# Patient Record
Sex: FEMALE | Race: WHITE | NOT HISPANIC OR LATINO | Employment: OTHER | URBAN - METROPOLITAN AREA
[De-identification: names, ages, dates, MRNs, and addresses within clinical notes are randomized per-mention and may not be internally consistent; named-entity substitution may affect disease eponyms.]

---

## 2017-02-27 ENCOUNTER — TELEPHONE (OUTPATIENT)
Dept: INTERNAL MEDICINE | Facility: CLINIC | Age: 77
End: 2017-02-27

## 2017-02-27 DIAGNOSIS — E78.5 HYPERLIPIDEMIA, UNSPECIFIED HYPERLIPIDEMIA TYPE: ICD-10-CM

## 2017-02-27 DIAGNOSIS — I10 ESSENTIAL HYPERTENSION: Primary | ICD-10-CM

## 2017-02-27 NOTE — TELEPHONE ENCOUNTER
----- Message from Leyla Wilcox sent at 2/27/2017  3:43 PM CST -----  Contact: self   Doctor appointment and lab have been scheduled.  Please link lab orders to the lab appointment.  Date of doctor appointment:  06/29  Physical or EP:  EPP  Date of lab appointment: 06/22

## 2017-04-03 ENCOUNTER — HOSPITAL ENCOUNTER (OUTPATIENT)
Dept: PULMONOLOGY | Facility: CLINIC | Age: 77
Discharge: HOME | End: 2017-04-03
Payer: MEDICARE

## 2017-04-03 ENCOUNTER — OFFICE VISIT (OUTPATIENT)
Dept: TRANSPLANT | Facility: CLINIC | Age: 77
End: 2017-04-03
Payer: MEDICARE

## 2017-04-03 VITALS
HEIGHT: 63 IN | BODY MASS INDEX: 28.13 KG/M2 | SYSTOLIC BLOOD PRESSURE: 156 MMHG | DIASTOLIC BLOOD PRESSURE: 84 MMHG | HEART RATE: 64 BPM | WEIGHT: 158.75 LBS

## 2017-04-03 VITALS — HEIGHT: 63 IN | WEIGHT: 158.31 LBS | BODY MASS INDEX: 28.05 KG/M2

## 2017-04-03 DIAGNOSIS — I10 ESSENTIAL HYPERTENSION: ICD-10-CM

## 2017-04-03 DIAGNOSIS — G47.33 OSA (OBSTRUCTIVE SLEEP APNEA): ICD-10-CM

## 2017-04-03 DIAGNOSIS — I25.10 CORONARY ARTERY DISEASE INVOLVING NATIVE CORONARY ARTERY OF NATIVE HEART WITHOUT ANGINA PECTORIS: ICD-10-CM

## 2017-04-03 DIAGNOSIS — E78.5 HYPERLIPIDEMIA, UNSPECIFIED HYPERLIPIDEMIA TYPE: ICD-10-CM

## 2017-04-03 DIAGNOSIS — I27.20 PULMONARY HYPERTENSION: Primary | ICD-10-CM

## 2017-04-03 PROCEDURE — 94620 PR PULMONARY STRESS TESTING,SIMPLE: CPT | Mod: S$GLB,,, | Performed by: INTERNAL MEDICINE

## 2017-04-03 PROCEDURE — 3079F DIAST BP 80-89 MM HG: CPT | Mod: S$GLB,,, | Performed by: INTERNAL MEDICINE

## 2017-04-03 PROCEDURE — 3077F SYST BP >= 140 MM HG: CPT | Mod: S$GLB,,, | Performed by: INTERNAL MEDICINE

## 2017-04-03 PROCEDURE — 1159F MED LIST DOCD IN RCRD: CPT | Mod: S$GLB,,, | Performed by: INTERNAL MEDICINE

## 2017-04-03 PROCEDURE — 99999 PR PBB SHADOW E&M-EST. PATIENT-LVL III: CPT | Mod: PBBFAC,,, | Performed by: INTERNAL MEDICINE

## 2017-04-03 PROCEDURE — 1157F ADVNC CARE PLAN IN RCRD: CPT | Mod: S$GLB,,, | Performed by: INTERNAL MEDICINE

## 2017-04-03 PROCEDURE — 99499 UNLISTED E&M SERVICE: CPT | Mod: S$GLB,,, | Performed by: INTERNAL MEDICINE

## 2017-04-03 PROCEDURE — 99214 OFFICE O/P EST MOD 30 MIN: CPT | Mod: S$GLB,,, | Performed by: INTERNAL MEDICINE

## 2017-04-03 PROCEDURE — 1160F RVW MEDS BY RX/DR IN RCRD: CPT | Mod: S$GLB,,, | Performed by: INTERNAL MEDICINE

## 2017-04-03 NOTE — PROCEDURES
Maryann Sorenson is a 76 y.o.  female patient, who presents for a 6 minute walk test ordered by MD. Sharath.  The diagnosis is Pulmonary Hypertension.  The patient's BMI is 28.1 kg/m2.  Predicted distance (lower limit of normal) is 271.24 meters.      Test Results:    The test was completed without stopping.   The total time walked was 360 seconds.  During walking, the patient reported:  Dyspnea. The patient used no assistive devices  during testing.     04/03/2017---------Distance: 462.69 meters (1518 feet)     O2 Sat % Supplemental Oxygen Heart Rate Blood Pressure Laurie Scale   Pre-exercise  (Resting) 99 % Room Air 68 bpm 153/76 mmHg 1   During Exercise 98 % Room Air 101 bpm 166/86 mmHg 4   Post-exercise  (Recovery) 99 % Room Air  75 bpm   mmHg       Recovery Time: 49 seconds    Performing nurse/tech: JAYASHREE Espino      PREVIOUS STUDY:   The patient had a previous study.  09/26/2016---------Distance: 518.16 meters (1700 feet)       O2 Sat % Supplemental Oxygen Heart Rate Blood Pressure Laurie Scale   Pre-exercise  (Resting) 98 % Room Air 62 bpm 183/81 mmHg 0.5   During Exercise 99 % Room Air 80 bpm 173/81 mmHg 4   Post-exercise  (Recovery) 99 % Room Air  70 bpm   mmHg           CLINICAL INTERPRETATION:  Six minute walk distance is 462.69 meters (1518 feet) with somewhat heavy dyspnea.  During exercise, there was no significant desaturation while breathing room air.  Blood pressure remained stable and Heart rate increased significantly with walking.  Hypertension was present prior to exercise.  This may represent a tachycardic response to exercise.  The patient did not report non-pulmonary symptoms during exercise.  Since the previous study in September 2016, exercise capacity is unchanged.  Based upon age and body mass index, exercise capacity is normal.

## 2017-04-03 NOTE — PROGRESS NOTES
Subjective:    Patient ID:  Maryann Sorenson is a 76 y.o. female who presents for evaluation of Pulmonary Hypertension.    Congestive Heart Failure   Pertinent negatives include no abdominal pain, change in bowel habit, chest pain, chills, coughing or fever.      76 to woman with HTN, high ca score, NOE referred by Dr Rich to be evaluated for PH after echo 1 yr ago showed PAP 59 and now here for f/u    Since last visit, pt says off and on since the fall she has been getting CP in the middle of her chest, twinges, not strong, dont last long, and has not occurred in the last few weeks. Has had a lot of belching and GI issues- was scoped at , found to have a large hiatal hernia, and she had to have her esophagus dilated, found to have one tiny polyp in her colon. Has bad reflux, gets reflux with nexium. bp log mostly well controlled in 112-130's/70-80's, has had a few in 150/80. Not exercising since she fell, used to do it faithfully, finding it hard to get motivated and feels tired. Went back to the sleep apnea MD in oct but never went back for f/u as her son was having heart surgery at [place]          Six Minute Walk Test  463m ( 518m 9/16, 488  m in June   )                                              O2 sat  99 ->98  %                                                           HR 68 -> 101                                                                BP  153 / 76  ->166 /86                                                         Laurie  1  -> 4    Echo        Results for orders placed or performed in visit on 04/27/16   2D echo with color flow doppler   Result Value Ref Range    EF 60 55 - 65    Diastolic Dysfunction No     Aortic Valve Regurgitation MILD     Est. PA Systolic Pressure 35.04     Mitral Valve Mobility NORMAL     Tricuspid Valve Regurgitation MILD TO MODERATE    The right ventricle is normal in size measuring 2.4 cm at the base in the apical right ventricle-focused view. Global right ventricular  "systolic function appears normal. Tricuspid annular plane systolic excursion (TAPSE) is 2.9 cm.   Tissue Doppler-derived tricuspid annular peak systolic velocity (S prime) is 12.0 cm/s. The estimated PA systolic pressure is 35 mmHg.     6 - Mild aortic regurgitation.     7 - Mild to moderate tricuspid regurgitation.     8 - Intermediate central venous pressure.    JENNIFER 2015    1 - Enlarged ascending aorta.     2 - Normal left ventricular systolic function (EF 60-65%).     3 - Normal left ventricular diastolic function.     4 - Pulmonary hypertension.     5 - Normal right ventricular systolic function .     6 - Mild tricuspid regurgitation.     No evidence of stress induced myocardial ischemia.     EKG  4 /  15  Normal sinus rhythm  Normal ECG    CXR  3 /16   Heart is not enlarged.  The lungs are clear.  Double density seen through the heart with air-fluid level is compatible with a non-reducing hiatal hernia.  Prominent kyphosis with DJD seen in the spine.  No compression fractures in the visualized spine          Review of Systems   Constitution: Negative for chills, fever, malaise/fatigue and weight gain.   HENT: Negative.    Eyes: Negative.    Cardiovascular: Negative for chest pain, dyspnea on exertion, leg swelling, near-syncope, orthopnea, palpitations, paroxysmal nocturnal dyspnea and syncope.   Respiratory: Negative for cough and shortness of breath.    Endocrine: Negative.    Skin: Negative.    Musculoskeletal: Negative.    Gastrointestinal: Negative for bloating, abdominal pain and change in bowel habit.   Neurological: Negative for dizziness and light-headedness.   Psychiatric/Behavioral: Negative for depression.        Objective:    BP (!) 156/84  Pulse 64  Ht 5' 3" (1.6 m)  Wt 72 kg (158 lb 11.7 oz)  BMI 28.12 kg/m2      Physical Exam   Constitutional: She is oriented to person, place, and time. She appears well-developed and well-nourished.   HENT:   Head: Normocephalic and atraumatic.   Eyes: Right " eye exhibits no discharge. Left eye exhibits no discharge.   Neck: Neck supple. No JVD present. No thyromegaly present.   Cardiovascular: Normal rate and regular rhythm.  Exam reveals no gallop and no friction rub.    No murmur heard.       Pulmonary/Chest: Effort normal and breath sounds normal. No respiratory distress. She has no wheezes. She has no rales.   Abdominal: Soft. Bowel sounds are normal. She exhibits no distension. There is no tenderness.   Musculoskeletal: Normal range of motion. She exhibits no edema or tenderness.   Neurological: She is alert and oriented to person, place, and time. No cranial nerve deficit. Coordination normal.   Skin: Skin is warm and dry. No rash noted.   Psychiatric: She has a normal mood and affect. Judgment and thought content normal.           Chemistry        Component Value Date/Time     04/03/2017 0921    K 4.5 04/03/2017 0921     04/03/2017 0921    CO2 29 04/03/2017 0921    BUN 11 04/03/2017 0921    CREATININE 0.7 04/03/2017 0921    GLU 87 04/03/2017 0921        Component Value Date/Time    CALCIUM 9.0 04/03/2017 0921    ALKPHOS 78 04/03/2017 0921    AST 27 04/03/2017 0921    ALT 15 04/03/2017 0921    BILITOT 0.3 04/03/2017 0921            Magnesium   Date Value Ref Range Status   06/06/2016 2.3 1.6 - 2.6 mg/dL Final       Lab Results   Component Value Date    WBC 2.93 (L) 04/03/2017    HGB 11.7 (L) 04/03/2017    HCT 33.9 (L) 04/03/2017    MCV 84 04/03/2017     04/03/2017       No results found for: INR, PROTIME    BNP   Date Value Ref Range Status   04/03/2017 86 0 - 99 pg/mL Final     Comment:     Values of less than 100 pg/ml are consistent with non-CHF populations.   09/26/2016 43 0 - 99 pg/mL Final     Comment:     Values of less than 100 pg/ml are consistent with non-CHF populations.   06/06/2016 57 0 - 99 pg/mL Final     Comment:     Values of less than 100 pg/ml are consistent with non-CHF populations.       No results found for:  LDH          Assessment:       1. Pulmonary hypertension on previous echo, now normal PAP, normal RV size and fxn- euvolemic on exam, low BNP- BP mostly well controlled, occasionally goes up into the 150's   2. Shortness of breath    3. Essential hypertension    4. Coronary artery disease involving native coronary artery of native heart without angina pectoris    5. Hyperlipidemia    6. NOE- untreated       Plan:      No changes today- needs a new general cardiologist as she said Dr Rich is no longer seeing pts in Clinic- will refer to Dr Nichols in [place]    Keep salt intake to under 2000 mg sodium, fluids to under 2 L (64 oz)    Continue tracking blood pressure at home and bring log to next visit. Trying to balance her low BP at rest with severe HTN on exercise.    Still need to schedule CPAP titration    Will defer [facility] at this time as my suspicion for PH in light of her most recent echo is low- again we talked about need for routine exercise    F/u 6 mo with labs 6mw and echo (would like to see PAP assessed once she's on CPAP) if echo suggests PH than will follow with RHC

## 2017-04-03 NOTE — PATIENT INSTRUCTIONS
CPAP titration    Refer to Dr Gavin to establish care    No changes to your heart medicines    Echo next time once you are wearing a CPAP machine for a few months (or oxygen at night if they think that's all you need)

## 2017-04-03 NOTE — MR AVS SNAPSHOT
[facility]  [address]  [facility] [address]  Phone: [phone]                  Maryann Sorenson   4/3/2017 10:30 AM   Office Visit    Description:  Female : 1940   Provider:  Iris Early MD   Department:  [facility]           Reason for Visit     Congestive Heart Failure           Diagnoses this Visit        Comments    Pulmonary hypertension    -  Primary     NOE (obstructive sleep apnea)         Essential hypertension         Coronary artery disease involving native coronary artery of native heart without angina pectoris         Hyperlipidemia, unspecified hyperlipidemia type                To Do List           Future Appointments        Provider Department Dept Phone    2017 8:30 AM Katja Recinos MD [facility] [phone]    2017 9:00 AM Heydi Gavin MD [place] - Cardiology [phone]    2017 8:30 [facility] [place] [facility] [phone]    2017 8:45 [facility] [place] [facility] [phone]    2017 9:00 AM Chucky Carlson MD [place] - Internal Medicine [phone]      Goals (5 Years of Data)     None      Follow-Up and Disposition     Return in about 6 months (around 10/3/2017).      [facility]     [facility] Care [facility] -  Assistance  Unless otherwise directed by your provider, please contact [facility], our nurse care line that is available for  assistance.     Registered nurses in the [facility] provide: appointment scheduling, clinical advisement, health education, and other advisory services.  Call: [phone] (toll free)               Medications           Message regarding Medications     Verify the changes and/or additions to your medication regime listed below are the same as discussed with your clinician today.  If any of these changes or additions are incorrect, please notify your healthcare provider.            "  Verify that the below list of medications is an accurate representation of the medications you are currently taking.  If none reported, the list may be blank. If incorrect, please contact your healthcare provider. Carry this list with you in case of emergency.           Current Medications     aspirin 81 mg Tab Take 1 tablet by mouth Daily. 1 Tablet Oral Every day    azithromycin (Z-RUSTAM) 250 MG tablet Take 2 tablets by mouth on day 1; Take 1 tablet by mouth on days 2-5    CA CITRATE/MGOX/VIT D3/B6/MIN (CITRACAL PLUS ORAL) Take by mouth.    coenzyme Q10 (CO Q-10) 200 mg capsule Take 1 capsule by mouth Daily. 1 Capsule Oral Every day    CRESTOR 40 mg Tab TAKE 1 TABLET EVERY DAY    dextromethorphan-guaifenesin  mg (MUCINEX DM)  mg per 12 hr tablet Take 1 tablet by mouth every 12 (twelve) hours.    diazepam (VALIUM) 2 MG tablet Take 1 tablet by mouth as needed. 1 Tablet Oral .  For dizziness    esomeprazole (NEXIUM) 40 MG capsule 1 Capsule, Delayed Release(E.C.) Oral Every day    fexofenadine (ALLEGRA) 180 MG tablet Take 1 tablet by mouth Daily. 1 Tablet Oral Every day    fluticasone (FLONASE) 50 mcg/actuation nasal spray 2 sprays by Each Nare route once daily.    GLUCOSAMINE SULFATE ORAL Take 1 capsule by mouth Daily. 1 Capsule Oral Every day    LACTOBACILLUS RHAMNOSUS GG (PROBIOTIC ORAL) Take 1 capsule by mouth Daily. 1 Capsule Oral Every day    lisinopril (PRINIVIL,ZESTRIL) 20 MG tablet Take 1 tablet (20 mg total) by mouth 2 (two) times daily.    magnesium 250 mg Tab Take 250 mg by mouth once daily.    MULTIVITAMIN ORAL Take 2 tablets by mouth Daily. 2 Tablet Oral Every day    omega-3 fatty acids-vitamin E (FISH OIL) 1,000 mg Cap Take 2 capsules by mouth once daily.             Clinical Reference Information           Your Vitals Were     BP Pulse Height Weight BMI    156/84 64 5' 3" (1.6 m) 72 kg (158 lb 11.7 oz) 28.12 kg/m2      Blood Pressure          Most Recent Value    BP  (!)  156/84    "   Allergies as of 4/3/2017     Augmentin [Amoxicillin-pot Clavulanate]    Codeine    Doxycycline    Lyrica [Pregabalin]    Relafen [Nabumetone]    Sulfa Dyne      Immunizations Administered on Date of Encounter - 4/3/2017     None      Orders Placed During Today's Visit      Normal Orders This Visit    Ambulatory consult to Cardiology     Future Labs/Procedures Expected by Expires    2D echo with color flow doppler  As directed 4/3/2018    CPAP titration (Must have diagnosis of NOE from previous sleep study.)  As directed 4/3/2018      [facility] Sign-Up     Activating your [facility] account is as easy as 1-2-3!     1) Visit [email or web address], select Sign Up Now, enter this activation code and your date of birth, then select Next.  [email or web address]  Expires: 5/18/2017 11:07 AM      2) Create a username and password to use when you visit [facility] in the future and select a security question in case you lose your password and select Next.    3) Enter your e-mail address and click Sign Up!    Additional Information  If you have questions, please e-mail [email or web address] or call [phone] to talk to our [facility] staff. Remember, [facility] is NOT to be used for urgent needs. For medical emergencies, dial 911.         Instructions    CPAP titration    Refer to Dr Gavin to establish care    No changes to your heart medicines    Echo next time once you are wearing a CPAP machine for a few months (or oxygen at night if they think that's all you need)       Language Assistance Services     ATTENTION: Language assistance services are available, free of charge. Please call [phone].      ATENCIÓN: Si habla español, tiene a hampton disposición servicios gratuitos de asistencia lingüística. Llame al [phone].     CHÚ Ý: N?u b?n nói Ti?ng Vi?t, có các d?ch v? h? tr? ngôn ng? mi?n phí dành cho b?n. G?i s? [phone].         [facility] complies with applicable Federal civil rights laws and does not  discriminate on the basis of race, color, national origin, age, disability, or sex.

## 2017-04-05 RX ORDER — ROSUVASTATIN CALCIUM 40 MG/1
40 TABLET, COATED ORAL DAILY
Qty: 90 TABLET | Refills: 3 | Status: SHIPPED | OUTPATIENT
Start: 2017-04-05 | End: 2018-03-28 | Stop reason: SDUPTHER

## 2017-04-24 ENCOUNTER — TELEPHONE (OUTPATIENT)
Dept: SLEEP MEDICINE | Facility: CLINIC | Age: 77
End: 2017-04-24

## 2017-05-02 RX ORDER — ESOMEPRAZOLE MAGNESIUM 40 MG/1
CAPSULE, DELAYED RELEASE ORAL
Qty: 90 CAPSULE | Refills: 3 | Status: SHIPPED | OUTPATIENT
Start: 2017-05-02 | End: 2018-04-23 | Stop reason: SDUPTHER

## 2017-05-02 NOTE — TELEPHONE ENCOUNTER
----- Message from Anna Rios sent at 5/1/2017  2:22 PM CDT -----  Contact: self   RX request - refill or new RX.  Is this a refill or new RX:  New   RX name and strength: esomeprazole (NEXIUM) 40 MG capsule  Directions: 1 Capsule, Delayed Release(E.C.) Oral Every day  Is this a 30 day or 90 day RX:  90  Pharmacy name and phone #: [facility]  [phone] (Phone)  [phone] (Fax)  Comments:        .

## 2017-05-02 NOTE — TELEPHONE ENCOUNTER
refill request sent to dr fonseca and rx will be sent today 5/2/17    Please approve.  Thanks jordon

## 2017-05-24 ENCOUNTER — OFFICE VISIT (OUTPATIENT)
Dept: SLEEP MEDICINE | Facility: CLINIC | Age: 77
End: 2017-05-24
Payer: MEDICARE

## 2017-05-24 VITALS
SYSTOLIC BLOOD PRESSURE: 135 MMHG | DIASTOLIC BLOOD PRESSURE: 84 MMHG | HEART RATE: 60 BPM | BODY MASS INDEX: 28.29 KG/M2 | HEIGHT: 63 IN | WEIGHT: 159.63 LBS

## 2017-05-24 DIAGNOSIS — G47.33 OSA (OBSTRUCTIVE SLEEP APNEA): ICD-10-CM

## 2017-05-24 PROCEDURE — 1160F RVW MEDS BY RX/DR IN RCRD: CPT | Mod: S$GLB,,, | Performed by: PSYCHIATRY & NEUROLOGY

## 2017-05-24 PROCEDURE — 99214 OFFICE O/P EST MOD 30 MIN: CPT | Mod: S$GLB,,, | Performed by: PSYCHIATRY & NEUROLOGY

## 2017-05-24 PROCEDURE — 1159F MED LIST DOCD IN RCRD: CPT | Mod: S$GLB,,, | Performed by: PSYCHIATRY & NEUROLOGY

## 2017-05-24 PROCEDURE — 99999 PR PBB SHADOW E&M-EST. PATIENT-LVL III: CPT | Mod: PBBFAC,,, | Performed by: PSYCHIATRY & NEUROLOGY

## 2017-05-24 PROCEDURE — 3079F DIAST BP 80-89 MM HG: CPT | Mod: S$GLB,,, | Performed by: PSYCHIATRY & NEUROLOGY

## 2017-05-24 PROCEDURE — 3075F SYST BP GE 130 - 139MM HG: CPT | Mod: S$GLB,,, | Performed by: PSYCHIATRY & NEUROLOGY

## 2017-05-24 PROCEDURE — 99499 UNLISTED E&M SERVICE: CPT | Mod: S$GLB,,, | Performed by: PSYCHIATRY & NEUROLOGY

## 2017-05-24 PROCEDURE — 1157F ADVNC CARE PLAN IN RCRD: CPT | Mod: S$GLB,,, | Performed by: PSYCHIATRY & NEUROLOGY

## 2017-05-24 NOTE — LETTER
May 24, 2017      Iris Early MD  [address]  [facility] [address]           [facility]  [address] [facility] [address]  Phone: [phone]  Fax: [phone]          Patient: Maryann Sorenson   MR Number: [id number]   YOB: 1940   Date of Visit: 5/24/2017       Dear Dr. Iris Early:    Thank you for referring Maryann Sorenson to me for evaluation. Attached you will find relevant portions of my assessment and plan of care.    If you have questions, please do not hesitate to call me. I look forward to following Maryann Sorenson along with you.    Sincerely,    Katja Recinos MD    Enclosure  CC:  No Recipients    If you would like to receive this communication electronically, please contact [email or web address] or [phone] to request more information on [employer] access.    For providers and/or their staff who would like to refer a patient to [facility], please contact us through our one-stop-shop provider referral line, [facility], at [phone].    If you feel you have received this communication in error or would no longer like to receive these types of communications, please e-mail [email or web address]

## 2017-05-24 NOTE — PATIENT INSTRUCTIONS
[employer] 677-231 Heating Pad plus Massage    by [employer]     More options available:   $115.38 Other Eli   3.8 out of 5 stars [phone]   Product Description  ... The [employer] Massaging Heating Pad provides the ultimate sense of ...      DME [facility]:  [phone] - [facility]:  or [phone] (ext [phone])- [facility]

## 2017-05-24 NOTE — PROGRESS NOTES
Maryann Sorenson  was seen at the request of  Iris Early MD for sleep evaluation.    08/11/2016 INITIAL HISTORY OF PRESENT ILLNESS:  Maryann Sorenson is a 76 y.o. female is here to be evaluated for a sleep disorder.       CHIEF COMPLAINT:      The patient's complaints include excessive daytime fatigue, snoring,  witnessed breathing pauses,  gasping for air in sleep and interrupted sleep since  Over 10 years ago.    Being evaluated by Dr. Early for pulmonary HTN.     Reports  dry mouth and sore throat  Denies nasal congestion   Denies  morning headaches  Reports occasional  interrupted sleep  Reports frequent leg movements - only when watching TV. Urge to move her legs. Got it from her father.   Denies symptoms concerning for parasomnia    The ESS ([place] Sleepiness Score) taken on initial visit is 6 /24    Bedroom is dark and quiet.    FH: father and 2 sisters - RLS.    INTERVAL HISTORY:    10/12/2016  The patient has not presented any new complaints since the previous visit.   Comes to discuss PSG. Very poor sleep efficiency could have caused underestimation of test results (positive to mild to moderate sleep apnea). Iron test was normal.   Nature's wellness oil at the base of the spine - using for RLS - helps at night - never tried it in the evening.     05/24/2017: Maryann Sorenson comes back to talk about treatment options. Prefers CPAP with nasal pillows mask. Pros and cons were discussed.       SLEEP ROUTINE AND LIFESTYLE 05/24/2017 :    Occupation: retired    Bed partner:  - sleeps in another room.     Time to bed: 11  Sleep onset latency: 5-10 min  Disruptions or awakenings: 1-2  Time to fall back into sleep: 5 min   Wakeup time: 7 AM   Perceived sleep quality: 3/5  Perceived total sleep time:  7.5  hours.  Daytime naps: sometimes not intentional  Weekend sleep routine: till 7:10   Exercise routine: no - lost routine since her trip to [place].  Caffeine: usually decaf     PREVIOUS SLEEP STUDIES:  "    PSG 8/25/16: Significant Obstructive sleep apnea (NOE) with AHI (apnea hypopnea Index) of 8.9 and SaO2 of 87% (weight  161 lbs).RDI 15.    DME:       PAST MEDICAL HISTORY:    Active Ambulatory Problems     Diagnosis Date Noted    Meniere's disease, unspecified 03/07/2010    Hyperlipidemia     Hypertension     CAD (coronary artery disease) 11/01/2012    Fullness in head 10/14/2013    Agatston coronary artery calcium score greater than 400 - 577 10/23/2013    Headache, occipital 10/24/2013    Cast discomfort 06/21/2014    Pre-operative examination for internal medicine 06/24/2014    GERD (gastroesophageal reflux disease) 10/07/2014    Dysphagia 10/07/2014    Sore throat 10/07/2014    Bronchitis 03/19/2015    Sinusitis 03/19/2015    Shortness of breath 04/15/2015    Generalized muscle ache 06/29/2015    Back pain of thoracolumbar region 03/08/2016    Essential hypertension 04/21/2016    Pulmonary hypertension 05/03/2016    NOE (obstructive sleep apnea)      Resolved Ambulatory Problems     Diagnosis Date Noted    Annual physical exam 06/20/2013     Past Medical History:   Diagnosis Date    CAD (coronary artery disease) 11/1/2012    GERD (gastroesophageal reflux disease)     Hyperlipidemia     Hypertension                 PAST SURGICAL HISTORY:    Past Surgical History:   Procedure Laterality Date    broken wrist      "put plate in it"    DILATION AND CURETTAGE OF UTERUS      tonsillectomy      TONSILLECTOMY           FAMILY HISTORY:                Family History   Problem Relation Age of Onset    Cancer Mother     Colon cancer Mother        SOCIAL HISTORY:          Tobacco:   History   Smoking Status    Never Smoker   Smokeless Tobacco    Never Used       alcohol use:    History   Alcohol Use    0.0 oz/week     Comment: occasionally/ not weekly                   ALLERGIES:    Allergies   Allergen Reactions    Augmentin [Amoxicillin-Pot Clavulanate] Diarrhea     Given march 2016 "    Codeine      Other reaction(s): Unknown, tolerates hydrocodone june 2014    Doxycycline      Other reaction(s): Unknown    Lyrica [Pregabalin] Other (See Comments)     Eye irritation    Relafen [Nabumetone] Diarrhea    Sulfa Dyne      Other reaction(s): Unknown       CURRENT MEDICATIONS:    Current Outpatient Prescriptions   Medication Sig Dispense Refill    aspirin 81 mg Tab Take 1 tablet by mouth Daily. 1 Tablet Oral Every day      azithromycin (Z-RUSTAM) 250 MG tablet Take 2 tablets by mouth on day 1; Take 1 tablet by mouth on days 2-5 6 tablet 0    CA CITRATE/MGOX/VIT D3/B6/MIN (CITRACAL PLUS ORAL) Take by mouth.      coenzyme Q10 (CO Q-10) 200 mg capsule Take 1 capsule by mouth Daily. 1 Capsule Oral Every day      dextromethorphan-guaifenesin  mg (MUCINEX DM)  mg per 12 hr tablet Take 1 tablet by mouth every 12 (twelve) hours.      diazepam (VALIUM) 2 MG tablet Take 1 tablet by mouth as needed. 1 Tablet Oral .  For dizziness      esomeprazole (NEXIUM) 40 MG capsule 1 Capsule, Delayed Release(E.C.) Oral Every day 90 capsule 3    fexofenadine (ALLEGRA) 180 MG tablet Take 1 tablet by mouth Daily. 1 Tablet Oral Every day      GLUCOSAMINE SULFATE ORAL Take 1 capsule by mouth Daily. 1 Capsule Oral Every day      LACTOBACILLUS RHAMNOSUS GG (PROBIOTIC ORAL) Take 1 capsule by mouth Daily. 1 Capsule Oral Every day      lisinopril (PRINIVIL,ZESTRIL) 20 MG tablet Take 1 tablet (20 mg total) by mouth 2 (two) times daily. 180 tablet 3    magnesium 250 mg Tab Take 250 mg by mouth once daily.      MULTIVITAMIN ORAL Take 2 tablets by mouth Daily. 2 Tablet Oral Every day      omega-3 fatty acids-vitamin E (FISH OIL) 1,000 mg Cap Take 2 capsules by mouth once daily.        rosuvastatin (CRESTOR) 40 MG Tab Take 1 tablet (40 mg total) by mouth once daily. 90 tablet 3    fluticasone (FLONASE) 50 mcg/actuation nasal spray 2 sprays by Each Nare route once daily.  6     No current facility-administered  "medications for this visit.                       REVIEW OF SYSTEMS:   Sleep related symptoms as per HPI    denies weight gain  Reports dyspnea  Denies palpitations  Reports acid reflux   Reports polyuria 3-4-> now better on low salt diet.  Denies  mood diturbance  Denies  anemia  Reports occasional  muscle pain  Denies  Gait imbalance    Otherwise, a balance of 10 systems reviewed is negative.    PHYSICAL EXAM:  /84 (BP Location: Left arm, Patient Position: Sitting, BP Method: Automatic)   Pulse 60   Ht 5' 3" (1.6 m)   Wt 72.4 kg (159 lb 9.8 oz)   BMI 28.27 kg/m²   GENERAL: Overweight body habitus, well groomed.  HEENT:   HEENT:  Conjunctivae are non-erythematous; Pupils equal, round, and reactive to light; Nose is symmetrical; Nasal mucosa is pink and moist; Septum is midline; Inferior turbinates are hypertrophied; Nasal airflow is normal; Posterior pharynx is pink; Modified Mallampati:II-III; Posterior palate is low; Tonsils not visualized; Uvula is normal and pink;Tongue is enlarged; Dentition is fair; No TMJ tenderness; Jaw opening and protrusion without click and without discomfort.  NECK: Supple. Neck circumference is 16 inches. No thyromegaly. No palpable nodes.     SKIN: On face and neck: No abrasions, no rashes, no lesions.  No subcutaneous nodules are palpable.  RESPIRATORY: Chest is clear to auscultation.  Normal chest expansion and non-labored breathing at rest.  CARDIOVASCULAR: Normal S1, S2.  No murmurs, gallops or rubs. No carotid bruits bilaterally.  No edema. No clubbing. No cyanosis.    NEURO: Oriented to time, place and person. Normal attention span and concentration. Gait normal.    PSYCH: Affect is full. Mood is normal  MUSCULOSKELETAL: Moves 4 extremities. Gait normal.       Using [facility]:       ASSESSMENT:    1. NOE - moderate by RDI. Poor sleep efficiency on the nioght of the study could have led to underestimation of NOE severity.  The patient symptomatically has  excessive " "daytime sleepiness, snoring,  witnessed breathing pauses, excessive daytime fatigue, gasping for air in sleep and interrupted sleep  with exam findings of "a crowded oral airway and elevated body mass index. The patient has medical co-morbidities of CAD and hyperlipidemia,  which can be worsened by NOE. This warrants treatment.     2. RLS - mild symptoms.Mostly while watching TV in the evening.  Iron/Ferritin parameters were normal. No significant PLMS were reported.  Now using a Nature Wellness Oil with some improvement. She would like to withhold medical treatment of RLS for now. Possibility of medical treatment was discussed with the patient.       PLAN:        Treatment: prescription  for CPAP 6 - 18 cm with the mask of a patient's choice was given to the patient.    Education: During our discussion today, we talked about the etiology of obstructive sleep apnea as well as the potential ramifications of untreated sleep apnea, which could include daytime sleepiness, hypertension, heart disease and/or stroke.      We discussed potential treatment options, which could include weight loss, body positioning, continuous positive airway pressure (CPAP), or referral for surgical consideration. The patient preferred CPAP option.     Discussed purpose of PAP therapy, health benefits of CPAP, as well as the potential ramifications of untreated sleep apnea, which could include daytime sleepiness, hypertension, heart disease and/or stroke. An AHI of 15 is associated with increased risk CVD.     The patient should avoid ETOH and sedatives at night, as it tends to aggravate NOE. Regular replacement of CPAP mask, tubing and filter was recommended.    Precautions: The patient was advised to abstain from driving should he feel sleepy or drowsy.    Follow up: MD/NP after 1 month of PAP use.    Thank you for allowing me the opportunity to participate in the care of your patient.    "

## 2017-05-30 ENCOUNTER — OFFICE VISIT (OUTPATIENT)
Dept: CARDIOLOGY | Facility: CLINIC | Age: 77
End: 2017-05-30
Payer: MEDICARE

## 2017-05-30 VITALS
DIASTOLIC BLOOD PRESSURE: 86 MMHG | BODY MASS INDEX: 27.97 KG/M2 | SYSTOLIC BLOOD PRESSURE: 150 MMHG | WEIGHT: 157.88 LBS | HEIGHT: 63 IN | HEART RATE: 74 BPM

## 2017-05-30 DIAGNOSIS — E78.5 HYPERLIPIDEMIA, UNSPECIFIED HYPERLIPIDEMIA TYPE: ICD-10-CM

## 2017-05-30 DIAGNOSIS — I27.20 PULMONARY HYPERTENSION: ICD-10-CM

## 2017-05-30 DIAGNOSIS — R93.1 AGATSTON CORONARY ARTERY CALCIUM SCORE GREATER THAN 400: ICD-10-CM

## 2017-05-30 DIAGNOSIS — I25.10 ATHEROSCLEROSIS OF NATIVE CORONARY ARTERY OF NATIVE HEART WITHOUT ANGINA PECTORIS: Primary | ICD-10-CM

## 2017-05-30 DIAGNOSIS — G47.33 OSA (OBSTRUCTIVE SLEEP APNEA): ICD-10-CM

## 2017-05-30 DIAGNOSIS — I10 ESSENTIAL HYPERTENSION: ICD-10-CM

## 2017-05-30 PROCEDURE — 1159F MED LIST DOCD IN RCRD: CPT | Mod: S$GLB,,, | Performed by: INTERNAL MEDICINE

## 2017-05-30 PROCEDURE — 1126F AMNT PAIN NOTED NONE PRSNT: CPT | Mod: S$GLB,,, | Performed by: INTERNAL MEDICINE

## 2017-05-30 PROCEDURE — 99499 UNLISTED E&M SERVICE: CPT | Mod: S$GLB,,, | Performed by: INTERNAL MEDICINE

## 2017-05-30 PROCEDURE — 99214 OFFICE O/P EST MOD 30 MIN: CPT | Mod: S$GLB,,, | Performed by: INTERNAL MEDICINE

## 2017-05-30 PROCEDURE — 99999 PR PBB SHADOW E&M-EST. PATIENT-LVL III: CPT | Mod: PBBFAC,,, | Performed by: INTERNAL MEDICINE

## 2017-05-30 RX ORDER — GUAIFENESIN 600 MG/1
1200 TABLET, EXTENDED RELEASE ORAL
COMMUNITY

## 2017-05-30 NOTE — LETTER
May 30, 2017      Iris Early MD  [address] [facility] [facility] [address]           [place] [facility]  [address] [facility]  [place] [address]  Phone: [phone]          Patient: Maryann Sorenson   MR Number: [id number]   YOB: 1940   Date of Visit: 5/30/2017       Dear Dr. Iris Early:    Thank you for referring Maryann Sorenson to me for evaluation. Attached you will find relevant portions of my assessment and plan of care.    If you have questions, please do not hesitate to call me. I look forward to following Maryann Sorenson along with you.    Sincerely,    Heydi Gavin MD    Enclosure  CC:  No Recipients    If you would like to receive this communication electronically, please contact [email or web address] or [phone] to request more information on [facility] access.    For providers and/or their staff who would like to refer a patient to [facility], please contact us through our one-stop-shop provider referral line, [facility], at [phone].    If you feel you have received this communication in error or would no longer like to receive these types of communications, please e-mail [email or web address]

## 2017-05-30 NOTE — PROGRESS NOTES
"Subjective:   Patient ID:  Maryann Sorenson is a 76 y.o. female who presents for evaulation of Coronary Artery Disease      HPI: This is a former patient of Gordo Rodriguez and Hilario, wo was followed for high CAC (>500) with DLP and hypertensive heart disease.  Stress echo in 2015 was negative for ischemia, but showed elevated PA pressure (58mmHg), Subsequent CFD showed mildly elevated PA pressure (38mm Hg). Patient was referred to Dr Early for an evaluation of PH.  She was also diagnosed with NOE, but has not been fitted for C-PAP yet. She used to walk daily, but after suffering fall and fracture of the left and she stopped walking.    Patient c/o shortness of breath on exertion and atypical chest discomort.  6 min was normal except for elevated BP.    Blood work indicates mild degree of anemia.  There is a history of hiatal hernia, GI reflux, esophageal dilatation, colonic polyp.    Past Medical History:   Diagnosis Date    CAD (coronary artery disease) 11/1/2012    GERD (gastroesophageal reflux disease)     Hyperlipidemia     Hypertension        Past Surgical History:   Procedure Laterality Date    broken wrist      "put plate in it"    DILATION AND CURETTAGE OF UTERUS      tonsillectomy      TONSILLECTOMY         Social History     Social History    Marital status:      Spouse name: N/A    Number of children: N/A    Years of education: N/A     Social History Main Topics    Smoking status: Never Smoker    Smokeless tobacco: Never Used    Alcohol use 0.0 oz/week      Comment: occasionally/ not weekly    Drug use: No    Sexual activity: Not Currently     Other Topics Concern    None     Social History Narrative    None       Review of patient's allergies indicates:   Allergen Reactions    Augmentin [amoxicillin-pot clavulanate] Diarrhea     Given march 2016    Codeine      Other reaction(s): Unknown, tolerates hydrocodone june 2014    Doxycycline      Other reaction(s): Unknown    Lyrica " [pregabalin] Other (See Comments)     Eye irritation    Relafen [nabumetone] Diarrhea    Sulfa dyne      Other reaction(s): Unknown       Lab Results   Component Value Date     04/03/2017    K 4.5 04/03/2017     04/03/2017    CO2 29 04/03/2017    BUN 11 04/03/2017    CREATININE 0.7 04/03/2017    GLU 87 04/03/2017    MG 2.3 06/06/2016    AST 27 04/03/2017    ALT 15 04/03/2017    ALBUMIN 3.7 04/03/2017    PROT 6.6 04/03/2017    BILITOT 0.3 04/03/2017    WBC 2.93 (L) 04/03/2017    HGB 11.7 (L) 04/03/2017    HCT 33.9 (L) 04/03/2017    MCV 84 04/03/2017     04/03/2017    TSH 2.134 06/14/2016    CHOL 156 06/14/2016    HDL 69 06/14/2016    LDLCALC 74.6 06/14/2016    TRIG 62 06/14/2016       Review of Systems   Constitution: Positive for malaise/fatigue.   HENT: Positive for headaches.    Eyes: Negative.    Cardiovascular: Positive for chest pain and dyspnea on exertion. Negative for claudication, irregular heartbeat, leg swelling, near-syncope, palpitations and syncope.   Respiratory: Negative.  Negative for cough, shortness of breath, snoring and wheezing.    Endocrine: Negative.  Negative for cold intolerance, heat intolerance, polydipsia, polyphagia and polyuria.   Skin: Negative.    Musculoskeletal: Positive for arthritis, back pain and falls.   Gastrointestinal: Positive for heartburn.   Genitourinary: Negative.    Neurological: Positive for dizziness, loss of balance, numbness and paresthesias.   Psychiatric/Behavioral: Positive for depression.       Patient's Medications   New Prescriptions    No medications on file   Previous Medications    ASPIRIN 81 MG TAB    Take 1 tablet by mouth Daily. 1 Tablet Oral Every day    CA CITRATE/MGOX/VIT D3/B6/MIN (CITRACAL PLUS ORAL)    Take by mouth.    COENZYME Q10 (CO Q-10) 200 MG CAPSULE    Take 1 capsule by mouth Daily. 1 Capsule Oral Every day    DIAZEPAM (VALIUM) 2 MG TABLET    Take 1 tablet by mouth as needed. 1 Tablet Oral .  For dizziness     "ESOMEPRAZOLE (NEXIUM) 40 MG CAPSULE    1 Capsule, Delayed Release(E.C.) Oral Every day    FEXOFENADINE (ALLEGRA) 180 MG TABLET    Take 1 tablet by mouth Daily. 1 Tablet Oral Every day    GLUCOSAMINE SULFATE ORAL    Take 1,000 mg by mouth Daily. 1 Capsule Oral Every day    GUAIFENESIN (MUCINEX) 600 MG 12 HR TABLET    Take 1,200 mg by mouth 2 (two) times daily.    LACTOBACILLUS RHAMNOSUS GG (PROBIOTIC ORAL)    Take 1 capsule by mouth Daily. 1 Capsule Oral Every day    LISINOPRIL (PRINIVIL,ZESTRIL) 20 MG TABLET    Take 1 tablet (20 mg total) by mouth 2 (two) times daily.    MAGNESIUM 250 MG TAB    Take 250 mg by mouth once daily.    MULTIVIT-IRON-FA-CALCIUM-MINS (WOMEN'S ONE DAILY) 18 MG IRON-400 MCG-500 MG CA TAB    Take by mouth once.    OMEGA-3 FATTY ACIDS-VITAMIN E (FISH OIL) 1,000 MG CAP    Take 2 capsules by mouth once daily.      ROSUVASTATIN (CRESTOR) 40 MG TAB    Take 1 tablet (40 mg total) by mouth once daily.   Modified Medications    No medications on file   Discontinued Medications    AZITHROMYCIN (Z-RUSTAM) 250 MG TABLET    Take 2 tablets by mouth on day 1; Take 1 tablet by mouth on days 2-5    DEXTROMETHORPHAN-GUAIFENESIN  MG (MUCINEX DM)  MG PER 12 HR TABLET    Take 1 tablet by mouth every 12 (twelve) hours.    FLUTICASONE (FLONASE) 50 MCG/ACTUATION NASAL SPRAY    2 sprays by Each Nare route once daily.    MULTIVITAMIN ORAL    Take 2 tablets by mouth Daily. 2 Tablet Oral Every day       Objective:   Physical Exam   Constitutional: She is oriented to person, place, and time. She appears well-developed and well-nourished.   BP (!) 150/86   Pulse 74   Ht 5' 3" (1.6 m)   Wt 71.6 kg (157 lb 13.6 oz)   BMI 27.96 kg/m²      HENT:   Head: Normocephalic.   Eyes: Pupils are equal, round, and reactive to light.   Neck: Normal range of motion. Neck supple. Carotid bruit is not present. No thyromegaly present.   Cardiovascular: Normal rate, regular rhythm, normal heart sounds and intact distal pulses.  " Exam reveals no gallop and no friction rub.    No murmur heard.  Pulses:       Carotid pulses are 2+ on the right side, and 2+ on the left side.       Radial pulses are 2+ on the right side, and 2+ on the left side.        Femoral pulses are 2+ on the right side, and 2+ on the left side.       Popliteal pulses are 2+ on the right side, and 2+ on the left side.        Dorsalis pedis pulses are 2+ on the right side, and 2+ on the left side.        Posterior tibial pulses are 2+ on the right side, and 2+ on the left side.   Pulmonary/Chest: Effort normal and breath sounds normal. No respiratory distress. She has no wheezes. She has no rales. She exhibits no tenderness.   Abdominal: Soft. Bowel sounds are normal.   Musculoskeletal: Normal range of motion. She exhibits no edema.   Neurological: She is alert and oriented to person, place, and time.   Skin: Skin is warm and dry.   Psychiatric: She has a normal mood and affect.   Nursing note and vitals reviewed.      Assessment and Plan:     Problem List Items Addressed This Visit        Cardiology Problems    Hyperlipidemia    Essential hypertension    Pulmonary hypertension    Atherosclerosis of native coronary artery of native heart without angina pectoris - Primary       Other    Agatston coronary artery calcium score greater than 400 - 577    NOE (obstructive sleep apnea)      Other Visit Diagnoses    None.       Suspect that patient's symptoms and elevated pulmonary pressure are related to exertional HFpEF.  Would consider Aldactone, but after she gets treated for NOE and status is reviewed.  In the meantime establishing exercise routine would be beneficial.  Dr. Early ordered repeat CFD. Will review and advise.  May need CPX.  For now continue on the present regimen.  Keep BP log and call if out of range.      No Follow-up on file.

## 2017-06-18 ENCOUNTER — HOSPITAL ENCOUNTER (EMERGENCY)
Facility: HOSPITAL | Age: 77
Discharge: HOME | End: 2017-06-18
Attending: EMERGENCY MEDICINE
Payer: MEDICARE

## 2017-06-18 VITALS
OXYGEN SATURATION: 99 % | BODY MASS INDEX: 28.17 KG/M2 | TEMPERATURE: 97 F | DIASTOLIC BLOOD PRESSURE: 82 MMHG | HEIGHT: 63 IN | RESPIRATION RATE: 16 BRPM | HEART RATE: 63 BPM | WEIGHT: 159 LBS | SYSTOLIC BLOOD PRESSURE: 151 MMHG

## 2017-06-18 DIAGNOSIS — T14.90XA TRAUMA: ICD-10-CM

## 2017-06-18 DIAGNOSIS — R55 SYNCOPE: ICD-10-CM

## 2017-06-18 DIAGNOSIS — S06.0X9A CONCUSSION, WITH LOC OF UNSPECIFIED DURATION, INITIAL ENCOUNTER: ICD-10-CM

## 2017-06-18 DIAGNOSIS — S93.409A SPRAIN OF ANKLE, UNSPECIFIED LATERALITY, UNSPECIFIED LIGAMENT, INITIAL ENCOUNTER: ICD-10-CM

## 2017-06-18 DIAGNOSIS — R55 SYNCOPE, UNSPECIFIED SYNCOPE TYPE: Primary | ICD-10-CM

## 2017-06-18 LAB
ALBUMIN SERPL BCP-MCNC: 3.9 G/DL
ALP SERPL-CCNC: 111 U/L
ALT SERPL W/O P-5'-P-CCNC: 24 U/L
ANION GAP SERPL CALC-SCNC: 9 MMOL/L
AST SERPL-CCNC: 39 U/L
BASOPHILS # BLD AUTO: 0.03 K/UL
BASOPHILS NFR BLD: 0.9 %
BILIRUB SERPL-MCNC: 0.2 MG/DL
BUN SERPL-MCNC: 15 MG/DL
CALCIUM SERPL-MCNC: 8.5 MG/DL
CHLORIDE SERPL-SCNC: 102 MMOL/L
CO2 SERPL-SCNC: 21 MMOL/L
CREAT SERPL-MCNC: 0.8 MG/DL
DIFFERENTIAL METHOD: ABNORMAL
EOSINOPHIL # BLD AUTO: 0.1 K/UL
EOSINOPHIL NFR BLD: 3.8 %
ERYTHROCYTE [DISTWIDTH] IN BLOOD BY AUTOMATED COUNT: 14.1 %
EST. GFR  (AFRICAN AMERICAN): >60 ML/MIN/1.73 M^2
EST. GFR  (NON AFRICAN AMERICAN): >60 ML/MIN/1.73 M^2
GLUCOSE SERPL-MCNC: 104 MG/DL
HCT VFR BLD AUTO: 33.4 %
HGB BLD-MCNC: 11.1 G/DL
LYMPHOCYTES # BLD AUTO: 1 K/UL
LYMPHOCYTES NFR BLD: 28.7 %
MAGNESIUM SERPL-MCNC: 2.5 MG/DL
MCH RBC QN AUTO: 28.1 PG
MCHC RBC AUTO-ENTMCNC: 33.2 %
MCV RBC AUTO: 85 FL
MONOCYTES # BLD AUTO: 0.3 K/UL
MONOCYTES NFR BLD: 9.4 %
NEUTROPHILS # BLD AUTO: 2 K/UL
NEUTROPHILS NFR BLD: 57.2 %
PLATELET # BLD AUTO: 203 K/UL
PMV BLD AUTO: 10.7 FL
POTASSIUM SERPL-SCNC: 4 MMOL/L
PROT SERPL-MCNC: 6.6 G/DL
RBC # BLD AUTO: 3.95 M/UL
SODIUM SERPL-SCNC: 132 MMOL/L
TROPONIN I SERPL DL<=0.01 NG/ML-MCNC: <0.006 NG/ML
WBC # BLD AUTO: 3.42 K/UL

## 2017-06-18 PROCEDURE — 25000003 PHARM REV CODE 250: Performed by: EMERGENCY MEDICINE

## 2017-06-18 PROCEDURE — 93005 ELECTROCARDIOGRAM TRACING: CPT

## 2017-06-18 PROCEDURE — 96360 HYDRATION IV INFUSION INIT: CPT

## 2017-06-18 PROCEDURE — 85025 COMPLETE CBC W/AUTO DIFF WBC: CPT

## 2017-06-18 PROCEDURE — 84484 ASSAY OF TROPONIN QUANT: CPT

## 2017-06-18 PROCEDURE — 80053 COMPREHEN METABOLIC PANEL: CPT

## 2017-06-18 PROCEDURE — 83735 ASSAY OF MAGNESIUM: CPT

## 2017-06-18 PROCEDURE — 99284 EMERGENCY DEPT VISIT MOD MDM: CPT

## 2017-06-18 RX ORDER — SODIUM CHLORIDE 9 MG/ML
1000 INJECTION, SOLUTION INTRAVENOUS
Status: COMPLETED | OUTPATIENT
Start: 2017-06-18 | End: 2017-06-18

## 2017-06-18 RX ORDER — SODIUM CHLORIDE 9 MG/ML
1000 INJECTION, SOLUTION INTRAVENOUS
Status: DISCONTINUED | OUTPATIENT
Start: 2017-06-18 | End: 2017-06-18

## 2017-06-18 RX ADMIN — SODIUM CHLORIDE 1000 ML: 0.9 INJECTION, SOLUTION INTRAVENOUS at 12:06

## 2017-06-18 NOTE — ED NOTES
"Pt reports that she was about to change her clothes when she started having a severe abdominal pain and shortly afterwards passed out. States that she woke up on the floor of her bedroom with bilateral ankle pain and a "knot" on the back of her head. Denies headache, dizziness, CP, and SOB. Reports that she had a normal BM after syncopal episode. Denies abdominal pain at this time. Reports continued bilateral ankle pain. + swelling noted to lateral aspect of R ankle. VSS. AAO.    APPEARANCE: Alert, oriented and in no acute distress.  CARDIAC: Normal rate and rhythm.  PERIPHERAL VASCULAR: peripheral pulses present. Normal cap refill. Warm to touch.    RESPIRATORY:Normal rate and effort. Respirations are equal and unlabored no obvious signs of distress.  GASTRO: soft, no tenderness, no abdominal distention.  MUSC: Full ROM. +bilateral ankle pain. +swelling to R lateral ankle.  SKIN: Skin is warm and dry, normal skin turgor.  NEURO: 5/5 strength major flexors/extensors bilaterally. Rosalee coma scale: eyes open spontaneously-4, oriented & converses-5, obeys commands-6. No neurological abnormalities.   MENTAL STATUS: awake, alert and aware of environment.        "

## 2017-06-23 ENCOUNTER — LAB VISIT (OUTPATIENT)
Dept: LAB | Facility: HOSPITAL | Age: 77
End: 2017-06-23
Attending: INTERNAL MEDICINE
Payer: MEDICARE

## 2017-06-23 DIAGNOSIS — I10 ESSENTIAL HYPERTENSION: ICD-10-CM

## 2017-06-23 DIAGNOSIS — E78.5 HYPERLIPIDEMIA, UNSPECIFIED HYPERLIPIDEMIA TYPE: ICD-10-CM

## 2017-06-23 LAB
ALBUMIN SERPL BCP-MCNC: 3.9 G/DL
ALP SERPL-CCNC: 89 U/L
ALT SERPL W/O P-5'-P-CCNC: 17 U/L
ANION GAP SERPL CALC-SCNC: 9 MMOL/L
AST SERPL-CCNC: 29 U/L
BASOPHILS # BLD AUTO: 0.03 K/UL
BASOPHILS NFR BLD: 0.8 %
BILIRUB SERPL-MCNC: 0.5 MG/DL
BUN SERPL-MCNC: 16 MG/DL
CALCIUM SERPL-MCNC: 9.7 MG/DL
CHLORIDE SERPL-SCNC: 102 MMOL/L
CHOLEST/HDLC SERPL: 2.5 {RATIO}
CO2 SERPL-SCNC: 27 MMOL/L
CREAT SERPL-MCNC: 0.8 MG/DL
DIFFERENTIAL METHOD: ABNORMAL
EOSINOPHIL # BLD AUTO: 0.2 K/UL
EOSINOPHIL NFR BLD: 4.4 %
ERYTHROCYTE [DISTWIDTH] IN BLOOD BY AUTOMATED COUNT: 14.3 %
EST. GFR  (AFRICAN AMERICAN): >60 ML/MIN/1.73 M^2
EST. GFR  (NON AFRICAN AMERICAN): >60 ML/MIN/1.73 M^2
GLUCOSE SERPL-MCNC: 99 MG/DL
HCT VFR BLD AUTO: 35.9 %
HDL/CHOLESTEROL RATIO: 39.6 %
HDLC SERPL-MCNC: 149 MG/DL
HDLC SERPL-MCNC: 59 MG/DL
HGB BLD-MCNC: 11.7 G/DL
LDLC SERPL CALC-MCNC: 78.6 MG/DL
LYMPHOCYTES # BLD AUTO: 1 K/UL
LYMPHOCYTES NFR BLD: 28.9 %
MCH RBC QN AUTO: 27.9 PG
MCHC RBC AUTO-ENTMCNC: 32.6 %
MCV RBC AUTO: 86 FL
MONOCYTES # BLD AUTO: 0.3 K/UL
MONOCYTES NFR BLD: 9.4 %
NEUTROPHILS # BLD AUTO: 2 K/UL
NEUTROPHILS NFR BLD: 56.5 %
NONHDLC SERPL-MCNC: 90 MG/DL
PLATELET # BLD AUTO: 242 K/UL
PMV BLD AUTO: 11.5 FL
POTASSIUM SERPL-SCNC: 4.8 MMOL/L
PROT SERPL-MCNC: 7.1 G/DL
RBC # BLD AUTO: 4.19 M/UL
SODIUM SERPL-SCNC: 138 MMOL/L
T4 FREE SERPL-MCNC: 0.83 NG/DL
TRIGL SERPL-MCNC: 57 MG/DL
TSH SERPL DL<=0.005 MIU/L-ACNC: 2.05 UIU/ML
WBC # BLD AUTO: 3.6 K/UL

## 2017-06-23 PROCEDURE — 84439 ASSAY OF FREE THYROXINE: CPT

## 2017-06-23 PROCEDURE — 80061 LIPID PANEL: CPT

## 2017-06-23 PROCEDURE — 84443 ASSAY THYROID STIM HORMONE: CPT

## 2017-06-23 PROCEDURE — 85025 COMPLETE CBC W/AUTO DIFF WBC: CPT

## 2017-06-23 PROCEDURE — 36415 COLL VENOUS BLD VENIPUNCTURE: CPT | Mod: PO

## 2017-06-23 PROCEDURE — 80053 COMPREHEN METABOLIC PANEL: CPT

## 2017-06-29 ENCOUNTER — OFFICE VISIT (OUTPATIENT)
Dept: INTERNAL MEDICINE | Facility: CLINIC | Age: 77
End: 2017-06-29
Payer: MEDICARE

## 2017-06-29 VITALS
SYSTOLIC BLOOD PRESSURE: 150 MMHG | BODY MASS INDEX: 28.64 KG/M2 | TEMPERATURE: 98 F | HEIGHT: 63 IN | WEIGHT: 161.63 LBS | HEART RATE: 72 BPM | DIASTOLIC BLOOD PRESSURE: 82 MMHG | RESPIRATION RATE: 16 BRPM

## 2017-06-29 DIAGNOSIS — E78.2 MIXED HYPERLIPIDEMIA: ICD-10-CM

## 2017-06-29 DIAGNOSIS — I10 ESSENTIAL HYPERTENSION: ICD-10-CM

## 2017-06-29 DIAGNOSIS — I27.20 PULMONARY HYPERTENSION: ICD-10-CM

## 2017-06-29 DIAGNOSIS — Z00.00 ANNUAL PHYSICAL EXAM: ICD-10-CM

## 2017-06-29 DIAGNOSIS — G47.33 OSA (OBSTRUCTIVE SLEEP APNEA): ICD-10-CM

## 2017-06-29 DIAGNOSIS — G44.309 POST-TRAUMATIC HEADACHE, NOT INTRACTABLE, UNSPECIFIED CHRONICITY PATTERN: Primary | ICD-10-CM

## 2017-06-29 DIAGNOSIS — M62.838 NECK MUSCLE SPASM: ICD-10-CM

## 2017-06-29 DIAGNOSIS — I25.10 ATHEROSCLEROSIS OF NATIVE CORONARY ARTERY OF NATIVE HEART WITHOUT ANGINA PECTORIS: ICD-10-CM

## 2017-06-29 DIAGNOSIS — R93.1 AGATSTON CORONARY ARTERY CALCIUM SCORE GREATER THAN 400: ICD-10-CM

## 2017-06-29 DIAGNOSIS — I65.23 CAROTID STENOSIS, BILATERAL: ICD-10-CM

## 2017-06-29 DIAGNOSIS — G25.81 RESTLESS LEGS: ICD-10-CM

## 2017-06-29 DIAGNOSIS — R55 SYNCOPE, UNSPECIFIED SYNCOPE TYPE: ICD-10-CM

## 2017-06-29 PROCEDURE — 99499 UNLISTED E&M SERVICE: CPT | Mod: S$GLB,,, | Performed by: INTERNAL MEDICINE

## 2017-06-29 PROCEDURE — 99999 PR PBB SHADOW E&M-EST. PATIENT-LVL III: CPT | Mod: PBBFAC,,, | Performed by: INTERNAL MEDICINE

## 2017-06-29 PROCEDURE — 99397 PER PM REEVAL EST PAT 65+ YR: CPT | Mod: S$GLB,,, | Performed by: INTERNAL MEDICINE

## 2017-06-29 RX ORDER — HYDROCODONE/ACETAMINOPHEN 5 MG-500MG
6 TABLET ORAL DAILY
COMMUNITY
End: 2019-11-25 | Stop reason: DRUGHIGH

## 2017-06-29 NOTE — PROGRESS NOTES
Subjective:       Patient ID: Maryann Sorenson is a 76 y.o. female.    Chief Complaint: Annual Exam  HISTORY OF PRESENT ILLNESS:  A 76-year-old white female patient coming in for an   annual review, but she has a number of complaints.  The patient said that 10   days prior to this she developed cramps while she was having [facility]   after breakfast and had a syncopal episode.  She is not certain how long she   passed out for.  The patient was taken to [facility].  It appears   that she hit her head and caught her feet underneath the tool injuring both of   her ankles.  Since that visit to the ER, she has seen orthopedist, Dr. Bran   and continues to have problems though she had negative x-rays on her ankle.  He   told her he thought she was just dealing with a strain and did not need to do   anything more about it.  The patient in the Emergency Room was awake, aware and   with other than her complaints of ankle injury and possible bump on her head,   she had negative CAT scan, lab and EKG.  The patient also had ankle x-rays   there, which saw no fracture, but she had arthritic changes, particularly in her   right ankle.  She had an ultrasound of her retroperitoneal area, found no   aneurysm of her aorta.  Following the syncopal spell, she went to the bathroom,   had her bowel movement, cramps disappeared and she has had no problems other   than the pain as described above.    The patient also is now on CPAP for sleep apnea and restless legs.  She has   occasional neck spasms.  She has seen multiple doctors including Dr. Gavin,   Dr. Bran, Dr. Recinos for sleep, Dr. Pop for her GI tract and she had   upper and lower endoscopies done in March 2017, which were normal.  She also   sees Pulmonary, Dr. Early, for pulmonary hypertension and gynecologist, Dr. Wiedemann.  The annual lab was done prior to this showing normal urinalysis,   thyroid function, chemistries including her  transaminase was normal, lipids, CBC   shows she has a very mild anemia in the high 30s.    PHYSICAL EXAMINATION:  VITAL SIGNS:  Normal.  Blood pressure slightly elevated, 150/82, so I asked her   to monitor that.  CHEST:  Clear though breath sounds seem somewhat reduced.  HEART:  There is no murmur or gallop.  ABDOMEN:  At this time is nontender without any organomegaly.  EXTREMITIES:  Show normal muscles, joints, pulses except for right ankle   swelling.  There is no deformity.  She has no bruising either.  NEUROLOGIC:  Cranial nerves, higher integrated function, motor, sensory all   appear normal.    I have told her to see Dr. Gavin because of the episode, though it sounded   like it was a vasovagal spell.  She was given a copy of her lab and AVS.      JEAN CLAUDE/GALEN  dd: 07/18/2017 00:11:49 (CDT)  td: 07/18/2017 05:42:48 (CDT)  Doc ID   #[id number]  Job ID #[id number]    CC:     [facility] [id number]  HPI  Review of Systems   Constitutional: Negative.    HENT: Negative for congestion, hearing loss, sinus pressure, sneezing, sore throat and voice change.    Eyes: Negative for visual disturbance.   Respiratory: Negative for cough, chest tightness and shortness of breath.    Cardiovascular: Negative.  Negative for chest pain, palpitations and leg swelling.   Gastrointestinal: Positive for abdominal pain.   Genitourinary: Negative for difficulty urinating, dysuria, flank pain, frequency, hematuria, menstrual problem, pelvic pain, urgency, vaginal bleeding and vaginal discharge.   Musculoskeletal: Positive for arthralgias, joint swelling, neck pain and neck stiffness.   Skin: Positive for wound.   Neurological: Positive for syncope. Negative for dizziness, seizures, light-headedness, numbness and headaches.   Psychiatric/Behavioral: Positive for sleep disturbance. Negative for agitation, behavioral problems and confusion. The patient is not nervous/anxious.        Objective:      Physical Exam   Constitutional: She is oriented to person,  place, and time. She appears well-developed and well-nourished.   Eyes: EOM are normal. Pupils are equal, round, and reactive to light.   Neck: Normal range of motion. Neck supple. No JVD present. No thyromegaly present.   Cardiovascular: Normal rate, regular rhythm, normal heart sounds and intact distal pulses.  Exam reveals no gallop.    No murmur heard.  Pulmonary/Chest: Breath sounds normal. She has no wheezes. She has no rales.   Abdominal: Soft. Bowel sounds are normal. She exhibits no mass. There is no tenderness.   Musculoskeletal: Normal range of motion. She exhibits tenderness.   Lymphadenopathy:     She has no cervical adenopathy.   Neurological: She is alert and oriented to person, place, and time. She has normal reflexes. No cranial nerve deficit.   Skin: No rash noted. No erythema.   Psychiatric: She has a normal mood and affect. Judgment normal.       Assessment:       1. Post-traumatic headache, not intractable, unspecified chronicity pattern    2. Annual physical exam    3. Neck muscle spasm    4. Agatston coronary artery calcium score greater than 400 - 577    5. Pulmonary hypertension    6. Essential hypertension    7. Syncope, unspecified syncope type    8. NOE (obstructive sleep apnea)    9. Restless legs    10. Carotid stenosis, bilateral    11. Atherosclerosis of native coronary artery of native heart without angina pectoris    12. Mixed hyperlipidemia        Plan:

## 2017-07-06 ENCOUNTER — CLINICAL SUPPORT (OUTPATIENT)
Dept: CARDIOLOGY | Facility: CLINIC | Age: 77
End: 2017-07-06
Payer: MEDICARE

## 2017-07-06 ENCOUNTER — OFFICE VISIT (OUTPATIENT)
Dept: CARDIOLOGY | Facility: CLINIC | Age: 77
End: 2017-07-06
Payer: MEDICARE

## 2017-07-06 VITALS
WEIGHT: 161.06 LBS | BODY MASS INDEX: 28.54 KG/M2 | DIASTOLIC BLOOD PRESSURE: 84 MMHG | HEART RATE: 68 BPM | HEIGHT: 63 IN | SYSTOLIC BLOOD PRESSURE: 136 MMHG

## 2017-07-06 DIAGNOSIS — G47.33 OSA (OBSTRUCTIVE SLEEP APNEA): ICD-10-CM

## 2017-07-06 DIAGNOSIS — I25.10 ATHEROSCLEROSIS OF NATIVE CORONARY ARTERY OF NATIVE HEART WITHOUT ANGINA PECTORIS: ICD-10-CM

## 2017-07-06 DIAGNOSIS — I27.20 PULMONARY HYPERTENSION: ICD-10-CM

## 2017-07-06 DIAGNOSIS — R55 VASOVAGAL SYNCOPE: ICD-10-CM

## 2017-07-06 DIAGNOSIS — R06.00 DYSPNEA, UNSPECIFIED TYPE: ICD-10-CM

## 2017-07-06 DIAGNOSIS — E78.2 MIXED HYPERLIPIDEMIA: ICD-10-CM

## 2017-07-06 DIAGNOSIS — I65.23 CAROTID STENOSIS, BILATERAL: ICD-10-CM

## 2017-07-06 DIAGNOSIS — R93.1 AGATSTON CORONARY ARTERY CALCIUM SCORE GREATER THAN 400: ICD-10-CM

## 2017-07-06 DIAGNOSIS — I10 ESSENTIAL HYPERTENSION: ICD-10-CM

## 2017-07-06 DIAGNOSIS — G44.309 POST-TRAUMATIC HEADACHE, NOT INTRACTABLE, UNSPECIFIED CHRONICITY PATTERN: ICD-10-CM

## 2017-07-06 DIAGNOSIS — R55 VASOVAGAL SYNCOPE: Primary | ICD-10-CM

## 2017-07-06 PROCEDURE — 1159F MED LIST DOCD IN RCRD: CPT | Mod: S$GLB,,, | Performed by: INTERNAL MEDICINE

## 2017-07-06 PROCEDURE — 93970 EXTREMITY STUDY: CPT | Mod: S$GLB,,, | Performed by: INTERNAL MEDICINE

## 2017-07-06 PROCEDURE — 99999 PR PBB SHADOW E&M-EST. PATIENT-LVL III: CPT | Mod: PBBFAC,,, | Performed by: INTERNAL MEDICINE

## 2017-07-06 PROCEDURE — 1125F AMNT PAIN NOTED PAIN PRSNT: CPT | Mod: S$GLB,,, | Performed by: INTERNAL MEDICINE

## 2017-07-06 PROCEDURE — 93224 XTRNL ECG REC UP TO 48 HRS: CPT | Mod: S$GLB,,, | Performed by: INTERNAL MEDICINE

## 2017-07-06 PROCEDURE — 99214 OFFICE O/P EST MOD 30 MIN: CPT | Mod: S$GLB,,, | Performed by: INTERNAL MEDICINE

## 2017-07-06 PROCEDURE — 99499 UNLISTED E&M SERVICE: CPT | Mod: S$GLB,,, | Performed by: INTERNAL MEDICINE

## 2017-07-06 RX ORDER — IBUPROFEN 200 MG
400 TABLET ORAL EVERY 8 HOURS PRN
COMMUNITY
End: 2022-04-27

## 2017-07-06 NOTE — PROGRESS NOTES
Subjective:   Patient ID:  Maryann Sorenson is a 76 y.o. female who presents for follow-up of Hospital Follow Up      HPI: Patient was recently seen in Er with syncopal episode related to severe abdominal pain. After she woke up she had BP and felt better. In ER patient was found to have concussion and sprained ankles.  She was diagnosed with vasovagal syncope and discharge to an out-patient follow up . PCP requested follow up with cardiology.    Patient is doing well except for post concussion headache.  She denies any recurrent abdominal pain ( abdominal ultrasound was fine) or any other gi, or gyn problems.  She stopped taking iron and iodine containing vitamines and feels that bowels move regularly now.  In the past she has been experiencing occasional pelvic and abdominal pains but has not pursued any work up.  She denies any rpodromal symptoms and any other symptoms after the episode. She denies leg pain or prolonged immobilization.      CFD is still pending. Patient just started using C-PAP and has difficulties with insomnia and RLS.    She has mild chronic anemia    Lab Results   Component Value Date     06/23/2017    K 4.8 06/23/2017     06/23/2017    CO2 27 06/23/2017    BUN 16 06/23/2017    CREATININE 0.8 06/23/2017    GLU 99 06/23/2017    MG 2.5 06/18/2017    AST 29 06/23/2017    ALT 17 06/23/2017    ALBUMIN 3.9 06/23/2017    PROT 7.1 06/23/2017    BILITOT 0.5 06/23/2017    WBC 3.60 (L) 06/23/2017    HGB 11.7 (L) 06/23/2017    HCT 35.9 (L) 06/23/2017    MCV 86 06/23/2017     06/23/2017    TSH 2.052 06/23/2017    CHOL 149 06/23/2017    HDL 59 06/23/2017    LDLCALC 78.6 06/23/2017    TRIG 57 06/23/2017       Review of Systems   Constitution: Positive for weakness and malaise/fatigue.   HENT: Positive for headaches.    Eyes: Negative.    Cardiovascular: Positive for syncope. Negative for chest pain, claudication, dyspnea on exertion, irregular heartbeat, leg swelling, near-syncope and  "palpitations.   Respiratory: Negative.  Negative for cough, shortness of breath, snoring and wheezing.    Endocrine: Negative.  Negative for cold intolerance, heat intolerance, polydipsia, polyphagia and polyuria.   Skin: Negative.    Musculoskeletal: Positive for arthritis, back pain and falls.   Gastrointestinal: Positive for abdominal pain and heartburn.   Genitourinary: Positive for pelvic pain.   Neurological: Positive for light-headedness, loss of balance and paresthesias.   Psychiatric/Behavioral: The patient is nervous/anxious.        Objective:   Physical Exam   Constitutional: She is oriented to person, place, and time. She appears well-developed and well-nourished.   /84   Pulse 68   Ht 5' 3" (1.6 m)   Wt 73.1 kg (161 lb 0.7 oz)   BMI 28.53 kg/m²      HENT:   Head: Normocephalic.   Eyes: Pupils are equal, round, and reactive to light.   Neck: Normal range of motion. Neck supple. Carotid bruit is not present. No thyromegaly present.   Cardiovascular: Normal rate, regular rhythm, normal heart sounds and intact distal pulses.  Exam reveals no gallop and no friction rub.    No murmur heard.  Pulses:       Carotid pulses are 2+ on the right side, and 2+ on the left side.       Radial pulses are 2+ on the right side, and 2+ on the left side.        Femoral pulses are 2+ on the right side, and 2+ on the left side.       Popliteal pulses are 2+ on the right side, and 2+ on the left side.        Dorsalis pedis pulses are 2+ on the right side, and 2+ on the left side.        Posterior tibial pulses are 2+ on the right side, and 2+ on the left side.   Pulmonary/Chest: Effort normal and breath sounds normal. No respiratory distress. She has no wheezes. She has no rales. She exhibits no tenderness.   Abdominal: Soft. Bowel sounds are normal.   Musculoskeletal: Normal range of motion. She exhibits no edema.   Immobilized left ankle   Neurological: She is alert and oriented to person, place, and time.   Skin: " Skin is warm and dry.   Psychiatric: She has a normal mood and affect.   Nursing note and vitals reviewed.        Assessment and Plan:     Problem List Items Addressed This Visit        Cardiology Problems    Mixed hyperlipidemia    Essential hypertension    Pulmonary hypertension    Relevant Orders    CAR Ultrasound doppler venous legs bilat    Holter monitor - 24 hour    Atherosclerosis of native coronary artery of native heart without angina pectoris    Carotid stenosis, bilateral    Syncope - Primary    Relevant Orders    CAR Ultrasound doppler venous legs bilat    Holter monitor - 24 hour       Other    Agatston coronary artery calcium score greater than 400 - 577    NOE (obstructive sleep apnea)    Post-traumatic headache, not intractable      Other Visit Diagnoses     Dyspnea, unspecified type         Relevant Orders    CAR Ultrasound doppler venous legs bilat          Patient's Medications   New Prescriptions    No medications on file   Previous Medications    ASPIRIN 81 MG TAB    Take 1 tablet by mouth Daily. 1 Tablet Oral Every day    CA CITRATE/MGOX/VIT D3/B6/MIN (CITRACAL PLUS ORAL)    Take by mouth.    COENZYME Q10 (CO Q-10) 200 MG CAPSULE    Take 1 capsule by mouth Daily. 1 Capsule Oral Every day    DIAZEPAM (VALIUM) 2 MG TABLET    Take 1 tablet by mouth as needed. 1 Tablet Oral .  For dizziness    ESOMEPRAZOLE (NEXIUM) 40 MG CAPSULE    1 Capsule, Delayed Release(E.C.) Oral Every day    FEXOFENADINE (ALLEGRA) 180 MG TABLET    Take 1 tablet by mouth Daily. 1 Tablet Oral Every day    GLUCOSAMINE SULFATE ORAL    Take 1,000 mg by mouth Daily. 1 Capsule Oral Every day    GUAIFENESIN (MUCINEX) 600 MG 12 HR TABLET    Take 1,200 mg by mouth as needed.     IBUPROFEN (ADVIL,MOTRIN) 200 MG TABLET    Take 400 mg by mouth every 8 (eight) hours as needed for Pain.    LACTOBACILLUS RHAMNOSUS GG (PROBIOTIC ORAL)    Take 1 capsule by mouth Daily. 1 Capsule Oral Every day    LISINOPRIL (PRINIVIL,ZESTRIL) 20 MG TABLET     Take 1 tablet (20 mg total) by mouth 2 (two) times daily.    LUTEIN 6 MG CAP    Take 12 mg by mouth once daily at 6am.    MAGNESIUM 250 MG TAB    Take 250 mg by mouth once daily.    MULTIVIT-IRON-FA-CALCIUM-MINS (WOMEN'S ONE DAILY) 18 MG IRON-400 MCG-500 MG CA TAB    Take by mouth once.    OMEGA-3 FATTY ACIDS-VITAMIN E (FISH OIL) 1,000 MG CAP    Take 2 capsules by mouth once daily.      ROSUVASTATIN (CRESTOR) 40 MG TAB    Take 1 tablet (40 mg total) by mouth once daily.   Modified Medications    No medications on file   Discontinued Medications    No medications on file     Symptoms are suggestive of vasovagal episode.  Reassurance given.  We discussed vasovagal syncope.  Proper hydration advised.  I doubt any other etiologies, but will schedule Holter to rule out arrhythmia and venous Doppler to rule out DVT/ PE and review.  Review CFD and advise.  Work up of recurrent abdominal and pelvic pains as per PCP.  Follow up with PCP and pulmonary hypertension clinic as scheduled.

## 2017-07-10 ENCOUNTER — TELEPHONE (OUTPATIENT)
Dept: CARDIOLOGY | Facility: CLINIC | Age: 77
End: 2017-07-10

## 2017-07-10 NOTE — TELEPHONE ENCOUNTER
----- Message from Heydi Gavin MD sent at 7/8/2017 10:35 AM CDT -----  Mrs. Sorenson,  Please review result of your Holter monitor.  It was unremarkable with few extra beats and no significant fast or slow heart rhythm. Good results.

## 2017-07-12 ENCOUNTER — TELEPHONE (OUTPATIENT)
Dept: CARDIOLOGY | Facility: CLINIC | Age: 77
End: 2017-07-12

## 2017-07-12 NOTE — TELEPHONE ENCOUNTER
----- Message from Heydi Gavin MD sent at 7/11/2017  4:55 PM CDT -----  Mrs Sorenson,  Please review results of your Venous Doppler. IT was normal. Great news. We were not able to find any structural reasons for your symptoms. As we have discussed it was probably a vasovagal episode.  I would not recommend any other cardiac work up at this time.

## 2017-07-20 ENCOUNTER — OFFICE VISIT (OUTPATIENT)
Dept: SLEEP MEDICINE | Facility: CLINIC | Age: 77
End: 2017-07-20
Payer: MEDICARE

## 2017-07-20 VITALS
SYSTOLIC BLOOD PRESSURE: 138 MMHG | HEART RATE: 59 BPM | WEIGHT: 161.19 LBS | HEIGHT: 63 IN | DIASTOLIC BLOOD PRESSURE: 78 MMHG | BODY MASS INDEX: 28.56 KG/M2

## 2017-07-20 DIAGNOSIS — G25.81 RLS (RESTLESS LEGS SYNDROME): Primary | ICD-10-CM

## 2017-07-20 PROCEDURE — 99499 UNLISTED E&M SERVICE: CPT | Mod: S$GLB,,, | Performed by: PSYCHIATRY & NEUROLOGY

## 2017-07-20 PROCEDURE — 99214 OFFICE O/P EST MOD 30 MIN: CPT | Mod: S$GLB,,, | Performed by: PSYCHIATRY & NEUROLOGY

## 2017-07-20 PROCEDURE — 1125F AMNT PAIN NOTED PAIN PRSNT: CPT | Mod: S$GLB,,, | Performed by: PSYCHIATRY & NEUROLOGY

## 2017-07-20 PROCEDURE — 99999 PR PBB SHADOW E&M-EST. PATIENT-LVL III: CPT | Mod: PBBFAC,,, | Performed by: PSYCHIATRY & NEUROLOGY

## 2017-07-20 PROCEDURE — 1159F MED LIST DOCD IN RCRD: CPT | Mod: S$GLB,,, | Performed by: PSYCHIATRY & NEUROLOGY

## 2017-07-20 RX ORDER — PRAMIPEXOLE DIHYDROCHLORIDE 0.12 MG/1
TABLET ORAL
Qty: 60 TABLET | Refills: 1 | Status: SHIPPED | OUTPATIENT
Start: 2017-07-20 | End: 2017-08-31 | Stop reason: SDUPTHER

## 2017-07-20 NOTE — PROGRESS NOTES
Maryann Sorenson  was seen at the request of  No ref. provider found for sleep evaluation.    08/11/2016 INITIAL HISTORY OF PRESENT ILLNESS:  Maryann Sorenson is a 76 y.o. female is here to be evaluated for a sleep disorder.       CHIEF COMPLAINT:      The patient's complaints include excessive daytime fatigue, snoring,  witnessed breathing pauses,  gasping for air in sleep and interrupted sleep since  Over 10 years ago.    Being evaluated by Dr. Early for pulmonary HTN.     Reports  dry mouth and sore throat  Denies nasal congestion   Denies  morning headaches  Reports occasional  interrupted sleep  Reports frequent leg movements - only when watching TV. Urge to move her legs. Got it from her father.   Denies symptoms concerning for parasomnia    The ESS ([place] Sleepiness Score) taken on initial visit is 6 /24    Bedroom is dark and quiet.    FH: father and 2 sisters - RLS.    INTERVAL HISTORY:    10/12/2016  The patient has not presented any new complaints since the previous visit.   Comes to discuss PSG. Very poor sleep efficiency could have caused underestimation of test results (positive to mild to moderate sleep apnea). Iron test was normal.   Nature's wellness oil at the base of the spine - using for RLS - helps at night - never tried it in the evening.     05/24/2017: Maryann Sorenson comes back to talk about treatment options. Prefers CPAP with nasal pillows mask. Pros and cons were discussed.     07/20/2017: Started using CPAP in June. Passed out in her bedroom-> resulted in ankle sprain.  Has to get up in the middle of the night to the bathroom - now has to turn the light on for the sake of CPAP - now that she is wide awake -> RLS acts up.     Therapy Event Summary Date Range: 6/20/2017 - 7/19/2017     Doing well with Dreamwear mask.    ESS 9/24.    Hide      Compliance Summary  Apnea Indices  Ventilator Statistics    Days with Device Usage:  30 days  Average AHI:  1.1  Average Breath Rate:  16.5 bpm     Percentage of Days >=4 Hours:  90.0%  Average OA Index:  0.1  Average % Patient Triggered Breaths:  N/A    Average Usage (Days Used):  4 hrs. 34 mins. 42 secs.  Average CA Index:  0.1  Average Tidal Volume:  417.1 ml    Average Usage (All Days):  4 hrs. 34 mins. 42 secs.    Average Minute Vent:  N/A        Large Leak  Periodic Breathing     Average Time in Large Leak:  0 secs.  Average % of Night in PB:  0.3%     Average % of Night in Large Leak:  0.0%                    SLEEP ROUTINE AND LIFESTYLE 07/20/2017 :    Occupation: retired    Bed partner:  - sleeps in another room.     Time to bed: 11  Sleep onset latency: 5-10 min  Disruptions or awakenings: 1-2  Time to fall back into sleep: 5 min   Wakeup time: 7 AM   Perceived sleep quality: 3/5  Perceived total sleep time:  7.5  hours.  Daytime naps: sometimes not intentional  Weekend sleep routine: till 7:10   Exercise routine: no - lost routine since her trip to [place].  Caffeine: usually decaf     PREVIOUS SLEEP STUDIES:     PSG 8/25/16: Significant Obstructive sleep apnea (NOE) with AHI (apnea hypopnea Index) of 8.9 and SaO2 of 87% (weight  161 lbs).RDI 15.    DME:       PAST MEDICAL HISTORY:    Active Ambulatory Problems     Diagnosis Date Noted    Meniere's disease, unspecified 03/07/2010    Mixed hyperlipidemia     Annual physical exam 06/20/2013    Agatston coronary artery calcium score greater than 400 - 577 10/23/2013    GERD (gastroesophageal reflux disease) 10/07/2014    Shortness of breath 04/15/2015    Back pain of thoracolumbar region 03/08/2016    Essential hypertension 04/21/2016    Pulmonary hypertension 05/03/2016    NOE (obstructive sleep apnea)     Atherosclerosis of native coronary artery of native heart without angina pectoris 05/30/2017    Carotid stenosis, bilateral 06/29/2017    Restless legs 06/29/2017    Syncope 06/29/2017    Neck muscle spasm 06/29/2017    Post-traumatic headache, not intractable 06/29/2017  "    Resolved Ambulatory Problems     Diagnosis Date Noted    Fullness in head 10/14/2013    Headache, occipital 10/24/2013    Cast discomfort 06/21/2014    Pre-operative examination for internal medicine 06/24/2014    Dysphagia 10/07/2014    Sore throat 10/07/2014    Bronchitis 03/19/2015    Sinusitis 03/19/2015    Generalized muscle ache 06/29/2015     Past Medical History:   Diagnosis Date    CAD (coronary artery disease) 11/1/2012    GERD (gastroesophageal reflux disease)     Hyperlipidemia     Hypertension                 PAST SURGICAL HISTORY:    Past Surgical History:   Procedure Laterality Date    broken wrist      "put plate in it"    DILATION AND CURETTAGE OF UTERUS      tonsillectomy      TONSILLECTOMY           FAMILY HISTORY:                Family History   Problem Relation Age of Onset    Cancer Mother     Colon cancer Mother     Hyperlipidemia Mother     Hypertension Mother     Heart disease Father     Heart attack Neg Hx     Heart failure Neg Hx     Stroke Neg Hx        SOCIAL HISTORY:          Tobacco:   History   Smoking Status    Never Smoker   Smokeless Tobacco    Never Used       alcohol use:    History   Alcohol Use    0.0 oz/week     Comment: occasionally/ not weekly                   ALLERGIES:    Allergies   Allergen Reactions    Augmentin [Amoxicillin-Pot Clavulanate] Diarrhea     Given march 2016    Codeine      Other reaction(s): Unknown, tolerates hydrocodone june 2014    Doxycycline      Other reaction(s): Unknown    Lyrica [Pregabalin] Other (See Comments)     Eye irritation    Relafen [Nabumetone] Diarrhea    Sulfa Dyne      Other reaction(s): Unknown       CURRENT MEDICATIONS:    Current Outpatient Prescriptions   Medication Sig Dispense Refill    aspirin 81 mg Tab Take 1 tablet by mouth Daily. 1 Tablet Oral Every day      CA CITRATE/MGOX/VIT D3/B6/MIN (CITRACAL PLUS ORAL) Take by mouth.      coenzyme Q10 (CO Q-10) 200 mg capsule Take 1 capsule by " "mouth Daily. 1 Capsule Oral Every day      diazepam (VALIUM) 2 MG tablet Take 1 tablet by mouth as needed. 1 Tablet Oral .  For dizziness      esomeprazole (NEXIUM) 40 MG capsule 1 Capsule, Delayed Release(E.C.) Oral Every day 90 capsule 3    fexofenadine (ALLEGRA) 180 MG tablet Take 1 tablet by mouth Daily. 1 Tablet Oral Every day      GLUCOSAMINE SULFATE ORAL Take 1,000 mg by mouth Daily. 1 Capsule Oral Every day      guaifenesin (MUCINEX) 600 mg 12 hr tablet Take 1,200 mg by mouth as needed.       ibuprofen (ADVIL,MOTRIN) 200 MG tablet Take 400 mg by mouth every 8 (eight) hours as needed for Pain.      LACTOBACILLUS RHAMNOSUS GG (PROBIOTIC ORAL) Take 1 capsule by mouth Daily. 1 Capsule Oral Every day      lisinopril (PRINIVIL,ZESTRIL) 20 MG tablet Take 1 tablet (20 mg total) by mouth 2 (two) times daily. 180 tablet 3    lutein 6 mg Cap Take 12 mg by mouth once daily at 6am.      magnesium 250 mg Tab Take 250 mg by mouth once daily.      multivit-iron-FA-calcium-mins (WOMEN'S ONE DAILY) 18 mg iron-400 mcg-500 mg Ca Tab Take by mouth once.      omega-3 fatty acids-vitamin E (FISH OIL) 1,000 mg Cap Take 2 capsules by mouth once daily.        rosuvastatin (CRESTOR) 40 MG Tab Take 1 tablet (40 mg total) by mouth once daily. 90 tablet 3     No current facility-administered medications for this visit.                       REVIEW OF SYSTEMS:   Sleep related symptoms as per HPI    denies weight gain  Reports dyspnea  Denies palpitations  Reports acid reflux   Reports polyuria 3-4-> now better on low salt diet.  Denies  mood diturbance  Denies  anemia  Reports occasional  muscle pain  Denies  Gait imbalance    Otherwise, a balance of 10 systems reviewed is negative.    PHYSICAL EXAM:  /78 (BP Location: Left arm, Patient Position: Sitting, BP Method: Automatic)   Pulse (!) 59   Ht 5' 3" (1.6 m)   Wt 73.1 kg (161 lb 2.5 oz)   BMI 28.55 kg/m²   GENERAL: Overweight body habitus, well groomed.  HEENT: " "  HEENT:  Conjunctivae are non-erythematous; Pupils equal, round, and reactive to light; Nose is symmetrical; Nasal mucosa is pink and moist; Septum is midline; Inferior turbinates are hypertrophied; Nasal airflow is normal; Posterior pharynx is pink; Modified Mallampati:II-III; Posterior palate is low; Tonsils not visualized; Uvula is normal and pink;Tongue is enlarged; Dentition is fair; No TMJ tenderness; Jaw opening and protrusion without click and without discomfort.  NECK: Supple. Neck circumference is 16 inches. No thyromegaly. No palpable nodes.     SKIN: On face and neck: No abrasions, no rashes, no lesions.  No subcutaneous nodules are palpable.  RESPIRATORY: Chest is clear to auscultation.  Normal chest expansion and non-labored breathing at rest.  CARDIOVASCULAR: Normal S1, S2.  No murmurs, gallops or rubs. No carotid bruits bilaterally.  No edema. No clubbing. No cyanosis.    NEURO: Oriented to time, place and person. Normal attention span and concentration. Gait normal.    PSYCH: Affect is full. Mood is normal  MUSCULOSKELETAL: Moves 4 extremities. Gait normal.       Using [facility]:       ASSESSMENT:    1. NOE - moderate by RDI. Poor sleep efficiency on the nioght of the study could have led to underestimation of NOE severity.  The patient symptomatically has  excessive daytime sleepiness, snoring,  witnessed breathing pauses, excessive daytime fatigue, gasping for air in sleep and interrupted sleep  with exam findings of "a crowded oral airway and elevated body mass index. The patient has medical co-morbidities of CAD and hyperlipidemia,  which can be worsened by NOE. This warrants treatment.     2. RLS - mild symptoms.Mostly while watching TV in the evening.  Iron/Ferritin parameters were normal. No significant PLMS were reported.  Now using a Nature Wellness Oil with some improvement. She would like to withhold medical treatment of RLS for now. Possibility of medical treatment was discussed with " the patient.       PLAN:      Increase autoPAP 6 - 18 cm to 7-18  with the mask of a patient's choice was given to the patient.  Distilled water requirements were discussed with the patient.    Will start Mirapex 0.125 mg    Education: During our discussion today, we talked about the etiology of obstructive sleep apnea as well as the potential ramifications of untreated sleep apnea, which could include daytime sleepiness, hypertension, heart disease and/or stroke.      We discussed potential treatment options, which could include weight loss, body positioning, continuous positive airway pressure (CPAP), or referral for surgical consideration. The patient preferred CPAP option.     Discussed purpose of PAP therapy, health benefits of CPAP, as well as the potential ramifications of untreated sleep apnea, which could include daytime sleepiness, hypertension, heart disease and/or stroke. An AHI of 15 is associated with increased risk CVD.     The patient should avoid ETOH and sedatives at night, as it tends to aggravate NOE. Regular replacement of CPAP mask, tubing and filter was recommended.    Precautions: The patient was advised to abstain from driving should he feel sleepy or drowsy.    Follow up: MD/NP after 1 month of PAP use.    Thank you for allowing me the opportunity to participate in the care of your patient.

## 2017-08-31 DIAGNOSIS — G25.81 RLS (RESTLESS LEGS SYNDROME): ICD-10-CM

## 2017-08-31 RX ORDER — PRAMIPEXOLE DIHYDROCHLORIDE 0.12 MG/1
TABLET ORAL
Qty: 60 TABLET | Refills: 1 | Status: SHIPPED | OUTPATIENT
Start: 2017-08-31 | End: 2017-09-06 | Stop reason: SDUPTHER

## 2017-09-05 RX ORDER — LISINOPRIL 20 MG/1
20 TABLET ORAL 2 TIMES DAILY
Qty: 180 TABLET | Refills: 3 | Status: SHIPPED | OUTPATIENT
Start: 2017-09-05 | End: 2018-09-05 | Stop reason: SDUPTHER

## 2017-09-06 ENCOUNTER — TELEPHONE (OUTPATIENT)
Dept: SLEEP MEDICINE | Facility: CLINIC | Age: 77
End: 2017-09-06

## 2017-09-06 DIAGNOSIS — G25.81 RLS (RESTLESS LEGS SYNDROME): ICD-10-CM

## 2017-09-06 RX ORDER — PRAMIPEXOLE DIHYDROCHLORIDE 0.12 MG/1
TABLET ORAL
Qty: 90 TABLET | Refills: 1 | Status: SHIPPED | OUTPATIENT
Start: 2017-09-06 | End: 2017-09-07 | Stop reason: SDUPTHER

## 2017-09-06 NOTE — TELEPHONE ENCOUNTER
----- Message from Sandy Hernandez MA sent at 9/6/2017 10:20 AM CDT -----  Contact: Self      ----- Message -----  From: Rita Callahan  Sent: 9/6/2017  10:14 AM  To: [facility] Staff    X   1st Request  _  2nd Request  _  3rd Request        Who: DARWIN CULVER [[id number]]    Why: Pt states she needs the pramipexole (MIRAPEX) 0.125 MG tablet resubmitted to [facility] for a 90 day supply and directions to say she takes one a day not two. Pt would like to speak to the clinical team before medication is submitted. Please call,thanks!    What Number to Call Back: [phone]    When to Expect a call back: (With in 24 hours)

## 2017-09-07 DIAGNOSIS — G25.81 RLS (RESTLESS LEGS SYNDROME): ICD-10-CM

## 2017-09-07 RX ORDER — PRAMIPEXOLE DIHYDROCHLORIDE 0.12 MG/1
TABLET ORAL
Qty: 90 TABLET | Refills: 1 | Status: SHIPPED | OUTPATIENT
Start: 2017-09-07 | End: 2017-10-20 | Stop reason: ALTCHOICE

## 2017-09-11 ENCOUNTER — TELEPHONE (OUTPATIENT)
Dept: SLEEP MEDICINE | Facility: CLINIC | Age: 77
End: 2017-09-11

## 2017-09-21 ENCOUNTER — TELEPHONE (OUTPATIENT)
Dept: OBSTETRICS AND GYNECOLOGY | Facility: CLINIC | Age: 77
End: 2017-09-21

## 2017-09-21 DIAGNOSIS — Z12.31 VISIT FOR SCREENING MAMMOGRAM: Primary | ICD-10-CM

## 2017-09-21 NOTE — TELEPHONE ENCOUNTER
----- Message from Jaimie Kraus sent at 9/21/2017  1:43 PM CDT -----  Contact: self/[phone]  Patient would like orders put in the system for her Mammo.  Please call and advise when completed.

## 2017-09-22 ENCOUNTER — HOSPITAL ENCOUNTER (OUTPATIENT)
Dept: RADIOLOGY | Facility: HOSPITAL | Age: 77
Discharge: HOME | End: 2017-09-22
Attending: OBSTETRICS & GYNECOLOGY
Payer: MEDICARE

## 2017-09-22 DIAGNOSIS — Z12.31 VISIT FOR SCREENING MAMMOGRAM: ICD-10-CM

## 2017-09-22 PROCEDURE — 77067 SCR MAMMO BI INCL CAD: CPT | Mod: TC

## 2017-09-22 PROCEDURE — 77067 SCR MAMMO BI INCL CAD: CPT | Mod: 26,,, | Performed by: RADIOLOGY

## 2017-09-22 PROCEDURE — 77063 BREAST TOMOSYNTHESIS BI: CPT | Mod: 26,,, | Performed by: RADIOLOGY

## 2017-09-29 ENCOUNTER — OFFICE VISIT (OUTPATIENT)
Dept: TRANSPLANT | Facility: CLINIC | Age: 77
End: 2017-09-29
Payer: MEDICARE

## 2017-09-29 ENCOUNTER — HOSPITAL ENCOUNTER (OUTPATIENT)
Dept: CARDIOLOGY | Facility: CLINIC | Age: 77
Discharge: HOME | End: 2017-09-29
Payer: MEDICARE

## 2017-09-29 ENCOUNTER — HOSPITAL ENCOUNTER (OUTPATIENT)
Dept: PULMONOLOGY | Facility: CLINIC | Age: 77
Discharge: HOME | End: 2017-09-29
Payer: MEDICARE

## 2017-09-29 VITALS
BODY MASS INDEX: 29.14 KG/M2 | WEIGHT: 161 LBS | HEIGHT: 63 IN | SYSTOLIC BLOOD PRESSURE: 143 MMHG | DIASTOLIC BLOOD PRESSURE: 83 MMHG | WEIGHT: 164.44 LBS | HEART RATE: 68 BPM | BODY MASS INDEX: 28.53 KG/M2 | OXYGEN SATURATION: 99 % | HEIGHT: 63 IN

## 2017-09-29 DIAGNOSIS — I10 ESSENTIAL HYPERTENSION: ICD-10-CM

## 2017-09-29 DIAGNOSIS — G47.33 OSA (OBSTRUCTIVE SLEEP APNEA): ICD-10-CM

## 2017-09-29 DIAGNOSIS — E78.2 MIXED HYPERLIPIDEMIA: ICD-10-CM

## 2017-09-29 DIAGNOSIS — R06.02 SHORTNESS OF BREATH: ICD-10-CM

## 2017-09-29 DIAGNOSIS — I25.10 ATHEROSCLEROSIS OF NATIVE CORONARY ARTERY OF NATIVE HEART WITHOUT ANGINA PECTORIS: ICD-10-CM

## 2017-09-29 DIAGNOSIS — I27.20 PULMONARY HYPERTENSION: ICD-10-CM

## 2017-09-29 DIAGNOSIS — I27.20 PULMONARY HYPERTENSION: Primary | ICD-10-CM

## 2017-09-29 PROBLEM — G44.309 POST-TRAUMATIC HEADACHE, NOT INTRACTABLE: Status: RESOLVED | Noted: 2017-06-29 | Resolved: 2017-09-29

## 2017-09-29 PROBLEM — R55 SYNCOPE: Status: RESOLVED | Noted: 2017-06-29 | Resolved: 2017-09-29

## 2017-09-29 LAB
AORTIC VALVE REGURGITATION: NORMAL
ESTIMATED PA SYSTOLIC PRESSURE: 35.04
RETIRED EF AND QEF - SEE NOTES: 65 (ref 55–65)
TRICUSPID VALVE REGURGITATION: NORMAL

## 2017-09-29 PROCEDURE — 3077F SYST BP >= 140 MM HG: CPT | Mod: S$GLB,,, | Performed by: INTERNAL MEDICINE

## 2017-09-29 PROCEDURE — 99999 PR PBB SHADOW E&M-EST. PATIENT-LVL III: CPT | Mod: PBBFAC,,, | Performed by: INTERNAL MEDICINE

## 2017-09-29 PROCEDURE — 99499 UNLISTED E&M SERVICE: CPT | Mod: S$GLB,,, | Performed by: INTERNAL MEDICINE

## 2017-09-29 PROCEDURE — 3008F BODY MASS INDEX DOCD: CPT | Mod: S$GLB,,, | Performed by: INTERNAL MEDICINE

## 2017-09-29 PROCEDURE — 94620 PR PULMONARY STRESS TESTING,SIMPLE: CPT | Mod: S$GLB,,, | Performed by: INTERNAL MEDICINE

## 2017-09-29 PROCEDURE — 3079F DIAST BP 80-89 MM HG: CPT | Mod: S$GLB,,, | Performed by: INTERNAL MEDICINE

## 2017-09-29 PROCEDURE — 99214 OFFICE O/P EST MOD 30 MIN: CPT | Mod: S$GLB,,, | Performed by: INTERNAL MEDICINE

## 2017-09-29 PROCEDURE — 93306 TTE W/DOPPLER COMPLETE: CPT | Mod: S$GLB,,, | Performed by: INTERNAL MEDICINE

## 2017-09-29 PROCEDURE — 1159F MED LIST DOCD IN RCRD: CPT | Mod: S$GLB,,, | Performed by: INTERNAL MEDICINE

## 2017-09-29 NOTE — PATIENT INSTRUCTIONS
1. Continue current therapy.  2.  Keep salt intake to under 2000 mg sodium, fluids to under 2 L (64 oz)  3  Check your weights every morning after getting out of bed and urinating. If your weight goes up 3# overnight or 5# in one week take call us  4. Call us if you find yourself getting more short of breath, have more swelling or unexpected weight changes, fatigue or chest pain    Flu shot

## 2017-09-29 NOTE — PROGRESS NOTES
Subjective:    Patient ID:  Maryann Sorenson is a 76 y.o. female who presents for follow-up of Pulmonary Hypertension.    Congestive Heart Failure   Pertinent negatives include no abdominal pain, change in bowel habit, chest pain, chills, coughing or fever.      76 to woman with HTN, high ca score, NOE referred by Dr Rich to be evaluated for PH after echo 1 yr ago showed PAP 59 and now here for f/u    Since last visit, pt had an episode of syncope in June- says it was vasovagal-  Feel on back, sprained her ankles, had a concussion- had severe stomach cramps and on her way to the [place] when she passed out- was laid up with her ankles for a a while- still having issues with the left ankle. Dr Nichols ordered a holter and LE doppler which was (-)  Says she still gets some SOB doing things, but it hasnt changed- changing sheets on the bed, wiping down the shower (using her arms)  Has a cleaning lady to do her housework.   Has had some swelling in her ankles since the injury, not exercising like she was    Started CPAP in June and has been faithful to it. Thinks maybe she has a tad more energy    BP log /64-85    TTE today    1 - Normal left ventricular systolic function (EF 60-65%).     2 - Normal right ventricular systolic function .     3 - Indeterminate LV diastolic function.     4 - Mild left atrial enlargement.     5 - Mildly enlarged ascending aorta.     6 - Mild tricuspid regurgitation.     7 - The estimated PA systolic pressure is 35 mmHg.   right ventricle is normal in size measuring 3.7 cm at the base in the apical right ventricle-focused view. Global right ventricular systolic function appears normal. Tricuspid annular plane systolic excursion (TAPSE) is 2.9 cm.   Tissue Doppler-derived tricuspid annular peak systolic velocity (S prime) is 13.7 cm/s. The estimated PA systolic pressure is 35 mmHg.       Six Minute Walk Test  457m (463m,  518m 9/16, 488  m in June   )                                               O2 sat  98 ->98  %                                                           HR 63 -> 107                                                                BP  138 / 78  ->164 /78                                                         Laurie  1  -> 4    Echo        Results for orders placed or performed during the hospital encounter of 09/29/17   2D echo with color flow doppler   Result Value Ref Range    EF 65 55 - 65    Aortic Valve Regurgitation TRIVIAL     Est. PA Systolic Pressure 35.04     Tricuspid Valve Regurgitation MILD    The right ventricle is normal in size measuring 2.4 cm at the base in the apical right ventricle-focused view. Global right ventricular systolic function appears normal. Tricuspid annular plane systolic excursion (TAPSE) is 2.9 cm.   Tissue Doppler-derived tricuspid annular peak systolic velocity (S prime) is 12.0 cm/s. The estimated PA systolic pressure is 35 mmHg.     6 - Mild aortic regurgitation.     7 - Mild to moderate tricuspid regurgitation.     8 - Intermediate central venous pressure.    JENNIFER 2015    1 - Enlarged ascending aorta.     2 - Normal left ventricular systolic function (EF 60-65%).     3 - Normal left ventricular diastolic function.     4 - Pulmonary hypertension.     5 - Normal right ventricular systolic function .     6 - Mild tricuspid regurgitation.     No evidence of stress induced myocardial ischemia.     EKG  4 /  15  Normal sinus rhythm  Normal ECG    CXR  3 /16   Heart is not enlarged.  The lungs are clear.  Double density seen through the heart with air-fluid level is compatible with a non-reducing hiatal hernia.  Prominent kyphosis with DJD seen in the spine.  No compression fractures in the visualized spine          Review of Systems   Constitution: Negative for chills, fever, malaise/fatigue and weight gain.   HENT: Negative.    Eyes: Negative.    Cardiovascular: Negative for chest pain, dyspnea on exertion, leg swelling, near-syncope, orthopnea,  "palpitations, paroxysmal nocturnal dyspnea and syncope.   Respiratory: Negative for cough and shortness of breath.    Endocrine: Negative.    Skin: Negative.    Musculoskeletal: Negative.    Gastrointestinal: Negative for bloating, abdominal pain and change in bowel habit.   Neurological: Negative for dizziness and light-headedness.   Psychiatric/Behavioral: Negative for depression.        Objective:    BP (!) 143/83   Pulse 68   Ht 5' 3" (1.6 m)   Wt 74.6 kg (164 lb 7.4 oz)   LMP  (LMP Unknown)   SpO2 99%   BMI 29.13 kg/m²       Physical Exam   Constitutional: She is oriented to person, place, and time. She appears well-developed and well-nourished.   HENT:   Head: Normocephalic and atraumatic.   Eyes: Right eye exhibits no discharge. Left eye exhibits no discharge.   Neck: Neck supple. No JVD present. No thyromegaly present.   Cardiovascular: Normal rate and regular rhythm.  Exam reveals no gallop and no friction rub.    No murmur heard.       Pulmonary/Chest: Effort normal and breath sounds normal. No respiratory distress. She has no wheezes. She has no rales.   Abdominal: Soft. Bowel sounds are normal. She exhibits no distension. There is no tenderness.   Musculoskeletal: Normal range of motion. She exhibits no edema or tenderness.   Neurological: She is alert and oriented to person, place, and time. No cranial nerve deficit. Coordination normal.   Skin: Skin is warm and dry. No rash noted.   Psychiatric: She has a normal mood and affect. Judgment and thought content normal.           Chemistry        Component Value Date/Time     (L) 09/29/2017 1217    K 4.8 09/29/2017 1217     09/29/2017 1217    CO2 24 09/29/2017 1217    BUN 17 09/29/2017 1217    CREATININE 0.9 09/29/2017 1217     09/29/2017 1217        Component Value Date/Time    CALCIUM 9.2 09/29/2017 1217    ALKPHOS 98 09/29/2017 1217    AST 34 09/29/2017 1217    ALT 19 09/29/2017 1217    BILITOT 0.3 09/29/2017 1217    "         Magnesium   Date Value Ref Range Status   06/18/2017 2.5 1.6 - 2.6 mg/dL Final       Lab Results   Component Value Date    WBC 3.89 (L) 09/29/2017    HGB 11.5 (L) 09/29/2017    HCT 35.1 (L) 09/29/2017    MCV 85 09/29/2017     09/29/2017       No results found for: INR, PROTIME    BNP   Date Value Ref Range Status   09/29/2017 40 0 - 99 pg/mL Final     Comment:     Values of less than 100 pg/ml are consistent with non-CHF populations.   04/03/2017 86 0 - 99 pg/mL Final     Comment:     Values of less than 100 pg/ml are consistent with non-CHF populations.   09/26/2016 43 0 - 99 pg/mL Final     Comment:     Values of less than 100 pg/ml are consistent with non-CHF populations.       No results found for: LDH          Assessment:       1. Pulmonary hypertension on previous echo, now normal PAP, normal RV size and fxn today- euvolemic on exam, low BNP- BP  well controlled,    2. Shortness of breath    3. Essential hypertension    4. Coronary artery disease involving native coronary artery of native heart without angina pectoris    5. Hyperlipidemia    6. NOE- untreated       Plan:      No changes today-     Keep salt intake to under 2000 mg sodium, fluids to under 2 L (64 oz)    Continue tracking blood pressure at home and bring log to next visit. Trying to balance her low BP at rest with severe HTN on exercise.    Will defer RHC at this time as my suspicion for PH in light of her most recent echo is low- again we talked about need for routine exercise    Flu shot    F/u 6 mo with labs 6mw

## 2017-09-30 NOTE — PROCEDURES
Maryann Sorenson is a 76 y.o.  female patient, who presents for a 6 minute walk test ordered by Iris Early MD.  The diagnosis is Pulmonary Hypertension; Dyspnea with Exertion.  The patient's BMI is 28.6 kg/m2.  Predicted distance (lower limit of normal) is 268.12 meters.      Test Results:    The test was completed without stopping.  The total time walked was 360 seconds.  During walking, the patient reported:  Dyspnea, Leg pain.  The patient used no assistive devices during testing.     09/29/2017---------Distance: 457.2 meters (1500 feet)     O2 Sat % Supplemental Oxygen Heart Rate Blood Pressure Laurie Scale   Pre-exercise  (Resting) 98 % Room Air 63 bpm 138/78 mmHg 1   During Exercise 98 % Room Air 107 bpm 164/78 mmHg 4   Post-exercise  (Recovery) 98 % Room Air  81 bpm       Recovery Time:  80 seconds    Performing nurse/tech:  JAYASHREE Villareal.      PREVIOUS STUDY:   04/03/2017---------Distance: 462.69 meters (1518 feet)       O2 Sat % Supplemental Oxygen Heart Rate Blood Pressure Laurie Scale   Pre-exercise  (Resting) 99 % Room Air 68 bpm 153/76 mmHg 1   During Exercise 98 % Room Air 101 bpm 166/86 mmHg 4   Post-exercise  (Recovery) 99 % Room Air  75 bpm   mmHg         CLINICAL INTERPRETATION:  Six minute walk distance is 457.2 meters (1500 feet) with somewhat heavy dyspnea.  During exercise, there was no desaturation while breathing room air.  Both blood pressure and heart rate increased significantly with walking.  The patient reported non-pulmonary symptoms during exercise.  Since the previous study in April 2017, exercise capacity is unchanged.  Based upon age and body mass index, exercise capacity is normal.

## 2017-10-20 ENCOUNTER — OFFICE VISIT (OUTPATIENT)
Dept: SLEEP MEDICINE | Facility: CLINIC | Age: 77
End: 2017-10-20
Payer: MEDICARE

## 2017-10-20 VITALS
BODY MASS INDEX: 29.57 KG/M2 | WEIGHT: 166.88 LBS | HEIGHT: 63 IN | HEART RATE: 59 BPM | DIASTOLIC BLOOD PRESSURE: 76 MMHG | SYSTOLIC BLOOD PRESSURE: 130 MMHG

## 2017-10-20 DIAGNOSIS — G25.81 RLS (RESTLESS LEGS SYNDROME): ICD-10-CM

## 2017-10-20 PROCEDURE — 99999 PR PBB SHADOW E&M-EST. PATIENT-LVL IV: CPT | Mod: PBBFAC,,, | Performed by: PSYCHIATRY & NEUROLOGY

## 2017-10-20 PROCEDURE — 99499 UNLISTED E&M SERVICE: CPT | Mod: S$GLB,,, | Performed by: PSYCHIATRY & NEUROLOGY

## 2017-10-20 PROCEDURE — 99212 OFFICE O/P EST SF 10 MIN: CPT | Mod: S$GLB,,, | Performed by: PSYCHIATRY & NEUROLOGY

## 2017-10-20 RX ORDER — PRAMIPEXOLE DIHYDROCHLORIDE 0.12 MG/1
TABLET ORAL
Qty: 90 TABLET | Refills: 3 | Status: SHIPPED | OUTPATIENT
Start: 2017-10-20 | End: 2018-09-26 | Stop reason: SDUPTHER

## 2017-10-20 NOTE — PROGRESS NOTES
Maryann Sorenson  was seen at the request of  No ref. provider found for sleep evaluation.    08/11/2016 INITIAL HISTORY OF PRESENT ILLNESS:  Maryann Sorenson is a 76 y.o. female is here to be evaluated for a sleep disorder.       CHIEF COMPLAINT:      The patient's complaints include excessive daytime fatigue, snoring,  witnessed breathing pauses,  gasping for air in sleep and interrupted sleep since  Over 10 years ago.    Being evaluated by Dr. Early for pulmonary HTN.     Reports  dry mouth and sore throat  Denies nasal congestion   Denies  morning headaches  Reports occasional  interrupted sleep  Reports frequent leg movements - only when watching TV. Urge to move her legs. Got it from her father.   Denies symptoms concerning for parasomnia    The ESS ([place] Sleepiness Score) taken on initial visit is 6 /24    Bedroom is dark and quiet.    FH: father and 2 sisters - RLS.    INTERVAL HISTORY:    10/12/2016  The patient has not presented any new complaints since the previous visit.   Comes to discuss PSG. Very poor sleep efficiency could have caused underestimation of test results (positive to mild to moderate sleep apnea). Iron test was normal.   Nature's wellness oil at the base of the spine - using for RLS - helps at night - never tried it in the evening.     05/24/2017: Maryann Sorenson comes back to talk about treatment options. Prefers CPAP with nasal pillows mask. Pros and cons were discussed.     07/20/2017: Started using CPAP in June. Passed out in her bedroom-> resulted in ankle sprain.  Has to get up in the middle of the night to the bathroom - now has to turn the light on for the sake of CPAP - now that she is wide awake -> RLS acts up.     Therapy Event Summary Date Range: 6/20/2017 - 7/19/2017     Doing well with Dreamwear mask.     ESS 9/24.    Hide      Compliance Summary  Apnea Indices  Ventilator Statistics    Days with Device Usage:  30 days  Average AHI:  1.1  Average Breath Rate:  16.5 bpm     Percentage of Days >=4 Hours:  90.0%  Average OA Index:  0.1  Average % Patient Triggered Breaths:  N/A    Average Usage (Days Used):  4 hrs. 34 mins. 42 secs.  Average CA Index:  0.1  Average Tidal Volume:  417.1 ml    Average Usage (All Days):  4 hrs. 34 mins. 42 secs.    Average Minute Vent:  N/A        Large Leak  Periodic Breathing     Average Time in Large Leak:  0 secs.  Average % of Night in PB:  0.3%     Average % of Night in Large Leak:  0.0%            \  10/20/2017:  Therapy Event Summary Date Range: 9/20/2017 - 10/19/2017   Doing better since pressure increase. Resports benefit from CPAP use, but still significantly interrupted sleep, and residual fatigue/sleepiness.    APAP90% pressure 8  Using ramp at 5.    Mirapex  0.125 mg- still controlling RLS well.      Hide      Compliance Summary  Apnea Indices  Ventilator Statistics    Days with Device Usage:  30 days  Average AHI:  4.9  Average Breath Rate:  13.7 bpm    Percentage of Days >=4 Hours:  100.0%  Average OA Index:  0.6  Average % Patient Triggered Breaths:  N/A    Average Usage (Days Used):  7 hrs. 34 mins. 49 secs.  Average CA Index:  1.1  Average Tidal Volume:  316.8 ml    Average Usage (All Days):  7 hrs. 34 mins. 49 secs.    Average Minute Vent:  N/A        Large Leak  Periodic Breathing     Average Time in Large Leak:  0 secs.  Average % of Night in PB:  1.0%     Average % of Night in Large Leak:  0.0%                    SLEEP ROUTINE AND LIFESTYLE 10/20/2017 :    Occupation: retired    Bed partner:  - sleeps in another room.     Time to bed: 11  Sleep onset latency: 5-10 min  Disruptions or awakenings: 1-2  Time to fall back into sleep: 5 min   Wakeup time: 7 AM   Perceived sleep quality: 3/5  Perceived total sleep time:  7.5  hours.  Daytime naps: sometimes not intentional  Weekend sleep routine: till 7:10   Exercise routine: no - lost routine since her trip to [place].  Caffeine: usually decaf     PREVIOUS SLEEP STUDIES:     PSG  "8/25/16: Significant Obstructive sleep apnea (NOE) with AHI (apnea hypopnea Index) of 8.9 and SaO2 of 87% (weight  161 lbs).RDI 15.    DME:       PAST MEDICAL HISTORY:    Active Ambulatory Problems     Diagnosis Date Noted    Meniere's disease, unspecified 03/07/2010    Mixed hyperlipidemia     Agatston coronary artery calcium score greater than 400 - 577 10/23/2013    GERD (gastroesophageal reflux disease) 10/07/2014    Shortness of breath 04/15/2015    Back pain of thoracolumbar region 03/08/2016    Essential hypertension 04/21/2016    Pulmonary hypertension 05/03/2016    NOE (obstructive sleep apnea)     Atherosclerosis of native coronary artery of native heart without angina pectoris 05/30/2017    Carotid stenosis, bilateral 06/29/2017    Restless legs 06/29/2017    Neck muscle spasm 06/29/2017     Resolved Ambulatory Problems     Diagnosis Date Noted    Annual physical exam 06/20/2013    Fullness in head 10/14/2013    Headache, occipital 10/24/2013    Cast discomfort 06/21/2014    Pre-operative examination for internal medicine 06/24/2014    Dysphagia 10/07/2014    Sore throat 10/07/2014    Bronchitis 03/19/2015    Sinusitis 03/19/2015    Generalized muscle ache 06/29/2015    Syncope 06/29/2017    Post-traumatic headache, not intractable 06/29/2017     Past Medical History:   Diagnosis Date    CAD (coronary artery disease) 11/1/2012    GERD (gastroesophageal reflux disease)     Hyperlipidemia     Hypertension     Syncope                 PAST SURGICAL HISTORY:    Past Surgical History:   Procedure Laterality Date    broken wrist      "put plate in it"    DILATION AND CURETTAGE OF UTERUS      tonsillectomy      TONSILLECTOMY           FAMILY HISTORY:                Family History   Problem Relation Age of Onset    Cancer Mother     Colon cancer Mother     Hyperlipidemia Mother     Hypertension Mother     Heart disease Father     Heart attack Neg Hx     Heart failure Neg Hx "     Stroke Neg Hx        SOCIAL HISTORY:          Tobacco:   History   Smoking Status    Never Smoker   Smokeless Tobacco    Never Used       alcohol use:    History   Alcohol Use    0.0 oz/week     Comment: occasionally/ not weekly                   ALLERGIES:    Allergies   Allergen Reactions    Augmentin [Amoxicillin-Pot Clavulanate] Diarrhea     Given march 2016    Codeine      Other reaction(s): Unknown, tolerates hydrocodone june 2014    Doxycycline      Other reaction(s): Unknown    Lyrica [Pregabalin] Other (See Comments)     Eye irritation    Relafen [Nabumetone] Diarrhea    Sulfa Dyne      Other reaction(s): Unknown       CURRENT MEDICATIONS:    Current Outpatient Prescriptions   Medication Sig Dispense Refill    aspirin 81 mg Tab Take 1 tablet by mouth Daily. 1 Tablet Oral Every day      CA CITRATE/MGOX/VIT D3/B6/MIN (CITRACAL PLUS ORAL) Take by mouth.      coenzyme Q10 (CO Q-10) 200 mg capsule Take 1 capsule by mouth Daily. 1 Capsule Oral Every day      diazepam (VALIUM) 2 MG tablet Take 1 tablet by mouth as needed. 1 Tablet Oral .  For dizziness      esomeprazole (NEXIUM) 40 MG capsule 1 Capsule, Delayed Release(E.C.) Oral Every day 90 capsule 3    fexofenadine (ALLEGRA) 180 MG tablet Take 1 tablet by mouth Daily. 1 Tablet Oral Every day      GLUCOSAMINE SULFATE ORAL Take 1,000 mg by mouth Daily. 1 Capsule Oral Every day      guaifenesin (MUCINEX) 600 mg 12 hr tablet Take 1,200 mg by mouth as needed.       ibuprofen (ADVIL,MOTRIN) 200 MG tablet Take 400 mg by mouth every 8 (eight) hours as needed for Pain.      LACTOBACILLUS RHAMNOSUS GG (PROBIOTIC ORAL) Take 1 capsule by mouth Daily. 1 Capsule Oral Every day      lisinopril (PRINIVIL,ZESTRIL) 20 MG tablet Take 1 tablet (20 mg total) by mouth 2 (two) times daily. 180 tablet 3    lutein 6 mg Cap Take 12 mg by mouth once daily at 6am.      magnesium 250 mg Tab Take 500 mg by mouth once daily.       omega-3 fatty acids-vitamin E  "(FISH OIL) 1,000 mg Cap Take 2 capsules by mouth once daily.        pramipexole (MIRAPEX) 0.125 MG tablet 1 pill PO in the morning PRN sleepiness. Please provide 90 days supply. (Patient taking differently: 1 pill PO in the nightime PRN for RLS. Please provide 90 days supply.) 90 tablet 1    rosuvastatin (CRESTOR) 40 MG Tab Take 1 tablet (40 mg total) by mouth once daily. 90 tablet 3     No current facility-administered medications for this visit.                       REVIEW OF SYSTEMS:   Sleep related symptoms as per HPI    denies weight gain  Reports dyspnea  Denies palpitations  Reports acid reflux   Reports polyuria 3-4-> now better on low salt diet.  Denies  mood diturbance  Denies  anemia  Reports occasional  muscle pain  Denies  Gait imbalance    Otherwise, a balance of 10 systems reviewed is negative.    PHYSICAL EXAM:  /76 (BP Location: Left arm, Patient Position: Sitting, BP Method: Large (Automatic))   Pulse (!) 59   Ht 5' 3" (1.6 m)   Wt 75.7 kg (166 lb 14.4 oz)   LMP  (LMP Unknown)   BMI 29.57 kg/m²   GENERAL: Overweight body habitus, well groomed.  HEENT:   HEENT:  Conjunctivae are non-erythematous; Pupils equal, round, and reactive to light; Nose is symmetrical; Nasal mucosa is pink and moist; Septum is midline; Inferior turbinates are hypertrophied; Nasal airflow is normal; Posterior pharynx is pink; Modified Mallampati:II-III; Posterior palate is low; Tonsils not visualized; Uvula is normal and pink;Tongue is enlarged; Dentition is fair; No TMJ tenderness; Jaw opening and protrusion without click and without discomfort.  NECK: Supple. Neck circumference is 16 inches. No thyromegaly. No palpable nodes.     SKIN: On face and neck: No abrasions, no rashes, no lesions.  No subcutaneous nodules are palpable.  RESPIRATORY: Chest is clear to auscultation.  Normal chest expansion and non-labored breathing at rest.  CARDIOVASCULAR: Normal S1, S2.  No murmurs, gallops or rubs. No carotid bruits " "bilaterally.  No edema. No clubbing. No cyanosis.    NEURO: Oriented to time, place and person. Normal attention span and concentration. Gait normal.    PSYCH: Affect is full. Mood is normal  MUSCULOSKELETAL: Moves 4 extremities. Gait normal.       Using [facility]:       ASSESSMENT:    1. NOE - moderate by RDI. Poor sleep efficiency on the nioght of the study could have led to underestimation of NOE severity.  The patient symptomatically has  excessive daytime sleepiness, snoring,  witnessed breathing pauses, excessive daytime fatigue, gasping for air in sleep and interrupted sleep  with exam findings of "a crowded oral airway and elevated body mass index. The patient has medical co-morbidities of CAD and hyperlipidemia,  which can be worsened by NOE. This warrants treatment. Benefiting from CPAP use in terms of sleep continuity and daytime sleepiness. Remains compliant . Reports still interrupted sleep and residual sleepiness.    2. RLS - mild symptoms. Well controlled on low dose Mirapex 0.125 mg QHS.      PLAN:      Following recommendations were given in the AVS:   ncrease CPAP to 8-18 H2O  Increase ramp to 6 cm H2O    Please let me know if you are having any problems with that.    Try Melatonin over the counter 5-10 mg    Consider CPAP comfort cover.    Use Meditations from [employer]  (I have personally installed it on her phone)    Please please try to walk more        Will continue Mirapex 0.125 mg    Education: During our discussion today, we talked about the etiology of obstructive sleep apnea as well as the potential ramifications of untreated sleep apnea, which could include daytime sleepiness, hypertension, heart disease and/or stroke.      We discussed potential treatment options, which could include weight loss, body positioning, continuous positive airway pressure (CPAP), or referral for surgical consideration. The patient preferred CPAP option.     Discussed purpose of PAP therapy, health benefits " of CPAP, as well as the potential ramifications of untreated sleep apnea, which could include daytime sleepiness, hypertension, heart disease and/or stroke. An AHI of 15 is associated with increased risk CVD.     The patient should avoid ETOH and sedatives at night, as it tends to aggravate NOE. Regular replacement of CPAP mask, tubing and filter was recommended.    Precautions: The patient was advised to abstain from driving should he feel sleepy or drowsy.    Follow up: MD/NP after 1 month of PAP use.    Thank you for allowing me the opportunity to participate in the care of your patient.

## 2017-11-28 ENCOUNTER — OFFICE VISIT (OUTPATIENT)
Dept: OBSTETRICS AND GYNECOLOGY | Facility: CLINIC | Age: 77
End: 2017-11-28
Payer: MEDICARE

## 2017-11-28 VITALS
WEIGHT: 165.56 LBS | DIASTOLIC BLOOD PRESSURE: 66 MMHG | HEIGHT: 63 IN | SYSTOLIC BLOOD PRESSURE: 124 MMHG | BODY MASS INDEX: 29.34 KG/M2

## 2017-11-28 DIAGNOSIS — Z01.419 WELL WOMAN EXAM WITH ROUTINE GYNECOLOGICAL EXAM: ICD-10-CM

## 2017-11-28 DIAGNOSIS — Z12.4 ROUTINE PAPANICOLAOU SMEAR: Primary | ICD-10-CM

## 2017-11-28 PROCEDURE — 88175 CYTOPATH C/V AUTO FLUID REDO: CPT

## 2017-11-28 PROCEDURE — 99999 PR PBB SHADOW E&M-EST. PATIENT-LVL III: CPT | Mod: PBBFAC,,, | Performed by: OBSTETRICS & GYNECOLOGY

## 2017-11-28 PROCEDURE — G0101 CA SCREEN;PELVIC/BREAST EXAM: HCPCS | Mod: S$GLB,,, | Performed by: OBSTETRICS & GYNECOLOGY

## 2017-12-09 NOTE — PATIENT INSTRUCTIONS
Increase CPAP to 8-18 H2O  Increase ramp to 6 cm H2O    Please let me know if you are having any problems with that.    Try Melatonin over the counter 5-10 mg    Consider CPAP comfort cover.    Please please try to walk more   Patient Discharge Instructions    Seble Copeland / [id number] : 1925    Admitted 2017 Discharged: 2017     Take Home Medications            · It is important that you take the medication exactly as they are prescribed. · Keep your medication in the bottles provided by the pharmacist and keep a list of the medication names, dosages, and times to be taken in your wallet. · Do not take other medications without consulting your doctor. What to do at Home    Recommended diet: Cardiac Diet,     Recommended activity: Activity as tolerated, PT/OT    If you experience any of the following symptoms syncope, please follow up with Dr. Sophia Nicholson. Follow-up Appointments   Procedures    FOLLOW UP VISIT Appointment in: One Month As needed     As needed     Standing Status:   Standing     Number of Occurrences:   1     Order Specific Question:   Appointment in     Answer:   One Month            Information obtained by :  I understand that if any problems occur once I am at home I am to contact my physician. I understand and acknowledge receipt of the instructions indicated above.                                                                                                                                            Physician's or R.N.'s Signature                                                                  Date/Time                                                                                                                                              Patient or Representative Signature                                                          Date/Time

## 2017-12-23 ENCOUNTER — HOSPITAL ENCOUNTER (OUTPATIENT)
Dept: RADIOLOGY | Facility: HOSPITAL | Age: 77
Discharge: HOME | End: 2017-12-23
Attending: INTERNAL MEDICINE
Payer: MEDICARE

## 2017-12-23 ENCOUNTER — OFFICE VISIT (OUTPATIENT)
Dept: INTERNAL MEDICINE | Facility: CLINIC | Age: 77
End: 2017-12-23
Payer: MEDICARE

## 2017-12-23 VITALS
TEMPERATURE: 98 F | WEIGHT: 166 LBS | HEART RATE: 88 BPM | RESPIRATION RATE: 12 BRPM | BODY MASS INDEX: 29.41 KG/M2 | HEIGHT: 63 IN | DIASTOLIC BLOOD PRESSURE: 84 MMHG | SYSTOLIC BLOOD PRESSURE: 144 MMHG

## 2017-12-23 DIAGNOSIS — M79.10 MYALGIA: ICD-10-CM

## 2017-12-23 DIAGNOSIS — B34.9 VIRAL ILLNESS: Primary | ICD-10-CM

## 2017-12-23 DIAGNOSIS — J34.89 SINUS PRESSURE: ICD-10-CM

## 2017-12-23 DIAGNOSIS — R53.83 FATIGUE, UNSPECIFIED TYPE: ICD-10-CM

## 2017-12-23 PROCEDURE — 70220 X-RAY EXAM OF SINUSES: CPT | Mod: 26,,, | Performed by: RADIOLOGY

## 2017-12-23 PROCEDURE — 99214 OFFICE O/P EST MOD 30 MIN: CPT | Mod: S$GLB,,, | Performed by: INTERNAL MEDICINE

## 2017-12-23 PROCEDURE — 99999 PR PBB SHADOW E&M-EST. PATIENT-LVL III: CPT | Mod: PBBFAC,,, | Performed by: INTERNAL MEDICINE

## 2017-12-23 PROCEDURE — 70220 X-RAY EXAM OF SINUSES: CPT | Mod: TC,PO

## 2017-12-23 NOTE — PROGRESS NOTES
CC: Fatigue    76 yo female patient w/ HLD, CAD, GERD, and HTN presents w/ URI symptoms  Sx started: Monday, intermittent, ++ myalgias   No Fever, chills, or night sweats:  Sudden onset: 11/29 sx w/ fever and fatigue, 12/1 UC  - flu swabs negative,   and reported as CXR neg  Steroid injection given and antibiotics , Zpak 12/4 when sx did not improve, 12/8   Intermittent w/ exacerbation Monday  Ear pain or pressure: n/a  Sinus pain or pressure: right congestion @ night  Sore throat: n/a  PND or nasal congestion: ++  Cough or wheezing: occ cough w/o wheezing- has chronic SOB  Mucus production: diminished over the past few days  Discolored: yellow 12/1  GI sx: diarrhea after the Zpak- improved, intermittent  Myalgias arthralgias: +++  HA neuro symptoms: n/a  TX: C-Pap, Allegra, Mucinex and steroid injection and Zpak  No unusual rashes or joint swelling  Pt thinks she has endocarditis, as her son has had 2/2 to his congenital heart problems.  Reviewed 9/2017 echo w/ pt that was unremarkable  Noted she has had no fever w/ her illness    PE:  VSS  GEN: WDWN, A&O, conversant and co-operative,in NAD  EYES: Conjunctiva/lids unremarkable  Sclera anicteric PERRL, EOMI  ENT: Hearing intact; canals w/o significant cerumen;   TM's unremarkable   Nasal mucosa/turbinates  injected w/o exudate  O/p  injected w/o exudate  Sinus mild pressure maxillary  Stridor none  Hoarseness none   NECK: Supple w/o lymphadenopathy or thyromegaly; trachea midline  RESP: Efforts unlabored  LUNGS: CTAP  CV: RRR w/o mgr, no carotid bruits noted, 1+ pedal pulses, no LE edema  GI: BS+, soft,, NT/ND, no HSM noted  MSK: Gait normal, no CCE  NEURO: STEVENS,, no tremor noted  SKIN: Warm and dry, no rashes noted    IMPRESSION:  Viral illness  Fatigue  Myalgias  Concern for endocarditis, recent dental cleaning and filling    Reviewed w/ pt no indication on lab, echo, hx or PE of endocarditis    Will not order BC or echo @ this time, request pt call if fever  develops or other    Acute change that require further investigation    PLAN:  Lab-  Sinus xray  Vigorous hydration - water best  Mucinex  NSAIDS   Acetaminophen  Call prn  RTC  ED acute change in status  30' o/v > 50% spent in review, rec , and MDM

## 2017-12-26 ENCOUNTER — TELEPHONE (OUTPATIENT)
Dept: INTERNAL MEDICINE | Facility: CLINIC | Age: 77
End: 2017-12-26

## 2017-12-26 NOTE — TELEPHONE ENCOUNTER
I gave her all of 's comments on test results and she will keep appt tomorrow with dr fonseca for 6 mos ck.  She expressed understanding all.

## 2017-12-26 NOTE — TELEPHONE ENCOUNTER
----- Message from Ira Smith MD sent at 12/26/2017 10:42 AM CST -----  Please advise pt that test results revealed, her markers of inflammation ( and possible blood infection) were all normal  And included,ESR And CRP  SHe reported muscle aches and pains, good news her muscle test, CPK, was normal, indication that the muscle pain was not to the extent that it was damaging the muscles  Her chemistries revealed normal kidney and liver tests, her sodium was one point improved @ 135 from her sodium when lab was checked 9/2017  Her WBC's were in the normal range  But borderline anemia was slightly more pronounced, and may, in part, explain some of her fatigue--- consuming iron rich foods like spinach and kale, will help build her count back up  ( it would be reasonable to check her iron studies if she is doing blood work in anticipation of her visit w/ Dr. Carlson this week, if you would yael up, I'll sign)  I called her Saturday w/ her normal sinus xray  Perhaps she has another process contributing to her fatigue- sleep disturbance or fibromyalgia, a log of her sx to review w/ Dr. Carlson might be helpful if she is not feeling any better

## 2017-12-27 ENCOUNTER — OFFICE VISIT (OUTPATIENT)
Dept: INTERNAL MEDICINE | Facility: CLINIC | Age: 77
End: 2017-12-27
Payer: MEDICARE

## 2017-12-27 ENCOUNTER — TELEPHONE (OUTPATIENT)
Dept: INTERNAL MEDICINE | Facility: CLINIC | Age: 77
End: 2017-12-27

## 2017-12-27 VITALS
HEART RATE: 73 BPM | DIASTOLIC BLOOD PRESSURE: 81 MMHG | RESPIRATION RATE: 16 BRPM | WEIGHT: 166.88 LBS | BODY MASS INDEX: 29.57 KG/M2 | TEMPERATURE: 98 F | SYSTOLIC BLOOD PRESSURE: 127 MMHG | HEIGHT: 63 IN

## 2017-12-27 DIAGNOSIS — I25.10 ATHEROSCLEROSIS OF NATIVE CORONARY ARTERY OF NATIVE HEART WITHOUT ANGINA PECTORIS: ICD-10-CM

## 2017-12-27 DIAGNOSIS — M79.10 MYALGIA: ICD-10-CM

## 2017-12-27 DIAGNOSIS — G25.81 RESTLESS LEGS: ICD-10-CM

## 2017-12-27 DIAGNOSIS — I77.1 STRICTURE OF ARTERY: ICD-10-CM

## 2017-12-27 DIAGNOSIS — R53.83 FATIGUE, UNSPECIFIED TYPE: Primary | ICD-10-CM

## 2017-12-27 DIAGNOSIS — R59.0 CERVICAL ADENOPATHY: ICD-10-CM

## 2017-12-27 DIAGNOSIS — G47.33 OSA (OBSTRUCTIVE SLEEP APNEA): ICD-10-CM

## 2017-12-27 DIAGNOSIS — K21.9 GASTROESOPHAGEAL REFLUX DISEASE WITHOUT ESOPHAGITIS: ICD-10-CM

## 2017-12-27 DIAGNOSIS — I10 ESSENTIAL HYPERTENSION: ICD-10-CM

## 2017-12-27 DIAGNOSIS — I65.23 CAROTID STENOSIS, BILATERAL: ICD-10-CM

## 2017-12-27 DIAGNOSIS — R93.1 AGATSTON CORONARY ARTERY CALCIUM SCORE GREATER THAN 400: ICD-10-CM

## 2017-12-27 PROCEDURE — 99999 PR PBB SHADOW E&M-EST. PATIENT-LVL III: CPT | Mod: PBBFAC,,, | Performed by: INTERNAL MEDICINE

## 2017-12-27 PROCEDURE — 99214 OFFICE O/P EST MOD 30 MIN: CPT | Mod: S$GLB,,, | Performed by: INTERNAL MEDICINE

## 2017-12-27 NOTE — TELEPHONE ENCOUNTER
----- Message from Ira Smith MD sent at 12/23/2017  6:22 PM CST -----  Patient/caregiver was advised that the patient xray  Revealed no acute infection

## 2017-12-28 ENCOUNTER — HOSPITAL ENCOUNTER (OUTPATIENT)
Dept: RADIOLOGY | Facility: HOSPITAL | Age: 77
Discharge: HOME | End: 2017-12-28
Attending: INTERNAL MEDICINE
Payer: MEDICARE

## 2017-12-28 DIAGNOSIS — R53.83 FATIGUE, UNSPECIFIED TYPE: ICD-10-CM

## 2017-12-28 DIAGNOSIS — M79.10 MYALGIA: ICD-10-CM

## 2017-12-28 DIAGNOSIS — I77.1 STRICTURE OF ARTERY: ICD-10-CM

## 2017-12-28 DIAGNOSIS — R59.0 CERVICAL ADENOPATHY: ICD-10-CM

## 2017-12-28 PROCEDURE — 93880 EXTRACRANIAL BILAT STUDY: CPT | Mod: TC,PO

## 2017-12-28 PROCEDURE — 76536 US EXAM OF HEAD AND NECK: CPT | Mod: TC,PO

## 2018-01-08 ENCOUNTER — OFFICE VISIT (OUTPATIENT)
Dept: SLEEP MEDICINE | Facility: CLINIC | Age: 78
End: 2018-01-08
Payer: MEDICARE

## 2018-01-08 VITALS
WEIGHT: 169.19 LBS | HEART RATE: 66 BPM | BODY MASS INDEX: 29.98 KG/M2 | DIASTOLIC BLOOD PRESSURE: 84 MMHG | HEIGHT: 63 IN | SYSTOLIC BLOOD PRESSURE: 141 MMHG

## 2018-01-08 DIAGNOSIS — G25.81 RLS (RESTLESS LEGS SYNDROME): ICD-10-CM

## 2018-01-08 DIAGNOSIS — G47.33 OSA (OBSTRUCTIVE SLEEP APNEA): Primary | ICD-10-CM

## 2018-01-08 DIAGNOSIS — E61.1 IRON DEFICIENCY: ICD-10-CM

## 2018-01-08 DIAGNOSIS — R53.83 FATIGUE, UNSPECIFIED TYPE: ICD-10-CM

## 2018-01-08 PROCEDURE — 99204 OFFICE O/P NEW MOD 45 MIN: CPT | Mod: S$GLB,,, | Performed by: PSYCHIATRY & NEUROLOGY

## 2018-01-08 PROCEDURE — 99999 PR PBB SHADOW E&M-EST. PATIENT-LVL IV: CPT | Mod: PBBFAC,,, | Performed by: PSYCHIATRY & NEUROLOGY

## 2018-01-08 PROCEDURE — 99499 UNLISTED E&M SERVICE: CPT | Mod: S$GLB,,, | Performed by: PSYCHIATRY & NEUROLOGY

## 2018-01-08 NOTE — PROGRESS NOTES
Maryann Sorenson  was seen at the request of  No ref. provider found for sleep evaluation.    08/11/2016 INITIAL HISTORY OF PRESENT ILLNESS:  Maryann Sorenson is a 77 y.o. female is here to be evaluated for a sleep disorder.       CHIEF COMPLAINT:      The patient's complaints include excessive daytime fatigue, snoring,  witnessed breathing pauses,  gasping for air in sleep and interrupted sleep since  Over 10 years ago.    Being evaluated by Dr. Early for pulmonary HTN.     Reports  dry mouth and sore throat  Denies nasal congestion   Denies  morning headaches  Reports occasional  interrupted sleep  Reports frequent leg movements - only when watching TV. Urge to move her legs. Got it from her father.   Denies symptoms concerning for parasomnia    The ESS ([place] Sleepiness Score) taken on initial visit is 6 /24    Bedroom is dark and quiet.    FH: father and 2 sisters - RLS.    INTERVAL HISTORY:    10/12/2016  The patient has not presented any new complaints since the previous visit.   Comes to discuss PSG. Very poor sleep efficiency could have caused underestimation of test results (positive to mild to moderate sleep apnea). Iron test was normal.   Nature's wellness oil at the base of the spine - using for RLS - helps at night - never tried it in the evening.     05/24/2017: Maryann Sorenson comes back to talk about treatment options. Prefers CPAP with nasal pillows mask. Pros and cons were discussed.     07/20/2017: Started using CPAP in June. Passed out in her bedroom-> resulted in ankle sprain.  Has to get up in the middle of the night to the bathroom - now has to turn the light on for the sake of CPAP - now that she is wide awake -> RLS acts up.     Therapy Event Summary Date Range: 6/20/2017 - 7/19/2017     Doing well with Dreamwear mask.     ESS 9/24.    Hide      Compliance Summary  Apnea Indices  Ventilator Statistics    Days with Device Usage:  30 days  Average AHI:  1.1  Average Breath Rate:  16.5 bpm   "  Percentage of Days >=4 Hours:  90.0%  Average OA Index:  0.1  Average % Patient Triggered Breaths:  N/A    Average Usage (Days Used):  4 hrs. 34 mins. 42 secs.  Average CA Index:  0.1  Average Tidal Volume:  417.1 ml    Average Usage (All Days):  4 hrs. 34 mins. 42 secs.    Average Minute Vent:  N/A        Large Leak  Periodic Breathing     Average Time in Large Leak:  0 secs.  Average % of Night in PB:  0.3%     Average % of Night in Large Leak:  0.0%            \  10/20/2017:  Therapy Event Summary Date Range: 9/20/2017 - 10/19/2017   Doing better since pressure increase. Resports benefit from CPAP use, but still significantly interrupted sleep, and residual fatigue/sleepiness.    APAP90% pressure 8  Using ramp at 5.    Mirapex  0.125 mg- still controlling RLS well.      Hide      Compliance Summary  Apnea Indices  Ventilator Statistics    Days with Device Usage:  30 days  Average AHI:  4.9  Average Breath Rate:  13.7 bpm    Percentage of Days >=4 Hours:  100.0%  Average OA Index:  0.6  Average % Patient Triggered Breaths:  N/A    Average Usage (Days Used):  7 hrs. 34 mins. 49 secs.  Average CA Index:  1.1  Average Tidal Volume:  316.8 ml    Average Usage (All Days):  7 hrs. 34 mins. 49 secs.    Average Minute Vent:  N/A        Large Leak  Periodic Breathing     Average Time in Large Leak:  0 secs.  Average % of Night in PB:  1.0%     Average % of Night in Large Leak:  0.0%              01/08/2018    CPAP 8-18; not using ramp lately. 90% 9 cm  Now can adjust CPAP in the dark. Most nights were good nights.   Still fatigued; anemia not controlled 0- not tolerating iron supplements.  Bedroom is very dark.  Nocturia and joint pain plays a role.    RLS is well controlled on Mirapex.    T is 64%. It is dark in the bedroom. Godd sleep hygiene.     Sh is 'stuck" in one position with CPAP.    Believes she "slept better before CPAP. Needs to readjust the mask.     She has failed REM Fresh Melatonin in terms of sleep " "continuity and not feeling more refreshed in AM    She has significant anemia (Hb 10), yet won't take iron due to its constipating effects.     Taking magnesium lunch time. Taking Fish oil and Glucosamine for joint pain, yet it seems that her arthritic pain is not well controlled and may contribute to frequent arousals (noted on airflow channel from her CPAP download).  She is trying to avoid regular NSAID or Tylenol intake.   She has not really tried CBTI ruma that I have installed on her phone last time yet, since she wants to "keep it simple:    Therapy Event Summary Date Range: 12/8/2017 - 1/6/2018     Hide      Compliance Summary  Apnea Indices  Ventilator Statistics    Days with Device Usage:  30 days  Average AHI:  3.7  Average Breath Rate:  13.7 bpm    Percentage of Days >=4 Hours:  100.0%  Average OA Index:  0.6  Average % Patient Triggered Breaths:  N/A    Average Usage (Days Used):  7 hrs. 39 mins. 1 secs.  Average CA Index:  0.9  Average Tidal Volume:  324.3 ml    Average Usage (All Days):  7 hrs. 39 mins. 1 secs.    Average Minute Vent:  N/A        Large Leak  Periodic Breathing     Average Time in Large Leak:  0 secs.  Average % of Night in PB:  1.0%     Average % of Night in Large Leak:  0.0%                    SLEEP ROUTINE AND LIFESTYLE 01/08/2018 :    Occupation: retired    Bed partner:  - sleeps in another room.     Time to bed: 11  Sleep onset latency: 5-10 min  Disruptions or awakenings: 1-2  Time to fall back into sleep: 5 min   Wakeup time: 7 AM   Perceived sleep quality: 3/5  Perceived total sleep time:  7.5  hours.  Daytime naps: sometimes not intentional  Weekend sleep routine: till 7:10   Exercise routine: no - lost routine since her trip to [place].  Caffeine: usually decaf     PREVIOUS SLEEP STUDIES:     PSG 8/25/16: Significant Obstructive sleep apnea (NOE) with AHI (apnea hypopnea Index) of 8.9 and SaO2 of 87% (weight  161 lbs).RDI 15.    DME:       PAST MEDICAL HISTORY:    Active " "Ambulatory Problems     Diagnosis Date Noted    Meniere's disease, unspecified 03/07/2010    Mixed hyperlipidemia     Agatston coronary artery calcium score greater than 400 - 577 10/23/2013    Gastroesophageal reflux disease without esophagitis 10/07/2014    Shortness of breath 04/15/2015    Myalgia 06/29/2015    Back pain of thoracolumbar region 03/08/2016    Essential hypertension 04/21/2016    Pulmonary hypertension 05/03/2016    NOE (obstructive sleep apnea)     Atherosclerosis of native coronary artery of native heart without angina pectoris 05/30/2017    Carotid stenosis, bilateral 06/29/2017    Restless legs 06/29/2017    Neck muscle spasm 06/29/2017    Fatigue 12/27/2017     Resolved Ambulatory Problems     Diagnosis Date Noted    Annual physical exam 06/20/2013    Fullness in head 10/14/2013    Headache, occipital 10/24/2013    Cast discomfort 06/21/2014    Pre-operative examination for internal medicine 06/24/2014    Dysphagia 10/07/2014    Sore throat 10/07/2014    Bronchitis 03/19/2015    Sinusitis 03/19/2015    Syncope 06/29/2017    Post-traumatic headache, not intractable 06/29/2017     Past Medical History:   Diagnosis Date    CAD (coronary artery disease) 11/1/2012    GERD (gastroesophageal reflux disease)     Hyperlipidemia     Hypertension     Syncope                 PAST SURGICAL HISTORY:    Past Surgical History:   Procedure Laterality Date    broken wrist      "put plate in it"    DILATION AND CURETTAGE OF UTERUS      tonsillectomy      TONSILLECTOMY           FAMILY HISTORY:                Family History   Problem Relation Age of Onset    Cancer Mother     Colon cancer Mother     Hyperlipidemia Mother     Hypertension Mother     Heart disease Father     Heart attack Neg Hx     Heart failure Neg Hx     Stroke Neg Hx        SOCIAL HISTORY:          Tobacco:   History   Smoking Status    Never Smoker   Smokeless Tobacco    Never Used       alcohol use: "    History   Alcohol Use    0.0 oz/week     Comment: occasionally/ not weekly                   ALLERGIES:    Allergies   Allergen Reactions    Augmentin [Amoxicillin-Pot Clavulanate] Diarrhea     Given march 2016    Codeine      Other reaction(s): Unknown, tolerates hydrocodone june 2014    Doxycycline      Other reaction(s): Unknown    Lyrica [Pregabalin] Other (See Comments)     Eye irritation    Relafen [Nabumetone] Diarrhea    Sulfa Dyne      Other reaction(s): Unknown       CURRENT MEDICATIONS:    Current Outpatient Prescriptions   Medication Sig Dispense Refill    aspirin 81 mg Tab Take 1 tablet by mouth Daily. 1 Tablet Oral Every day      CA CITRATE/MGOX/VIT D3/B6/MIN (CITRACAL PLUS ORAL) Take by mouth.      coenzyme Q10 (CO Q-10) 200 mg capsule Take 1 capsule by mouth Daily. 1 Capsule Oral Every day      diazepam (VALIUM) 2 MG tablet Take 1 tablet by mouth as needed. 1 Tablet Oral .  For dizziness      esomeprazole (NEXIUM) 40 MG capsule 1 Capsule, Delayed Release(E.C.) Oral Every day 90 capsule 3    fexofenadine (ALLEGRA) 180 MG tablet Take 1 tablet by mouth Daily. 1 Tablet Oral Every day      GLUCOSAMINE SULFATE ORAL Take 1,000 mg by mouth Daily. 1 Capsule Oral Every day      guaifenesin (MUCINEX) 600 mg 12 hr tablet Take 1,200 mg by mouth as needed.       ibuprofen (ADVIL,MOTRIN) 200 MG tablet Take 400 mg by mouth every 8 (eight) hours as needed for Pain.      LACTOBACILLUS RHAMNOSUS GG (PROBIOTIC ORAL) Take 1 capsule by mouth Daily. 1 Capsule Oral Every day      lisinopril (PRINIVIL,ZESTRIL) 20 MG tablet Take 1 tablet (20 mg total) by mouth 2 (two) times daily. 180 tablet 3    lutein 6 mg Cap Take 12 mg by mouth once daily at 6am.      magnesium 250 mg Tab Take 500 mg by mouth once daily.       omega-3 fatty acids-vitamin E (FISH OIL) 1,000 mg Cap Take 2 capsules by mouth once daily.        pramipexole (MIRAPEX) 0.125 MG tablet Take 1 pill PO QHS for RLS - 90 days supply 90 tablet  "3    rosuvastatin (CRESTOR) 40 MG Tab Take 1 tablet (40 mg total) by mouth once daily. 90 tablet 3     No current facility-administered medications for this visit.                       REVIEW OF SYSTEMS:   Sleep related symptoms as per HPI    denies weight gain  Reports dyspnea  Denies palpitations  Reports acid reflux   Reports polyuria 3-4-> now better on low salt diet.  Denies  mood diturbance  Denies  anemia  Reports occasional  muscle pain  Denies  Gait imbalance    Otherwise, a balance of 10 systems reviewed is negative.    PHYSICAL EXAM:  BP (!) 141/84   Pulse 66   Ht 5' 3" (1.6 m)   Wt 76.7 kg (169 lb 3.2 oz)   LMP  (LMP Unknown)   BMI 29.97 kg/m²   GENERAL: Overweight body habitus, well groomed.  HEENT:   HEENT:  Conjunctivae are non-erythematous; Pupils equal, round, and reactive to light; Nose is symmetrical; Nasal mucosa is pink and moist; Septum is midline; Inferior turbinates are hypertrophied; Nasal airflow is normal; Posterior pharynx is pink; Modified Mallampati:II-III; Posterior palate is low; Tonsils not visualized; Uvula is normal and pink;Tongue is enlarged; Dentition is fair; No TMJ tenderness; Jaw opening and protrusion without click and without discomfort.  NECK: Supple. Neck circumference is 16 inches. No thyromegaly. No palpable nodes.     SKIN: On face and neck: No abrasions, no rashes, no lesions.  No subcutaneous nodules are palpable.  RESPIRATORY: Chest is clear to auscultation.  Normal chest expansion and non-labored breathing at rest.  CARDIOVASCULAR: Normal S1, S2.  No murmurs, gallops or rubs. No carotid bruits bilaterally.  No edema. No clubbing. No cyanosis.    NEURO: Oriented to time, place and person. Normal attention span and concentration. Gait normal.    PSYCH: Affect is full. Mood is normal  MUSCULOSKELETAL: Moves 4 extremities. Gait normal.       Using [facility]:       ASSESSMENT:    1. NOE - moderate by RDI. Poor sleep efficiency on the nioght of the study could " "have led to underestimation of NOE severity.  The patient symptomatically has  excessive daytime sleepiness, snoring,  witnessed breathing pauses, excessive daytime fatigue, gasping for air in sleep and interrupted sleep  with exam findings of "a crowded oral airway and elevated body mass index. The patient has medical co-morbidities of CAD and hyperlipidemia,  which can be worsened by NOE. This warrants treatment. Benefiting from CPAP use in terms of sleep continuity and daytime sleepiness. Remains compliant .  NOE is well controlled with minimal mask leak as per CPAP download and she is doing sufficient hours.      2. Interrupted, nonrefreshing  sleep and residual fatigue despite compliant and effective CPAP use.         Reports still interrupted sleep and residual sleepiness - despite good AHI control. May be due to pain interrupting her sleep and feeling limited in 1 sleep position, since mask leaks when she turns.          It seems that her arthritic pain is not well controlled and may contribute to frequent arousals (noted on airflow channel from her CPAP download).  She has not really tried CBTI ruma that I have installed on her phone last time yet, since she wants to "keep it simple". She has very significant anemia, yet won't take iron supplement due to GI side effects.  She has a good overall sleep hygiene.    2. RLS - mild symptoms. Well controlled on low dose Mirapex 0.125 mg QHS.      PLAN:      Following recommendations were given in the AVS:   Keep CPAP to 8-18 H2O  Keep amp to 6 cm H2O    Will continue Mirapex 0.125 mg    Following recommendations were given in the AVS:       Try Dreamwear mask with pillows - RT provided the nasal piece (Medium) today      For sleep can try:    1. Black cherry juice helps with Melatonin.  2. Take all your Magnesium at bedtime    CBTI -  was strongly encouraged. She was told that without      For pain:    1. Zlammend  2. White [place] bark - natural " aspirin      -------------------------------    Kale, spinach,  pomegranate, may juice to get more iron out of it  Red meat    Ferrous bisglycinate may be better tolerated than ferrous sulfate    ----------------------------------------------------            Education: During our discussion today, we talked about the etiology of obstructive sleep apnea as well as the potential ramifications of untreated sleep apnea, which could include daytime sleepiness, hypertension, heart disease and/or stroke.      We discussed potential treatment options, which could include weight loss, body positioning, continuous positive airway pressure (CPAP), or referral for surgical consideration. The patient preferred CPAP option.     Discussed purpose of PAP therapy, health benefits of CPAP, as well as the potential ramifications of untreated sleep apnea, which could include daytime sleepiness, hypertension, heart disease and/or stroke. An AHI of 15 is associated with increased risk CVD.     The patient should avoid ETOH and sedatives at night, as it tends to aggravate NOE. Regular replacement of CPAP mask, tubing and filter was recommended.    Precautions: The patient was advised to abstain from driving should he feel sleepy or drowsy.    Follow up: MD/NP after 1 month of PAP use.    Thank you for allowing me the opportunity to participate in the care of your patient.

## 2018-01-08 NOTE — PATIENT INSTRUCTIONS
Try Dreamwear mask with pillows      For sleep can try:    1. Black cherry juice helps with Melatonin.  2. Take all your Magnesium at bedtime      For pain:    1. Noe  2. [place] bark - natural aspirin      -------------------------------    Kale, spinach,  pomegranate, may juice    Ferrous bisglycinate    ----------------------------------------------------    KURTI -

## 2018-01-09 ENCOUNTER — TELEPHONE (OUTPATIENT)
Dept: INTERNAL MEDICINE | Facility: CLINIC | Age: 78
End: 2018-01-09

## 2018-01-09 NOTE — TELEPHONE ENCOUNTER
----- Message from Chucky Carlson MD sent at 1/8/2018  7:40 AM CST -----  She had small thyroid nodules, no change. And her carotids looked open with minimal disease.  No change in treatment

## 2018-01-09 NOTE — TELEPHONE ENCOUNTER
----- Message from Won Jiang sent at 1/9/2018  2:15 PM CST -----  Contact: Pt at [phone]  Patient is returning a phone call.  Who left a message for the patient:  Geraldine

## 2018-01-15 NOTE — PROGRESS NOTES
Subjective:       Patient ID: Maryann Sorenson is a 77 y.o. female.    Chief Complaint: Follow-up (6 month)  HISTORY OF PRESENT ILLNESS:  A 77-year-old white female coming in for periodic   evaluation, over the last month, she has been having problems with upper   respiratory infection.  She was seen a couple of days after onset on 12/01/2017   at an Urgent Care, had a negative chest x-ray, flu prep, was placed on Kenalog   and Z-RUSTAM.  She got somewhat better and then was seen by Dr. Vila on   12/23/2017, had lab work done and sinus x-rays that were negative.  She was   treated symptomatically with Mucinex nasal spray.  She continues to have sinus   congestion, postnasal drainage, cough, but no other symptoms.    The patient has restless legs for which she is now on Mirapex and that seems to   be working.  She has no other complaints.  She recently saw her gynecologist,   Dr. Wiedemann and is followed by the Sleep Medicine people.    PHYSICAL EXAMINATION:  VITAL SIGNS:  Normal.  SKIN:  Fair and healthy.  HEENT:  Clear.  She does have a rather large lymph node in her left neck.  That   is about 3 cm in size in the lower lateral anterior cervical area, it is not   tender.  She is unaware, but it does move.  She has no thyroid enlargement.  RESPIRATORY:  Clear.  HEART:  There is no murmur or gallop.  ABDOMEN:  Nontender without any organomegaly.  EXTREMITIES:  Show normal muscles, joints, pulses.  NEUROLOGIC:  Appears normal.    IMPRESSION:  1.  There appears to be a large lymph node, left neck for which I sent her for   an ultrasound, which also included carotids showing mild carotid disease.  No   narrowing.  The ultrasound of her soft tissues showed changes is similar to an   ultrasound a year and a half ago and her thyroid with bilateral nodules, but no   mention of a lymph node in her neck.  2.  Persistent sinus symptoms.  3.  Restless legs.  4.  Mild carotid disease.  5.  Mild thyroid disease, not functionally  important.  6.  Hypertension.  7.  Abnormal Agatston coronary score.  8.  Fatigue secondary to the above.    PLAN:  My plan is to get her back if she continues to have upper respiratory   complaints because they theoretically could be from lisinopril and she should   then be switched to an ARB if that is alright with Dr. Rich.  I also go and   to want her to see ENT about the node in her neck, which has not showed up on 2   ultrasounds at this point.      JDC/HN  dd: 01/14/2018 21:18:56 (CST)  td: 01/14/2018 22:06:06 (CST)  Doc ID   #[id number]  Job ID #[id number]    CC:     Dict [id number]  HPI  Review of Systems   Constitutional: Negative.    HENT: Positive for congestion, postnasal drip and sinus pressure. Negative for hearing loss, sneezing, sore throat and voice change.    Eyes: Negative for visual disturbance.   Respiratory: Positive for cough. Negative for chest tightness and shortness of breath.    Cardiovascular: Negative.  Negative for chest pain, palpitations and leg swelling.   Gastrointestinal: Negative.    Genitourinary: Negative for difficulty urinating, dysuria, flank pain, frequency, hematuria, menstrual problem, pelvic pain, urgency, vaginal bleeding and vaginal discharge.   Musculoskeletal: Positive for myalgias. Negative for neck pain and neck stiffness.   Skin: Negative.    Neurological: Negative.  Negative for dizziness, seizures, light-headedness, numbness and headaches.   Psychiatric/Behavioral: Negative for agitation, behavioral problems, confusion and sleep disturbance. The patient is not nervous/anxious.        Objective:      Physical Exam   Constitutional: She is oriented to person, place, and time. She appears well-developed and well-nourished.   Eyes: EOM are normal. Pupils are equal, round, and reactive to light.   Neck: Normal range of motion. Neck supple. No JVD present. No thyromegaly present.   Cardiovascular: Normal rate, regular rhythm, normal heart sounds and intact distal pulses.   Exam reveals no gallop.    No murmur heard.  Pulmonary/Chest: Breath sounds normal. She has no wheezes. She has no rales.   Abdominal: Soft. Bowel sounds are normal. She exhibits no mass. There is no tenderness.   Musculoskeletal: Normal range of motion.   Lymphadenopathy:     She has cervical adenopathy.   Neurological: She is alert and oriented to person, place, and time. She has normal reflexes. No cranial nerve deficit.   Skin: No rash noted. No erythema.   Psychiatric: She has a normal mood and affect. Judgment normal.       Assessment:       1. Fatigue, unspecified type    2. Myalgia    3. Agatston coronary artery calcium score greater than 400 - 577    4. Carotid stenosis, bilateral    5. Restless legs    6. Atherosclerosis of native coronary artery of native heart without angina pectoris    7. NOE (obstructive sleep apnea)    8. Essential hypertension    9. Gastroesophageal reflux disease without esophagitis    10. Cervical adenopathy    11. Stricture of artery         Plan:

## 2018-01-23 ENCOUNTER — TELEPHONE (OUTPATIENT)
Dept: INTERNAL MEDICINE | Facility: CLINIC | Age: 78
End: 2018-01-23

## 2018-01-23 DIAGNOSIS — R59.0 CERVICAL ADENOPATHY: Primary | ICD-10-CM

## 2018-01-23 NOTE — TELEPHONE ENCOUNTER
"Your note says  "  My plan is to get her back if she continues to have upper respiratory   complaints because they theoretically could be from lisinopril and she should   then be switched to an ARB if that is alright with Dr. Rich.  I also go and   to want her to see ENT about the node in her neck, which has not showed up on 2   ultrasounds at this point. "    need ent referral please.    Thanks jordon"

## 2018-01-25 NOTE — TELEPHONE ENCOUNTER
I reached her and told her dr's recommendation.  She is feeling better with respiratory problem.    She see's dr guerrero at [facility] and will set a appt to see him for node in neck.

## 2018-01-26 ENCOUNTER — PES CALL (OUTPATIENT)
Dept: ADMINISTRATIVE | Facility: CLINIC | Age: 78
End: 2018-01-26

## 2018-02-09 ENCOUNTER — TELEPHONE (OUTPATIENT)
Dept: INTERNAL MEDICINE | Facility: CLINIC | Age: 78
End: 2018-02-09

## 2018-02-09 NOTE — TELEPHONE ENCOUNTER
Thyroid labs and scan done and all was ok.  Nodule shown and wants repeat scan done in 6 mos.    I told her I will keep my eye out for fax coming.    Records put in your inbox at your desk to review.  She has recall to return in June for annual.

## 2018-02-09 NOTE — TELEPHONE ENCOUNTER
----- Message from Paty Wood sent at 2/9/2018  1:42 PM CST -----  Contact: patient [phone]  Dr. Fitch from [facility] is supposed to be faxing you a copy of thyroid labs and scan today. She would like to speak with you/

## 2018-02-27 ENCOUNTER — TELEPHONE (OUTPATIENT)
Dept: CARDIOLOGY | Facility: CLINIC | Age: 78
End: 2018-02-27

## 2018-02-27 NOTE — TELEPHONE ENCOUNTER
Pt called c/o sob and fatigue x 2 weeks. Pt stated that she thinks that she may be involuntarily holding her breath and when she breathes again she is taking heavy breaths. Pt has appointment w/ on 3/28/18 and would like to know if she should come in to see Dr. Gavin for her symptoms before seeing Dr. Early on 3/28/18. Please advise.

## 2018-02-28 DIAGNOSIS — R06.02 SHORTNESS OF BREATH: Primary | ICD-10-CM

## 2018-03-07 ENCOUNTER — HOSPITAL ENCOUNTER (OUTPATIENT)
Dept: CARDIOLOGY | Facility: CLINIC | Age: 78
Discharge: HOME | End: 2018-03-07
Attending: INTERNAL MEDICINE
Payer: MEDICARE

## 2018-03-07 DIAGNOSIS — R06.02 SHORTNESS OF BREATH: ICD-10-CM

## 2018-03-07 LAB — DIASTOLIC DYSFUNCTION: NO

## 2018-03-07 PROCEDURE — 94621 CARDIOPULM EXERCISE TESTING: CPT | Mod: S$GLB,,, | Performed by: INTERNAL MEDICINE

## 2018-03-08 ENCOUNTER — TELEPHONE (OUTPATIENT)
Dept: CARDIOLOGY | Facility: CLINIC | Age: 78
End: 2018-03-08

## 2018-03-08 NOTE — TELEPHONE ENCOUNTER
----- Message from Heydi Gavin MD sent at 3/8/2018  2:17 PM CST -----  Ms. Sorenson,  Please review results of  Your cardiopulmonary stress test. It shows that reasons for your shortness of breath do not come from either lungs or a heart, rather physical deconditioning plays a role in your symptoms. In a way this is a good result as this could significantly improve if you estbalish an exercise routine that is acceptable to you.

## 2018-03-28 ENCOUNTER — OFFICE VISIT (OUTPATIENT)
Dept: TRANSPLANT | Facility: CLINIC | Age: 78
End: 2018-03-28
Payer: MEDICARE

## 2018-03-28 ENCOUNTER — HOSPITAL ENCOUNTER (OUTPATIENT)
Dept: PULMONOLOGY | Facility: CLINIC | Age: 78
Discharge: HOME | End: 2018-03-28
Payer: MEDICARE

## 2018-03-28 VITALS
WEIGHT: 166.25 LBS | SYSTOLIC BLOOD PRESSURE: 156 MMHG | HEIGHT: 63 IN | HEIGHT: 63 IN | WEIGHT: 163 LBS | DIASTOLIC BLOOD PRESSURE: 82 MMHG | HEART RATE: 68 BPM | TEMPERATURE: 100 F | BODY MASS INDEX: 29.46 KG/M2 | BODY MASS INDEX: 28.88 KG/M2

## 2018-03-28 DIAGNOSIS — R93.1 AGATSTON CORONARY ARTERY CALCIUM SCORE GREATER THAN 400: ICD-10-CM

## 2018-03-28 DIAGNOSIS — I10 ESSENTIAL HYPERTENSION: ICD-10-CM

## 2018-03-28 DIAGNOSIS — I25.10 ATHEROSCLEROSIS OF NATIVE CORONARY ARTERY OF NATIVE HEART WITHOUT ANGINA PECTORIS: ICD-10-CM

## 2018-03-28 DIAGNOSIS — G47.33 OSA (OBSTRUCTIVE SLEEP APNEA): ICD-10-CM

## 2018-03-28 DIAGNOSIS — I65.23 CAROTID STENOSIS, BILATERAL: ICD-10-CM

## 2018-03-28 DIAGNOSIS — R06.02 SHORTNESS OF BREATH: ICD-10-CM

## 2018-03-28 DIAGNOSIS — I27.20 PULMONARY HYPERTENSION: Primary | ICD-10-CM

## 2018-03-28 DIAGNOSIS — E78.2 MIXED HYPERLIPIDEMIA: ICD-10-CM

## 2018-03-28 PROCEDURE — 99499 UNLISTED E&M SERVICE: CPT | Mod: S$GLB,,, | Performed by: INTERNAL MEDICINE

## 2018-03-28 PROCEDURE — 94618 PULMONARY STRESS TESTING: CPT | Mod: S$GLB,,, | Performed by: INTERNAL MEDICINE

## 2018-03-28 PROCEDURE — 99999 PR PBB SHADOW E&M-EST. PATIENT-LVL III: CPT | Mod: PBBFAC,,, | Performed by: INTERNAL MEDICINE

## 2018-03-28 PROCEDURE — 3079F DIAST BP 80-89 MM HG: CPT | Mod: CPTII,S$GLB,, | Performed by: INTERNAL MEDICINE

## 2018-03-28 PROCEDURE — 3077F SYST BP >= 140 MM HG: CPT | Mod: CPTII,S$GLB,, | Performed by: INTERNAL MEDICINE

## 2018-03-28 PROCEDURE — 99214 OFFICE O/P EST MOD 30 MIN: CPT | Mod: S$GLB,,, | Performed by: INTERNAL MEDICINE

## 2018-03-28 RX ORDER — GABAPENTIN 100 MG/1
1 CAPSULE ORAL 2 TIMES DAILY
COMMUNITY
Start: 2018-03-26 | End: 2019-02-14 | Stop reason: ALTCHOICE

## 2018-03-28 RX ORDER — ROSUVASTATIN CALCIUM 40 MG/1
40 TABLET, COATED ORAL DAILY
Qty: 90 TABLET | Refills: 3 | Status: SHIPPED | OUTPATIENT
Start: 2018-03-28 | End: 2019-02-14 | Stop reason: SDUPTHER

## 2018-03-28 NOTE — PROGRESS NOTES
Subjective:    Patient ID:  Maryann Sorenson is a 77 y.o. female who presents for follow-up of Pulmonary Hypertension.    Congestive Heart Failure   Pertinent negatives include no abdominal pain, change in bowel habit, chest pain, chills, coughing or fever.      76 yo woman with HTN, high ca score, NOE referred by Dr Rich to be evaluated for PH after echo 1 yr ago showed PAP 59 and now here for f/u    Since last visit, pt has been suffering with back pain going down her leg- has an MRI scheduled- has a trip planned to [place] in 2 mo  Called Dr Gavin who ordered a CPX- felt that she found herself holding her breath and then being SOB- Dr Recinos also noticed she was holding her breath during the night- has not been swelling except a little in her fingers in the am, by the end of the day has no swelling- no CP, orthopnea, PND- doing well with the CPAP- wears it all night long though she feels like it isnt doing anything- would like to have more energy and not feel so tired, but she knows she has anemia which contributes- says Dr Recinos did adjust her settings. Thyroid was checked in Dec-     Has not been exercising, first because of the ankle issue she had for a long time and then because of her back/leg issues- has been taking ibuprofen prn    bp log 110's-131/70-80's      CPX 3/18  Mild functional impairment associated with a normal breathing reserve, normal oxygen saturation, an adequate effort, and a normal AT. These findings are indicative of functional impairment secondary to deconditioning.     TTE 9/17    1 - Normal left ventricular systolic function (EF 60-65%).     2 - Normal right ventricular systolic function .     3 - Indeterminate LV diastolic function.     4 - Mild left atrial enlargement.     5 - Mildly enlarged ascending aorta.     6 - Mild tricuspid regurgitation.     7 - The estimated PA systolic pressure is 35 mmHg.   right ventricle is normal in size measuring 3.7 cm at the base in  the apical right ventricle-focused view. Global right ventricular systolic function appears normal. Tricuspid annular plane systolic excursion (TAPSE) is 2.9 cm.   Tissue Doppler-derived tricuspid annular peak systolic velocity (S prime) is 13.7 cm/s. The estimated PA systolic pressure is 35 mmHg.       Six Minute Walk Test  446m, (457m, 463m,  518m 9/16, 488  m in June   )                                              O2 sat  98 ->98  %                                                           HR 62 -> 97                                                                BP  148 / 79 ->170 /80                                                         Laurie 2 -> 4    Echo        Results for orders placed or performed during the hospital encounter of 09/29/17   2D echo with color flow doppler   Result Value Ref Range    EF 65 55 - 65    Aortic Valve Regurgitation TRIVIAL     Est. PA Systolic Pressure 35.04     Tricuspid Valve Regurgitation MILD    The right ventricle is normal in size measuring 2.4 cm at the base in the apical right ventricle-focused view. Global right ventricular systolic function appears normal. Tricuspid annular plane systolic excursion (TAPSE) is 2.9 cm.   Tissue Doppler-derived tricuspid annular peak systolic velocity (S prime) is 12.0 cm/s. The estimated PA systolic pressure is 35 mmHg.     6 - Mild aortic regurgitation.     7 - Mild to moderate tricuspid regurgitation.     8 - Intermediate central venous pressure.    JENNIFER 2015    1 - Enlarged ascending aorta.     2 - Normal left ventricular systolic function (EF 60-65%).     3 - Normal left ventricular diastolic function.     4 - Pulmonary hypertension.     5 - Normal right ventricular systolic function .     6 - Mild tricuspid regurgitation.     No evidence of stress induced myocardial ischemia.     EKG  4 /  15  Normal sinus rhythm  Normal ECG    CXR  3 /16   Heart is not enlarged.  The lungs are clear.  Double density seen through the heart with  "air-fluid level is compatible with a non-reducing hiatal hernia.  Prominent kyphosis with DJD seen in the spine.  No compression fractures in the visualized spine          Review of Systems   Constitution: Negative for chills, fever, malaise/fatigue and weight gain.   HENT: Negative.    Eyes: Negative.    Cardiovascular: Negative for chest pain, dyspnea on exertion, leg swelling, near-syncope, orthopnea, palpitations, paroxysmal nocturnal dyspnea and syncope.   Respiratory: Negative for cough and shortness of breath.    Endocrine: Negative.    Skin: Negative.    Musculoskeletal: Positive for back pain.   Gastrointestinal: Negative for bloating, abdominal pain and change in bowel habit.   Neurological: Negative for dizziness and light-headedness.   Psychiatric/Behavioral: Negative for depression.        Objective:    BP (!) 156/82   Pulse 68   Temp 100 °F (37.8 °C)   Ht 5' 3" (1.6 m)   Wt 75.4 kg (166 lb 3.6 oz)   LMP  (LMP Unknown)   BMI 29.45 kg/m²       Physical Exam   Constitutional: She is oriented to person, place, and time. She appears well-developed and well-nourished.   HENT:   Head: Normocephalic and atraumatic.   Eyes: Right eye exhibits no discharge. Left eye exhibits no discharge.   Neck: Neck supple. No JVD present. No thyromegaly present.   Cardiovascular: Normal rate and regular rhythm.  Exam reveals no gallop and no friction rub.    No murmur heard.       Pulmonary/Chest: Effort normal and breath sounds normal. No respiratory distress. She has no wheezes. She has no rales.   Abdominal: Soft. Bowel sounds are normal. She exhibits no distension. There is no tenderness.   Musculoskeletal: Normal range of motion. She exhibits no edema or tenderness.   Neurological: She is alert and oriented to person, place, and time. No cranial nerve deficit. Coordination normal.   Skin: Skin is warm and dry. No rash noted.   Psychiatric: She has a normal mood and affect. Judgment and thought content normal.       "     Chemistry        Component Value Date/Time     (L) 03/28/2018 0938    K 4.4 03/28/2018 0938     03/28/2018 0938    CO2 24 03/28/2018 0938    BUN 15 03/28/2018 0938    CREATININE 0.9 03/28/2018 0938    GLU 92 03/28/2018 0938        Component Value Date/Time    CALCIUM 9.3 03/28/2018 0938    ALKPHOS 87 03/28/2018 0938    AST 31 03/28/2018 0938    ALT 17 03/28/2018 0938    BILITOT 0.4 03/28/2018 0938            Magnesium   Date Value Ref Range Status   06/18/2017 2.5 1.6 - 2.6 mg/dL Final       Lab Results   Component Value Date    WBC 3.18 (L) 03/28/2018    HGB 10.9 (L) 03/28/2018    HCT 33.7 (L) 03/28/2018    MCV 87 03/28/2018     03/28/2018       No results found for: INR, PROTIME    BNP   Date Value Ref Range Status   09/29/2017 40 0 - 99 pg/mL Final     Comment:     Values of less than 100 pg/ml are consistent with non-CHF populations.   04/03/2017 86 0 - 99 pg/mL Final     Comment:     Values of less than 100 pg/ml are consistent with non-CHF populations.   09/26/2016 43 0 - 99 pg/mL Final     Comment:     Values of less than 100 pg/ml are consistent with non-CHF populations.       No results found for: LDH          Assessment:       1. Pulmonary hypertension on previous echo, now normal PAP, normal RV size and fxn one cho 9/17- euvolemic on exam,  BNP pending- BP  well controlled,    2. Shortness of breath    3. Essential hypertension    4. Coronary artery disease involving native coronary artery of native heart without angina pectoris    5. Hyperlipidemia    6. NOE- now treated     Plan:      No changes today- asked her to minimize NSAID use, neurontin is ok    Keep salt intake to under 2000 mg sodium, fluids to under 2 L (64 oz)    Continue tracking blood pressure at home and bring log to next visit. Trying to balance her low BP at rest with severe HTN on exercise.    Will defer RHC at this time as my suspicion for PH in light of her most recent echo is low- again we talked about need  for routine exercise given CPX with deconditioning    F/u 6 mo with labs 6mw and echo

## 2018-03-28 NOTE — PATIENT INSTRUCTIONS
"1. Continue current therapy.  2.  Keep salt intake to under 2000 mg sodium, fluids to under 2 L (64 oz)  3  Check your weights every morning after getting out of bed and urinating. If your weight goes up 3# overnight or 5# in one week call us   4. Call us if you find yourself getting more short of breath, have more swelling or unexpected weight changes, fatigue or chest pain    For Upper respiratory tract infections: Cough syrup (robitussin with or without DM)  Is fine  Benadryl, zyrtec, claritin, allegra, also ok, as long as there isn't a "D"   Mucinex DM is ok, as is Flonase and saline nasal spray    For pain: Tylenol is ok. Avoid nonsteroidal anti-inflammatory drugs (advil, motrin aleve, ibuprofen and naproxen)  Gabapentin is fine too          "

## 2018-03-29 NOTE — PROCEDURES
Maryann Sorenson is a 77 y.o.  female patient, who presents for a 6 minute walk test ordered by Iris Early MD.  The diagnosis is Pulmonary Hypertension.  The patient's BMI is 28.9 kg/m2.  Predicted distance (lower limit of normal) is 260.41 meters.      Test Results:    The test was completed without stopping.  The total time walked was 360 seconds.  During walking, the patient reported:  Dyspnea.  The patient used no assistive devices during testing.     03/28/2018---------Distance: 446.23 meters (1464 feet)     O2 Sat % Supplemental Oxygen Heart Rate Blood Pressure Laurie Scale   Pre-exercise  (Resting) 98 % Room Air 62 bpm 148/79 mmHg 2   During Exercise 98 % Room Air 97 bpm 170/80 mmHg 4   Post-exercise  (Recovery) 99 % Room Air  71 bpm 159/70 mmHg      Recovery Time:  146 seconds    Performing nurse/tech:  SUSANA Daly RRT      PREVIOUS STUDY:   09/29/2017---------Distance: 457.2 meters (1500 feet)       O2 Sat % Supplemental Oxygen Heart Rate Blood Pressure Laurie Scale   Pre-exercise  (Resting) 98 % Room Air 63 bpm 138/78 mmHg 1   During Exercise 98 % Room Air 107 bpm 164/78 mmHg 4   Post-exercise  (Recovery) 98 % Room Air  81 bpm           CLINICAL INTERPRETATION:  Six minute walk distance is 446.23 meters (1464 feet) with somewhat heavy dyspnea.  During exercise, there was no desaturation while breathing room air.  Both blood pressure and heart rate increased significantly with walking.  The patient did not report non-pulmonary symptoms during exercise.  Since the previous study in September 2017, exercise capacity is unchanged.  Based upon age and body mass index, exercise capacity is normal.

## 2018-04-13 ENCOUNTER — TELEPHONE (OUTPATIENT)
Dept: INTERNAL MEDICINE | Facility: CLINIC | Age: 78
End: 2018-04-13

## 2018-04-13 NOTE — TELEPHONE ENCOUNTER
I told patient dr kimberly.  Dr aguilar needs a note faxed to them saying she is cleared to stop aspirin for 7 days for epidural procedure..    Ok I fax them a note in your behalf since you are out of office till 4/23?    (dr angela aguilar, ph [phone]  Fax [phone] attn mekhi)    Please advise.  Thanks jordon

## 2018-04-13 NOTE — TELEPHONE ENCOUNTER
----- Message from Karla Byers sent at 4/13/2018  1:41 PM CDT -----  Contact: pt [phone]  Pt would like a call back about stopping her asprin for 7 says in order to get an epidural. Please advise.

## 2018-04-13 NOTE — TELEPHONE ENCOUNTER
7 days before epidural she needs to stop  81mg aspirin.    Not scheduled yet till they know she can stop aspirin.  Ok to do so?    Has trip in 6 weeks and wants epidural to help her back/Leg pain.   Dr aguilar at [facility] .    Please advise asap.  Thanks jordon

## 2018-04-23 RX ORDER — ESOMEPRAZOLE MAGNESIUM 40 MG/1
CAPSULE, DELAYED RELEASE ORAL
Qty: 90 CAPSULE | Refills: 3 | Status: SHIPPED | OUTPATIENT
Start: 2018-04-23 | End: 2019-04-09 | Stop reason: SDUPTHER

## 2018-04-23 NOTE — TELEPHONE ENCOUNTER
"----- Message from Tiny Ashley sent at 4/23/2018 12:23 PM CDT -----  Contact: Self Call   [phone]  RX request - refill or new RX.  Is this a refill or new RX:  Refill  RX name and strength: Esomeprazole (NEXIUM) 40 MG capsule  Directions:   Is this a 30 day or 90 day RX:  90 day  Pharmacy name and phone # (DON'T enter "on file" or "in chart"):  [facility] [phone] (Phone) [phone] (Fax)    Comments:        "

## 2018-05-11 PROBLEM — M54.16 LUMBAR RADICULITIS: Status: ACTIVE | Noted: 2018-05-11

## 2018-05-18 ENCOUNTER — OFFICE VISIT (OUTPATIENT)
Dept: NEUROLOGY | Facility: CLINIC | Age: 78
End: 2018-05-18
Payer: MEDICARE

## 2018-05-18 VITALS
HEIGHT: 63 IN | DIASTOLIC BLOOD PRESSURE: 102 MMHG | BODY MASS INDEX: 28.35 KG/M2 | SYSTOLIC BLOOD PRESSURE: 168 MMHG | HEART RATE: 68 BPM | WEIGHT: 160 LBS

## 2018-05-18 DIAGNOSIS — R20.0 NUMBNESS ON LEFT SIDE: ICD-10-CM

## 2018-05-18 DIAGNOSIS — M54.2 NECK PAIN: ICD-10-CM

## 2018-05-18 DIAGNOSIS — G54.2 CERVICAL SYNDROME: Primary | ICD-10-CM

## 2018-05-18 PROCEDURE — 3077F SYST BP >= 140 MM HG: CPT | Mod: CPTII,S$GLB,, | Performed by: NEUROMUSCULOSKELETAL MEDICINE & OMM

## 2018-05-18 PROCEDURE — 99999 PR PBB SHADOW E&M-EST. PATIENT-LVL IV: CPT | Mod: PBBFAC,,, | Performed by: NEUROMUSCULOSKELETAL MEDICINE & OMM

## 2018-05-18 PROCEDURE — 99205 OFFICE O/P NEW HI 60 MIN: CPT | Mod: S$GLB,,, | Performed by: NEUROMUSCULOSKELETAL MEDICINE & OMM

## 2018-05-18 PROCEDURE — 3080F DIAST BP >= 90 MM HG: CPT | Mod: CPTII,S$GLB,, | Performed by: NEUROMUSCULOSKELETAL MEDICINE & OMM

## 2018-05-18 NOTE — PROGRESS NOTES
Maryann Sorenson  1940  Review of patient's allergies indicates:   Allergen Reactions    Augmentin [amoxicillin-pot clavulanate] Diarrhea     Given march 2016    Codeine      Other reaction(s): Unknown, tolerates hydrocodone june 2014    Doxycycline      Other reaction(s): Unknown    Lyrica [pregabalin] Other (See Comments)     Eye irritation    Relafen [nabumetone] Diarrhea    Sulfa dyne      Other reaction(s): Unknown     [unfilled]    Past Medical History:   Diagnosis Date    CAD (coronary artery disease) 11/1/2012    GERD (gastroesophageal reflux disease)     Hyperlipidemia     Hypertension     Pulmonary hypertension 2016    Syncope      Social History     Social History    Marital status:      Spouse name: N/A    Number of children: N/A    Years of education: N/A     Occupational History    Not on file.     Social History Main Topics    Smoking status: Never Smoker    Smokeless tobacco: Never Used    Alcohol use 0.0 oz/week      Comment: occasionally/ not weekly    Drug use: No    Sexual activity: Not Currently     Other Topics Concern    Not on file     Social History Narrative    No narrative on file     Family History   Problem Relation Age of Onset    Cancer Mother     Colon cancer Mother     Hyperlipidemia Mother     Hypertension Mother     Heart disease Father     Heart attack Neg Hx     Heart failure Neg Hx     Stroke Neg Hx        Review of systems:  Constitutional-negative  Eyes-negative  ENT, mouth-negative  Cardiovascular-negative  Respiratory-negative  GI-negative  - negative  Musculoskeletal-negative  Skin-negative  Neurologic-negative  Psychiatric-negative  Endocrine-negative  Hematology/lymph nodes-negative  Allergies/immunology-negative  Maryann Sorenson  1940  Review of patient's allergies indicates:   Allergen Reactions    Augmentin [amoxicillin-pot clavulanate] Diarrhea     Given march 2016    Codeine      Other reaction(s): Unknown,  tolerates hydrocodone june 2014    Doxycycline      Other reaction(s): Unknown    Lyrica [pregabalin] Other (See Comments)     Eye irritation    Relafen [nabumetone] Diarrhea    Sulfa dyne      Other reaction(s): Unknown     [unfilled]    Past Medical History:   Diagnosis Date    CAD (coronary artery disease) 11/1/2012    GERD (gastroesophageal reflux disease)     Hyperlipidemia     Hypertension     Pulmonary hypertension 2016    Syncope      Social History     Social History    Marital status:      Spouse name: N/A    Number of children: N/A    Years of education: N/A     Occupational History    Not on file.     Social History Main Topics    Smoking status: Never Smoker    Smokeless tobacco: Never Used    Alcohol use 0.0 oz/week      Comment: occasionally/ not weekly    Drug use: No    Sexual activity: Not Currently     Other Topics Concern    Not on file     Social History Narrative    No narrative on file     Family History   Problem Relation Age of Onset    Cancer Mother     Colon cancer Mother     Hyperlipidemia Mother     Hypertension Mother     Heart disease Father     Heart attack Neg Hx     Heart failure Neg Hx     Stroke Neg Hx        Review of systems:  Constitutional-negative  Eyes-negative  ENT, mouth-negative  Cardiovascular-negative  Respiratory-negative  GI-negative  - negative  Musculoskeletal-negative  Skin-negative  Neurologic-negative  Psychiatric-negative  Endocrine-negative  Hematology/lymph nodes-negative  Allergies/immunology-negative  Gen. Appearance: Well-developed with no obvious deformities  Carotid arteries symmetrical pulses  Peripheral vascular shows symmetrical pulses with no obvious edema or tenderness  Social History : Patient is retired.  She is scheduled to travel to [place] in [place] week from today.  Present history:   This is a 77-year-old white female who presents with several neurologic issues.  Patient had severely low back pain  and left leg pain in March of this year.  She woke up with for-5/10 left leg pain.  She had weakness with difficulty picking up for an climbing the stairs.  She saw orthopedics and was given an epidural steroid injection on 4-24-18.  This made it much better.  She had a repeat injection on 5-11-18.  After that she felt that she had left leg she'll as well as a leg feeling tingling.  In May she developed numbness on the left side including the arm and leg and face.  She notes that she has lost some of her balance mechanism in that she feels wobbly frequently in [place] after standing for periods time.  She continues to some neck pain.  Patient uses a CPAP machine and feels that this may be related to the abnormal positioning of her neck at night and aggravation.  Patient relates in June of last year she developed a vasovagal response with stomach cramps.    Neurological Exam:  Mental status-alert and oriented to person, place, and time; attention span and concentration is good. Fund of knowledge-patient is aware of current events and able to give detailed history of the current problem.recent and remote memory seems intact. Language function is normal with no evidence of aphasia  Cranial nerves:Visual acuity to hand chart -normal; visual fields to confrontation normal;pupils were equal and reactive to light ;no evidence of ptosis ;  funduscopic examination was normal with sharp disc margins. external ocular movements were full with no nystagmus. Facial sensation to pinprick : normal ; corneal reflexes intact; Facial muscles were symmetrical. Hearing is unimpaired symmetrical finger rub; Tongue movements - normal ; palate movements - normal ;Swallowing unimpaired. Shoulder shrug was intact with good strength Speech was normal  Motor examination: Upper : normal                                      Lower extremities - proximal leg weakness; difficulty standing from a chair without using her hands                   Right-handed  Sensory examination:   Upper; normal pinprick and soft touch ;   Lower extremities - normal and symmetrical.   Vibration sense: 10-15 seconds @ toes  Deep tendon reflexes: upper extremities :1-2+ symmetrical ;     lower extremities KJ- 3 +; AJ - 1+ Both plantar responses were flexor  Cerebellar examination upper: Normal finger to nose and rapid alternating movements  Gait: Steady with no ataxia;      heel and toe walk normal  Romberg test: Positive      Tandem gait: Normal    Involuntary movements: Negative  TMJ - no tenderness  Cervical examination: Full range of motion with no pain Cervical tenderness : Left positive  Lumbar examination: Low back tenderness-negative                  Sciatic notchtenderness-negative            Straight leg raising : negative    Impression: Left cervical syndrome; low back syndrome; left arm, face, leg numbness    Recommendations/Plan : MRI cervical spine to rule out spinal stenosis.  Long discussion with the patient in terms of differential diagnosis of her leg weakness, balance problems, numbness, sciatica and neck pain.  I suggested patient obtained a cane or walking stick prior to her trip to [place].

## 2018-05-22 ENCOUNTER — LAB VISIT (OUTPATIENT)
Dept: LAB | Facility: HOSPITAL | Age: 78
End: 2018-05-22
Attending: INTERNAL MEDICINE
Payer: MEDICARE

## 2018-05-22 ENCOUNTER — OFFICE VISIT (OUTPATIENT)
Dept: INTERNAL MEDICINE | Facility: CLINIC | Age: 78
End: 2018-05-22
Payer: MEDICARE

## 2018-05-22 VITALS
HEIGHT: 63 IN | BODY MASS INDEX: 29.84 KG/M2 | WEIGHT: 168.44 LBS | SYSTOLIC BLOOD PRESSURE: 150 MMHG | HEART RATE: 80 BPM | TEMPERATURE: 99 F | DIASTOLIC BLOOD PRESSURE: 82 MMHG

## 2018-05-22 DIAGNOSIS — R20.0 NUMBNESS AND TINGLING: ICD-10-CM

## 2018-05-22 DIAGNOSIS — M79.10 MYALGIA: ICD-10-CM

## 2018-05-22 DIAGNOSIS — R20.2 NUMBNESS AND TINGLING: ICD-10-CM

## 2018-05-22 DIAGNOSIS — R53.83 FATIGUE, UNSPECIFIED TYPE: ICD-10-CM

## 2018-05-22 DIAGNOSIS — R53.83 FATIGUE, UNSPECIFIED TYPE: Primary | ICD-10-CM

## 2018-05-22 LAB
ANION GAP SERPL CALC-SCNC: 9 MMOL/L
BASOPHILS # BLD AUTO: 0.03 K/UL
BASOPHILS NFR BLD: 0.6 %
BUN SERPL-MCNC: 19 MG/DL
CALCIUM SERPL-MCNC: 9.5 MG/DL
CHLORIDE SERPL-SCNC: 102 MMOL/L
CK SERPL-CCNC: 95 U/L
CO2 SERPL-SCNC: 24 MMOL/L
CREAT SERPL-MCNC: 0.9 MG/DL
DIFFERENTIAL METHOD: ABNORMAL
EOSINOPHIL # BLD AUTO: 0.1 K/UL
EOSINOPHIL NFR BLD: 1.1 %
ERYTHROCYTE [DISTWIDTH] IN BLOOD BY AUTOMATED COUNT: 14.7 %
ERYTHROCYTE [SEDIMENTATION RATE] IN BLOOD BY WESTERGREN METHOD: 11 MM/HR
EST. GFR  (AFRICAN AMERICAN): >60 ML/MIN/1.73 M^2
EST. GFR  (NON AFRICAN AMERICAN): >60 ML/MIN/1.73 M^2
GLUCOSE SERPL-MCNC: 102 MG/DL
HCT VFR BLD AUTO: 34.4 %
HGB BLD-MCNC: 11.2 G/DL
IMM GRANULOCYTES # BLD AUTO: 0.01 K/UL
IMM GRANULOCYTES NFR BLD AUTO: 0.2 %
LYMPHOCYTES # BLD AUTO: 1 K/UL
LYMPHOCYTES NFR BLD: 20 %
MCH RBC QN AUTO: 29 PG
MCHC RBC AUTO-ENTMCNC: 32.6 G/DL
MCV RBC AUTO: 89 FL
MONOCYTES # BLD AUTO: 0.4 K/UL
MONOCYTES NFR BLD: 8.4 %
NEUTROPHILS # BLD AUTO: 3.3 K/UL
NEUTROPHILS NFR BLD: 69.7 %
NRBC BLD-RTO: 0 /100 WBC
PLATELET # BLD AUTO: 231 K/UL
PMV BLD AUTO: 12.1 FL
POTASSIUM SERPL-SCNC: 4.9 MMOL/L
RBC # BLD AUTO: 3.86 M/UL
SODIUM SERPL-SCNC: 135 MMOL/L
TSH SERPL DL<=0.005 MIU/L-ACNC: 1.12 UIU/ML
VIT B12 SERPL-MCNC: 501 PG/ML
WBC # BLD AUTO: 4.74 K/UL

## 2018-05-22 PROCEDURE — 80048 BASIC METABOLIC PNL TOTAL CA: CPT

## 2018-05-22 PROCEDURE — 3079F DIAST BP 80-89 MM HG: CPT | Mod: CPTII,S$GLB,, | Performed by: INTERNAL MEDICINE

## 2018-05-22 PROCEDURE — 82550 ASSAY OF CK (CPK): CPT

## 2018-05-22 PROCEDURE — 99999 PR PBB SHADOW E&M-EST. PATIENT-LVL III: CPT | Mod: PBBFAC,,, | Performed by: INTERNAL MEDICINE

## 2018-05-22 PROCEDURE — 36415 COLL VENOUS BLD VENIPUNCTURE: CPT | Mod: PO

## 2018-05-22 PROCEDURE — 99214 OFFICE O/P EST MOD 30 MIN: CPT | Mod: S$GLB,,, | Performed by: INTERNAL MEDICINE

## 2018-05-22 PROCEDURE — 85651 RBC SED RATE NONAUTOMATED: CPT

## 2018-05-22 PROCEDURE — 3077F SYST BP >= 140 MM HG: CPT | Mod: CPTII,S$GLB,, | Performed by: INTERNAL MEDICINE

## 2018-05-22 PROCEDURE — 85025 COMPLETE CBC W/AUTO DIFF WBC: CPT

## 2018-05-22 PROCEDURE — 84443 ASSAY THYROID STIM HORMONE: CPT

## 2018-05-22 PROCEDURE — 82607 VITAMIN B-12: CPT

## 2018-05-22 RX ORDER — ALPRAZOLAM 1 MG/1
1 TABLET ORAL DAILY PRN
Qty: 2 TABLET | Refills: 0 | Status: SHIPPED | OUTPATIENT
Start: 2018-05-22 | End: 2018-11-20

## 2018-05-22 RX ORDER — AZITHROMYCIN 250 MG/1
TABLET, FILM COATED ORAL
Qty: 6 TABLET | Refills: 0 | Status: SHIPPED | OUTPATIENT
Start: 2018-05-22 | End: 2018-07-02 | Stop reason: ALTCHOICE

## 2018-05-22 NOTE — TELEPHONE ENCOUNTER
----- Message from Morteza Arcos sent at 5/22/2018  8:10 AM CDT -----  Needs Medical Advice    Who Called: Pt  Communication Preference (Beatrizt response to Pt. (or) Call Back # and timeframe): [phone]  Additional Information: Pt is asking for Xanax for MRI on tomorrow.    [id number] - [place], [place] - [address] [place] [address] AT CORNER OF [address] [address]  Phone: [phone] Fax: [phone]

## 2018-05-22 NOTE — PROGRESS NOTES
Subjective:       Patient ID: Maryann Sorenson is a 77 y.o. female.    Chief Complaint: hot/ cold flashes (and tingling both legs/arms ; L> R)  HISTORY OF PRESENT ILLNESS:  A 77-year-old white female who is coming in with   several complaints.  She has gotten epidurals on April 24th and May 11th and   after the second epidural, she developed chills in her left lower extremity and   occasionally tingling and numbness following that.  She subsequently has had   tingling on her left face, both arms and her left leg on and off.  Her back is   better.  Her mobility is better.  She has been put on gabapentin by Dr. Champagne   on March 26th.  She also feels like she is having hot flashes.  She said that   she feels somewhat weak when she gets the numbness.  The patient has had   complaints of fatigue in the past and she is wondering if this might be a   complication of her epidural.  I told her that is possible, but that would be a   diagnosis of exclusion and is unlikely to account for the problems that she is   having in her face and both arms.    PHYSICAL EXAMINATION:  VITAL SIGNS:  Normal except her blood pressure is somewhat elevated, 150/82.  GENERAL:  She is in no distress.  SKIN:  Fair, healthy.  HEENT:  Clear.  CHEST:  Clear.  NECK:  She has no pain on neck flexion.  NEUROLOGIC:  She has no current problems with numbness, weakness or other   neurologic abnormalities.    I ordered lab on her, which is back showing normal CPK, B12, sed rate.  Her   hematocrit is 34, but it runs in that range chronically.  Sodium is 135, but   also runs in that range.  TSH is normal.  She was seen prior to my visit by Dr. Smith and diagnosed as having a cervical syndrome, so I have told her if   nothing was found on the lab evaluation to continue her evaluation and follow up   with Neurology.      JEAN CLAUDE/GALEN  dd: 05/30/2018 10:00:15 (CDT)  td: 05/30/2018 22:36:37 (CDT)  Doc ID   #[id number]  Job ID #[id number]    CC:     Dict  [id number]  [facility]  Review of Systems   Constitutional: Positive for chills and fever.   HENT: Negative for congestion, hearing loss, sinus pressure, sneezing, sore throat and voice change.    Eyes: Negative for visual disturbance.   Respiratory: Negative for cough, chest tightness and shortness of breath.    Cardiovascular: Negative.  Negative for chest pain, palpitations and leg swelling.   Gastrointestinal: Negative.    Genitourinary: Negative for difficulty urinating, dysuria, flank pain, frequency, hematuria, menstrual problem, pelvic pain, urgency, vaginal bleeding and vaginal discharge.   Musculoskeletal: Positive for back pain. Negative for neck pain and neck stiffness.   Skin: Negative.    Neurological: Positive for weakness and numbness. Negative for dizziness, seizures, light-headedness and headaches.   Psychiatric/Behavioral: Negative for agitation, behavioral problems, confusion and sleep disturbance. The patient is not nervous/anxious.        Objective:      Physical Exam   Constitutional: She is oriented to person, place, and time. She appears well-developed and well-nourished.   Eyes: EOM are normal. Pupils are equal, round, and reactive to light.   Neck: Normal range of motion. Neck supple. No JVD present. No thyromegaly present.   Cardiovascular: Normal rate, regular rhythm, normal heart sounds and intact distal pulses.  Exam reveals no gallop.    No murmur heard.  Pulmonary/Chest: Breath sounds normal. She has no wheezes. She has no rales.   Abdominal: Soft. Bowel sounds are normal. She exhibits no mass. There is no tenderness.   Musculoskeletal: Normal range of motion.   Lymphadenopathy:     She has no cervical adenopathy.   Neurological: She is alert and oriented to person, place, and time. She has normal reflexes. No cranial nerve deficit.   Skin: No rash noted. No erythema.   Psychiatric: She has a normal mood and affect. Judgment normal.       Assessment:       1. Fatigue, unspecified type    2.  Myalgia    3. Numbness and tingling        Plan:

## 2018-05-23 ENCOUNTER — HOSPITAL ENCOUNTER (OUTPATIENT)
Dept: RADIOLOGY | Facility: HOSPITAL | Age: 78
Discharge: HOME | End: 2018-05-23
Attending: NEUROMUSCULOSKELETAL MEDICINE & OMM
Payer: MEDICARE

## 2018-05-23 DIAGNOSIS — M54.2 NECK PAIN: ICD-10-CM

## 2018-05-23 DIAGNOSIS — G54.2 CERVICAL SYNDROME: ICD-10-CM

## 2018-05-23 PROCEDURE — 72141 MRI NECK SPINE W/O DYE: CPT | Mod: 26,,, | Performed by: RADIOLOGY

## 2018-05-23 PROCEDURE — 72141 MRI NECK SPINE W/O DYE: CPT | Mod: TC

## 2018-05-24 ENCOUNTER — TELEPHONE (OUTPATIENT)
Dept: NEUROLOGY | Facility: CLINIC | Age: 78
End: 2018-05-24

## 2018-05-24 NOTE — TELEPHONE ENCOUNTER
Patient made aware that Dr. Smith is out of the office today and will return tomorrow. She however will be going out of the country for approx. 2 weeks (returning 6/8/18)

## 2018-05-24 NOTE — TELEPHONE ENCOUNTER
----- Message from Morteza Arcos sent at 5/24/2018  3:13 PM CDT -----  Test Results    Who Called: Pt  Name of Test (Lab/Mammo/Etc): MRI C SPINE NON CONTRAST  Date of Test: 05/23/18  Ordering Provider: Luis  Where the test was performed: [facility]  Communication Preference (MyChart response to Pt. (or) Call Back # and timeframe): [phone]  Additional Information:

## 2018-05-25 NOTE — TELEPHONE ENCOUNTER
Left message on Mrs. Sorenson voicemail per Dr. Smith informing her that her MRI results(C-Spine) are not urgent, she was asked to contact our office upon return in regards to scheduling another MRI.

## 2018-05-28 ENCOUNTER — TELEPHONE (OUTPATIENT)
Dept: INTERNAL MEDICINE | Facility: CLINIC | Age: 78
End: 2018-05-28

## 2018-05-28 DIAGNOSIS — R53.81 MALAISE: ICD-10-CM

## 2018-05-28 DIAGNOSIS — I10 HYPERTENSION, UNSPECIFIED TYPE: Primary | ICD-10-CM

## 2018-05-28 DIAGNOSIS — E78.5 HYPERLIPIDEMIA, UNSPECIFIED HYPERLIPIDEMIA TYPE: ICD-10-CM

## 2018-05-28 NOTE — TELEPHONE ENCOUNTER
----- Message from Chucky Carlson MD sent at 5/26/2018 10:00 AM CDT -----  Her lab is normal and how is she doing?

## 2018-06-08 ENCOUNTER — TELEPHONE (OUTPATIENT)
Dept: NEUROLOGY | Facility: CLINIC | Age: 78
End: 2018-06-08

## 2018-06-08 NOTE — TELEPHONE ENCOUNTER
----- Message from Fernando Cooley sent at 6/8/2018  3:29 PM CDT -----  Contact: Pt. [phone]  Needs Advice    Reason for call:    The patient would like to speak to someone regarding scheduling another MRI.    Communication Preference: PHONE

## 2018-06-15 ENCOUNTER — TELEPHONE (OUTPATIENT)
Dept: NEUROLOGY | Facility: CLINIC | Age: 78
End: 2018-06-15

## 2018-06-15 NOTE — TELEPHONE ENCOUNTER
----- Message from Laura Daly sent at 6/14/2018  9:19 AM CDT -----  Contact: pt @ [phone]  Calling to speak with doctor Luis regarding her MRI results and per Paula's note in the patients chart:    Left message on Mrs. Sorenson voicemail per Dr. Smith informing her that her MRI results(C-Spine) are not urgent, she was asked to contact our office upon return in regards to scheduling another MRI.    Patient says she has been calling to speak with someone, left several messages and no one has returned her call. She is very concerned about her test results and why is another MRI needed. Please call patient.

## 2018-06-15 NOTE — TELEPHONE ENCOUNTER
Spoke with patient and informed her that Paula left a message and spoke with her about it not being urgent and Dr. Smith will give us further instruction on another MRI if still needed. Informed her Dr. Smith was out of the office and just returned yesterday as she was told previously. Patients want results of MRI and why is another MRI needed from her.

## 2018-06-15 NOTE — TELEPHONE ENCOUNTER
----- Message from Janusz Peterson sent at 6/15/2018  1:38 PM CDT -----  Contact: DARWIN CULVER [[id number]]    Name of Who is Calling:DARWIN CULVER [[id number]]       What is the request in detail: Patient is upset, stating she has called 4 different times and have not been contacted. Patient states if she is suppose to have another MRI no one has contacted her in a week. Please call ASAP      Can the clinic reply by [facility]: no      What Number to Call Back if not [facility]: [phone]

## 2018-06-18 ENCOUNTER — OFFICE VISIT (OUTPATIENT)
Dept: NEUROLOGY | Facility: CLINIC | Age: 78
End: 2018-06-18
Payer: MEDICARE

## 2018-06-18 VITALS
DIASTOLIC BLOOD PRESSURE: 95 MMHG | HEART RATE: 66 BPM | SYSTOLIC BLOOD PRESSURE: 164 MMHG | BODY MASS INDEX: 29.06 KG/M2 | WEIGHT: 164 LBS | HEIGHT: 63 IN

## 2018-06-18 DIAGNOSIS — M50.90 CERVICAL DISC DISEASE: ICD-10-CM

## 2018-06-18 PROCEDURE — 3077F SYST BP >= 140 MM HG: CPT | Mod: CPTII,S$GLB,, | Performed by: NEUROMUSCULOSKELETAL MEDICINE & OMM

## 2018-06-18 PROCEDURE — 99215 OFFICE O/P EST HI 40 MIN: CPT | Mod: S$GLB,,, | Performed by: NEUROMUSCULOSKELETAL MEDICINE & OMM

## 2018-06-18 PROCEDURE — 3080F DIAST BP >= 90 MM HG: CPT | Mod: CPTII,S$GLB,, | Performed by: NEUROMUSCULOSKELETAL MEDICINE & OMM

## 2018-06-18 PROCEDURE — 99999 PR PBB SHADOW E&M-EST. PATIENT-LVL IV: CPT | Mod: PBBFAC,,, | Performed by: NEUROMUSCULOSKELETAL MEDICINE & OMM

## 2018-06-18 NOTE — PROGRESS NOTES
Present history: Patient presents today for follow-up for her difficulty walking and numbness in the leg.  She describes left thigh numbness in the lateral femoral cutaneous distribution.  She complains of a lot of knee problems at the present time.  He is scheduled to see orthopedics.  MRI cervical spine shows some mild degenerative disc disease with an odontoid cysts which patient relates has been described in the past.  She has some moderate left neural foraminal encroachment at C5-C6.  She has a walking stick which she says really helps her balance as well as her knee problem.  She took her walking stick to [place] ended quite well with walking with the additional stability of his stick.  Previous note: 5-18-18: This is a 77-year-old white female who presents with several neurologic issues.  Patient had severely low back pain and left leg pain in March of this year.  She woke up with for-5/10 left leg pain.  She had weakness with difficulty picking up for an climbing the stairs.  She saw orthopedics and was given an epidural steroid injection on 4-24-18.  This made it much better.  She had a repeat injection on 5-11-18.  After that she felt that she had left leg she'll as well as a leg feeling tingling.  In May she developed numbness on the left side including the arm and leg and face.  She notes that she has lost some of her balance mechanism in that she feels wobbly frequently in [place] after standing for periods time.  She continues to some neck pain.  Patient uses a CPAP machine and feels that this may be related to the abnormal positioning of her neck at night and aggravation.  Patient relates in June of last year she developed a vasovagal response with stomach cramps.     Neurological Exam:  Mental status-alert and oriented to person, place, and time; attention span and concentration is good. Fund of knowledge-patient is aware of current events and able to give detailed history of the current  problem.recent and remote memory seems intact. Language function is normal with no evidence of aphasia  Cranial nerves:Visual acuity to hand chart -normal; visual fields to confrontation normal;pupils were equal and reactive to light ;no evidence of ptosis ;  funduscopic examination was normal with sharp disc margins. external ocular movements were full with no nystagmus. Facial sensation to pinprick : normal ; corneal reflexes intact; Facial muscles were symmetrical. Hearing is unimpaired symmetrical finger rub; Tongue movements - normal ; palate movements - normal ;Swallowing unimpaired. Shoulder shrug was intact with good strength Speech was normal  Motor examination: Upper : normal                                      Lower extremities - proximal leg weakness improved;  today she has no difficulty standing from a chair without using her hands                  Right-handed  Sensory examination:   Upper; normal pinprick and soft touch ;   Lower extremities - some alteration in sensation over the left thigh in the lateral femoral cutaneous distribution.  Vibration sense: 10-15 seconds @ toes  Deep tendon reflexes: upper extremities :1-2+ symmetrical ;     lower extremities KJ- 3 +; AJ - 1+ Both plantar responses were flexor  Cerebellar examination upper: Normal finger to nose and rapid alternating movements  Gait: Steady with no ataxia;      heel and toe walk normal  Romberg test: Positive      Tandem gait: Normal    Involuntary movements: Negative  TMJ - no tenderness  Cervical examination: Full range of motion with no pain Cervical tenderness : Left positive  Lumbar examination: Low back tenderness-negative                  Sciatic notchtenderness-negative            Straight leg raising : negative     Impression:  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  Continue using the walking stick; avoids squatting to irritate  lateral femoral cutaneous nerve.  Weight loss.  Follow-up 4 months

## 2018-06-25 ENCOUNTER — LAB VISIT (OUTPATIENT)
Dept: LAB | Facility: HOSPITAL | Age: 78
End: 2018-06-25
Attending: INTERNAL MEDICINE
Payer: MEDICARE

## 2018-06-25 DIAGNOSIS — I10 HYPERTENSION, UNSPECIFIED TYPE: ICD-10-CM

## 2018-06-25 DIAGNOSIS — R53.81 MALAISE: ICD-10-CM

## 2018-06-25 DIAGNOSIS — E78.5 HYPERLIPIDEMIA, UNSPECIFIED HYPERLIPIDEMIA TYPE: ICD-10-CM

## 2018-06-25 LAB
ALBUMIN SERPL BCP-MCNC: 4.4 G/DL
ALP SERPL-CCNC: 81 U/L
ALT SERPL W/O P-5'-P-CCNC: 18 U/L
ANION GAP SERPL CALC-SCNC: 8 MMOL/L
AST SERPL-CCNC: 28 U/L
BASOPHILS # BLD AUTO: 0.03 K/UL
BASOPHILS NFR BLD: 0.8 %
BILIRUB SERPL-MCNC: 0.3 MG/DL
BUN SERPL-MCNC: 16 MG/DL
CALCIUM SERPL-MCNC: 9.4 MG/DL
CHLORIDE SERPL-SCNC: 99 MMOL/L
CHOLEST SERPL-MCNC: 165 MG/DL
CHOLEST/HDLC SERPL: 2.1 {RATIO}
CO2 SERPL-SCNC: 30 MMOL/L
CREAT SERPL-MCNC: 0.68 MG/DL
DIFFERENTIAL METHOD: ABNORMAL
EOSINOPHIL # BLD AUTO: 0.3 K/UL
EOSINOPHIL NFR BLD: 6.6 %
ERYTHROCYTE [DISTWIDTH] IN BLOOD BY AUTOMATED COUNT: 14.6 %
EST. GFR  (AFRICAN AMERICAN): >60 ML/MIN/1.73 M^2
EST. GFR  (NON AFRICAN AMERICAN): >60 ML/MIN/1.73 M^2
GLUCOSE SERPL-MCNC: 98 MG/DL
HCT VFR BLD AUTO: 35.5 %
HDLC SERPL-MCNC: 79 MG/DL
HDLC SERPL: 47.9 %
HGB BLD-MCNC: 11.5 G/DL
LDLC SERPL CALC-MCNC: 75 MG/DL
LYMPHOCYTES # BLD AUTO: 1.3 K/UL
LYMPHOCYTES NFR BLD: 34.6 %
MCH RBC QN AUTO: 28 PG
MCHC RBC AUTO-ENTMCNC: 32.4 G/DL
MCV RBC AUTO: 87 FL
MONOCYTES # BLD AUTO: 0.4 K/UL
MONOCYTES NFR BLD: 10.8 %
NEUTROPHILS # BLD AUTO: 1.8 K/UL
NEUTROPHILS NFR BLD: 46.9 %
NONHDLC SERPL-MCNC: 86 MG/DL
PLATELET # BLD AUTO: 220 K/UL
PMV BLD AUTO: 11.2 FL
POTASSIUM SERPL-SCNC: 4.7 MMOL/L
PROT SERPL-MCNC: 7.2 G/DL
RBC # BLD AUTO: 4.1 M/UL
SODIUM SERPL-SCNC: 137 MMOL/L
TRIGL SERPL-MCNC: 55 MG/DL
WBC # BLD AUTO: 3.81 K/UL

## 2018-06-25 PROCEDURE — 80053 COMPREHEN METABOLIC PANEL: CPT | Mod: PO

## 2018-06-25 PROCEDURE — 36415 COLL VENOUS BLD VENIPUNCTURE: CPT | Mod: PO

## 2018-06-25 PROCEDURE — 85025 COMPLETE CBC W/AUTO DIFF WBC: CPT | Mod: PO

## 2018-06-25 PROCEDURE — 80061 LIPID PANEL: CPT

## 2018-07-02 ENCOUNTER — OFFICE VISIT (OUTPATIENT)
Dept: INTERNAL MEDICINE | Facility: CLINIC | Age: 78
End: 2018-07-02
Payer: MEDICARE

## 2018-07-02 VITALS
RESPIRATION RATE: 18 BRPM | HEART RATE: 71 BPM | WEIGHT: 169.75 LBS | SYSTOLIC BLOOD PRESSURE: 144 MMHG | TEMPERATURE: 97 F | BODY MASS INDEX: 30.08 KG/M2 | HEIGHT: 63 IN | DIASTOLIC BLOOD PRESSURE: 84 MMHG

## 2018-07-02 DIAGNOSIS — K21.9 GASTROESOPHAGEAL REFLUX DISEASE WITHOUT ESOPHAGITIS: ICD-10-CM

## 2018-07-02 DIAGNOSIS — I65.23 CAROTID STENOSIS, BILATERAL: ICD-10-CM

## 2018-07-02 DIAGNOSIS — I27.20 PULMONARY HYPERTENSION: ICD-10-CM

## 2018-07-02 DIAGNOSIS — M50.90 CERVICAL DISC DISEASE: ICD-10-CM

## 2018-07-02 DIAGNOSIS — M54.16 LUMBAR RADICULITIS: ICD-10-CM

## 2018-07-02 DIAGNOSIS — I10 ESSENTIAL HYPERTENSION: ICD-10-CM

## 2018-07-02 DIAGNOSIS — G47.33 OSA (OBSTRUCTIVE SLEEP APNEA): ICD-10-CM

## 2018-07-02 DIAGNOSIS — I25.10 CALCIFIC CORONARY ARTERIOSCLEROSIS: ICD-10-CM

## 2018-07-02 DIAGNOSIS — I25.10 ATHEROSCLEROSIS OF NATIVE CORONARY ARTERY OF NATIVE HEART WITHOUT ANGINA PECTORIS: ICD-10-CM

## 2018-07-02 DIAGNOSIS — G25.81 RESTLESS LEGS: ICD-10-CM

## 2018-07-02 DIAGNOSIS — Z00.00 ANNUAL PHYSICAL EXAM: Primary | ICD-10-CM

## 2018-07-02 PROCEDURE — 3077F SYST BP >= 140 MM HG: CPT | Mod: CPTII,S$GLB,, | Performed by: INTERNAL MEDICINE

## 2018-07-02 PROCEDURE — 99397 PER PM REEVAL EST PAT 65+ YR: CPT | Mod: S$GLB,,, | Performed by: INTERNAL MEDICINE

## 2018-07-02 PROCEDURE — 3079F DIAST BP 80-89 MM HG: CPT | Mod: CPTII,S$GLB,, | Performed by: INTERNAL MEDICINE

## 2018-07-02 PROCEDURE — 99999 PR PBB SHADOW E&M-EST. PATIENT-LVL III: CPT | Mod: PBBFAC,,, | Performed by: INTERNAL MEDICINE

## 2018-07-02 NOTE — PROGRESS NOTES
Subjective:       Patient ID: Maryann Sorenson is a 77 y.o. female.    Chief Complaint: Annual Exam and Knee Pain (right)  HISTORY OF PRESENT ILLNESS:  A 77-year-old white female coming in for annual   review and has lots of complaints.  Most recent is problems with her right knee,   which began after a trip to [place] where she did a lot of walking.  She   walked with a cane.  She says she did not injure her knee.  The pain started two   to three days after they returned from a trip.  She in the meantime was seen by   Dr. Bran who is an orthopedist at [facility].  On June 21st, given a   shot in her knee that did not help her.  In the meantime, she has talked about   getting an MRI and he has ordered that.    The patient also complains of some pain in the muscles and probably nerves in   her left leg connected with what she thinks are cervical spine disease.  She has   seen Dr. Smith for that.  Wanted copy of her report and I gave her that.    She also finds that her hearing is declined.  The patient sees Dr. Gavin in   Cardiology, Dr. Early in pulmonary, Dr. Pop who is in GI at [facility], Dr. Wiedemann in Gynecology and Dr. Fitch in ENT at [facility].    PHYSICAL EXAMINATION:  VITAL SIGNS:  Normal.  SKIN:  Fair and healthy.  HEENT:  Shows bilateral cerumen impaction.  She does not use Q-tips.  She gets   her ears cleaned by Dr. Fitch.  NECK:  Shows no adenopathy, thyroid enlargement or bruit, though she does have   thyroid scans done by both us and her ENT doctor.  ENT asked to get another   scan.  She has had one just in December of last year, which showed no change   over two years.  CHEST:  Clear.  HEART:  There is no murmur or gallop.  ABDOMEN:  Nontender without any organomegaly.  EXTREMITIES:  Show normal muscles, joints and pulses.  She does have some   swelling and possibly a little heat in the right knee.  She has no crepitance,   no instability.  She complains of pain  along the medial inferior aspect of the   knee, does not appear to be from her collateral ligament.  The pulses are good   throughout.  No edema.  NEUROLOGIC:  Otherwise normal.    IMPRESSION:  1.  Hypertension, controlled.  2.  Carotid stenosis, bilateral.  3.  Atherosclerosis, coronaries followed by Cardiology.  4.  Lumbar radiculopathy.  5.  Cervical disk disease followed by Dr. Smith.  6.  Sleep problems with restless leg and NOE, GERD, calcific coronary   atherosclerosis.  7.  Bilateral cerumen impaction.  I told her to see Dr. Fitch for that.  8.  Cystic thyroid disease, probably degenerative.  Her thyroid functions are   normal.  Labs showed slightly elevated lipids and a trace microscopic blood.    So, I told her to check those the next time.  I will see her again in one year.      JEAN CLAUDE/GALEN  dd: 07/02/2018 11:41:13 (CDT)  td: 07/03/2018 02:14:13 (CDT)  Doc ID   #[id number]  Job ID #[id number]    CC:     [facility] [id number]  HPI  Review of Systems   Constitutional: Negative.    HENT: Positive for hearing loss. Negative for congestion, sinus pressure, sneezing, sore throat and voice change.    Eyes: Negative for visual disturbance.   Respiratory: Negative for cough, chest tightness and shortness of breath.    Cardiovascular: Negative.  Negative for chest pain, palpitations and leg swelling.   Gastrointestinal: Negative.    Genitourinary: Negative for difficulty urinating, dysuria, flank pain, frequency, hematuria, menstrual problem, pelvic pain, urgency, vaginal bleeding and vaginal discharge.   Musculoskeletal: Positive for arthralgias, myalgias, neck pain and neck stiffness.   Skin: Negative.    Neurological: Negative.  Negative for dizziness, seizures, light-headedness, numbness and headaches.   Psychiatric/Behavioral: Positive for agitation, dysphoric mood and sleep disturbance. Negative for behavioral problems and confusion. The patient is nervous/anxious.        Objective:      Physical Exam   Constitutional: She is  oriented to person, place, and time. She appears well-developed and well-nourished.   Eyes: EOM are normal. Pupils are equal, round, and reactive to light.   Neck: Normal range of motion. Neck supple. No JVD present. No thyromegaly present.   Cardiovascular: Normal rate, regular rhythm, normal heart sounds and intact distal pulses.  Exam reveals no gallop.    No murmur heard.  Pulmonary/Chest: Breath sounds normal. She has no wheezes. She has no rales.   Abdominal: Soft. Bowel sounds are normal. She exhibits no mass. There is no tenderness.   Musculoskeletal: Normal range of motion.   Lymphadenopathy:     She has no cervical adenopathy.   Neurological: She is alert and oriented to person, place, and time. She has normal reflexes. No cranial nerve deficit.   Skin: No rash noted. No erythema.   Psychiatric: She has a normal mood and affect. Judgment normal.       Assessment:       1. Annual physical exam    2. Essential hypertension    3. Carotid stenosis, bilateral    4. Atherosclerosis of native coronary artery of native heart without angina pectoris    5. Lumbar radiculitis    6. Cervical disc disease    7. Restless legs    8. NOE (obstructive sleep apnea)    9. Gastroesophageal reflux disease without esophagitis    10. Pulmonary hypertension    11. Calcific coronary arteriosclerosis        Plan:

## 2018-07-27 DIAGNOSIS — I27.9 CHRONIC PULMONARY HEART DISEASE: ICD-10-CM

## 2018-07-27 DIAGNOSIS — Z79.899 POLYPHARMACY: Primary | ICD-10-CM

## 2018-07-27 DIAGNOSIS — R06.82 TACHYPNEA: ICD-10-CM

## 2018-09-05 RX ORDER — LISINOPRIL 20 MG/1
20 TABLET ORAL 2 TIMES DAILY
Qty: 180 TABLET | Refills: 3 | Status: SHIPPED | OUTPATIENT
Start: 2018-09-05 | End: 2019-02-14 | Stop reason: ALTCHOICE

## 2018-09-05 NOTE — TELEPHONE ENCOUNTER
----- Message from Joya Hooper MA sent at 9/5/2018 11:40 AM CDT -----  Contact: self  Pt.is calling to get a refill on Lisinopril to [facility].  pt.Last visit 3/28/18. Please call

## 2018-09-10 ENCOUNTER — PES CALL (OUTPATIENT)
Dept: ADMINISTRATIVE | Facility: CLINIC | Age: 78
End: 2018-09-10

## 2018-09-17 ENCOUNTER — TELEPHONE (OUTPATIENT)
Dept: OBSTETRICS AND GYNECOLOGY | Facility: CLINIC | Age: 78
End: 2018-09-17

## 2018-09-17 DIAGNOSIS — Z12.31 SCREENING MAMMOGRAM, ENCOUNTER FOR: Primary | ICD-10-CM

## 2018-09-21 ENCOUNTER — OFFICE VISIT (OUTPATIENT)
Dept: TRANSPLANT | Facility: CLINIC | Age: 78
End: 2018-09-21
Payer: MEDICARE

## 2018-09-21 ENCOUNTER — TELEPHONE (OUTPATIENT)
Dept: OBSTETRICS AND GYNECOLOGY | Facility: CLINIC | Age: 78
End: 2018-09-21

## 2018-09-21 ENCOUNTER — HOSPITAL ENCOUNTER (OUTPATIENT)
Dept: CARDIOLOGY | Facility: CLINIC | Age: 78
Discharge: HOME | End: 2018-09-21
Attending: INTERNAL MEDICINE
Payer: MEDICARE

## 2018-09-21 ENCOUNTER — HOSPITAL ENCOUNTER (OUTPATIENT)
Dept: PULMONOLOGY | Facility: CLINIC | Age: 78
Discharge: HOME | End: 2018-09-21
Payer: MEDICARE

## 2018-09-21 VITALS
HEIGHT: 63 IN | WEIGHT: 168.63 LBS | HEIGHT: 62 IN | BODY MASS INDEX: 29.88 KG/M2 | HEART RATE: 72 BPM | BODY MASS INDEX: 31 KG/M2 | WEIGHT: 168.44 LBS | DIASTOLIC BLOOD PRESSURE: 80 MMHG | SYSTOLIC BLOOD PRESSURE: 144 MMHG

## 2018-09-21 DIAGNOSIS — I27.9 CHRONIC PULMONARY HEART DISEASE: ICD-10-CM

## 2018-09-21 DIAGNOSIS — R93.1 AGATSTON CORONARY ARTERY CALCIUM SCORE GREATER THAN 400: ICD-10-CM

## 2018-09-21 DIAGNOSIS — I27.20 PULMONARY HYPERTENSION: Primary | ICD-10-CM

## 2018-09-21 DIAGNOSIS — I10 ESSENTIAL HYPERTENSION: ICD-10-CM

## 2018-09-21 DIAGNOSIS — G47.33 OSA (OBSTRUCTIVE SLEEP APNEA): ICD-10-CM

## 2018-09-21 DIAGNOSIS — I65.23 CAROTID STENOSIS, BILATERAL: ICD-10-CM

## 2018-09-21 DIAGNOSIS — I25.10 ATHEROSCLEROSIS OF NATIVE CORONARY ARTERY OF NATIVE HEART WITHOUT ANGINA PECTORIS: ICD-10-CM

## 2018-09-21 DIAGNOSIS — R06.02 SHORTNESS OF BREATH: ICD-10-CM

## 2018-09-21 DIAGNOSIS — I27.20 PULMONARY HYPERTENSION: ICD-10-CM

## 2018-09-21 DIAGNOSIS — E78.2 MIXED HYPERLIPIDEMIA: ICD-10-CM

## 2018-09-21 LAB
AORTIC VALVE REGURGITATION: ABNORMAL
DIASTOLIC DYSFUNCTION: NO
ESTIMATED PA SYSTOLIC PRESSURE: 32.6
RETIRED EF AND QEF - SEE NOTES: 65 (ref 55–65)
TRICUSPID VALVE REGURGITATION: ABNORMAL

## 2018-09-21 PROCEDURE — 93306 TTE W/DOPPLER COMPLETE: CPT | Mod: PBBFAC | Performed by: INTERNAL MEDICINE

## 2018-09-21 PROCEDURE — 99214 OFFICE O/P EST MOD 30 MIN: CPT | Mod: S$PBB,,, | Performed by: INTERNAL MEDICINE

## 2018-09-21 PROCEDURE — 94618 PULMONARY STRESS TESTING: CPT | Mod: 26,S$PBB,, | Performed by: INTERNAL MEDICINE

## 2018-09-21 PROCEDURE — 3079F DIAST BP 80-89 MM HG: CPT | Mod: CPTII,,, | Performed by: INTERNAL MEDICINE

## 2018-09-21 PROCEDURE — 1101F PT FALLS ASSESS-DOCD LE1/YR: CPT | Mod: CPTII,,, | Performed by: INTERNAL MEDICINE

## 2018-09-21 PROCEDURE — 3077F SYST BP >= 140 MM HG: CPT | Mod: CPTII,,, | Performed by: INTERNAL MEDICINE

## 2018-09-21 PROCEDURE — 99999 PR PBB SHADOW E&M-EST. PATIENT-LVL III: CPT | Mod: PBBFAC,,, | Performed by: INTERNAL MEDICINE

## 2018-09-21 PROCEDURE — 99213 OFFICE O/P EST LOW 20 MIN: CPT | Mod: PBBFAC | Performed by: INTERNAL MEDICINE

## 2018-09-21 PROCEDURE — 94618 PULMONARY STRESS TESTING: CPT | Mod: PBBFAC | Performed by: INTERNAL MEDICINE

## 2018-09-21 RX ORDER — FLUTICASONE PROPIONATE 50 MCG
2 SPRAY, SUSPENSION (ML) NASAL DAILY
COMMUNITY
Start: 2018-09-07 | End: 2019-10-23 | Stop reason: SDUPTHER

## 2018-09-21 NOTE — TELEPHONE ENCOUNTER
----- Message from Jordyn Meza sent at 9/21/2018 11:14 AM CDT -----  No. [phone]    Patient returned your call.

## 2018-09-21 NOTE — PATIENT INSTRUCTIONS
Start exercsiing again gradually- go three days a week at first, and pace yourself  In a couple of weeks, try four days a week, then shoot for five    Keep salt intake to under 2000 mg sodium, fluids to under 2 L (64 oz)    Call us if you find yourself getting more short of breath, have more swelling or unexpected weight changes    Continue tracking blood pressure at home and bring log to next visit with Dr Gavin.

## 2018-09-21 NOTE — TELEPHONE ENCOUNTER
Left message to return call  Pt is scheduled to see Dr. Wiedemann on 11/30/18. Dr. Wiedemann will not be in the office that day so we need to reschedule. Also, pt has medicare and is not due to have an annual appointment until 11/2019

## 2018-09-21 NOTE — TELEPHONE ENCOUNTER
Pt notified she does not need an annual every year due to her being on medicare. Pt verbalized understanding

## 2018-09-21 NOTE — PROGRESS NOTES
Subjective:    Patient ID:  Maryann Sorenson is a 77 y.o. female who presents for follow-up of Pulmonary Hypertension.    Congestive Heart Failure   Pertinent negatives include no abdominal pain, change in bowel habit, chest pain, chills, coughing or fever.      78 yo woman with HTN, high ca score, NOE referred by Dr Rich to be evaluated for PH after echo 1 yr ago showed PAP 59 and now here for f/u    Since last visit, pt says she is deteriorating- c/o shoulder, knee, back pain- takes gabapentin bid which she says does help, along with ibuprofen once a day, occasionally tylenol at night. Says this is the reason she is not exercising daily like she was.   Some days her breathing is really good, other days she can feel it-- can't tell what makes a good day or bad day- may be weather related but she says she doesn't know for sure. Has not been retaining fluid- ankles are small, ring slips off easily.    no CP, orthopnea, PND- doing well with the CPAP- wears it all night long and sees Dr Recinos next week, thinks the CPAP is helping    +gastro issues- gas, belching from a hiatal hernia        bp log 110's-142/70-93 (mostly 110-120's/70-80's)    TTE today  The right ventricle is normal in size measuring 3.0 cm at the base in the apical right ventricle-focused view. Global right ventricular systolic function appears normal. Tricuspid annular plane systolic excursion (TAPSE) is 2.0 cm. The   estimated PA systolic pressure is 33 mmHg.     1 - Normal left ventricular systolic function (EF 60-65%).     2 - Normal left ventricular diastolic function.     3 - Normal right ventricular systolic function .     4 - The estimated PA systolic pressure is 33 mmHg.     5 - Mild to moderate aortic regurgitation.     6 - Mild tricuspid regurgitation.     7 - Trivial pulmonic regurgitation.     8 - Intermediate central venous pressure.     9 - No wall motion abnormalities.     CPX 3/18  Mild functional impairment associated with a normal  breathing reserve, normal oxygen saturation, an adequate effort, and a normal AT. These findings are indicative of functional impairment secondary to deconditioning.     TTE 9/17    1 - Normal left ventricular systolic function (EF 60-65%).     2 - Normal right ventricular systolic function .     3 - Indeterminate LV diastolic function.     4 - Mild left atrial enlargement.     5 - Mildly enlarged ascending aorta.     6 - Mild tricuspid regurgitation.     7 - The estimated PA systolic pressure is 35 mmHg.   right ventricle is normal in size measuring 3.7 cm at the base in the apical right ventricle-focused view. Global right ventricular systolic function appears normal. Tricuspid annular plane systolic excursion (TAPSE) is 2.9 cm.   Tissue Doppler-derived tricuspid annular peak systolic velocity (S prime) is 13.7 cm/s. The estimated PA systolic pressure is 35 mmHg.       Six Minute Walk Test  427m (446m, (457m, 463m,  518m 9/16, 488  m in June   )     Paces herself-                                          O2 sat  99 ->99  %                                                           HR 69 -> 101                                                                BP  158 / 75 ->174 /84                                                         Laurie 3 -> 5-6    Echo        Results for orders placed or performed during the hospital encounter of 09/21/18   2D echo with color flow doppler   Result Value Ref Range    EF 65 55 - 65    Diastolic Dysfunction No     Aortic Valve Regurgitation MILD TO MODERATE (A)     Est. PA Systolic Pressure 32.6     Tricuspid Valve Regurgitation MILD    The right ventricle is normal in size measuring 2.4 cm at the base in the apical right ventricle-focused view. Global right ventricular systolic function appears normal. Tricuspid annular plane systolic excursion (TAPSE) is 2.9 cm.   Tissue Doppler-derived tricuspid annular peak systolic velocity (S prime) is 12.0 cm/s. The estimated PA systolic  "pressure is 35 mmHg.     6 - Mild aortic regurgitation.     7 - Mild to moderate tricuspid regurgitation.     8 - Intermediate central venous pressure.    JENNIFER 2015    1 - Enlarged ascending aorta.     2 - Normal left ventricular systolic function (EF 60-65%).     3 - Normal left ventricular diastolic function.     4 - Pulmonary hypertension.     5 - Normal right ventricular systolic function .     6 - Mild tricuspid regurgitation.     No evidence of stress induced myocardial ischemia.     EKG  4 /  15  Normal sinus rhythm  Normal ECG    CXR  3 /16   Heart is not enlarged.  The lungs are clear.  Double density seen through the heart with air-fluid level is compatible with a non-reducing hiatal hernia.  Prominent kyphosis with DJD seen in the spine.  No compression fractures in the visualized spine          Review of Systems   Constitution: Negative for chills, fever, malaise/fatigue and weight gain.   HENT: Negative.    Eyes: Negative.    Cardiovascular: Negative for chest pain, dyspnea on exertion, leg swelling, near-syncope, orthopnea, palpitations, paroxysmal nocturnal dyspnea and syncope.   Respiratory: Negative for cough and shortness of breath.    Endocrine: Negative.    Skin: Negative.    Musculoskeletal: Positive for arthritis and back pain.   Gastrointestinal: Negative for bloating, abdominal pain and change in bowel habit.        Belching, gas   Neurological: Positive for loss of balance. Negative for dizziness and light-headedness.   Psychiatric/Behavioral: Negative for depression.        Objective:    BP (!) 144/80   Pulse 72   Ht 5' 3" (1.6 m)   Wt 76.5 kg (168 lb 10.4 oz)   LMP  (LMP Unknown)   BMI 29.88 kg/m²       Physical Exam   Constitutional: She is oriented to person, place, and time. She appears well-developed and well-nourished.   HENT:   Head: Normocephalic and atraumatic.   Eyes: Right eye exhibits no discharge. Left eye exhibits no discharge.   Neck: Neck supple. No JVD present. No " thyromegaly present.   Cardiovascular: Normal rate and regular rhythm. Exam reveals no gallop and no friction rub.   No murmur heard.       Pulmonary/Chest: Effort normal and breath sounds normal. No respiratory distress. She has no wheezes. She has no rales.   Abdominal: Soft. Bowel sounds are normal. She exhibits no distension. There is no tenderness.   Musculoskeletal: Normal range of motion. She exhibits no edema or tenderness.   Neurological: She is alert and oriented to person, place, and time. No cranial nerve deficit. Coordination normal.   Skin: Skin is warm and dry. No rash noted.   Psychiatric: She has a normal mood and affect. Judgment and thought content normal.           Chemistry        Component Value Date/Time     09/21/2018 0857    K 4.3 09/21/2018 0857     09/21/2018 0857    CO2 26 09/21/2018 0857    BUN 15 09/21/2018 0857    CREATININE 0.8 09/21/2018 0857    GLU 93 09/21/2018 0857        Component Value Date/Time    CALCIUM 10.1 09/21/2018 0857    ALKPHOS 88 09/21/2018 0857    AST 28 09/21/2018 0857    ALT 18 09/21/2018 0857    BILITOT 0.4 09/21/2018 0857            Magnesium   Date Value Ref Range Status   09/21/2018 2.4 1.6 - 2.6 mg/dL Final       Lab Results   Component Value Date    WBC 3.07 (L) 09/21/2018    HGB 11.9 (L) 09/21/2018    HCT 36.9 (L) 09/21/2018    MCV 87 09/21/2018     09/21/2018       No results found for: INR, PROTIME    BNP   Date Value Ref Range Status   09/21/2018 26 0 - 99 pg/mL Final     Comment:     Values of less than 100 pg/ml are consistent with non-CHF populations.   03/28/2018 19 0 - 99 pg/mL Final     Comment:     Values of less than 100 pg/ml are consistent with non-CHF populations.   09/29/2017 40 0 - 99 pg/mL Final     Comment:     Values of less than 100 pg/ml are consistent with non-CHF populations.       No results found for: LDH          Assessment:       1. Pulmonary hypertension on previous echo, now normal PAP, normal RV size and fxn  on todays echo- euvolemic on exam,  BNP normal- BP  well controlled for the most part at home, though it's a little high in clinic today    2. Shortness of breath    3. Essential hypertension    4. Coronary artery disease involving native coronary artery of native heart without angina pectoris    5. Hyperlipidemia    6. NOE- now treated     Plan:      No changes today- asked her to restart her exercise program and to cont to track bp at home and bring log to next visit    Already got her flu shot    Keep salt intake to under 2000 mg sodium, fluids to under 2 L (64 oz)    Pt to resume following with general cardiology- happy to see pt back if volume overload becomes an issue

## 2018-09-22 NOTE — PROCEDURES
Maryann Sorenson is a 77 y.o.  female patient, who presents for a 6 minute walk test ordered by Iris Early MD.  The diagnosis is Pulmonary Hypertension.  The patient's BMI is 30.9 kg/m2.  Predicted distance (lower limit of normal) is 247.93 meters.      Test Results:    The test was completed without stopping.  The total time walked was 360 seconds.  During walking, the patient reported:  Dyspnea.  The patient used no assistive devices during testing.     09/21/2018---------Distance: 426.72 meters (1400 feet)     O2 Sat % Supplemental Oxygen Heart Rate Blood Pressure Laurie Scale   Pre-exercise  (Resting) 99 % Room Air 69 bpm 158/75 mmHg 3   During Exercise 99 % Room Air 101 bpm 174/84 mmHg 5-6   Post-exercise  (Recovery) 99 % Room Air  78 bpm 162/74 mmHg      Recovery Time:  134 seconds    Performing nurse/tech:  JAYASHREE Espino      PREVIOUS STUDY:   03/28/2018---------Distance: 446.23 meters (1464 feet)       O2 Sat % Supplemental Oxygen Heart Rate Blood Pressure Laurie Scale   Pre-exercise  (Resting) 98 % Room Air 62 bpm 148/79 mmHg 2   During Exercise 98 % Room Air 97 bpm 170/80 mmHg 4   Post-exercise  (Recovery) 99 % Room Air  71 bpm 159/70 mmHg           CLINICAL INTERPRETATION:  Six minute walk distance is 426.72 meters (1400 feet) with heavy dyspnea.  During exercise, there was no desaturation while breathing room air.  Blood pressure remained stable and Heart rate increased significantly with walking.  Hypertension was present prior to exercise.  The patient did not report non-pulmonary symptoms during exercise.  Since the previous study in March 2018, exercise capacity is unchanged.  Based upon age and body mass index, exercise capacity is normal.

## 2018-09-24 ENCOUNTER — HOSPITAL ENCOUNTER (OUTPATIENT)
Dept: RADIOLOGY | Facility: HOSPITAL | Age: 78
Discharge: HOME | End: 2018-09-24
Attending: OBSTETRICS & GYNECOLOGY
Payer: MEDICARE

## 2018-09-24 DIAGNOSIS — Z12.31 SCREENING MAMMOGRAM, ENCOUNTER FOR: ICD-10-CM

## 2018-09-24 PROCEDURE — 77063 BREAST TOMOSYNTHESIS BI: CPT | Mod: TC,PO

## 2018-09-26 ENCOUNTER — OFFICE VISIT (OUTPATIENT)
Dept: SLEEP MEDICINE | Facility: CLINIC | Age: 78
End: 2018-09-26
Payer: MEDICARE

## 2018-09-26 VITALS
HEIGHT: 63 IN | DIASTOLIC BLOOD PRESSURE: 76 MMHG | WEIGHT: 169 LBS | HEART RATE: 72 BPM | SYSTOLIC BLOOD PRESSURE: 126 MMHG | BODY MASS INDEX: 29.95 KG/M2

## 2018-09-26 DIAGNOSIS — G25.81 RLS (RESTLESS LEGS SYNDROME): ICD-10-CM

## 2018-09-26 DIAGNOSIS — G47.33 OSA (OBSTRUCTIVE SLEEP APNEA): Primary | ICD-10-CM

## 2018-09-26 PROCEDURE — 3074F SYST BP LT 130 MM HG: CPT | Mod: CPTII,,, | Performed by: PSYCHIATRY & NEUROLOGY

## 2018-09-26 PROCEDURE — 99999 PR PBB SHADOW E&M-EST. PATIENT-LVL III: CPT | Mod: PBBFAC,,, | Performed by: PSYCHIATRY & NEUROLOGY

## 2018-09-26 PROCEDURE — 1101F PT FALLS ASSESS-DOCD LE1/YR: CPT | Mod: CPTII,,, | Performed by: PSYCHIATRY & NEUROLOGY

## 2018-09-26 PROCEDURE — 99214 OFFICE O/P EST MOD 30 MIN: CPT | Mod: S$PBB,,, | Performed by: PSYCHIATRY & NEUROLOGY

## 2018-09-26 PROCEDURE — 99213 OFFICE O/P EST LOW 20 MIN: CPT | Mod: PBBFAC,PO | Performed by: PSYCHIATRY & NEUROLOGY

## 2018-09-26 PROCEDURE — 3078F DIAST BP <80 MM HG: CPT | Mod: CPTII,,, | Performed by: PSYCHIATRY & NEUROLOGY

## 2018-09-26 RX ORDER — PRAMIPEXOLE DIHYDROCHLORIDE 0.12 MG/1
TABLET ORAL
Qty: 90 TABLET | Refills: 3 | Status: SHIPPED | OUTPATIENT
Start: 2018-09-26 | End: 2019-10-23 | Stop reason: SDUPTHER

## 2018-09-26 NOTE — PROGRESS NOTES
Maryann Sorenson  was seen at the request of  No ref. provider found for sleep evaluation.    08/11/2016 INITIAL HISTORY OF PRESENT ILLNESS:  Maryann Sorenson is a 77 y.o. female is here to be evaluated for a sleep disorder.       CHIEF COMPLAINT:      The patient's complaints include excessive daytime fatigue, snoring,  witnessed breathing pauses,  gasping for air in sleep and interrupted sleep since  Over 10 years ago.    Being evaluated by Dr. Early for pulmonary HTN.     Reports  dry mouth and sore throat  Denies nasal congestion   Denies  morning headaches  Reports occasional  interrupted sleep  Reports frequent leg movements - only when watching TV. Urge to move her legs. Got it from her father.   Denies symptoms concerning for parasomnia    The ESS ([place] Sleepiness Score) taken on initial visit is 6 /24    Bedroom is dark and quiet.    FH: father and 2 sisters - RLS.    INTERVAL HISTORY:    10/12/2016  The patient has not presented any new complaints since the previous visit.   Comes to discuss PSG. Very poor sleep efficiency could have caused underestimation of test results (positive to mild to moderate sleep apnea). Iron test was normal.   Nature's wellness oil at the base of the spine - using for RLS - helps at night - never tried it in the evening.     05/24/2017: Maryann Sorenson comes back to talk about treatment options. Prefers CPAP with nasal pillows mask. Pros and cons were discussed.     07/20/2017: Started using CPAP in June. Passed out in her bedroom-> resulted in ankle sprain.  Has to get up in the middle of the night to the bathroom - now has to turn the light on for the sake of CPAP - now that she is wide awake -> RLS acts up.     Therapy Event Summary Date Range: 6/20/2017 - 7/19/2017     Doing well with Dreamwear mask.     ESS 9/24.    Hide      Compliance Summary  Apnea Indices  Ventilator Statistics    Days with Device Usage:  30 days  Average AHI:  1.1  Average Breath Rate:  16.5 bpm   "  Percentage of Days >=4 Hours:  90.0%  Average OA Index:  0.1  Average % Patient Triggered Breaths:  N/A    Average Usage (Days Used):  4 hrs. 34 mins. 42 secs.  Average CA Index:  0.1  Average Tidal Volume:  417.1 ml    Average Usage (All Days):  4 hrs. 34 mins. 42 secs.    Average Minute Vent:  N/A        Large Leak  Periodic Breathing     Average Time in Large Leak:  0 secs.  Average % of Night in PB:  0.3%     Average % of Night in Large Leak:  0.0%            \  10/20/2017:  Therapy Event Summary Date Range: 9/20/2017 - 10/19/2017   Doing better since pressure increase. Resports benefit from CPAP use, but still significantly interrupted sleep, and residual fatigue/sleepiness.    APAP90% pressure 8  Using ramp at 5.    Mirapex  0.125 mg- still controlling RLS well.      Hide      Compliance Summary  Apnea Indices  Ventilator Statistics    Days with Device Usage:  30 days  Average AHI:  4.9  Average Breath Rate:  13.7 bpm    Percentage of Days >=4 Hours:  100.0%  Average OA Index:  0.6  Average % Patient Triggered Breaths:  N/A    Average Usage (Days Used):  7 hrs. 34 mins. 49 secs.  Average CA Index:  1.1  Average Tidal Volume:  316.8 ml    Average Usage (All Days):  7 hrs. 34 mins. 49 secs.    Average Minute Vent:  N/A        Large Leak  Periodic Breathing     Average Time in Large Leak:  0 secs.  Average % of Night in PB:  1.0%     Average % of Night in Large Leak:  0.0%              01/08/2018    CPAP 8-18; not using ramp lately. 90% 9 cm  Now can adjust CPAP in the dark. Most nights were good nights.   Still fatigued; anemia not controlled 0- not tolerating iron supplements.  Bedroom is very dark.  Nocturia and joint pain plays a role.    RLS is well controlled on Mirapex.    T is 64%. It is dark in the bedroom. Godd sleep hygiene.     Sh is 'stuck" in one position with CPAP.    Believes she "slept better before CPAP. Needs to readjust the mask.     She has failed REM Fresh Melatonin in terms of sleep " "continuity and not feeling more refreshed in AM    She has significant anemia (Hb 10), yet won't take iron due to its constipating effects.     Taking magnesium lunch time. Taking Fish oil and Glucosamine for joint pain, yet it seems that her arthritic pain is not well controlled and may contribute to frequent arousals (noted on airflow channel from her CPAP download).  She is trying to avoid regular NSAID or Tylenol intake.   She has not really tried CBTI ruma that I have installed on her phone last time yet, since she wants to "keep it simple:    Therapy Event Summary Date Range: 12/8/2017 - 1/6/2018     Hide      Compliance Summary  Apnea Indices  Ventilator Statistics    Days with Device Usage:  30 days  Average AHI:  3.7  Average Breath Rate:  13.7 bpm    Percentage of Days >=4 Hours:  100.0%  Average OA Index:  0.6  Average % Patient Triggered Breaths:  N/A    Average Usage (Days Used):  7 hrs. 39 mins. 1 secs.  Average CA Index:  0.9  Average Tidal Volume:  324.3 ml    Average Usage (All Days):  7 hrs. 39 mins. 1 secs.    Average Minute Vent:  N/A        Large Leak  Periodic Breathing     Average Time in Large Leak:  0 secs.  Average % of Night in PB:  1.0%     Average % of Night in Large Leak:  0.0%              09/26/2018:    Mirapex is controlling RLS well on Mirapex.    CPAP 8-18  90% 9-10  Cm    Denies sleep interruptions from mask leak  A lot of issues with arthritis with some effect on sleep  Taking Flucosamine, Magnesium - helps her sleep.    EPWORTH SLEEPINESS SCALE 9/20/2018   Sitting and reading 1   Watching TV 1   Sitting, inactive in a public place (e.g. a theatre or a meeting) 0   As a passenger in a car for an hour without a break 2   Lying down to rest in the afternoon when circumstances permit 1   Sitting and talking to someone 0   Sitting quietly after a lunch without alcohol 1   In a car, while stopped for a few minutes in traffic 0   Total score 6     Therapy Event Summary  Date Range: " 3/20/2018 - 9/15/2018    Hide            Compliance Summary  Apnea Indices  Ventilator Statistics       Days with Device Usage:  178 days  Average AHI:  2.7  Average Breath Rate:  13.6 bpm       Percentage of Days >=4 Hours:  98.9%  Average OA Index:  0.5  Average % Patient Triggered Breaths:  N/A       Average Usage (Days Used):  7 hrs. 31 mins. 47 secs.  Average CA Index:  0.6  Average Tidal Volume:  305.2 ml       Average Usage (All Days):  7 hrs. 26 mins. 46 secs.    Average Minute Vent:  N/A              Large Leak  Periodic Breathing        Average Time in Large Leak:  0 secs.  Average % of Night in PB:  0.4%        Average % of Night in Large Leak:  0.0%                               SLEEP ROUTINE AND LIFESTYLE 09/26/2018 :    Occupation: retired    Bed partner:  - sleeps in another room.     Time to bed: 11  Sleep onset latency: 5-10 min  Disruptions or awakenings: 1-2  Time to fall back into sleep: 5 min   Wakeup time: 7 AM   Perceived sleep quality: 3/5  Perceived total sleep time:  7.5  hours.  Daytime naps: sometimes not intentional  Weekend sleep routine: till 7:10   Exercise routine: no - lost routine since her trip to [place].  Caffeine: usually decaf     PREVIOUS SLEEP STUDIES:     PSG 8/25/16: Significant Obstructive sleep apnea (NOE) with AHI (apnea hypopnea Index) of 8.9 and SaO2 of 87% (weight  161 lbs).RDI 15.    DME:       PAST MEDICAL HISTORY:    Active Ambulatory Problems     Diagnosis Date Noted    Meniere's disease, unspecified 03/07/2010    Mixed hyperlipidemia     Agatston coronary artery calcium score greater than 400 - 577 10/23/2013    Gastroesophageal reflux disease without esophagitis 10/07/2014    Shortness of breath 04/15/2015    Myalgia 06/29/2015    Back pain of thoracolumbar region 03/08/2016    Essential hypertension 04/21/2016    Pulmonary hypertension 05/03/2016    NOE (obstructive sleep apnea)     Atherosclerosis of native coronary artery of native heart  "without angina pectoris 05/30/2017    Carotid stenosis, bilateral 06/29/2017    Restless legs 06/29/2017    Neck muscle spasm 06/29/2017    Fatigue 12/27/2017    Lumbar radiculitis 05/11/2018    Cervical disc disease 06/18/2018    Calcific coronary arteriosclerosis 07/02/2018    Chronic pulmonary heart disease      Resolved Ambulatory Problems     Diagnosis Date Noted    Annual physical exam 06/20/2013    Fullness in head 10/14/2013    Headache, occipital 10/24/2013    Cast discomfort 06/21/2014    Pre-operative examination for internal medicine 06/24/2014    Dysphagia 10/07/2014    Sore throat 10/07/2014    Bronchitis 03/19/2015    Sinusitis 03/19/2015    Syncope 06/29/2017    Post-traumatic headache, not intractable 06/29/2017     Past Medical History:   Diagnosis Date    CAD (coronary artery disease) 11/1/2012    GERD (gastroesophageal reflux disease)     Hyperlipidemia     Hypertension     Pulmonary hypertension 2016    Syncope                 PAST SURGICAL HISTORY:    Past Surgical History:   Procedure Laterality Date    broken wrist      "put plate in it"    DILATION AND CURETTAGE OF UTERUS      excision of lipoma Left 2009    near collar bone    INJECTION-STEROID-EPIDURAL-TRANSFORAMINAL Left 5/11/2018    [facility]    tonsillectomy      TONSILLECTOMY           FAMILY HISTORY:                Family History   Problem Relation Age of Onset    Cancer Mother     Colon cancer Mother     Hyperlipidemia Mother     Hypertension Mother     Heart disease Father     Heart attack Neg Hx     Heart failure Neg Hx     Stroke Neg Hx        SOCIAL HISTORY:          Tobacco:   Social History     Tobacco Use   Smoking Status Never Smoker   Smokeless Tobacco Never Used       alcohol use:    Social History     Substance and Sexual Activity   Alcohol Use Yes    Alcohol/week: 0.0 oz    Comment: occasionally/ not weekly                   ALLERGIES:  "   Allergies   Allergen Reactions    Augmentin [Amoxicillin-Pot Clavulanate] Diarrhea     Given march 2016    Codeine      Other reaction(s): Unknown, tolerates hydrocodone june 2014    Doxycycline      Other reaction(s): Unknown    Lyrica [Pregabalin] Other (See Comments)     Eye irritation    Relafen [Nabumetone] Diarrhea    Sulfa Dyne      Other reaction(s): Unknown       CURRENT MEDICATIONS:    Current Outpatient Medications   Medication Sig Dispense Refill    ALPRAZolam (XANAX) 1 MG tablet Take 1 tablet (1 mg total) by mouth daily as needed for Anxiety. Take 1 tablet by mouth 30 minutes prior to MRI 2 tablet 0    aspirin 81 mg Tab Take 1 tablet by mouth Daily. 1 Tablet Oral Every day      CA CITRATE/MGOX/VIT D3/B6/MIN (CITRACAL PLUS ORAL) Take by mouth.      coenzyme Q10 (CO Q-10) 200 mg capsule Take 1 capsule by mouth Daily. 1 Capsule Oral Every day      diazepam (VALIUM) 2 MG tablet Take 1 tablet by mouth as needed. 1 Tablet Oral .  For dizziness      esomeprazole (NEXIUM) 40 MG capsule 1 Capsule, Delayed Release(E.C.) Oral Every day 90 capsule 3    fexofenadine (ALLEGRA) 180 MG tablet Take 1 tablet by mouth Daily. 1 Tablet Oral Every day      fluticasone (FLONASE) 50 mcg/actuation nasal spray 2 sprays by Each Nare route once daily.      gabapentin (NEURONTIN) 100 MG capsule Take 1 capsule by mouth 2 (two) times daily.      GLUCOSAMINE SULFATE ORAL Take 1,000 mg by mouth Daily. 1 Capsule Oral Every day      guaifenesin (MUCINEX) 600 mg 12 hr tablet Take 1,200 mg by mouth as needed.       ibuprofen (ADVIL,MOTRIN) 200 MG tablet Take 400 mg by mouth every 8 (eight) hours as needed for Pain.      LACTOBACILLUS RHAMNOSUS GG (PROBIOTIC ORAL) Take 1 capsule by mouth Daily. 1 Capsule Oral Every day      lisinopril (PRINIVIL,ZESTRIL) 20 MG tablet Take 1 tablet (20 mg total) by mouth 2 (two) times daily. 180 tablet 3    lutein 6 mg Cap Take 12 mg by mouth once daily at 6am.      magnesium 250 mg  "Tab Take 250 mg by mouth once daily.       omega-3 fatty acids-vitamin E (FISH OIL) 1,000 mg Cap Take 2 capsules by mouth once daily.        pramipexole (MIRAPEX) 0.125 MG tablet Take 1 pill PO QHS for RLS - 90 days supply 90 tablet 3    rosuvastatin (CRESTOR) 40 MG Tab Take 1 tablet (40 mg total) by mouth once daily. 90 tablet 3     No current facility-administered medications for this visit.                       REVIEW OF SYSTEMS:   Sleep related symptoms as per HPI    denies weight gain  Reports dyspnea  Denies palpitations  Reports acid reflux   Reports polyuria 3-4-> now better on low salt diet.  Denies  mood diturbance  Denies  anemia  Reports occasional  muscle pain  Denies  Gait imbalance    Otherwise, a balance of 10 systems reviewed is negative.    PHYSICAL EXAM:  /76   Pulse 72   Ht 5' 3" (1.6 m)   Wt 76.7 kg (169 lb)   LMP  (LMP Unknown)   BMI 29.94 kg/m²   GENERAL: Overweight body habitus, well groomed.  HEENT:   HEENT:  Conjunctivae are non-erythematous; Pupils equal, round, and reactive to light; Nose is symmetrical; Nasal mucosa is pink and moist; Septum is midline; Inferior turbinates are hypertrophied; Nasal airflow is normal; Posterior pharynx is pink; Modified Mallampati:II-III; Posterior palate is low; Tonsils not visualized; Uvula is normal and pink;Tongue is enlarged; Dentition is fair; No TMJ tenderness; Jaw opening and protrusion without click and without discomfort.  NECK: Supple. Neck circumference is 16 inches. No thyromegaly. No palpable nodes.     SKIN: On face and neck: No abrasions, no rashes, no lesions.  No subcutaneous nodules are palpable.  RESPIRATORY: Chest is clear to auscultation.  Normal chest expansion and non-labored breathing at rest.  CARDIOVASCULAR: Normal S1, S2.  No murmurs, gallops or rubs. No carotid bruits bilaterally.  No edema. No clubbing. No cyanosis.    NEURO: Oriented to time, place and person. Normal attention span and concentration. Gait " "normal.    PSYCH: Affect is full. Mood is normal  MUSCULOSKELETAL: Moves 4 extremities. Gait normal.       Using [facility]:       ASSESSMENT:    1. NOE - moderate by RDI. Poor sleep efficiency on the nioght of the study could have led to underestimation of NOE severity.  The patient symptomatically has  excessive daytime sleepiness, snoring,  witnessed breathing pauses, excessive daytime fatigue, gasping for air in sleep and interrupted sleep  with exam findings of "a crowded oral airway and elevated body mass index. The patient has medical co-morbidities of CAD and hyperlipidemia,  which can be worsened by NOE. This warrants treatment. Benefiting from CPAP use in terms of sleep continuity and daytime sleepiness. Remains compliant .  NOE is well controlled with minimal mask leak as per CPAP download and she is doing sufficient hours.      2. Interrupted, nonrefreshing  sleep and residual fatigue despite compliant and effective CPAP use.         Reports still interrupted sleep and residual sleepiness - despite good AHI control. May be due to pain interrupting her sleep and feeling limited in 1 sleep position, since mask leaks when she turns.          It seems that her arthritic pain is not well controlled and may contribute to frequent arousals (noted on airflow channel from her CPAP download).  She has not really tried CBTI ruma that I have installed on her phone last time yet, since she wants to "keep it simple". She has very significant anemia, yet won't take iron supplement due to GI side effects.  She has a good overall sleep hygiene.    2. RLS - mild symptoms. Well controlled on low dose Mirapex 0.125 mg QHS.      PLAN:      Following recommendations were given in the AVS:   Keep CPAP to 8-18 H2O  Keep amp to 6 cm H2O    Will continue Mirapex 0.125 mg  Continue Magnesium.    Ferrous bisglycinate may be better tolerated than ferrous sulfate    ----------------------------------------------------      Education: " During our discussion today, we talked about the etiology of obstructive sleep apnea as well as the potential ramifications of untreated sleep apnea, which could include daytime sleepiness, hypertension, heart disease and/or stroke.      We discussed potential treatment options, which could include weight loss, body positioning, continuous positive airway pressure (CPAP), or referral for surgical consideration. The patient preferred CPAP option.     Discussed purpose of PAP therapy, health benefits of CPAP, as well as the potential ramifications of untreated sleep apnea, which could include daytime sleepiness, hypertension, heart disease and/or stroke. An AHI of 15 is associated with increased risk CVD.     The patient should avoid ETOH and sedatives at night, as it tends to aggravate NOE. Regular replacement of CPAP mask, tubing and filter was recommended.    Precautions: The patient was advised to abstain from driving should he feel sleepy or drowsy.    Follow up: MD/NP after 1 month of PAP use.    Thank you for allowing me the opportunity to participate in the care of your patient.

## 2018-09-27 ENCOUNTER — PATIENT MESSAGE (OUTPATIENT)
Dept: SLEEP MEDICINE | Facility: CLINIC | Age: 78
End: 2018-09-27

## 2018-11-20 ENCOUNTER — HOSPITAL ENCOUNTER (OUTPATIENT)
Dept: RADIOLOGY | Facility: HOSPITAL | Age: 78
Discharge: HOME | End: 2018-11-20
Attending: INTERNAL MEDICINE
Payer: MEDICARE

## 2018-11-20 ENCOUNTER — OFFICE VISIT (OUTPATIENT)
Dept: INTERNAL MEDICINE | Facility: CLINIC | Age: 78
End: 2018-11-20
Payer: MEDICARE

## 2018-11-20 VITALS
HEART RATE: 72 BPM | BODY MASS INDEX: 29.49 KG/M2 | HEIGHT: 63 IN | SYSTOLIC BLOOD PRESSURE: 142 MMHG | RESPIRATION RATE: 18 BRPM | TEMPERATURE: 98 F | DIASTOLIC BLOOD PRESSURE: 70 MMHG | WEIGHT: 166.44 LBS

## 2018-11-20 DIAGNOSIS — I27.20 PULMONARY HYPERTENSION: ICD-10-CM

## 2018-11-20 DIAGNOSIS — K21.9 GASTROESOPHAGEAL REFLUX DISEASE WITHOUT ESOPHAGITIS: ICD-10-CM

## 2018-11-20 DIAGNOSIS — G47.33 OSA (OBSTRUCTIVE SLEEP APNEA): ICD-10-CM

## 2018-11-20 DIAGNOSIS — R14.2 BELCHING: ICD-10-CM

## 2018-11-20 DIAGNOSIS — R11.0 NAUSEA: ICD-10-CM

## 2018-11-20 DIAGNOSIS — R68.81 EARLY SATIETY: Primary | ICD-10-CM

## 2018-11-20 DIAGNOSIS — I25.10 ATHEROSCLEROSIS OF NATIVE CORONARY ARTERY OF NATIVE HEART WITHOUT ANGINA PECTORIS: ICD-10-CM

## 2018-11-20 DIAGNOSIS — R68.81 EARLY SATIETY: ICD-10-CM

## 2018-11-20 PROCEDURE — 71046 X-RAY EXAM CHEST 2 VIEWS: CPT | Mod: 26,HCNC,, | Performed by: RADIOLOGY

## 2018-11-20 PROCEDURE — 3078F DIAST BP <80 MM HG: CPT | Mod: CPTII,HCNC,S$GLB, | Performed by: INTERNAL MEDICINE

## 2018-11-20 PROCEDURE — 99215 OFFICE O/P EST HI 40 MIN: CPT | Mod: HCNC,S$GLB,, | Performed by: INTERNAL MEDICINE

## 2018-11-20 PROCEDURE — 71046 X-RAY EXAM CHEST 2 VIEWS: CPT | Mod: TC,HCNC,PO

## 2018-11-20 PROCEDURE — 1101F PT FALLS ASSESS-DOCD LE1/YR: CPT | Mod: CPTII,HCNC,S$GLB, | Performed by: INTERNAL MEDICINE

## 2018-11-20 PROCEDURE — 99999 PR PBB SHADOW E&M-EST. PATIENT-LVL III: CPT | Mod: PBBFAC,HCNC,, | Performed by: INTERNAL MEDICINE

## 2018-11-20 PROCEDURE — 3077F SYST BP >= 140 MM HG: CPT | Mod: CPTII,HCNC,S$GLB, | Performed by: INTERNAL MEDICINE

## 2018-11-23 NOTE — PROGRESS NOTES
Subjective:       Patient ID: Maryann Sorenson is a 77 y.o. female.    Chief Complaint: inside of nose stinks; Bloated; Extremity Weakness; balance is off; stomach virus (11-11-18, nausea, diarrhea, ); and Headache  HISTORY OF PRESENT ILLNESS:  A 77-year-old white female comes in for an ill   health feeling.  When I asked her about that, she listed a long list of things   brought notes to give me the history.  First complaint was a bad smell in her   nostrils for the last six weeks.  She has had a full feeling in her abdomen and   bloating, especially after she eats.  She also gets at other times.  She has had   a history of esophageal stricture for which she has seen Dr. Pop and had   dilatation done.  The patient on 11/11/2018 had nausea all day, belching   constantly and then had three days of diarrhea.  The patient uses CPAP at night   and says that when she wakes up in the morning after she drinks water, she   develops weakness, nausea and diaphoresis.  She also has nasal obstruction from   what I can gather.  She is on gabapentin for back pain but the dose of that has   not changed recently.  She is taking only 200 mg a day.    PHYSICAL EXAMINATION:  VITAL SIGNS:  Normal.  SKIN:  Fair and healthy.  GENERAL:  She looks in no distress.  HEENT:  Clear.  She has no adenopathy in her neck.  CHEST:  Clear.  HEART:  There is no murmur or gallop.  ABDOMEN:  Uncomfortable in the epigastric area, but she does not really guard   and I feel no mass.  Bowel sounds are active.  Rest of the exam is normal.    IMPRESSION:  1.  Sleep apnea, on CPAP.  2.  Most of her symptoms likely are due to her gastric problems and possibly   having esophageal stricture again.  She has a large hiatal hernia and I have   told her to see Dr. Pop for this.  I did not order any abdominal testing   explaining to her that he should see her first and decide what type of testing,   he would like to have done if anything, but she probably is  going to need an   upper endoscopy.  She is already taking Nexium 40 mg.  I have told to elevate   the head of her bed.  I ordered lab on her, which include H. pylori, which was   negative.  Amylase was 111 with the upper limit being 110.  Her chemistries are   normal including liver.  CBC, white count is 4600, hematocrit is 34.5, but   generally she runs in the mid 30s.  I will see her again at her regularly   scheduled time.            /ls [id number] blank(s)        JDC/HN  dd: 11/24/2018 16:48:05 (CST)  td: 11/25/2018 04:17:44 (CST)  Doc ID   #[id number]  Job ID #[id number]    CC:     Dict [id number]  HPI  Review of Systems   Constitutional: Positive for appetite change, diaphoresis and fatigue.   HENT: Negative for congestion, hearing loss, sinus pressure, sneezing, sore throat and voice change.    Eyes: Negative for visual disturbance.   Respiratory: Negative for cough, chest tightness and shortness of breath.    Cardiovascular: Negative.  Negative for chest pain, palpitations and leg swelling.   Gastrointestinal: Positive for abdominal distention, abdominal pain, constipation and nausea.   Genitourinary: Negative for difficulty urinating, dysuria, flank pain, frequency, hematuria, menstrual problem, pelvic pain, urgency, vaginal bleeding and vaginal discharge.   Musculoskeletal: Negative.  Negative for neck pain and neck stiffness.   Skin: Negative.    Neurological: Positive for weakness. Negative for dizziness, seizures, light-headedness, numbness and headaches.   Psychiatric/Behavioral: Negative for agitation, behavioral problems, confusion and sleep disturbance. The patient is not nervous/anxious.        Objective:      Physical Exam   Constitutional: She is oriented to person, place, and time. She appears well-developed and well-nourished.   Eyes: EOM are normal. Pupils are equal, round, and reactive to light.   Neck: Normal range of motion. Neck supple. No JVD present. No thyromegaly present.   Cardiovascular: Normal  rate, regular rhythm, normal heart sounds and intact distal pulses. Exam reveals no gallop.   No murmur heard.  Pulmonary/Chest: Breath sounds normal. She has no wheezes. She has no rales.   Abdominal: Soft. Bowel sounds are normal. She exhibits no mass. There is no tenderness.   Musculoskeletal: Normal range of motion.   Lymphadenopathy:     She has no cervical adenopathy.   Neurological: She is alert and oriented to person, place, and time. She has normal reflexes. No cranial nerve deficit.   Skin: No rash noted. No erythema.   Psychiatric: She has a normal mood and affect. Judgment normal.       Assessment:       1. Early satiety    2. Belching    3. Nausea    4. Gastroesophageal reflux disease without esophagitis    5. Atherosclerosis of native coronary artery of native heart without angina pectoris    6. Pulmonary hypertension    7. NOE (obstructive sleep apnea)        Plan:

## 2018-11-29 ENCOUNTER — TELEPHONE (OUTPATIENT)
Dept: INTERNAL MEDICINE | Facility: CLINIC | Age: 78
End: 2018-11-29

## 2018-11-29 NOTE — TELEPHONE ENCOUNTER
Please advise your comments on chest xray .    (i can msg her all on [facility] portal)    Thanks jordon

## 2018-11-29 NOTE — TELEPHONE ENCOUNTER
----- Message from Chucky Carlson MD sent at 11/23/2018  2:16 PM CST -----  She has neg lab and send that to Dr Pop at [facility]

## 2018-12-12 ENCOUNTER — OFFICE VISIT (OUTPATIENT)
Dept: NEUROLOGY | Facility: CLINIC | Age: 78
End: 2018-12-12
Payer: MEDICARE

## 2018-12-12 VITALS
DIASTOLIC BLOOD PRESSURE: 79 MMHG | HEIGHT: 63 IN | SYSTOLIC BLOOD PRESSURE: 141 MMHG | WEIGHT: 169.06 LBS | HEART RATE: 70 BPM | BODY MASS INDEX: 29.95 KG/M2

## 2018-12-12 DIAGNOSIS — R42 DIZZINESS: ICD-10-CM

## 2018-12-12 PROCEDURE — 3077F SYST BP >= 140 MM HG: CPT | Mod: CPTII,HCNC,S$GLB, | Performed by: NEUROMUSCULOSKELETAL MEDICINE & OMM

## 2018-12-12 PROCEDURE — 99999 PR PBB SHADOW E&M-EST. PATIENT-LVL IV: CPT | Mod: PBBFAC,HCNC,, | Performed by: NEUROMUSCULOSKELETAL MEDICINE & OMM

## 2018-12-12 PROCEDURE — 1101F PT FALLS ASSESS-DOCD LE1/YR: CPT | Mod: CPTII,HCNC,S$GLB, | Performed by: NEUROMUSCULOSKELETAL MEDICINE & OMM

## 2018-12-12 PROCEDURE — 3078F DIAST BP <80 MM HG: CPT | Mod: CPTII,HCNC,S$GLB, | Performed by: NEUROMUSCULOSKELETAL MEDICINE & OMM

## 2018-12-12 PROCEDURE — 99214 OFFICE O/P EST MOD 30 MIN: CPT | Mod: HCNC,S$GLB,, | Performed by: NEUROMUSCULOSKELETAL MEDICINE & OMM

## 2018-12-12 NOTE — PROGRESS NOTES
Progress Notes        Present history:  Patient complains of feeling lightheaded dizzy and wobbly at times.  She has numbness in both legs intermittently.  Her neck is much better.  She is taking lisinopril 20 mg twice a day.  She backed off the gabapentin to 100 mg at night only.  We discussed several options in terms of her lightheaded dizziness which most probably is related to her blood pressure or blood pressure medication.    Previous note:  6-18-18:  Patient presents today for follow-up for her difficulty walking and numbness in the leg.  She describes left thigh numbness in the lateral femoral cutaneous distribution.  She complains of a lot of knee problems at the present time.  He is scheduled to see orthopedics.  MRI cervical spine shows some mild degenerative disc disease with an odontoid cysts which patient relates has been described in the past.  She has some moderate left neural foraminal encroachment at C5-C6.  She has a walking stick which she says really helps her balance as well as her knee problem.  She took her walking stick to [place] ended quite well with walking with the additional stability of his stick.  Previous note: 5-18-18: This is a 77-year-old white female who presents with several neurologic issues.  Patient had severely low back pain and left leg pain in March of this year.  She woke up with for-5/10 left leg pain.  She had weakness with difficulty picking up for an climbing the stairs.  She saw orthopedics and was given an epidural steroid injection on 4-24-18.  This made it much better.  She had a repeat injection on 5-11-18.  After that she felt that she had left leg she'll as well as a leg feeling tingling.  In May she developed numbness on the left side including the arm and leg and face.  She notes that she has lost some of her balance mechanism in that she feels wobbly frequently in [employer] after standing for periods time.  She continues to some neck pain.  Patient  uses a CPAP machine and feels that this may be related to the abnormal positioning of her neck at night and aggravation.  Patient relates in June of last year she developed a vasovagal response with stomach cramps.     Neurological Exam:  Mental status-alert and oriented to person, place, and time; attention span and concentration is good. Fund of knowledge-patient is aware of current events and able to give detailed history of the current problem.recent and remote memory seems intact. Language function is normal with no evidence of aphasia  Cranial nerves:Visual acuity to hand chart -normal; visual fields to confrontation normal;pupils were equal and reactive to light ;no evidence of ptosis ;  funduscopic examination was normal with sharp disc margins. external ocular movements were full with no nystagmus. Facial sensation to pinprick : normal ; corneal reflexes intact; Facial muscles were symmetrical. Hearing is unimpaired symmetrical finger rub; Tongue movements - normal ; palate movements - normal ;Swallowing unimpaired. Shoulder shrug was intact with good strength Speech was normal  Motor examination: Upper : normal                                      Lower extremities - proximal leg weakness improved;  today she has no difficulty standing from a chair without using her hands                  Right-handed  Sensory examination:   Upper; normal pinprick and soft touch ;   Lower extremities - some alteration in sensation over the left thigh in the lateral femoral cutaneous distribution.  Vibration sense: 10-15 seconds @ toes  Deep tendon reflexes: upper extremities :1-2+ symmetrical ;     lower extremities KJ- 3 +; AJ - 1+ Both plantar responses were flexor  Cerebellar examination upper: Normal finger to nose and rapid alternating movements  Gait: Steady with no ataxia;      heel and toe walk normal  Romberg test: Positive      Tandem gait: Normal    Involuntary movements: Negative  TMJ - no tenderness  Cervical  examination: Full range of motion with no pain Cervical tenderness : Left positive  Lumbar examination: Low back tenderness-negative                  Sciatic notchtenderness-negative            Straight leg raising : negative     Impression: ; MRI shows cervical disc disease with moderate left neural foraminal encroachment at C5-6 ; odontoid cysts; Left cervical syndrome; low back syndrome; left arm, face, leg numbness 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  Compare blood pressure with her dizzy spells..  Continue using the walking stick; avoids squatting to irritate  lateral femoral cutaneous nerve.  Weight loss.  Follow-up 4 months

## 2019-01-10 ENCOUNTER — OFFICE VISIT (OUTPATIENT)
Dept: INTERNAL MEDICINE | Facility: CLINIC | Age: 79
End: 2019-01-10
Payer: MEDICARE

## 2019-01-10 ENCOUNTER — TELEPHONE (OUTPATIENT)
Dept: INTERNAL MEDICINE | Facility: CLINIC | Age: 79
End: 2019-01-10

## 2019-01-10 ENCOUNTER — HOSPITAL ENCOUNTER (OUTPATIENT)
Dept: RADIOLOGY | Facility: HOSPITAL | Age: 79
Discharge: HOME | End: 2019-01-10
Attending: INTERNAL MEDICINE
Payer: MEDICARE

## 2019-01-10 VITALS
WEIGHT: 162.69 LBS | OXYGEN SATURATION: 96 % | RESPIRATION RATE: 18 BRPM | HEART RATE: 76 BPM | SYSTOLIC BLOOD PRESSURE: 128 MMHG | HEIGHT: 63 IN | BODY MASS INDEX: 28.82 KG/M2 | TEMPERATURE: 98 F | DIASTOLIC BLOOD PRESSURE: 78 MMHG

## 2019-01-10 DIAGNOSIS — J45.21 MILD INTERMITTENT ASTHMATIC BRONCHITIS WITH ACUTE EXACERBATION: ICD-10-CM

## 2019-01-10 DIAGNOSIS — I25.10 ATHEROSCLEROSIS OF NATIVE CORONARY ARTERY OF NATIVE HEART WITHOUT ANGINA PECTORIS: ICD-10-CM

## 2019-01-10 DIAGNOSIS — I27.9 CHRONIC PULMONARY HEART DISEASE: ICD-10-CM

## 2019-01-10 DIAGNOSIS — B96.89 BACTERIAL SINUSITIS: ICD-10-CM

## 2019-01-10 DIAGNOSIS — R93.1 AGATSTON CORONARY ARTERY CALCIUM SCORE GREATER THAN 400: ICD-10-CM

## 2019-01-10 DIAGNOSIS — J32.9 BACTERIAL SINUSITIS: ICD-10-CM

## 2019-01-10 DIAGNOSIS — B96.89 BACTERIAL SINUSITIS: Primary | ICD-10-CM

## 2019-01-10 DIAGNOSIS — I10 ESSENTIAL HYPERTENSION: ICD-10-CM

## 2019-01-10 DIAGNOSIS — J32.9 BACTERIAL SINUSITIS: Primary | ICD-10-CM

## 2019-01-10 PROCEDURE — 71046 X-RAY EXAM CHEST 2 VIEWS: CPT | Mod: TC,HCNC,PO

## 2019-01-10 PROCEDURE — 99999 PR PBB SHADOW E&M-EST. PATIENT-LVL IV: ICD-10-PCS | Mod: PBBFAC,HCNC,, | Performed by: INTERNAL MEDICINE

## 2019-01-10 PROCEDURE — 71046 XR CHEST PA AND LATERAL: ICD-10-PCS | Mod: 26,HCNC,, | Performed by: RADIOLOGY

## 2019-01-10 PROCEDURE — 3074F SYST BP LT 130 MM HG: CPT | Mod: HCNC,CPTII,S$GLB, | Performed by: INTERNAL MEDICINE

## 2019-01-10 PROCEDURE — 3074F PR MOST RECENT SYSTOLIC BLOOD PRESSURE < 130 MM HG: ICD-10-PCS | Mod: HCNC,CPTII,S$GLB, | Performed by: INTERNAL MEDICINE

## 2019-01-10 PROCEDURE — 99999 PR PBB SHADOW E&M-EST. PATIENT-LVL IV: CPT | Mod: PBBFAC,HCNC,, | Performed by: INTERNAL MEDICINE

## 2019-01-10 PROCEDURE — 99214 OFFICE O/P EST MOD 30 MIN: CPT | Mod: HCNC,S$GLB,, | Performed by: INTERNAL MEDICINE

## 2019-01-10 PROCEDURE — 3078F DIAST BP <80 MM HG: CPT | Mod: HCNC,CPTII,S$GLB, | Performed by: INTERNAL MEDICINE

## 2019-01-10 PROCEDURE — 71046 X-RAY EXAM CHEST 2 VIEWS: CPT | Mod: 26,HCNC,, | Performed by: RADIOLOGY

## 2019-01-10 PROCEDURE — 1101F PR PT FALLS ASSESS DOC 0-1 FALLS W/OUT INJ PAST YR: ICD-10-PCS | Mod: HCNC,CPTII,S$GLB, | Performed by: INTERNAL MEDICINE

## 2019-01-10 PROCEDURE — 1101F PT FALLS ASSESS-DOCD LE1/YR: CPT | Mod: HCNC,CPTII,S$GLB, | Performed by: INTERNAL MEDICINE

## 2019-01-10 PROCEDURE — 3078F PR MOST RECENT DIASTOLIC BLOOD PRESSURE < 80 MM HG: ICD-10-PCS | Mod: HCNC,CPTII,S$GLB, | Performed by: INTERNAL MEDICINE

## 2019-01-10 PROCEDURE — 99214 PR OFFICE/OUTPT VISIT, EST, LEVL IV, 30-39 MIN: ICD-10-PCS | Mod: HCNC,S$GLB,, | Performed by: INTERNAL MEDICINE

## 2019-01-10 RX ORDER — CIPROFLOXACIN 500 MG/1
500 TABLET ORAL 2 TIMES DAILY
Qty: 20 TABLET | Refills: 0 | Status: SHIPPED | OUTPATIENT
Start: 2019-01-10 | End: 2019-02-14 | Stop reason: ALTCHOICE

## 2019-01-10 RX ORDER — OSELTAMIVIR PHOSPHATE 75 MG/1
75 CAPSULE ORAL 2 TIMES DAILY
Qty: 10 CAPSULE | Refills: 0 | Status: SHIPPED | OUTPATIENT
Start: 2019-01-10 | End: 2019-01-15

## 2019-01-10 NOTE — PROGRESS NOTES
Cancelled rx's at [facility] and called cipro and tamiflu to [facility] recorder.

## 2019-01-10 NOTE — PROGRESS NOTES
Subjective:       Patient ID: Maryann Sorenson is a 78 y.o. female.    Chief Complaint: Cough; Chest Congestion; Nasal Congestion; Sinus Problem; Generalized Body Aches; and Fatigue  HISTORY OF PRESENT ILLNESS:  The patient has had a history of cough for one week   and was seen in Urgent Care on 01/06.  She received there a Z-RUSTAM and is no   better.  She also got a flu shot at the time of that visit.  The patient feels   tired.  She has had a decline in her hearing, ear pressure, sinus pressure,   runny nose, sore throat.    PHYSICAL EXAMINATION:  VITAL SIGNS:  Normal including temperature.  Pulse ox is 96 on room air.  SKIN:  Fair without rash.  HEENT:  Clear.  NECK:  Shows no stiffness or adenopathy.  CHEST:  Clear with no wheezing, rales, dullness.    The patient [facility]  She   was given a Decadron shot and benzoate along with that.    IMPRESSION:  Flu-like illness with probable bacterial sinusitis.  The patient   has some intolerances of antibiotics, so I placed her on Cipro 500 b.i.d. for 10   days.  Chest x-ray was done and clear.  Her lab was normal.  She has a very   mild chronic anemia.  She was also placed on Tamiflu since she did not receive   her flu shot until the time of her urgent care visit.  I had a long discussion   with her about her illness.  I did not test her for flu since she had been sick   for a week at this point and it would not change my decision about treating her.    If she does not get better, she is to call.      JEAN CLAUDE/GALEN  dd: 01/31/2019 21:59:25 (CST)  td: 02/01/2019 19:38:01 (CST)  Doc ID   #[id number]  Job ID #[id number]    CC:     [facility] [id number]  HPI  Review of Systems   Constitutional: Positive for fatigue.   HENT: Positive for congestion, hearing loss, postnasal drip, rhinorrhea, sinus pressure and sore throat. Negative for sneezing and voice change.    Eyes: Negative for visual disturbance.   Respiratory: Positive for cough. Negative for  chest tightness and shortness of breath.    Cardiovascular: Negative.  Negative for chest pain, palpitations and leg swelling.   Gastrointestinal: Negative.    Genitourinary: Negative for difficulty urinating, dysuria, flank pain, frequency, hematuria, menstrual problem, pelvic pain, urgency, vaginal bleeding and vaginal discharge.   Musculoskeletal: Negative.  Negative for neck pain and neck stiffness.   Skin: Negative.    Neurological: Negative.  Negative for dizziness, seizures, light-headedness, numbness and headaches.   Psychiatric/Behavioral: Negative for agitation, behavioral problems, confusion and sleep disturbance. The patient is not nervous/anxious.        Objective:      Physical Exam   Constitutional: She is oriented to person, place, and time. She appears well-developed and well-nourished.   Eyes: EOM are normal. Pupils are equal, round, and reactive to light.   Neck: Normal range of motion. Neck supple. No JVD present. No thyromegaly present.   Cardiovascular: Normal rate, regular rhythm, normal heart sounds and intact distal pulses. Exam reveals no gallop.   No murmur heard.  Pulmonary/Chest: Breath sounds normal. She has no wheezes. She has no rales.   Abdominal: Soft. Bowel sounds are normal. She exhibits no mass. There is no tenderness.   Musculoskeletal: Normal range of motion.   Lymphadenopathy:     She has no cervical adenopathy.   Neurological: She is alert and oriented to person, place, and time. She has normal reflexes. No cranial nerve deficit.   Skin: No rash noted. No erythema.   Psychiatric: She has a normal mood and affect. Judgment normal.       Assessment:       1. Bacterial sinusitis    2. Mild intermittent asthmatic bronchitis with acute exacerbation    3. Essential hypertension    4. Chronic pulmonary heart disease    5. Atherosclerosis of native coronary artery of native heart without angina pectoris    6. 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

## 2019-01-14 ENCOUNTER — TELEPHONE (OUTPATIENT)
Dept: INTERNAL MEDICINE | Facility: CLINIC | Age: 79
End: 2019-01-14

## 2019-02-06 ENCOUNTER — TELEPHONE (OUTPATIENT)
Dept: CARDIOLOGY | Facility: CLINIC | Age: 79
End: 2019-02-06

## 2019-02-06 DIAGNOSIS — I10 HYPERTENSION, ESSENTIAL: ICD-10-CM

## 2019-02-06 DIAGNOSIS — E78.2 MIXED HYPERLIPIDEMIA: Primary | ICD-10-CM

## 2019-02-06 DIAGNOSIS — I25.10 ATHEROSCLEROSIS OF NATIVE CORONARY ARTERY OF NATIVE HEART WITHOUT ANGINA PECTORIS: ICD-10-CM

## 2019-02-11 ENCOUNTER — LAB VISIT (OUTPATIENT)
Dept: LAB | Facility: HOSPITAL | Age: 79
End: 2019-02-11
Attending: INTERNAL MEDICINE
Payer: MEDICARE

## 2019-02-11 ENCOUNTER — TELEPHONE (OUTPATIENT)
Dept: CARDIOLOGY | Facility: CLINIC | Age: 79
End: 2019-02-11

## 2019-02-11 DIAGNOSIS — I25.10 ATHEROSCLEROSIS OF NATIVE CORONARY ARTERY OF NATIVE HEART WITHOUT ANGINA PECTORIS: ICD-10-CM

## 2019-02-11 DIAGNOSIS — E78.2 MIXED HYPERLIPIDEMIA: ICD-10-CM

## 2019-02-11 DIAGNOSIS — I10 HYPERTENSION, ESSENTIAL: ICD-10-CM

## 2019-02-11 LAB
ALBUMIN SERPL BCP-MCNC: 4.5 G/DL
ALP SERPL-CCNC: 78 U/L
ALT SERPL W/O P-5'-P-CCNC: 20 U/L
ANION GAP SERPL CALC-SCNC: 11 MMOL/L
AST SERPL-CCNC: 33 U/L
BILIRUB SERPL-MCNC: 0.3 MG/DL
BUN SERPL-MCNC: 17 MG/DL
CALCIUM SERPL-MCNC: 9.3 MG/DL
CHLORIDE SERPL-SCNC: 99 MMOL/L
CHOLEST SERPL-MCNC: 169 MG/DL
CHOLEST/HDLC SERPL: 2.1 {RATIO}
CO2 SERPL-SCNC: 26 MMOL/L
CREAT SERPL-MCNC: 0.71 MG/DL
EST. GFR  (AFRICAN AMERICAN): >60 ML/MIN/1.73 M^2
EST. GFR  (NON AFRICAN AMERICAN): >60 ML/MIN/1.73 M^2
GLUCOSE SERPL-MCNC: 93 MG/DL
HDLC SERPL-MCNC: 80 MG/DL
HDLC SERPL: 47.3 %
LDLC SERPL CALC-MCNC: 77.2 MG/DL
NONHDLC SERPL-MCNC: 89 MG/DL
POTASSIUM SERPL-SCNC: 4.2 MMOL/L
PROT SERPL-MCNC: 7.3 G/DL
SODIUM SERPL-SCNC: 136 MMOL/L
TRIGL SERPL-MCNC: 59 MG/DL
TSH SERPL DL<=0.005 MIU/L-ACNC: 1.21 UIU/ML

## 2019-02-11 PROCEDURE — 80061 LIPID PANEL: CPT | Mod: HCNC

## 2019-02-11 PROCEDURE — 80053 COMPREHEN METABOLIC PANEL: CPT | Mod: HCNC,PO

## 2019-02-11 PROCEDURE — 36415 COLL VENOUS BLD VENIPUNCTURE: CPT | Mod: HCNC,PO

## 2019-02-11 PROCEDURE — 84443 ASSAY THYROID STIM HORMONE: CPT | Mod: HCNC,PO

## 2019-02-11 NOTE — TELEPHONE ENCOUNTER
----- Message from Heydi Gavin MD sent at 2/11/2019  2:39 PM CST -----  Please review.  We will discuss the results during your upcoming visit with me. It looks good.

## 2019-02-14 ENCOUNTER — OFFICE VISIT (OUTPATIENT)
Dept: CARDIOLOGY | Facility: CLINIC | Age: 79
End: 2019-02-14
Payer: MEDICARE

## 2019-02-14 VITALS
HEART RATE: 86 BPM | HEIGHT: 63 IN | BODY MASS INDEX: 29.49 KG/M2 | WEIGHT: 166.44 LBS | DIASTOLIC BLOOD PRESSURE: 82 MMHG | SYSTOLIC BLOOD PRESSURE: 130 MMHG

## 2019-02-14 DIAGNOSIS — I25.10 CALCIFIC CORONARY ARTERIOSCLEROSIS: ICD-10-CM

## 2019-02-14 DIAGNOSIS — I65.23 CAROTID STENOSIS, BILATERAL: ICD-10-CM

## 2019-02-14 DIAGNOSIS — I27.9 CHRONIC PULMONARY HEART DISEASE: ICD-10-CM

## 2019-02-14 DIAGNOSIS — R93.1 AGATSTON CORONARY ARTERY CALCIUM SCORE GREATER THAN 400: ICD-10-CM

## 2019-02-14 DIAGNOSIS — H81.09 MENIERE'S DISEASE, UNSPECIFIED LATERALITY: ICD-10-CM

## 2019-02-14 DIAGNOSIS — E78.2 MIXED HYPERLIPIDEMIA: ICD-10-CM

## 2019-02-14 DIAGNOSIS — I25.10 ATHEROSCLEROSIS OF NATIVE CORONARY ARTERY OF NATIVE HEART WITHOUT ANGINA PECTORIS: ICD-10-CM

## 2019-02-14 DIAGNOSIS — R06.02 SHORTNESS OF BREATH: ICD-10-CM

## 2019-02-14 DIAGNOSIS — I10 ESSENTIAL HYPERTENSION: Primary | ICD-10-CM

## 2019-02-14 DIAGNOSIS — R53.83 FATIGUE, UNSPECIFIED TYPE: ICD-10-CM

## 2019-02-14 DIAGNOSIS — R42 DIZZINESS: ICD-10-CM

## 2019-02-14 DIAGNOSIS — G47.33 OSA (OBSTRUCTIVE SLEEP APNEA): ICD-10-CM

## 2019-02-14 PROCEDURE — 3079F DIAST BP 80-89 MM HG: CPT | Mod: HCNC,CPTII,S$GLB, | Performed by: INTERNAL MEDICINE

## 2019-02-14 PROCEDURE — 99214 OFFICE O/P EST MOD 30 MIN: CPT | Mod: HCNC,S$GLB,, | Performed by: INTERNAL MEDICINE

## 2019-02-14 PROCEDURE — 1101F PT FALLS ASSESS-DOCD LE1/YR: CPT | Mod: HCNC,CPTII,S$GLB, | Performed by: INTERNAL MEDICINE

## 2019-02-14 PROCEDURE — 3075F PR MOST RECENT SYSTOLIC BLOOD PRESS GE 130-139MM HG: ICD-10-PCS | Mod: HCNC,CPTII,S$GLB, | Performed by: INTERNAL MEDICINE

## 2019-02-14 PROCEDURE — 99214 PR OFFICE/OUTPT VISIT, EST, LEVL IV, 30-39 MIN: ICD-10-PCS | Mod: HCNC,S$GLB,, | Performed by: INTERNAL MEDICINE

## 2019-02-14 PROCEDURE — 99999 PR PBB SHADOW E&M-EST. PATIENT-LVL III: CPT | Mod: PBBFAC,HCNC,, | Performed by: INTERNAL MEDICINE

## 2019-02-14 PROCEDURE — 1101F PR PT FALLS ASSESS DOC 0-1 FALLS W/OUT INJ PAST YR: ICD-10-PCS | Mod: HCNC,CPTII,S$GLB, | Performed by: INTERNAL MEDICINE

## 2019-02-14 PROCEDURE — 3075F SYST BP GE 130 - 139MM HG: CPT | Mod: HCNC,CPTII,S$GLB, | Performed by: INTERNAL MEDICINE

## 2019-02-14 PROCEDURE — 99999 PR PBB SHADOW E&M-EST. PATIENT-LVL III: ICD-10-PCS | Mod: PBBFAC,HCNC,, | Performed by: INTERNAL MEDICINE

## 2019-02-14 PROCEDURE — 3079F PR MOST RECENT DIASTOLIC BLOOD PRESSURE 80-89 MM HG: ICD-10-PCS | Mod: HCNC,CPTII,S$GLB, | Performed by: INTERNAL MEDICINE

## 2019-02-14 RX ORDER — LOSARTAN POTASSIUM 50 MG/1
50 TABLET ORAL DAILY
Qty: 90 TABLET | Refills: 3 | Status: SHIPPED | OUTPATIENT
Start: 2019-02-14 | End: 2020-01-15 | Stop reason: SDUPTHER

## 2019-02-15 ENCOUNTER — CLINICAL SUPPORT (OUTPATIENT)
Dept: CARDIOLOGY | Facility: CLINIC | Age: 79
End: 2019-02-15
Attending: INTERNAL MEDICINE
Payer: MEDICARE

## 2019-02-15 VITALS — WEIGHT: 166 LBS | BODY MASS INDEX: 29.41 KG/M2 | HEIGHT: 63 IN

## 2019-02-15 DIAGNOSIS — I25.10 CALCIFIC CORONARY ARTERIOSCLEROSIS: ICD-10-CM

## 2019-02-15 DIAGNOSIS — R53.83 FATIGUE, UNSPECIFIED TYPE: ICD-10-CM

## 2019-02-15 DIAGNOSIS — R93.1 AGATSTON CORONARY ARTERY CALCIUM SCORE GREATER THAN 400: ICD-10-CM

## 2019-02-15 DIAGNOSIS — R06.02 SHORTNESS OF BREATH: ICD-10-CM

## 2019-02-15 DIAGNOSIS — I25.10 ATHEROSCLEROSIS OF NATIVE CORONARY ARTERY OF NATIVE HEART WITHOUT ANGINA PECTORIS: ICD-10-CM

## 2019-02-15 DIAGNOSIS — I10 ESSENTIAL HYPERTENSION: ICD-10-CM

## 2019-02-15 LAB
ASCENDING AORTA: 4.3 CM
AV INDEX (PROSTH): 0.66
AV MEAN GRADIENT: 6.93 MMHG
AV PEAK GRADIENT: 12.96 MMHG
AV VALVE AREA: 1.86 CM2
AV VELOCITY RATIO: 0.62
BSA FOR ECHO PROCEDURE: 1.83 M2
CV ECHO LV RWT: 0.38 CM
CV STRESS BASE HR: 68 BPM
DIASTOLIC BLOOD PRESSURE: 76 MMHG
DOP CALC AO PEAK VEL: 1.8 M/S
DOP CALC AO VTI: 43.33 CM
DOP CALC LVOT AREA: 2.83 CM2
DOP CALC LVOT DIAMETER: 1.9 CM
DOP CALC LVOT PEAK VEL: 1.12 M/S
DOP CALC LVOT STROKE VOLUME: 80.79 CM3
DOP CALCLVOT PEAK VEL VTI: 28.51 CM
E WAVE DECELERATION TIME: 175.19 MSEC
E/A RATIO: 0.94
E/E' RATIO: 13.57
ECHO LV POSTERIOR WALL: 0.73 CM (ref 0.6–1.1)
FRACTIONAL SHORTENING: 27 % (ref 28–44)
INTERVENTRICULAR SEPTUM: 0.79 CM (ref 0.6–1.1)
IVRT: 0.1 MSEC
LA MAJOR: 5.82 CM
LA MINOR: 5.62 CM
LA WIDTH: 4.62 CM
LEFT ATRIUM SIZE: 3.5 CM
LEFT ATRIUM VOLUME INDEX: 44 ML/M2
LEFT ATRIUM VOLUME: 78.59 CM3
LEFT INTERNAL DIMENSION IN SYSTOLE: 2.84 CM (ref 2.1–4)
LEFT VENTRICLE DIASTOLIC VOLUME INDEX: 36.6 ML/M2
LEFT VENTRICLE DIASTOLIC VOLUME: 65.38 ML
LEFT VENTRICLE MASS INDEX: 46.7 G/M2
LEFT VENTRICLE SYSTOLIC VOLUME INDEX: 17.2 ML/M2
LEFT VENTRICLE SYSTOLIC VOLUME: 30.68 ML
LEFT VENTRICULAR INTERNAL DIMENSION IN DIASTOLE: 3.89 CM (ref 3.5–6)
LEFT VENTRICULAR MASS: 83.36 G
LV LATERAL E/E' RATIO: 11.88
LV SEPTAL E/E' RATIO: 15.83
MV PEAK A VEL: 1.01 M/S
MV PEAK E VEL: 0.95 M/S
OHS CV CPX 1 MINUTE RECOVERY HEART RATE: 115 BPM
OHS CV CPX 85 PERCENT MAX PREDICTED HEART RATE MALE: 118
OHS CV CPX ESTIMATED METS: 6
OHS CV CPX MAX PREDICTED HEART RATE: 139
OHS CV CPX PATIENT IS FEMALE: 1
OHS CV CPX PATIENT IS MALE: 0
OHS CV CPX PEAK DIASTOLIC BLOOD PRESSURE: 106 MMHG
OHS CV CPX PEAK HEAR RATE: 137 BPM
OHS CV CPX PEAK RATE PRESSURE PRODUCT: NORMAL
OHS CV CPX PEAK SYSTOLIC BLOOD PRESSURE: 205 MMHG
OHS CV CPX PERCENT MAX PREDICTED HEART RATE ACHIEVED: 98
OHS CV CPX PERCENT TARGET HEART RATE ACHIEVED: 116.1
OHS CV CPX RATE PRESSURE PRODUCT PRESENTING: 9928
OHS CV CPX TARGET HEART RATE: 118
PISA TR MAX VEL: 2.99 M/S
PULM VEIN S/D RATIO: 1.29
PV PEAK D VEL: 0.41 M/S
PV PEAK S VEL: 0.53 M/S
RA MAJOR: 5.51 CM
RA PRESSURE: 3 MMHG
RA WIDTH: 3.11 CM
RIGHT VENTRICULAR END-DIASTOLIC DIMENSION: 3.04 CM
SINUS: 3.35 CM
STJ: 3.16 CM
STRESS ECHO POST EXERCISE DUR MIN: 8 MIN
STRESS ECHO POST EXERCISE DUR SEC: 46
SYSTOLIC BLOOD PRESSURE: 146 MMHG
TDI LATERAL: 0.08
TDI SEPTAL: 0.06
TDI: 0.07
TR MAX PG: 35.76 MMHG
TRICUSPID ANNULAR PLANE SYSTOLIC EXCURSION: 2.86 CM
TV REST PULMONARY ARTERY PRESSURE: 39 MMHG

## 2019-02-15 PROCEDURE — 93351 ECHOCARDIOGRAM STRESS TEST WITH COLOR FLOW DOPPLER (CUPID ONLY): ICD-10-PCS | Mod: HCNC,S$GLB,, | Performed by: INTERNAL MEDICINE

## 2019-02-15 PROCEDURE — 93351 STRESS TTE COMPLETE: CPT | Mod: HCNC,S$GLB,, | Performed by: INTERNAL MEDICINE

## 2019-02-15 PROCEDURE — 99999 PR PBB SHADOW E&M-EST. PATIENT-LVL I: CPT | Mod: PBBFAC,HCNC,,

## 2019-02-15 PROCEDURE — 99999 PR PBB SHADOW E&M-EST. PATIENT-LVL I: ICD-10-PCS | Mod: PBBFAC,HCNC,,

## 2019-02-15 NOTE — PROGRESS NOTES
Subjective:   Patient ID:  Maryann Sorenson is a 78 y.o. female who presents for follow-up of Shortness of Breath      HPI: Patient not seen since 2017.  In the meantime she was followed by Dr. Early. Presently discharged from PH clinic.  Overall doing well. Has chronic dyspnea on exertion and persistent dizziness upon changing positions.  Patient has chronic inner ear problems and is also followed by Neurology.    Last CFE showed PA pressure in the low 30ties. And preserved LV systolic function.    Patient is using C-PAP nightly.    Lab Results   Component Value Date     02/11/2019    K 4.2 02/11/2019    CL 99 02/11/2019    CO2 26 02/11/2019    BUN 17 02/11/2019    CREATININE 0.71 02/11/2019    GLU 93 02/11/2019    MG 2.4 09/21/2018    AST 33 02/11/2019    ALT 20 02/11/2019    ALBUMIN 4.5 02/11/2019    PROT 7.3 02/11/2019    BILITOT 0.3 02/11/2019    WBC 4.88 01/10/2019    HGB 11.6 (L) 01/10/2019    HCT 36.9 (L) 01/10/2019    MCV 90 01/10/2019     01/10/2019    TSH 1.210 02/11/2019    CHOL 169 02/11/2019    HDL 80 (H) 02/11/2019    LDLCALC 77.2 02/11/2019    TRIG 59 02/11/2019       Review of Systems   Constitution: Positive for malaise/fatigue.   HENT: Negative.    Eyes: Negative.    Cardiovascular: Positive for chest pain and dyspnea on exertion. Negative for claudication, irregular heartbeat, leg swelling, near-syncope, palpitations and syncope.   Respiratory: Negative.  Negative for cough, shortness of breath, snoring and wheezing.    Endocrine: Negative.  Negative for cold intolerance, heat intolerance, polydipsia, polyphagia and polyuria.   Skin: Negative.    Musculoskeletal: Positive for arthritis and back pain.   Gastrointestinal: Positive for abdominal pain and heartburn.   Genitourinary: Negative.    Neurological: Positive for headaches, light-headedness and loss of balance.   Psychiatric/Behavioral: Negative.        Objective:   Physical Exam   Constitutional: She is oriented to person,  "place, and time. She appears well-developed and well-nourished.   /82 (BP Location: Left arm, Patient Position: Sitting, BP Method: Medium (Manual))   Pulse 86   Ht 5' 3" (1.6 m)   Wt 75.5 kg (166 lb 7.2 oz)   LMP  (LMP Unknown)   BMI 29.48 kg/m²      HENT:   Head: Normocephalic.   Eyes: Pupils are equal, round, and reactive to light.   Neck: Normal range of motion. Neck supple. Carotid bruit is not present. No thyromegaly present.   Cardiovascular: Normal rate, regular rhythm, normal heart sounds and intact distal pulses. Exam reveals no gallop and no friction rub.   No murmur heard.  Pulses:       Carotid pulses are 2+ on the right side, and 2+ on the left side.       Radial pulses are 2+ on the right side, and 2+ on the left side.        Femoral pulses are 2+ on the right side, and 2+ on the left side.       Popliteal pulses are 2+ on the right side, and 2+ on the left side.        Dorsalis pedis pulses are 2+ on the right side, and 2+ on the left side.        Posterior tibial pulses are 2+ on the right side, and 2+ on the left side.   Pulmonary/Chest: Effort normal and breath sounds normal. No respiratory distress. She has no wheezes. She has no rales. She exhibits no tenderness.   Abdominal: Soft. Bowel sounds are normal.   Musculoskeletal: Normal range of motion. She exhibits no edema.   Neurological: She is alert and oriented to person, place, and time.   Skin: Skin is warm and dry.   Psychiatric: She has a normal mood and affect.   Nursing note and vitals reviewed.        Assessment and Plan:     Problem List Items Addressed This Visit        Cardiology Problems    Mixed hyperlipidemia    Essential hypertension - Primary    Relevant Orders    Echocardiogram stress test with CFD (Cupid Only) (Completed)    Atherosclerosis of native coronary artery of native heart without angina pectoris    Relevant Orders    Echocardiogram stress test with CFD (Cupid Only) (Completed)    Carotid stenosis, bilateral "    Calcific coronary arteriosclerosis    Relevant Orders    Echocardiogram stress test with CFD (Cupid Only) (Completed)    Chronic pulmonary heart disease       Other    Meniere's disease    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   Previous Medications    ASPIRIN 81 MG TAB    Take 1 tablet by mouth Daily. 1 Tablet Oral Every day    CALCIUM CARBONATE-VITAMIN D3 (CALTRATE 600 + D) 600 MG (1,500 MG)-800 UNIT CHEW    Take 1 tablet by mouth once.    COENZYME Q10 (CO Q-10) 200 MG CAPSULE    Take 1 capsule by mouth Daily. 1 Capsule Oral Every day    DIAZEPAM (VALIUM) 2 MG TABLET    Take 1 tablet by mouth as needed. 1 Tablet Oral .  For dizziness    ESOMEPRAZOLE (NEXIUM) 40 MG CAPSULE    1 Capsule, Delayed Release(E.C.) Oral Every day    FEXOFENADINE (ALLEGRA) 180 MG TABLET    Take 1 tablet by mouth Daily. 1 Tablet Oral Every day    FLUTICASONE (FLONASE) 50 MCG/ACTUATION NASAL SPRAY    2 sprays by Each Nare route once daily.    GLUCOSAMINE SULFATE ORAL    Take 1,000 mg by mouth Daily. 1 Capsule Oral Every day    GUAIFENESIN (MUCINEX) 600 MG 12 HR TABLET    Take 1,200 mg by mouth as needed.     IBUPROFEN (ADVIL,MOTRIN) 200 MG TABLET    Take 400 mg by mouth every 8 (eight) hours as needed for Pain.    LACTOBACILLUS RHAMNOSUS GG (PROBIOTIC ORAL)    Take 1 capsule by mouth Daily. 1 Capsule Oral Every day    LUTEIN 6 MG CAP    Take 6 mg by mouth once daily at 6am.     MAGNESIUM 250 MG TAB    Take 250 mg by mouth once daily.     OMEGA-3 FATTY  ACIDS-VITAMIN E (FISH OIL) 1,000 MG CAP    Take 1 capsule by mouth once daily.     PRAMIPEXOLE (MIRAPEX) 0.125 MG TABLET    Take 1 pill PO QHS for RLS - 90 days supply    ROSUVASTATIN (CRESTOR) 40 MG TAB    Take 1 tablet (40 mg total) by mouth once daily.   Modified Medications    No medications on file   Discontinued Medications    CA CITRATE/MGOX/VIT D3/B6/MIN (CITRACAL PLUS ORAL)    Take by mouth.    CIPROFLOXACIN HCL (CIPRO) 500 MG TABLET    Take 1 tablet (500 mg total) by mouth 2 (two) times daily.    GABAPENTIN (NEURONTIN) 100 MG CAPSULE    Take 1 capsule by mouth 2 (two) times daily.    LISINOPRIL (PRINIVIL,ZESTRIL) 20 MG TABLET    Take 1 tablet (20 mg total) by mouth 2 (two) times daily.   Shortness of breath is probably multifactorial.  Similarly dizziness might be related to chronic ENT problems, but autonomic dysfunction cannot be ruled out.  We have discussed HUT test. Patient does not wish to proceed with it.  Repeat Stress echo with CFD  and advise.  In the meantime monitor BP and let us know if it is out of range.  Consider ENT follow up. Defer to Dr. Carlson  Follow-up in about 1 year (around 2/14/2020).

## 2019-02-17 RX ORDER — ROSUVASTATIN CALCIUM 40 MG/1
40 TABLET, COATED ORAL DAILY
Qty: 90 TABLET | Refills: 3 | Status: SHIPPED | OUTPATIENT
Start: 2019-02-17 | End: 2020-03-18 | Stop reason: SDUPTHER

## 2019-02-18 ENCOUNTER — TELEPHONE (OUTPATIENT)
Dept: CARDIOLOGY | Facility: CLINIC | Age: 79
End: 2019-02-18

## 2019-02-18 NOTE — TELEPHONE ENCOUNTER
Pt notified, verbalized understanding. Pt stated that she has not started Losartan yet because she is waiting to get it in the mail. Pt stated that she will take Aldactone, but want to know if it's safe for to start Losartan and Aldactone around the same time. Please advise.

## 2019-02-18 NOTE — TELEPHONE ENCOUNTER
"----- Message from Heydi Gavin MD sent at 2/18/2019  9:04 AM CST -----  Mrs. Sorenson,  Please review results of your stress echo.  It did not show any changes that would indicate active coronary artery disease.  Good news. IT showed that your heart might be "stiff", which is a results of chronic high blood pressure and possibly sleep apnea.  It could lead to shortness of  Breath on exertion.  I suggest we start medication called Aldactone. It is a mild fluid pill, but it is given to patients with "stiff" heart and high blood pressure.  If you agree I will prescribe and we would have to check your blood work again within 1-2 weeks after being on this medication to review kidney function and potassium level.  "

## 2019-02-19 ENCOUNTER — PES CALL (OUTPATIENT)
Dept: ADMINISTRATIVE | Facility: CLINIC | Age: 79
End: 2019-02-19

## 2019-03-06 ENCOUNTER — TELEPHONE (OUTPATIENT)
Dept: CARDIOLOGY | Facility: CLINIC | Age: 79
End: 2019-03-06

## 2019-03-06 DIAGNOSIS — I10 ESSENTIAL HYPERTENSION: Primary | ICD-10-CM

## 2019-03-06 RX ORDER — SPIRONOLACTONE 25 MG/1
25 TABLET ORAL DAILY
Qty: 30 TABLET | Refills: 11 | Status: SHIPPED | OUTPATIENT
Start: 2019-03-06 | End: 2019-04-08

## 2019-03-06 RX ORDER — SPIRONOLACTONE 25 MG/1
25 TABLET ORAL DAILY
COMMUNITY
End: 2019-03-06 | Stop reason: SDUPTHER

## 2019-03-06 NOTE — TELEPHONE ENCOUNTER
Pt calling to report she is tolerating Losartan without difficulty. Pt reports recent BP readings as follows:  113/79  128/85  128/84  106/68  115/50  124/77  Pt states you mentioned adding a diuretic. Pt asking if a diuretic will be added to please send prescription to her mail order pharmacy. Please advise.

## 2019-03-11 ENCOUNTER — TELEPHONE (OUTPATIENT)
Dept: CARDIOLOGY | Facility: CLINIC | Age: 79
End: 2019-03-11

## 2019-03-11 NOTE — TELEPHONE ENCOUNTER
Spoke w/pt and she stated that she has been taking Aldactone since Friday and has noticed that an hour after she takes the medication she feels lightheaded for an hour. Pt wanted you to be aware. Please advise.

## 2019-03-15 ENCOUNTER — TELEPHONE (OUTPATIENT)
Dept: CARDIOLOGY | Facility: CLINIC | Age: 79
End: 2019-03-15

## 2019-03-15 ENCOUNTER — LAB VISIT (OUTPATIENT)
Dept: LAB | Facility: HOSPITAL | Age: 79
End: 2019-03-15
Attending: INTERNAL MEDICINE
Payer: MEDICARE

## 2019-03-15 DIAGNOSIS — I10 ESSENTIAL HYPERTENSION: ICD-10-CM

## 2019-03-15 DIAGNOSIS — I10 ESSENTIAL HYPERTENSION: Primary | ICD-10-CM

## 2019-03-15 LAB
ANION GAP SERPL CALC-SCNC: 7 MMOL/L
BUN SERPL-MCNC: 20 MG/DL
CALCIUM SERPL-MCNC: 9.3 MG/DL
CHLORIDE SERPL-SCNC: 97 MMOL/L
CO2 SERPL-SCNC: 28 MMOL/L
CREAT SERPL-MCNC: 0.76 MG/DL
EST. GFR  (AFRICAN AMERICAN): >60 ML/MIN/1.73 M^2
EST. GFR  (NON AFRICAN AMERICAN): >60 ML/MIN/1.73 M^2
GLUCOSE SERPL-MCNC: 99 MG/DL
POTASSIUM SERPL-SCNC: 5 MMOL/L
SODIUM SERPL-SCNC: 132 MMOL/L

## 2019-03-15 PROCEDURE — 36415 COLL VENOUS BLD VENIPUNCTURE: CPT | Mod: HCNC,PO

## 2019-03-15 PROCEDURE — 80048 BASIC METABOLIC PNL TOTAL CA: CPT | Mod: HCNC,PO

## 2019-03-15 NOTE — TELEPHONE ENCOUNTER
----- Message from Heydi Gavin MD sent at 3/15/2019  2:00 PM CDT -----  / Delta,  Please review results of your blood work. The combination of Losartan and Aldactone probably caused high normal potassium level.  I would first recommend limiting potassium rich foods. (We can mail you an info that lists potassium rich foods).  We should then repeat BMP in 1-2 weeks and reassess, If potassium is still high then we may have to discontinue Aldactone.

## 2019-03-28 ENCOUNTER — TELEPHONE (OUTPATIENT)
Dept: CARDIOLOGY | Facility: CLINIC | Age: 79
End: 2019-03-28

## 2019-03-28 ENCOUNTER — LAB VISIT (OUTPATIENT)
Dept: LAB | Facility: HOSPITAL | Age: 79
End: 2019-03-28
Attending: INTERNAL MEDICINE
Payer: MEDICARE

## 2019-03-28 DIAGNOSIS — I10 ESSENTIAL HYPERTENSION: ICD-10-CM

## 2019-03-28 DIAGNOSIS — I10 ESSENTIAL HYPERTENSION: Primary | ICD-10-CM

## 2019-03-28 LAB
ANION GAP SERPL CALC-SCNC: 9 MMOL/L (ref 8–16)
BUN SERPL-MCNC: 15 MG/DL (ref 7–17)
CALCIUM SERPL-MCNC: 9.2 MG/DL (ref 8.7–10.5)
CHLORIDE SERPL-SCNC: 94 MMOL/L (ref 95–110)
CO2 SERPL-SCNC: 26 MMOL/L (ref 23–29)
CREAT SERPL-MCNC: 0.93 MG/DL (ref 0.5–1.4)
EST. GFR  (AFRICAN AMERICAN): >60 ML/MIN/1.73 M^2
EST. GFR  (NON AFRICAN AMERICAN): 59.9 ML/MIN/1.73 M^2
GLUCOSE SERPL-MCNC: 105 MG/DL (ref 70–110)
POTASSIUM SERPL-SCNC: 4.6 MMOL/L (ref 3.5–5.1)
SODIUM SERPL-SCNC: 129 MMOL/L (ref 136–145)

## 2019-03-28 PROCEDURE — 80048 BASIC METABOLIC PNL TOTAL CA: CPT | Mod: HCNC,PO

## 2019-03-28 PROCEDURE — 36415 COLL VENOUS BLD VENIPUNCTURE: CPT | Mod: HCNC,PO

## 2019-03-29 ENCOUNTER — TELEPHONE (OUTPATIENT)
Dept: CARDIOLOGY | Facility: CLINIC | Age: 79
End: 2019-03-29

## 2019-03-29 DIAGNOSIS — I50.31 ACUTE DIASTOLIC HEART FAILURE: ICD-10-CM

## 2019-03-29 DIAGNOSIS — I27.20 HYPERTENSIVE PULMONARY VASCULAR DISEASE: ICD-10-CM

## 2019-03-29 DIAGNOSIS — I10 ESSENTIAL HYPERTENSION: Primary | ICD-10-CM

## 2019-03-29 DIAGNOSIS — I50.42 CHRONIC COMBINED SYSTOLIC AND DIASTOLIC HEART FAILURE: ICD-10-CM

## 2019-03-29 NOTE — TELEPHONE ENCOUNTER
Per Dr. Gavin pt should take 1/2 of Aldactone for 3 days then stop it. Pt notified, verbalized understanding.

## 2019-03-29 NOTE — TELEPHONE ENCOUNTER
Pt notified, verbalized understanding. Pt stated that she never decreased the dosage of Aldactone because her systolic bp was always above 100. Pt wants to know if you still think it's safe to stop Aldactone or should she decrease the Aldactone to a half pill then discontinue it. Please advise.

## 2019-03-29 NOTE — TELEPHONE ENCOUNTER
----- Message from Heydi Gavin MD sent at 3/29/2019 10:15 AM CDT -----  Please inform the patient that potassium is better, but now her sodium is low. I would recommend first limiting free water ( including gravy and juice). But since it all started after we began Aldactone, I think we need to stop it. I will repeat BMP in 2 weeks again.

## 2019-03-29 NOTE — TELEPHONE ENCOUNTER
Pt notified, verbalized understanding. Pt would like to know the amount of fluids she is allowed to have in a day. Pt also stated the insert from the pharmacy states that she should not stop taking Aldactone abruptly. Pt stated that she is still having sob and it has not been better while taking Aldactone. Please advise.

## 2019-04-04 ENCOUNTER — PATIENT MESSAGE (OUTPATIENT)
Dept: SLEEP MEDICINE | Facility: CLINIC | Age: 79
End: 2019-04-04

## 2019-04-08 ENCOUNTER — OFFICE VISIT (OUTPATIENT)
Dept: SLEEP MEDICINE | Facility: CLINIC | Age: 79
End: 2019-04-08
Payer: MEDICARE

## 2019-04-08 VITALS
WEIGHT: 169.88 LBS | SYSTOLIC BLOOD PRESSURE: 127 MMHG | BODY MASS INDEX: 30.1 KG/M2 | HEART RATE: 56 BPM | HEIGHT: 63 IN | DIASTOLIC BLOOD PRESSURE: 81 MMHG

## 2019-04-08 DIAGNOSIS — G47.33 OSA (OBSTRUCTIVE SLEEP APNEA): Primary | ICD-10-CM

## 2019-04-08 PROCEDURE — 3074F PR MOST RECENT SYSTOLIC BLOOD PRESSURE < 130 MM HG: ICD-10-PCS | Mod: HCNC,CPTII,S$GLB, | Performed by: PSYCHIATRY & NEUROLOGY

## 2019-04-08 PROCEDURE — 99499 UNLISTED E&M SERVICE: CPT | Mod: S$GLB,,, | Performed by: PSYCHIATRY & NEUROLOGY

## 2019-04-08 PROCEDURE — 3079F PR MOST RECENT DIASTOLIC BLOOD PRESSURE 80-89 MM HG: ICD-10-PCS | Mod: HCNC,CPTII,S$GLB, | Performed by: PSYCHIATRY & NEUROLOGY

## 2019-04-08 PROCEDURE — 99213 OFFICE O/P EST LOW 20 MIN: CPT | Mod: HCNC,S$GLB,, | Performed by: PSYCHIATRY & NEUROLOGY

## 2019-04-08 PROCEDURE — 99999 PR PBB SHADOW E&M-EST. PATIENT-LVL III: CPT | Mod: PBBFAC,HCNC,, | Performed by: PSYCHIATRY & NEUROLOGY

## 2019-04-08 PROCEDURE — 99213 PR OFFICE/OUTPT VISIT, EST, LEVL III, 20-29 MIN: ICD-10-PCS | Mod: HCNC,S$GLB,, | Performed by: PSYCHIATRY & NEUROLOGY

## 2019-04-08 PROCEDURE — 1101F PR PT FALLS ASSESS DOC 0-1 FALLS W/OUT INJ PAST YR: ICD-10-PCS | Mod: HCNC,CPTII,S$GLB, | Performed by: PSYCHIATRY & NEUROLOGY

## 2019-04-08 PROCEDURE — 3079F DIAST BP 80-89 MM HG: CPT | Mod: HCNC,CPTII,S$GLB, | Performed by: PSYCHIATRY & NEUROLOGY

## 2019-04-08 PROCEDURE — 99499 RISK ADDL DX/OHS AUDIT: ICD-10-PCS | Mod: S$GLB,,, | Performed by: PSYCHIATRY & NEUROLOGY

## 2019-04-08 PROCEDURE — 1101F PT FALLS ASSESS-DOCD LE1/YR: CPT | Mod: HCNC,CPTII,S$GLB, | Performed by: PSYCHIATRY & NEUROLOGY

## 2019-04-08 PROCEDURE — 99999 PR PBB SHADOW E&M-EST. PATIENT-LVL III: ICD-10-PCS | Mod: PBBFAC,HCNC,, | Performed by: PSYCHIATRY & NEUROLOGY

## 2019-04-08 PROCEDURE — 3074F SYST BP LT 130 MM HG: CPT | Mod: HCNC,CPTII,S$GLB, | Performed by: PSYCHIATRY & NEUROLOGY

## 2019-04-08 NOTE — PROGRESS NOTES
Maryann Sorenson  was seen at the request of  No ref. provider found for sleep evaluation.    08/11/2016 INITIAL HISTORY OF PRESENT ILLNESS:  Maryann Sorenson is a 78 y.o. female is here to be evaluated for a sleep disorder.       CHIEF COMPLAINT:      The patient's complaints include excessive daytime fatigue, snoring,  witnessed breathing pauses,  gasping for air in sleep and interrupted sleep since  Over 10 years ago.    Being evaluated by Dr. Early for pulmonary HTN.     Reports  dry mouth and sore throat  Denies nasal congestion   Denies  morning headaches  Reports occasional  interrupted sleep  Reports frequent leg movements - only when watching TV. Urge to move her legs. Got it from her father.   Denies symptoms concerning for parasomnia    The ESS (Florien Sleepiness Score) taken on initial visit is 6 /24    Bedroom is dark and quiet.    FH: father and 2 sisters - RLS.    INTERVAL HISTORY:    10/12/2016  The patient has not presented any new complaints since the previous visit.   Comes to discuss PSG. Very poor sleep efficiency could have caused underestimation of test results (positive to mild to moderate sleep apnea). Iron test was normal.   Nature's wellness oil at the base of the spine - using for RLS - helps at night - never tried it in the evening.     05/24/2017: Maryann Sorenson comes back to talk about treatment options. Prefers CPAP with nasal pillows mask. Pros and cons were discussed.     07/20/2017: Started using CPAP in June. Passed out in her bedroom-> resulted in ankle sprain.  Has to get up in the middle of the night to the bathroom - now has to turn the light on for the sake of CPAP - now that she is wide awake -> RLS acts up.     Therapy Event Summary Date Range: 6/20/2017 - 7/19/2017     Doing well with Dreamwear mask.     ESS 9/24.    Hide      Compliance Summary  Apnea Indices  Ventilator Statistics    Days with Device Usage:  30 days  Average AHI:  1.1  Average Breath Rate:  16.5 bpm   "  Percentage of Days >=4 Hours:  90.0%  Average OA Index:  0.1  Average % Patient Triggered Breaths:  N/A    Average Usage (Days Used):  4 hrs. 34 mins. 42 secs.  Average CA Index:  0.1  Average Tidal Volume:  417.1 ml    Average Usage (All Days):  4 hrs. 34 mins. 42 secs.    Average Minute Vent:  N/A        Large Leak  Periodic Breathing     Average Time in Large Leak:  0 secs.  Average % of Night in PB:  0.3%     Average % of Night in Large Leak:  0.0%            \  10/20/2017:  Therapy Event Summary Date Range: 9/20/2017 - 10/19/2017   Doing better since pressure increase. Resports benefit from CPAP use, but still significantly interrupted sleep, and residual fatigue/sleepiness.    APAP90% pressure 8  Using ramp at 5.    Mirapex  0.125 mg- still controlling RLS well.      Hide      Compliance Summary  Apnea Indices  Ventilator Statistics    Days with Device Usage:  30 days  Average AHI:  4.9  Average Breath Rate:  13.7 bpm    Percentage of Days >=4 Hours:  100.0%  Average OA Index:  0.6  Average % Patient Triggered Breaths:  N/A    Average Usage (Days Used):  7 hrs. 34 mins. 49 secs.  Average CA Index:  1.1  Average Tidal Volume:  316.8 ml    Average Usage (All Days):  7 hrs. 34 mins. 49 secs.    Average Minute Vent:  N/A        Large Leak  Periodic Breathing     Average Time in Large Leak:  0 secs.  Average % of Night in PB:  1.0%     Average % of Night in Large Leak:  0.0%              01/08/2018    CPAP 8-18; not using ramp lately. 90% 9 cm  Now can adjust CPAP in the dark. Most nights were good nights.   Still fatigued; anemia not controlled 0- not tolerating iron supplements.  Bedroom is very dark.  Nocturia and joint pain plays a role.    RLS is well controlled on Mirapex.    T is 64%. It is dark in the bedroom. Godd sleep hygiene.     Sh is 'stuck" in one position with CPAP.    Believes she "slept better before CPAP. Needs to readjust the mask.     She has failed REM Fresh Melatonin in terms of sleep " "continuity and not feeling more refreshed in AM    She has significant anemia (Hb 10), yet won't take iron due to its constipating effects.     Taking magnesium lunch time. Taking Fish oil and Glucosamine for joint pain, yet it seems that her arthritic pain is not well controlled and may contribute to frequent arousals (noted on airflow channel from her CPAP download).  She is trying to avoid regular NSAID or Tylenol intake.   She has not really tried CBTI ruma that I have installed on her phone last time yet, since she wants to "keep it simple:    Therapy Event Summary Date Range: 12/8/2017 - 1/6/2018     Hide      Compliance Summary  Apnea Indices  Ventilator Statistics    Days with Device Usage:  30 days  Average AHI:  3.7  Average Breath Rate:  13.7 bpm    Percentage of Days >=4 Hours:  100.0%  Average OA Index:  0.6  Average % Patient Triggered Breaths:  N/A    Average Usage (Days Used):  7 hrs. 39 mins. 1 secs.  Average CA Index:  0.9  Average Tidal Volume:  324.3 ml    Average Usage (All Days):  7 hrs. 39 mins. 1 secs.    Average Minute Vent:  N/A        Large Leak  Periodic Breathing     Average Time in Large Leak:  0 secs.  Average % of Night in PB:  1.0%     Average % of Night in Large Leak:  0.0%              09/26/2018:    Mirapex is controlling RLS well on Mirapex.    CPAP 8-18  90% 9-10  Cm    Denies sleep interruptions from mask leak  A lot of issues with arthritis with some effect on sleep  Taking Flucosamine, Magnesium - helps her sleep.    EPWORTH SLEEPINESS SCALE 9/20/2018   Sitting and reading 1   Watching TV 1   Sitting, inactive in a public place (e.g. a theatre or a meeting) 0   As a passenger in a car for an hour without a break 2   Lying down to rest in the afternoon when circumstances permit 1   Sitting and talking to someone 0   Sitting quietly after a lunch without alcohol 1   In a car, while stopped for a few minutes in traffic 0   Total score 6     Therapy Event Summary  Date Range: " 3/20/2018 - 9/15/2018    Hide            Compliance Summary  Apnea Indices  Ventilator Statistics       Days with Device Usage:  178 days  Average AHI:  2.7  Average Breath Rate:  13.6 bpm       Percentage of Days >=4 Hours:  98.9%  Average OA Index:  0.5  Average % Patient Triggered Breaths:  N/A       Average Usage (Days Used):  7 hrs. 31 mins. 47 secs.  Average CA Index:  0.6  Average Tidal Volume:  305.2 ml       Average Usage (All Days):  7 hrs. 26 mins. 46 secs.    Average Minute Vent:  N/A              Large Leak  Periodic Breathing        Average Time in Large Leak:  0 secs.  Average % of Night in PB:  0.4%        Average % of Night in Large Leak:  0.0%                           04/08/2019  Since the last visit had URI in January.  Reports more difficulty falling and staying asleep, nocturia, at times RLS in the middle of the night.  Still not taking iron (did not try organic form)    However was sleeping better over the last several days.  Also reports SOB for a long time - especially at night.  Trouble getting situated positionally.    Stopped using ramp for months ago.  Using Dreamwear mask. She is not aware of significant leak, but is aware of wearing a mask.    APAP 6-16 cm H2O  90% 9    Occasional CA and PB reported  She states that objectively she is not feeling better with CPAP - slept better before.  No TV in her bedroom  No caffeine- but at times decaf during the day way before 4 PM. No cokes, no tea.    Trying to get back to exercise.     Her bedroom is Dark; clock is blocked.      Taking Mirapex - if accidentally taken 2 Mirapex - was very sleepy.    At times left side nose bleed for the last 3 weeks    Therapy Event Summary Date Range: 3/1/2019 - 3/15/2019     Has been using Allegra.    Hide      Compliance Summary  Apnea Indices  Ventilator Statistics    Days with Device Usage:  15 days  Average AHI:  4.0  Average Breath Rate:  13.6 bpm    Percentage of Days >=4 Hours:  100.0%  Average OA  Index:  0.6  Average % Patient Triggered Breaths:  N/A    Average Usage (Days Used):  7 hrs. 57 mins. 45 secs.  Average CA Index:  1.2  Average Tidal Volume:  306.3 ml    Average Usage (All Days):  7 hrs. 57 mins. 45 secs.    Average Minute Vent:  N/A        Large Leak  Periodic Breathing     Average Time in Large Leak:  0 secs.  Average % of Night in PB:  0.8%     Average % of Night in Large Leak:  0.0%                        SLEEP ROUTINE AND LIFESTYLE 04/08/2019 :    Occupation: retired    Bed partner:  - sleeps in another room.     Time to bed: 11  Sleep onset latency: 5-10 min  Disruptions or awakenings: 1-2  Time to fall back into sleep: 5 min   Wakeup time: 7 AM   Perceived sleep quality: 3/5  Perceived total sleep time:  7.5  hours.  Daytime naps: sometimes not intentional  Weekend sleep routine: till 7:10   Exercise routine: no - lost routine since her trip to [place].  Caffeine: usually decaf     PREVIOUS SLEEP STUDIES:     PSG 8/25/16: Significant Obstructive sleep apnea (NOE) with AHI (apnea hypopnea Index) of 8.9 and SaO2 of 87% (weight  161 lbs).RDI 15.    DME:       PAST MEDICAL HISTORY:    Active Ambulatory Problems     Diagnosis Date Noted    Meniere's disease 03/07/2010    Mixed hyperlipidemia     Agatston coronary artery calcium score greater than 400 - 577 10/23/2013    Gastroesophageal reflux disease without esophagitis 10/07/2014    Asthmatic bronchitis with acute exacerbation 03/19/2015    Bacterial sinusitis 03/19/2015    Shortness of breath 04/15/2015    Myalgia 06/29/2015    Back pain of thoracolumbar region 03/08/2016    Essential hypertension 04/21/2016    Pulmonary hypertension 05/03/2016    NOE (obstructive sleep apnea)     Atherosclerosis of native coronary artery of native heart without angina pectoris 05/30/2017    Carotid stenosis, bilateral 06/29/2017    Restless legs 06/29/2017    Neck muscle spasm 06/29/2017    Fatigue 12/27/2017    Lumbar radiculitis  "05/11/2018    Cervical disc disease 06/18/2018    Calcific coronary arteriosclerosis 07/02/2018    Chronic pulmonary heart disease     Dizziness 12/12/2018     Resolved Ambulatory Problems     Diagnosis Date Noted    Annual physical exam 06/20/2013    Fullness in head 10/14/2013    Headache, occipital 10/24/2013    Cast discomfort 06/21/2014    Pre-operative examination for internal medicine 06/24/2014    Dysphagia 10/07/2014    Sore throat 10/07/2014    Syncope 06/29/2017    Post-traumatic headache, not intractable 06/29/2017     Past Medical History:   Diagnosis Date    CAD (coronary artery disease) 11/1/2012    GERD (gastroesophageal reflux disease)     Hyperlipidemia     Hypertension     Pulmonary hypertension 2016    Syncope                 PAST SURGICAL HISTORY:    Past Surgical History:   Procedure Laterality Date    broken wrist      "put plate in it"    DILATION AND CURETTAGE OF UTERUS      excision of lipoma Left 2009    near collar bone    INJECTION-STEROID-EPIDURAL-TRANSFORAMINAL Left 5/11/2018    [facility]    tonsillectomy      TONSILLECTOMY           FAMILY HISTORY:                Family History   Problem Relation Age of Onset    Cancer Mother     Colon cancer Mother     Hyperlipidemia Mother     Hypertension Mother     Heart disease Father     Heart attack Neg Hx     Heart failure Neg Hx     Stroke Neg Hx        SOCIAL HISTORY:          Tobacco:   Social History     Tobacco Use   Smoking Status Never Smoker   Smokeless Tobacco Never Used       alcohol use:    Social History     Substance and Sexual Activity   Alcohol Use Yes    Alcohol/week: 0.0 oz    Comment: occasionally/ not weekly                   ALLERGIES:    Allergies   Allergen Reactions    Augmentin [Amoxicillin-Pot Clavulanate] Diarrhea     Given march 2016    Codeine      Other reaction(s): Unknown, tolerates hydrocodone june 2014    Doxycycline      Other " reaction(s): Unknown    Lyrica [Pregabalin] Other (See Comments)     Eye irritation    Relafen [Nabumetone] Diarrhea    Sulfa Dyne      Other reaction(s): Unknown       CURRENT MEDICATIONS:    Current Outpatient Medications   Medication Sig Dispense Refill    aspirin 81 mg Tab Take 1 tablet by mouth Daily. 1 Tablet Oral Every day      calcium carbonate-vitamin D3 (CALTRATE 600 + D) 600 mg (1,500 mg)-800 unit Chew Take 1 tablet by mouth once.      coenzyme Q10 (CO Q-10) 200 mg capsule Take 1 capsule by mouth Daily. 1 Capsule Oral Every day      diazepam (VALIUM) 2 MG tablet Take 1 tablet by mouth as needed. 1 Tablet Oral .  For dizziness      esomeprazole (NEXIUM) 40 MG capsule 1 Capsule, Delayed Release(E.C.) Oral Every day 90 capsule 3    fexofenadine (ALLEGRA) 180 MG tablet Take 1 tablet by mouth Daily. 1 Tablet Oral Every day      fluticasone (FLONASE) 50 mcg/actuation nasal spray 2 sprays by Each Nare route once daily.      GLUCOSAMINE SULFATE ORAL Take 1,000 mg by mouth Daily. 1 Capsule Oral Every day      guaifenesin (MUCINEX) 600 mg 12 hr tablet Take 1,200 mg by mouth as needed.       ibuprofen (ADVIL,MOTRIN) 200 MG tablet Take 400 mg by mouth every 8 (eight) hours as needed for Pain.      LACTOBACILLUS RHAMNOSUS GG (PROBIOTIC ORAL) Take 1 capsule by mouth Daily. 1 Capsule Oral Every day      losartan (COZAAR) 50 MG tablet Take 1 tablet (50 mg total) by mouth once daily. 90 tablet 3    lutein 6 mg Cap Take 6 mg by mouth once daily at 6am.       magnesium 250 mg Tab Take 250 mg by mouth once daily.       omega-3 fatty acids-vitamin E (FISH OIL) 1,000 mg Cap Take 1 capsule by mouth once daily.       pramipexole (MIRAPEX) 0.125 MG tablet Take 1 pill PO QHS for RLS - 90 days supply 90 tablet 3    rosuvastatin (CRESTOR) 40 MG Tab Take 1 tablet (40 mg total) by mouth once daily. 90 tablet 3     No current facility-administered medications for this visit.                       REVIEW OF SYSTEMS:  "  Sleep related symptoms as per HPI    denies weight gain  Reports dyspnea  Denies palpitations  Reports acid reflux   Reports polyuria 3-4-> now better on low salt diet.  Denies  mood diturbance  Denies  anemia  Reports occasional  muscle pain  Denies  Gait imbalance    Otherwise, a balance of 10 systems reviewed is negative.    PHYSICAL EXAM:  /81   Pulse (!) 56   Ht 5' 3" (1.6 m)   Wt 77.1 kg (169 lb 14.4 oz)   LMP  (LMP Unknown)   BMI 30.10 kg/m²   GENERAL: Overweight body habitus, well groomed.  HEENT:   HEENT:  Conjunctivae are non-erythematous; Pupils equal, round, and reactive to light; Nose is symmetrical; Nasal mucosa is pink and moist; Septum is midline; Inferior turbinates are hypertrophied; Nasal airflow is normal; Posterior pharynx is pink; Modified Mallampati:II-III; Posterior palate is low; Tonsils not visualized; Uvula is normal and pink;Tongue is enlarged; Dentition is fair; No TMJ tenderness; Jaw opening and protrusion without click and without discomfort.  NECK: Supple. Neck circumference is 16 inches. No thyromegaly. No palpable nodes.     SKIN: On face and neck: No abrasions, no rashes, no lesions.  No subcutaneous nodules are palpable.  RESPIRATORY: Chest is clear to auscultation.  Normal chest expansion and non-labored breathing at rest.  CARDIOVASCULAR: Normal S1, S2.  No murmurs, gallops or rubs. No carotid bruits bilaterally.  No edema. No clubbing. No cyanosis.    NEURO: Oriented to time, place and person. Normal attention span and concentration. Gait normal.    PSYCH: Affect is full. Mood is normal  MUSCULOSKELETAL: Moves 4 extremities. Gait normal.       Using [facility]:       ASSESSMENT:    1. NOE - moderate by RDI. Poor sleep efficiency on the nioght of the study could have led to underestimation of NOE severity.  The patient symptomatically has  excessive daytime sleepiness, snoring,  witnessed breathing pauses, excessive daytime fatigue, gasping for air in sleep and " "interrupted sleep  with exam findings of "a crowded oral airway and elevated body mass index. The patient has medical co-morbidities of CAD and hyperlipidemia,  which can be worsened by NOE. This warrants treatment. Benefiting from CPAP use in terms of sleep continuity and daytime sleepiness. Remains compliant .  NOE is well controlled with minimal mask leak as per CPAP download and she is doing sufficient hours.      2. Interrupted, nonrefreshing  sleep and residual fatigue despite compliant and effective CPAP use.         Reports still interrupted sleep and residual sleepiness - despite good AHI control. May be due to pain interrupting her sleep and feeling limited in 1 sleep position, since mask leaks when she turns.          It seems that her arthritic pain is not well controlled and may contribute to frequent arousals (noted on airflow channel from her CPAP download).  She has not really tried CBTI ruma that I have installed on her phone last time yet, since she wants to "keep it simple". She has very significant anemia, yet won't take iron supplement due to GI side effects.  She has a good overall sleep hygiene.    2. RLS - mild symptoms. Well controlled on low dose Mirapex 0.125 mg QHS.      PLAN:      Following recommendations were given in the AVS:   Keep CPAP to 8-18 H2O  Keep amp to 6 cm H2O    Will continue Mirapex 0.125 mg  Continue Magnesium.        ----------------------------------------------------      Following recommendations were given in the AVS:   1. Use AYR spray followed by Flonase at night  2. Use AYR gel in the morning.  3 Please restart using Ramp button  4. Consider Melatonin 10 mg  We may also consider a light sleep aid to see if it helps.  5. Melatonin 5-10 mg at bedtime with Mirapex      Education: During our discussion today, we talked about the etiology of obstructive sleep apnea as well as the potential ramifications of untreated sleep apnea, which could include daytime sleepiness, " hypertension, heart disease and/or stroke.      We discussed potential treatment options, which could include weight loss, body positioning, continuous positive airway pressure (CPAP), or referral for surgical consideration. The patient preferred CPAP option.     Discussed purpose of PAP therapy, health benefits of CPAP, as well as the potential ramifications of untreated sleep apnea, which could include daytime sleepiness, hypertension, heart disease and/or stroke. An AHI of 15 is associated with increased risk CVD.     The patient should avoid ETOH and sedatives at night, as it tends to aggravate NOE. Regular replacement of CPAP mask, tubing and filter was recommended.    Precautions: The patient was advised to abstain from driving should he feel sleepy or drowsy.    Follow up: MD/NP after 1 month of PAP use.    Thank you for allowing me the opportunity to participate in the care of your patient.

## 2019-04-08 NOTE — PATIENT INSTRUCTIONS
1. Use AYR spray followed by Flonase at night  2. Use AYR gel in the morning.  3 Please restart using Ramp button  4. Consider Melatonin 5-10 mg TR  We may also consider a light sleep aid to see if it helps.  5. Melatonin 5-10 mg at bedtime with Mirapex    -----------------    If RLS gets worse, please consider ORganic Nonconstipating Iron:Ferrous bisglycinate may be better tolerated than ferrous sulfate

## 2019-04-09 DIAGNOSIS — E78.5 HYPERLIPIDEMIA, UNSPECIFIED HYPERLIPIDEMIA TYPE: ICD-10-CM

## 2019-04-09 DIAGNOSIS — I10 HYPERTENSION, UNSPECIFIED TYPE: Primary | ICD-10-CM

## 2019-04-09 RX ORDER — ESOMEPRAZOLE MAGNESIUM 40 MG/1
CAPSULE, DELAYED RELEASE ORAL
Qty: 90 CAPSULE | Refills: 3 | Status: SHIPPED | OUTPATIENT
Start: 2019-04-09 | End: 2020-03-30 | Stop reason: SDUPTHER

## 2019-04-09 NOTE — TELEPHONE ENCOUNTER
----- Message from Paty Wood sent at 4/9/2019  8:23 AM CDT -----  Contact: patient on cell [phone]  Doctor appointment and lab have been scheduled.  Please link lab orders to the lab appointment.  Date of doctor appointment:  07/09/19  Date of lab appointment:  07/02/19  Physical or EP: physical  Comments:  Geraldine, Patient needs a new rx from [facility] mail order.  esomeprazole (NEXIUM) 40 MG capsule 90 day supply with 3 refills. Please call pt to confirm that this has been sent.

## 2019-04-09 NOTE — TELEPHONE ENCOUNTER
Annual labs ordered and linked to appt in July..  nexium refill loaded for approval to mail order.    Patient called and advise will send today.    Please approve.  Thanks jordon

## 2019-04-11 ENCOUNTER — LAB VISIT (OUTPATIENT)
Dept: LAB | Facility: HOSPITAL | Age: 79
End: 2019-04-11
Attending: INTERNAL MEDICINE
Payer: MEDICARE

## 2019-04-11 DIAGNOSIS — I50.42 CHRONIC COMBINED SYSTOLIC AND DIASTOLIC HEART FAILURE: ICD-10-CM

## 2019-04-11 DIAGNOSIS — I50.31 ACUTE DIASTOLIC HEART FAILURE: ICD-10-CM

## 2019-04-11 DIAGNOSIS — I27.20 HYPERTENSIVE PULMONARY VASCULAR DISEASE: ICD-10-CM

## 2019-04-11 DIAGNOSIS — I10 ESSENTIAL HYPERTENSION: ICD-10-CM

## 2019-04-11 LAB
ANION GAP SERPL CALC-SCNC: 8 MMOL/L (ref 8–16)
BUN SERPL-MCNC: 15 MG/DL (ref 7–17)
CALCIUM SERPL-MCNC: 9.4 MG/DL (ref 8.7–10.5)
CHLORIDE SERPL-SCNC: 100 MMOL/L (ref 95–110)
CO2 SERPL-SCNC: 29 MMOL/L (ref 23–29)
CREAT SERPL-MCNC: 0.73 MG/DL (ref 0.5–1.4)
EST. GFR  (AFRICAN AMERICAN): >60 ML/MIN/1.73 M^2
EST. GFR  (NON AFRICAN AMERICAN): >60 ML/MIN/1.73 M^2
GLUCOSE SERPL-MCNC: 96 MG/DL (ref 70–110)
POTASSIUM SERPL-SCNC: 4.5 MMOL/L (ref 3.5–5.1)
SODIUM SERPL-SCNC: 137 MMOL/L (ref 136–145)

## 2019-04-11 PROCEDURE — 36415 COLL VENOUS BLD VENIPUNCTURE: CPT | Mod: HCNC,PO

## 2019-04-11 PROCEDURE — 80048 BASIC METABOLIC PNL TOTAL CA: CPT | Mod: HCNC,PO

## 2019-04-12 ENCOUNTER — TELEPHONE (OUTPATIENT)
Dept: CARDIOLOGY | Facility: CLINIC | Age: 79
End: 2019-04-12

## 2019-04-12 NOTE — TELEPHONE ENCOUNTER
----- Message from Heydi Gavin MD sent at 4/12/2019  7:08 AM CDT -----  Please inform the patient that blood woork is fine

## 2019-05-06 ENCOUNTER — OFFICE VISIT (OUTPATIENT)
Dept: INTERNAL MEDICINE | Facility: CLINIC | Age: 79
End: 2019-05-06
Payer: MEDICARE

## 2019-05-06 VITALS
HEART RATE: 64 BPM | TEMPERATURE: 98 F | DIASTOLIC BLOOD PRESSURE: 90 MMHG | WEIGHT: 167 LBS | RESPIRATION RATE: 16 BRPM | HEIGHT: 63 IN | BODY MASS INDEX: 29.59 KG/M2 | SYSTOLIC BLOOD PRESSURE: 156 MMHG

## 2019-05-06 DIAGNOSIS — I10 ESSENTIAL HYPERTENSION: ICD-10-CM

## 2019-05-06 DIAGNOSIS — J32.9 BACTERIAL SINUSITIS: ICD-10-CM

## 2019-05-06 DIAGNOSIS — B96.89 ACUTE BACTERIAL BRONCHITIS: Primary | ICD-10-CM

## 2019-05-06 DIAGNOSIS — J20.8 ACUTE BACTERIAL BRONCHITIS: Primary | ICD-10-CM

## 2019-05-06 DIAGNOSIS — B96.89 BACTERIAL SINUSITIS: ICD-10-CM

## 2019-05-06 DIAGNOSIS — I27.9 CHRONIC PULMONARY HEART DISEASE: ICD-10-CM

## 2019-05-06 DIAGNOSIS — J45.21 MILD INTERMITTENT ASTHMATIC BRONCHITIS WITH ACUTE EXACERBATION: ICD-10-CM

## 2019-05-06 PROCEDURE — 96372 THER/PROPH/DIAG INJ SC/IM: CPT | Mod: HCNC,59,S$GLB, | Performed by: INTERNAL MEDICINE

## 2019-05-06 PROCEDURE — 99999 PR PBB SHADOW E&M-EST. PATIENT-LVL III: CPT | Mod: PBBFAC,HCNC,, | Performed by: INTERNAL MEDICINE

## 2019-05-06 PROCEDURE — 3080F DIAST BP >= 90 MM HG: CPT | Mod: HCNC,CPTII,S$GLB, | Performed by: INTERNAL MEDICINE

## 2019-05-06 PROCEDURE — 99213 PR OFFICE/OUTPT VISIT, EST, LEVL III, 20-29 MIN: ICD-10-PCS | Mod: HCNC,25,S$GLB, | Performed by: INTERNAL MEDICINE

## 2019-05-06 PROCEDURE — 99999 PR PBB SHADOW E&M-EST. PATIENT-LVL III: ICD-10-PCS | Mod: PBBFAC,HCNC,, | Performed by: INTERNAL MEDICINE

## 2019-05-06 PROCEDURE — 3080F PR MOST RECENT DIASTOLIC BLOOD PRESSURE >= 90 MM HG: ICD-10-PCS | Mod: HCNC,CPTII,S$GLB, | Performed by: INTERNAL MEDICINE

## 2019-05-06 PROCEDURE — 1101F PT FALLS ASSESS-DOCD LE1/YR: CPT | Mod: HCNC,CPTII,S$GLB, | Performed by: INTERNAL MEDICINE

## 2019-05-06 PROCEDURE — 1101F PR PT FALLS ASSESS DOC 0-1 FALLS W/OUT INJ PAST YR: ICD-10-PCS | Mod: HCNC,CPTII,S$GLB, | Performed by: INTERNAL MEDICINE

## 2019-05-06 PROCEDURE — 96372 PR INJECTION,THERAP/PROPH/DIAG2ST, IM OR SUBCUT: ICD-10-PCS | Mod: HCNC,59,S$GLB, | Performed by: INTERNAL MEDICINE

## 2019-05-06 PROCEDURE — 3077F PR MOST RECENT SYSTOLIC BLOOD PRESSURE >= 140 MM HG: ICD-10-PCS | Mod: HCNC,CPTII,S$GLB, | Performed by: INTERNAL MEDICINE

## 2019-05-06 PROCEDURE — 99213 OFFICE O/P EST LOW 20 MIN: CPT | Mod: HCNC,25,S$GLB, | Performed by: INTERNAL MEDICINE

## 2019-05-06 PROCEDURE — 3077F SYST BP >= 140 MM HG: CPT | Mod: HCNC,CPTII,S$GLB, | Performed by: INTERNAL MEDICINE

## 2019-05-06 RX ORDER — AZITHROMYCIN 250 MG/1
TABLET, FILM COATED ORAL
Qty: 6 TABLET | Refills: 0 | Status: SHIPPED | OUTPATIENT
Start: 2019-05-06 | End: 2019-07-23 | Stop reason: ALTCHOICE

## 2019-05-06 RX ORDER — TRIAMCINOLONE ACETONIDE 40 MG/ML
40 INJECTION, SUSPENSION INTRA-ARTICULAR; INTRAMUSCULAR ONCE
Status: COMPLETED | OUTPATIENT
Start: 2019-05-06 | End: 2019-05-06

## 2019-05-06 RX ADMIN — TRIAMCINOLONE ACETONIDE 40 MG: 40 INJECTION, SUSPENSION INTRA-ARTICULAR; INTRAMUSCULAR at 11:05

## 2019-05-11 NOTE — PROGRESS NOTES
Subjective:       Patient ID: Maryann Sorenson is a 78 y.o. female.    Chief Complaint: Cough (started yesterday.  suppose to leave in 2 days for grdaugh graduation in [place].); Nasal Congestion; and Fatigue  HISTORY OF PRESENT ILLNESS:  A 78-year-old white female patient well known to   me, comes in with a one-day history of cough productive of yellow sputum, but   most of the time clear.  Her son was sick prior to this and he is at home   frequently.  The patient also has had throat soreness, sinus congestion,   drainage and this has kept her awake at night.  The patient has no fever, chills   or wheezing.    PHYSICAL EXAMINATION:  VITAL SIGNS:  Normal except her blood pressure elevated 156/90, so I asked her   to check that.  SKIN:  Fair.  HEENT:  Clear.  NECK:  Shows no stiffness or adenopathy.  CHEST:  Clear.    IMPRESSION:  1.  Bacterial bronchitis.  2.  Bacterial sinusitis.  3.  Probable asthmatic bronchitis, though I do not hear anything.  4.  Hypertension, currently elevated blood pressure.  5.  History of pulmonary hypertension.    Tessalon Perles to suppress her cough at her request.      JTAMANNA/HN  dd: 05/11/2019 16:16:34 (CDT)  td: 05/12/2019 16:03:45 (CDT)  Doc ID   #[id number]  Job ID #[id number]    CC:     [facility]    [id number]                            HPI  Review of Systems   Constitutional: Negative.    HENT: Positive for congestion, postnasal drip, sinus pressure and sore throat. Negative for hearing loss, sneezing and voice change.    Eyes: Negative for visual disturbance.   Respiratory: Positive for cough. Negative for chest tightness and shortness of breath.    Cardiovascular: Negative.  Negative for chest pain, palpitations and leg swelling.   Gastrointestinal: Negative.    Genitourinary: Negative for difficulty urinating, dysuria, flank pain, frequency, hematuria, menstrual problem, pelvic pain, urgency, vaginal bleeding and vaginal discharge.   Musculoskeletal: Negative.  Negative for neck pain and neck  stiffness.   Skin: Negative.    Neurological: Negative.  Negative for dizziness, seizures, light-headedness, numbness and headaches.   Psychiatric/Behavioral: Positive for sleep disturbance. Negative for agitation, behavioral problems and confusion. The patient is not nervous/anxious.        Objective:      Physical Exam   Constitutional: She is oriented to person, place, and time. She appears well-developed and well-nourished.   Eyes: Pupils are equal, round, and reactive to light. EOM are normal.   Neck: Normal range of motion. Neck supple. No JVD present. No thyromegaly present.   Cardiovascular: Normal rate, regular rhythm, normal heart sounds and intact distal pulses. Exam reveals no gallop.   No murmur heard.  Pulmonary/Chest: Breath sounds normal. She has no wheezes. She has no rales.   Abdominal: Soft. Bowel sounds are normal. She exhibits no mass. There is no tenderness.   Musculoskeletal: Normal range of motion.   Lymphadenopathy:     She has no cervical adenopathy.   Neurological: She is alert and oriented to person, place, and time. She has normal reflexes. No cranial nerve deficit.   Skin: No rash noted. No erythema.   Psychiatric: She has a normal mood and affect. Judgment normal.       Assessment:       1. Acute bacterial bronchitis    2. Bacterial sinusitis    3. Mild intermittent asthmatic bronchitis with acute exacerbation    4. Essential hypertension    5. Chronic pulmonary heart disease        Plan:

## 2019-07-03 ENCOUNTER — PATIENT MESSAGE (OUTPATIENT)
Dept: SLEEP MEDICINE | Facility: CLINIC | Age: 79
End: 2019-07-03

## 2019-07-16 ENCOUNTER — LAB VISIT (OUTPATIENT)
Dept: LAB | Facility: HOSPITAL | Age: 79
End: 2019-07-16
Attending: INTERNAL MEDICINE
Payer: MEDICARE

## 2019-07-16 DIAGNOSIS — E78.5 HYPERLIPIDEMIA, UNSPECIFIED HYPERLIPIDEMIA TYPE: ICD-10-CM

## 2019-07-16 DIAGNOSIS — I10 HYPERTENSION, UNSPECIFIED TYPE: ICD-10-CM

## 2019-07-16 LAB
BILIRUB UR QL STRIP: NEGATIVE
CLARITY UR REFRACT.AUTO: CLEAR
COLOR UR AUTO: YELLOW
GLUCOSE UR QL STRIP: NEGATIVE
HGB UR QL STRIP: ABNORMAL
KETONES UR QL STRIP: NEGATIVE
LEUKOCYTE ESTERASE UR QL STRIP: NEGATIVE
NITRITE UR QL STRIP: NEGATIVE
PH UR STRIP: 8 [PH] (ref 5–8)
PROT UR QL STRIP: NEGATIVE
SP GR UR STRIP: 1.01 (ref 1–1.03)
URN SPEC COLLECT METH UR: ABNORMAL
UROBILINOGEN UR STRIP-ACNC: NEGATIVE EU/DL

## 2019-07-16 PROCEDURE — 81003 URINALYSIS AUTO W/O SCOPE: CPT | Mod: HCNC,PO

## 2019-07-22 ENCOUNTER — OFFICE VISIT (OUTPATIENT)
Dept: SLEEP MEDICINE | Facility: CLINIC | Age: 79
End: 2019-07-22
Payer: MEDICARE

## 2019-07-22 DIAGNOSIS — G47.33 OSA (OBSTRUCTIVE SLEEP APNEA): Primary | ICD-10-CM

## 2019-07-22 PROCEDURE — 99999 PR PBB SHADOW E&M-EST. PATIENT-LVL II: CPT | Mod: PBBFAC,HCNC,, | Performed by: PSYCHIATRY & NEUROLOGY

## 2019-07-22 PROCEDURE — 1101F PR PT FALLS ASSESS DOC 0-1 FALLS W/OUT INJ PAST YR: ICD-10-PCS | Mod: HCNC,CPTII,S$GLB, | Performed by: PSYCHIATRY & NEUROLOGY

## 2019-07-22 PROCEDURE — 99212 PR OFFICE/OUTPT VISIT, EST, LEVL II, 10-19 MIN: ICD-10-PCS | Mod: HCNC,S$GLB,, | Performed by: PSYCHIATRY & NEUROLOGY

## 2019-07-22 PROCEDURE — 99212 OFFICE O/P EST SF 10 MIN: CPT | Mod: HCNC,S$GLB,, | Performed by: PSYCHIATRY & NEUROLOGY

## 2019-07-22 PROCEDURE — 99999 PR PBB SHADOW E&M-EST. PATIENT-LVL II: ICD-10-PCS | Mod: PBBFAC,HCNC,, | Performed by: PSYCHIATRY & NEUROLOGY

## 2019-07-22 PROCEDURE — 1101F PT FALLS ASSESS-DOCD LE1/YR: CPT | Mod: HCNC,CPTII,S$GLB, | Performed by: PSYCHIATRY & NEUROLOGY

## 2019-07-22 NOTE — PROGRESS NOTES
Maryann Sorenson  was seen at the request of  No ref. provider found for sleep evaluation.    08/11/2016 INITIAL HISTORY OF PRESENT ILLNESS:  Maryann Sorenson is a 78 y.o. female is here to be evaluated for a sleep disorder.       CHIEF COMPLAINT:      The patient's complaints include excessive daytime fatigue, snoring,  witnessed breathing pauses,  gasping for air in sleep and interrupted sleep since  Over 10 years ago.    Being evaluated by Dr. Early for pulmonary HTN.     Reports  dry mouth and sore throat  Denies nasal congestion   Denies  morning headaches  Reports occasional  interrupted sleep  Reports frequent leg movements - only when watching TV. Urge to move her legs. Got it from her father.   Denies symptoms concerning for parasomnia    The ESS ([place] Sleepiness Score) taken on initial visit is 6 /24    Bedroom is dark and quiet.    FH: father and 2 sisters - RLS.    INTERVAL HISTORY:    10/12/2016  The patient has not presented any new complaints since the previous visit.   Comes to discuss PSG. Very poor sleep efficiency could have caused underestimation of test results (positive to mild to moderate sleep apnea). Iron test was normal.   Nature's wellness oil at the base of the spine - using for RLS - helps at night - never tried it in the evening.     05/24/2017: Maryann Sorenson comes back to talk about treatment options. Prefers CPAP with nasal pillows mask. Pros and cons were discussed.     07/20/2017: Started using CPAP in June. Passed out in her bedroom-> resulted in ankle sprain.  Has to get up in the middle of the night to the bathroom - now has to turn the light on for the sake of CPAP - now that she is wide awake -> RLS acts up.     Therapy Event Summary Date Range: 6/20/2017 - 7/19/2017     Doing well with Dreamwear mask.     ESS 9/24.    Hide      Compliance Summary  Apnea Indices  Ventilator Statistics    Days with Device Usage:  30 days  Average AHI:  1.1  Average Breath Rate:  16.5 bpm   "  Percentage of Days >=4 Hours:  90.0%  Average OA Index:  0.1  Average % Patient Triggered Breaths:  N/A    Average Usage (Days Used):  4 hrs. 34 mins. 42 secs.  Average CA Index:  0.1  Average Tidal Volume:  417.1 ml    Average Usage (All Days):  4 hrs. 34 mins. 42 secs.    Average Minute Vent:  N/A        Large Leak  Periodic Breathing     Average Time in Large Leak:  0 secs.  Average % of Night in PB:  0.3%     Average % of Night in Large Leak:  0.0%            \  10/20/2017:  Therapy Event Summary Date Range: 9/20/2017 - 10/19/2017   Doing better since pressure increase. Resports benefit from CPAP use, but still significantly interrupted sleep, and residual fatigue/sleepiness.    APAP90% pressure 8  Using ramp at 5.    Mirapex  0.125 mg- still controlling RLS well.      Hide      Compliance Summary  Apnea Indices  Ventilator Statistics    Days with Device Usage:  30 days  Average AHI:  4.9  Average Breath Rate:  13.7 bpm    Percentage of Days >=4 Hours:  100.0%  Average OA Index:  0.6  Average % Patient Triggered Breaths:  N/A    Average Usage (Days Used):  7 hrs. 34 mins. 49 secs.  Average CA Index:  1.1  Average Tidal Volume:  316.8 ml    Average Usage (All Days):  7 hrs. 34 mins. 49 secs.    Average Minute Vent:  N/A        Large Leak  Periodic Breathing     Average Time in Large Leak:  0 secs.  Average % of Night in PB:  1.0%     Average % of Night in Large Leak:  0.0%              01/08/2018    CPAP 8-18; not using ramp lately. 90% 9 cm  Now can adjust CPAP in the dark. Most nights were good nights.   Still fatigued; anemia not controlled 0- not tolerating iron supplements.  Bedroom is very dark.  Nocturia and joint pain plays a role.    RLS is well controlled on Mirapex.    T is 64%. It is dark in the bedroom. Godd sleep hygiene.     Sh is 'stuck" in one position with CPAP.    Believes she "slept better before CPAP. Needs to readjust the mask.     She has failed REM Fresh Melatonin in terms of sleep " "continuity and not feeling more refreshed in AM    She has significant anemia (Hb 10), yet won't take iron due to its constipating effects.     Taking magnesium lunch time. Taking Fish oil and Glucosamine for joint pain, yet it seems that her arthritic pain is not well controlled and may contribute to frequent arousals (noted on airflow channel from her CPAP download).  She is trying to avoid regular NSAID or Tylenol intake.   She has not really tried CBTI ruma that I have installed on her phone last time yet, since she wants to "keep it simple:    Therapy Event Summary Date Range: 12/8/2017 - 1/6/2018     Hide      Compliance Summary  Apnea Indices  Ventilator Statistics    Days with Device Usage:  30 days  Average AHI:  3.7  Average Breath Rate:  13.7 bpm    Percentage of Days >=4 Hours:  100.0%  Average OA Index:  0.6  Average % Patient Triggered Breaths:  N/A    Average Usage (Days Used):  7 hrs. 39 mins. 1 secs.  Average CA Index:  0.9  Average Tidal Volume:  324.3 ml    Average Usage (All Days):  7 hrs. 39 mins. 1 secs.    Average Minute Vent:  N/A        Large Leak  Periodic Breathing     Average Time in Large Leak:  0 secs.  Average % of Night in PB:  1.0%     Average % of Night in Large Leak:  0.0%              09/26/2018:    Mirapex is controlling RLS well on Mirapex.    CPAP 8-18  90% 9-10  Cm    Denies sleep interruptions from mask leak  A lot of issues with arthritis with some effect on sleep  Taking Flucosamine, Magnesium - helps her sleep.    EPWORTH SLEEPINESS SCALE 9/20/2018   Sitting and reading 1   Watching TV 1   Sitting, inactive in a public place (e.g. a theatre or a meeting) 0   As a passenger in a car for an hour without a break 2   Lying down to rest in the afternoon when circumstances permit 1   Sitting and talking to someone 0   Sitting quietly after a lunch without alcohol 1   In a car, while stopped for a few minutes in traffic 0   Total score 6     Therapy Event Summary  Date Range: " 3/20/2018 - 9/15/2018    Hide            Compliance Summary  Apnea Indices  Ventilator Statistics       Days with Device Usage:  178 days  Average AHI:  2.7  Average Breath Rate:  13.6 bpm       Percentage of Days >=4 Hours:  98.9%  Average OA Index:  0.5  Average % Patient Triggered Breaths:  N/A       Average Usage (Days Used):  7 hrs. 31 mins. 47 secs.  Average CA Index:  0.6  Average Tidal Volume:  305.2 ml       Average Usage (All Days):  7 hrs. 26 mins. 46 secs.    Average Minute Vent:  N/A              Large Leak  Periodic Breathing        Average Time in Large Leak:  0 secs.  Average % of Night in PB:  0.4%        Average % of Night in Large Leak:  0.0%                           04/08/2019  Since the last visit had URI in January.  Reports more difficulty falling and staying asleep, nocturia, at times RLS in the middle of the night.  Still not taking iron (did not try organic form)    However was sleeping better over the last several days.  Also reports SOB for a long time - especially at night.  Trouble getting situated positionally.    Stopped using ramp for months ago.  Using Dreamwear mask. She is not aware of significant leak, but is aware of wearing a mask.    APAP 6-16 cm H2O  90% 9    Occasional CA and PB reported  She states that objectively she is not feeling better with CPAP - slept better before.  No TV in her bedroom  No caffeine- but at times decaf during the day way before 4 PM. No cokes, no tea.    Trying to get back to exercise.     Her bedroom is Dark; clock is blocked.      Taking Mirapex - if accidentally taken 2 Mirapex - was very sleepy.    At times left side nose bleed for the last 3 weeks    Therapy Event Summary Date Range: 3/1/2019 - 3/15/2019     Has been using Allegra.    Hide      Compliance Summary  Apnea Indices  Ventilator Statistics    Days with Device Usage:  15 days  Average AHI:  4.0  Average Breath Rate:  13.6 bpm    Percentage of Days >=4 Hours:  100.0%  Average OA  Index:  0.6  Average % Patient Triggered Breaths:  N/A    Average Usage (Days Used):  7 hrs. 57 mins. 45 secs.  Average CA Index:  1.2  Average Tidal Volume:  306.3 ml    Average Usage (All Days):  7 hrs. 57 mins. 45 secs.    Average Minute Vent:  N/A        Large Leak  Periodic Breathing     Average Time in Large Leak:  0 secs.  Average % of Night in PB:  0.8%     Average % of Night in Large Leak:  0.0%                07/22/2019: Maryann Sorenson reports very frequent URI this season - washes her machine and supplies religiously with  Vinegar.  Mirapex - 0.125 mg . 2 pills  Make her very groggy.  Using leg calming wellness oil on her lower back.  Still not using iron supplementation - H&H dropped more since 1/19.  APAP 8-18   90% 9.2   AHI - may be spurious due to more interrupted sleep    Therapy Event Summary (Last 14 Days)     Hide      Compliance Summary  Apnea Indices  Ventilator Statistics    Days with Device Usage:  14 days  Average AHI:  5.3  Average Breath Rate:  13.4 bpm    Percentage of Days >=4 Hours:  100.0%  Average OA Index:  1.2  Average % Patient Triggered Breaths:  N/A    Average Usage (Days Used):  7 hrs. 48 mins. 8 secs.  Average CA Index:  1.4  Average Tidal Volume:  288.4 ml    Average Usage (All Days):  7 hrs. 48 mins. 8 secs.    Average Minute Vent:  N/A        Large Leak  Periodic Breathing     Average Time in Large Leak:  0 secs.  Average % of Night in PB:  0.8%     Average % of Night in Large Leak:  0.0%                      SLEEP ROUTINE AND LIFESTYLE 07/22/2019 :    Occupation: retired    Bed partner:  - sleeps in another room.     Time to bed: 11  Sleep onset latency: 5-10 min  Disruptions or awakenings: 1-2  Time to fall back into sleep: 5 min   Wakeup time: 7 AM   Perceived sleep quality: 3/5  Perceived total sleep time:  7.5  hours.  Daytime naps: sometimes not intentional  Weekend sleep routine: till 7:10   Exercise routine: no - lost routine since her trip to  "[place].  Caffeine: usually decaf     PREVIOUS SLEEP STUDIES:     PSG 8/25/16: Significant Obstructive sleep apnea (NOE) with AHI (apnea hypopnea Index) of 8.9 and SaO2 of 87% (weight  161 lbs).RDI 15.    DME:       PAST MEDICAL HISTORY:    Active Ambulatory Problems     Diagnosis Date Noted    Meniere's disease 03/07/2010    Mixed hyperlipidemia     Agatston coronary artery calcium score greater than 400 - 577 10/23/2013    Gastroesophageal reflux disease without esophagitis 10/07/2014    Asthmatic bronchitis with acute exacerbation 03/19/2015    Bacterial sinusitis 03/19/2015    Shortness of breath 04/15/2015    Myalgia 06/29/2015    Back pain of thoracolumbar region 03/08/2016    Essential hypertension 04/21/2016    Pulmonary hypertension 05/03/2016    NOE (obstructive sleep apnea)     Atherosclerosis of native coronary artery of native heart without angina pectoris 05/30/2017    Carotid stenosis, bilateral 06/29/2017    Restless legs 06/29/2017    Neck muscle spasm 06/29/2017    Fatigue 12/27/2017    Lumbar radiculitis 05/11/2018    Cervical disc disease 06/18/2018    Calcific coronary arteriosclerosis 07/02/2018    Chronic pulmonary heart disease     Dizziness 12/12/2018     Resolved Ambulatory Problems     Diagnosis Date Noted    Annual physical exam 06/20/2013    Fullness in head 10/14/2013    Headache, occipital 10/24/2013    Cast discomfort 06/21/2014    Pre-operative examination for internal medicine 06/24/2014    Dysphagia 10/07/2014    Sore throat 10/07/2014    Syncope 06/29/2017    Post-traumatic headache, not intractable 06/29/2017     Past Medical History:   Diagnosis Date    CAD (coronary artery disease) 11/1/2012    GERD (gastroesophageal reflux disease)     Hyperlipidemia     Hypertension     Pulmonary hypertension 2016    Syncope                 PAST SURGICAL HISTORY:    Past Surgical History:   Procedure Laterality Date    broken wrist      "put plate in it" "    DILATION AND CURETTAGE OF UTERUS      excision of lipoma Left 2009    near collar bone    INJECTION-STEROID-EPIDURAL-TRANSFORAMINAL Left 5/11/2018    Performed by Pierre Shell [facility] at [facility]    tonsillectomy      TONSILLECTOMY           FAMILY HISTORY:                Family History   Problem Relation Age of Onset    Cancer Mother     Colon cancer Mother     Hyperlipidemia Mother     Hypertension Mother     Heart disease Father     Heart attack Neg Hx     Heart failure Neg Hx     Stroke Neg Hx        SOCIAL HISTORY:          Tobacco:   Social History     Tobacco Use   Smoking Status Never Smoker   Smokeless Tobacco Never Used       alcohol use:    Social History     Substance and Sexual Activity   Alcohol Use Yes    Alcohol/week: 0.0 oz    Comment: occasionally/ not weekly                   ALLERGIES:    Allergies   Allergen Reactions    Augmentin [Amoxicillin-Pot Clavulanate] Diarrhea     Given march 2016    Codeine      Other reaction(s): Unknown, tolerates hydrocodone june 2014    Doxycycline      Other reaction(s): Unknown    Lyrica [Pregabalin] Other (See Comments)     Eye irritation    Relafen [Nabumetone] Diarrhea    Sulfa Dyne      Other reaction(s): Unknown       CURRENT MEDICATIONS:    Current Outpatient Medications   Medication Sig Dispense Refill    aspirin 81 mg Tab Take 1 tablet by mouth Daily. 1 Tablet Oral Every day      azithromycin (Z-RUSTAM) 250 MG tablet Take 2 tablets by mouth on day 1; Take 1 tablet by mouth on days 2-5 6 tablet 0    calcium carbonate-vitamin D3 (CALTRATE 600 + D) 600 mg (1,500 mg)-800 unit Chew Take 1 tablet by mouth once.      coenzyme Q10 (CO Q-10) 200 mg capsule Take 1 capsule by mouth Daily. 1 Capsule Oral Every day      diazepam (VALIUM) 2 MG tablet Take 1 tablet by mouth as needed. 1 Tablet Oral .  For dizziness      esomeprazole (NEXIUM) 40 MG capsule 1 Capsule, Delayed Release(E.C.) Oral Every day 90 capsule 3    fexofenadine (ALLEGRA)  180 MG tablet Take 1 tablet by mouth Daily. 1 Tablet Oral Every day      fluticasone (FLONASE) 50 mcg/actuation nasal spray 2 sprays by Each Nare route once daily.      GLUCOSAMINE SULFATE ORAL Take 1,000 mg by mouth Daily. 1 Capsule Oral Every day      guaifenesin (MUCINEX) 600 mg 12 hr tablet Take 1,200 mg by mouth as needed.       ibuprofen (ADVIL,MOTRIN) 200 MG tablet Take 400 mg by mouth every 8 (eight) hours as needed for Pain.      LACTOBACILLUS RHAMNOSUS GG (PROBIOTIC ORAL) Take 1 capsule by mouth Daily. 1 Capsule Oral Every day      losartan (COZAAR) 50 MG tablet Take 1 tablet (50 mg total) by mouth once daily. 90 tablet 3    lutein 6 mg Cap Take 6 mg by mouth once daily at 6am.       magnesium 250 mg Tab Take 250 mg by mouth once daily.       omega-3 fatty acids-vitamin E (FISH OIL) 1,000 mg Cap Take 1 capsule by mouth once daily.       pramipexole (MIRAPEX) 0.125 MG tablet Take 1 pill PO QHS for RLS - 90 days supply 90 tablet 3    rosuvastatin (CRESTOR) 40 MG Tab Take 1 tablet (40 mg total) by mouth once daily. 90 tablet 3     No current facility-administered medications for this visit.                       REVIEW OF SYSTEMS:   Sleep related symptoms as per HPI    denies weight gain  Reports dyspnea  Denies palpitations  Reports acid reflux   Reports polyuria 3-4-> now better on low salt diet.  Denies  mood diturbance  Denies  anemia  Reports occasional  muscle pain  Denies  Gait imbalance    Otherwise, a balance of 10 systems reviewed is negative.    PHYSICAL EXAM:  LMP  (LMP Unknown)   GENERAL: Overweight body habitus, well groomed.  HEENT:   HEENT:  Conjunctivae are non-erythematous; Pupils equal, round, and reactive to light; Nose is symmetrical; Nasal mucosa is pink and moist; Septum is midline; Inferior turbinates are hypertrophied; Nasal airflow is normal; Posterior pharynx is pink; Modified Mallampati:II-III; Posterior palate is low; Tonsils not visualized; Uvula is normal and  "pink;Tongue is enlarged; Dentition is fair; No TMJ tenderness; Jaw opening and protrusion without click and without discomfort.  NECK: Supple. Neck circumference is 16 inches. No thyromegaly. No palpable nodes.     SKIN: On face and neck: No abrasions, no rashes, no lesions.  No subcutaneous nodules are palpable.  RESPIRATORY: Chest is clear to auscultation.  Normal chest expansion and non-labored breathing at rest.  CARDIOVASCULAR: Normal S1, S2.  No murmurs, gallops or rubs. No carotid bruits bilaterally.  No edema. No clubbing. No cyanosis.    NEURO: Oriented to time, place and person. Normal attention span and concentration. Gait normal.    PSYCH: Affect is full. Mood is normal  MUSCULOSKELETAL: Moves 4 extremities. Gait normal.       Using [facility]:       ASSESSMENT:    1. NOE - moderate by RDI. Poor sleep efficiency on the nioght of the study could have led to underestimation of NOE severity.  The patient symptomatically has  excessive daytime sleepiness, snoring,  witnessed breathing pauses, excessive daytime fatigue, gasping for air in sleep and interrupted sleep  with exam findings of "a crowded oral airway and elevated body mass index. The patient has medical co-morbidities of CAD and hyperlipidemia,  which can be worsened by NOE. This warrants treatment. Benefiting from CPAP use in terms of sleep continuity and daytime sleepiness. Remains compliant .  NOE is well controlled with minimal mask leak as per CPAP download and she is doing sufficient hours.  Increased AHI - most likely duw to more interrupted sleep secondaary to RLS    2. Interrupted, nonrefreshing  sleep and residual fatigue despite compliant and effective CPAP use.         Reports still interrupted sleep and residual sleepiness - despite good AHI control. May be due to pain interrupting her sleep and feeling limited in 1 sleep position, since mask leaks when she turns.          It seems that her arthritic pain is not well controlled and may " "contribute to frequent arousals (noted on airflow channel from her CPAP download).  She has not really tried CBTI ruma that I have installed on her phone last time yet, since she wants to "keep it simple". She has very significant anemia, yet won't take iron supplement due to GI side effects.  She has a good overall sleep hygiene.    2. RLS - mild symptoms. Well controlled on low dose Mirapex 0.125 mg QHS. Insufficient effect, but wont tolerate more Mirapex/      PLAN:    Add Gabapentin 100 mg - in the evening and bedtime for RLS in addition to Mirapex 0.125 mg.    Keep CPAP to 8-18 H2O  Will continue Mirapex 0.125 mg  Continue Magnesium.    TRY IRON      Following recommendations were given in the AVS:     Try ferrous bis glycinate - organic form of iron.  I will ad Gabapentin 10 mg  - you can take 1 pill before bed and second pill at bedtime.  Continue Mirapex 0.125 mg.    Keep up great job with CPAP.     ----------------------------------------------------      Education: During our discussion today, we talked about the etiology of obstructive sleep apnea as well as the potential ramifications of untreated sleep apnea, which could include daytime sleepiness, hypertension, heart disease and/or stroke.      We discussed potential treatment options, which could include weight loss, body positioning, continuous positive airway pressure (CPAP), or referral for surgical consideration. The patient preferred CPAP option.     Discussed purpose of PAP therapy, health benefits of CPAP, as well as the potential ramifications of untreated sleep apnea, which could include daytime sleepiness, hypertension, heart disease and/or stroke. An AHI of 15 is associated with increased risk CVD.     The patient should avoid ETOH and sedatives at night, as it tends to aggravate NOE. Regular replacement of CPAP mask, tubing and filter was recommended.    Precautions: The patient was advised to abstain from driving should he feel sleepy or " drowsy.    Follow up: MD/NP after 1 month of PAP use.    Thank you for allowing me the opportunity to participate in the care of your patient.

## 2019-07-22 NOTE — PATIENT INSTRUCTIONS
Try ferrous bis glycinate - organic form of iron.  I will ad Gabapentin 10 mg  - you can take 1 pill before bed and second pill at bedtime.  Continue Mirapex 0.125 mg.    Keep up great job with CPAP.

## 2019-07-23 ENCOUNTER — OFFICE VISIT (OUTPATIENT)
Dept: INTERNAL MEDICINE | Facility: CLINIC | Age: 79
End: 2019-07-23
Payer: MEDICARE

## 2019-07-23 VITALS
TEMPERATURE: 98 F | RESPIRATION RATE: 18 BRPM | HEART RATE: 60 BPM | DIASTOLIC BLOOD PRESSURE: 78 MMHG | WEIGHT: 165 LBS | BODY MASS INDEX: 29.23 KG/M2 | SYSTOLIC BLOOD PRESSURE: 138 MMHG | HEIGHT: 63 IN

## 2019-07-23 DIAGNOSIS — G25.81 RESTLESS LEGS: ICD-10-CM

## 2019-07-23 DIAGNOSIS — G47.33 OSA (OBSTRUCTIVE SLEEP APNEA): ICD-10-CM

## 2019-07-23 DIAGNOSIS — J45.21 MILD INTERMITTENT ASTHMATIC BRONCHITIS WITH ACUTE EXACERBATION: ICD-10-CM

## 2019-07-23 DIAGNOSIS — I27.20 PULMONARY HYPERTENSION: ICD-10-CM

## 2019-07-23 DIAGNOSIS — E78.5 HYPERLIPIDEMIA, UNSPECIFIED HYPERLIPIDEMIA TYPE: ICD-10-CM

## 2019-07-23 DIAGNOSIS — I10 ESSENTIAL HYPERTENSION: ICD-10-CM

## 2019-07-23 DIAGNOSIS — R53.83 FATIGUE, UNSPECIFIED TYPE: ICD-10-CM

## 2019-07-23 DIAGNOSIS — I25.10 ATHEROSCLEROSIS OF NATIVE CORONARY ARTERY OF NATIVE HEART WITHOUT ANGINA PECTORIS: ICD-10-CM

## 2019-07-23 DIAGNOSIS — Z00.00 ANNUAL PHYSICAL EXAM: Primary | ICD-10-CM

## 2019-07-23 DIAGNOSIS — I65.23 CAROTID STENOSIS, BILATERAL: ICD-10-CM

## 2019-07-23 DIAGNOSIS — Z23 NEED FOR PNEUMOCOCCAL VACCINE: ICD-10-CM

## 2019-07-23 DIAGNOSIS — H81.09 MENIERE'S DISEASE, UNSPECIFIED LATERALITY: ICD-10-CM

## 2019-07-23 DIAGNOSIS — K21.9 GASTROESOPHAGEAL REFLUX DISEASE WITHOUT ESOPHAGITIS: ICD-10-CM

## 2019-07-23 DIAGNOSIS — E07.9 LUMP IN THYROID: ICD-10-CM

## 2019-07-23 DIAGNOSIS — R93.1 AGATSTON CORONARY ARTERY CALCIUM SCORE GREATER THAN 400: ICD-10-CM

## 2019-07-23 PROCEDURE — 90670 PNEUMOCOCCAL CONJUGATE VACCINE 13-VALENT LESS THAN 5YO & GREATER THAN: ICD-10-PCS | Mod: HCNC,S$GLB,, | Performed by: INTERNAL MEDICINE

## 2019-07-23 PROCEDURE — 90670 PCV13 VACCINE IM: CPT | Mod: HCNC,S$GLB,, | Performed by: INTERNAL MEDICINE

## 2019-07-23 PROCEDURE — 99499 UNLISTED E&M SERVICE: CPT | Mod: HCNC,S$GLB,, | Performed by: INTERNAL MEDICINE

## 2019-07-23 PROCEDURE — 3078F DIAST BP <80 MM HG: CPT | Mod: HCNC,CPTII,S$GLB, | Performed by: INTERNAL MEDICINE

## 2019-07-23 PROCEDURE — 3075F SYST BP GE 130 - 139MM HG: CPT | Mod: HCNC,CPTII,S$GLB, | Performed by: INTERNAL MEDICINE

## 2019-07-23 PROCEDURE — 99397 PR PREVENTIVE VISIT,EST,65 & OVER: ICD-10-PCS | Mod: HCNC,25,S$GLB, | Performed by: INTERNAL MEDICINE

## 2019-07-23 PROCEDURE — 3078F PR MOST RECENT DIASTOLIC BLOOD PRESSURE < 80 MM HG: ICD-10-PCS | Mod: HCNC,CPTII,S$GLB, | Performed by: INTERNAL MEDICINE

## 2019-07-23 PROCEDURE — 99999 PR PBB SHADOW E&M-EST. PATIENT-LVL IV: ICD-10-PCS | Mod: PBBFAC,HCNC,, | Performed by: INTERNAL MEDICINE

## 2019-07-23 PROCEDURE — 99499 RISK ADDL DX/OHS AUDIT: ICD-10-PCS | Mod: HCNC,S$GLB,, | Performed by: INTERNAL MEDICINE

## 2019-07-23 PROCEDURE — G0009 PNEUMOCOCCAL CONJUGATE VACCINE 13-VALENT LESS THAN 5YO & GREATER THAN: ICD-10-PCS | Mod: HCNC,S$GLB,, | Performed by: INTERNAL MEDICINE

## 2019-07-23 PROCEDURE — 99397 PER PM REEVAL EST PAT 65+ YR: CPT | Mod: HCNC,25,S$GLB, | Performed by: INTERNAL MEDICINE

## 2019-07-23 PROCEDURE — 3075F PR MOST RECENT SYSTOLIC BLOOD PRESS GE 130-139MM HG: ICD-10-PCS | Mod: HCNC,CPTII,S$GLB, | Performed by: INTERNAL MEDICINE

## 2019-07-23 PROCEDURE — 99999 PR PBB SHADOW E&M-EST. PATIENT-LVL IV: CPT | Mod: PBBFAC,HCNC,, | Performed by: INTERNAL MEDICINE

## 2019-07-23 PROCEDURE — G0009 ADMIN PNEUMOCOCCAL VACCINE: HCPCS | Mod: HCNC,S$GLB,, | Performed by: INTERNAL MEDICINE

## 2019-07-24 ENCOUNTER — HOSPITAL ENCOUNTER (OUTPATIENT)
Dept: RADIOLOGY | Facility: HOSPITAL | Age: 79
Discharge: HOME | End: 2019-07-24
Attending: INTERNAL MEDICINE
Payer: MEDICARE

## 2019-07-24 DIAGNOSIS — I65.23 CAROTID STENOSIS, BILATERAL: ICD-10-CM

## 2019-07-24 DIAGNOSIS — E07.9 LUMP IN THYROID: ICD-10-CM

## 2019-07-24 PROCEDURE — 76536 US EXAM OF HEAD AND NECK: CPT | Mod: TC,HCNC,PO

## 2019-07-24 PROCEDURE — 93880 EXTRACRANIAL BILAT STUDY: CPT | Mod: TC,HCNC,PO

## 2019-07-24 NOTE — PROGRESS NOTES
Subjective:       Patient ID: Maryann Sorenson is a 78 y.o. female.    Chief Complaint: Annual Exam  HISTORY OF PRESENT ILLNESS:  This is a 78-year-old white female well known to   me, coming in for an annual review and she has had some problems over the last   year.  In May while she was in [place] on May 19th, she developed bronchitis.    She used her daughter's inhaler for asthma, ended up going to Urgent Care in   [place] because she had palpitations for 3 minutes and they were unable to   discover anything with EKG and workup and told to discontinue the inhaler.  She   had a recurrence of the same thing, saw Dr. Fitch on July 1st and he treated   her with shots and antibiotics and she cleared after 10 days.  She, however, is   still having some symptoms related to it, though she feels like she is improving   slowly.  She has seen GI, Dr. Pop who diagnosed her having GERD and had a   procedure for that.  She also sees Dr. Recinos for sleep, Dr. Gavin for   cardiac oversight and Dr. Wiedemann for Gynecology.  The patient had blood work   done, which was reviewed with her showing normal urine and thyroid.  She has a   mild anemia, which she has had before and her white count was slightly low.  I   told her that might be from her recent infection.  Lipids were normal.    Chemistries showed a slightly low-sodium and potassium is normal.  She has had   trouble with low sodiums in the past.  This is higher than it had been four   months ago.    PHYSICAL EXAMINATION:  VITAL SIGNS:  Normal.  SKIN:  Fair and healthy without rash.  HEENT:  Shows some wax, but no obstruction in her ears, otherwise clear.  NECK:  Shows no adenopathy, thyroid enlargement, or bruit, though she had   enlarged thyroid in the past.  CHEST:  Clear with good breath sounds.  HEART:  There is no murmur or gallop.  ABDOMEN:  Nontender without any organomegaly.  EXTREMITIES:  Show no edema.  She has had some trouble with arthritis in her    knees.  There are no inflammatory changes now.  NEUROLOGIC:  Appears normal.    IMPRESSION:  1.  Hypertension, controlled.  2.  Carotid stenosis, bilateral moderate.  3.  Coronary atherosclerosis, followed by Cardiology.  4.  History of lumbar radiculopathy, currently not giving her trouble.  5.  Cervical disk disease, followed by neurologist, Dr. Smith.  6.  Sleep disorder with restless leg and sleep apnea.  7.  Recurrent cerumen impaction in her ears seen by her ENT, Dr. Fitch.  8.  Cystic thyroid disease with normal function.  9.  Recent upper respiratory infections, recurrent over the last two months,   which appear to be improving.  I have done no additional treatment.  The patient   needed updating them on her vaccines.  She was given a Prevnar.  She did not   want to take the shingles vaccine.  I ordered an ultrasound on her thyroid,   which has been done and shows several nodules, largest is in the right lobe and   she has one in the left lobe that looks vascular, so they recommended a nuclear   thyroid, which I will order.      JTAMANNA/GALEN  dd: 07/30/2019 16:19:57 (CDT)  td: 07/31/2019 03:54:40 (CDT)  Doc ID   #[id number]  Job ID #[id number]    CC:     Dict [id number]  HPI  Review of Systems   Constitutional: Negative.    HENT: Negative for congestion, hearing loss, sinus pressure, sneezing, sore throat and voice change.    Eyes: Negative for visual disturbance.   Respiratory: Positive for cough and shortness of breath. Negative for chest tightness.    Cardiovascular: Negative.  Negative for chest pain, palpitations and leg swelling.   Gastrointestinal: Negative.    Genitourinary: Negative for difficulty urinating, dysuria, flank pain, frequency, hematuria, menstrual problem, pelvic pain, urgency, vaginal bleeding and vaginal discharge.   Musculoskeletal: Negative.  Negative for neck pain and neck stiffness.   Skin: Negative.    Neurological: Negative.  Negative for dizziness, seizures, light-headedness, numbness  and headaches.   Psychiatric/Behavioral: Negative for agitation, behavioral problems, confusion and sleep disturbance. The patient is not nervous/anxious.        Objective:      Physical Exam   Constitutional: She is oriented to person, place, and time. She appears well-developed and well-nourished.   Eyes: Pupils are equal, round, and reactive to light. EOM are normal.   Neck: Normal range of motion. Neck supple. No JVD present. No thyromegaly present.   Cardiovascular: Normal rate, regular rhythm, normal heart sounds and intact distal pulses. Exam reveals no gallop.   No murmur heard.  Pulmonary/Chest: Breath sounds normal. She has no wheezes. She has no rales.   Abdominal: Soft. Bowel sounds are normal. She exhibits no mass. There is no tenderness.   Musculoskeletal: Normal range of motion.   Lymphadenopathy:     She has no cervical adenopathy.   Neurological: She is alert and oriented to person, place, and time. She has normal reflexes. No cranial nerve deficit.   Skin: No rash noted. No erythema.   Psychiatric: She has a normal mood and affect. Judgment normal.       Assessment:       1. Annual physical exam    2. Mild intermittent asthmatic bronchitis with acute exacerbation    3. Essential hypertension    4. Agatston coronary artery calcium score greater than 400 - 577    5. Atherosclerosis of native coronary artery of native heart without angina pectoris    6. Carotid stenosis, bilateral    7. NOE (obstructive sleep apnea)    8. Restless legs    9. Pulmonary hypertension    10. Hyperlipidemia, unspecified hyperlipidemia type    11. Fatigue, unspecified type    12. Gastroesophageal reflux disease without esophagitis    13. Meniere's disease, unspecified laterality    14. Lump in thyroid    15. Need for pneumococcal vaccine        Plan:

## 2019-07-25 ENCOUNTER — TELEPHONE (OUTPATIENT)
Dept: INTERNAL MEDICINE | Facility: CLINIC | Age: 79
End: 2019-07-25

## 2019-07-25 NOTE — TELEPHONE ENCOUNTER
I discssed msg with  .  He said continue doing what I already recommended and call if secretions continue to worsen.    Patient advised and she expressed understanding.    I told her she does not need another pneumonia vaccine.  Has had both available.   I added prevnar to allergy list.

## 2019-07-25 NOTE — TELEPHONE ENCOUNTER
Phone staff tried to connect her to triage staff but she was disconnected .    I spoke to patient. She thinks she is having reaction to pneumonia vaccine given Tuesday..    Arm red and swollen and hurts.  Pain at 2 today. She says she Hurts all over and temp 99 .  Arm doesn't hurt as much as yesterday.    All this started yesterday. Yesterday took advil and did help.     Secretions are getting yellow again and wondering if should get antibiotic.     Please advise?  Thanks chris      ( I told her to take another round of advil this am and apply ice to injection site, 20 min off , 20 min on for 2 hours)

## 2019-07-30 ENCOUNTER — TELEPHONE (OUTPATIENT)
Dept: INTERNAL MEDICINE | Facility: CLINIC | Age: 79
End: 2019-07-30

## 2019-07-30 DIAGNOSIS — E07.9 LUMP IN THYROID: Primary | ICD-10-CM

## 2019-07-31 NOTE — TELEPHONE ENCOUNTER
I called patient.  She says she had this done already 3/15/19.    See nuclear study done in Media tab of chart review  Dated 3/15/18.    The last ultrasound of thyroid with us was 12/28/17.    Does she need test repeated thru dr guerrero/ cheryl?    Please advise.

## 2019-07-31 NOTE — TELEPHONE ENCOUNTER
Read over the study and would like to send copy of the study to the reader of her recent u/s here for them to review and decide if she needs further evaluation of her thyroid

## 2019-08-01 NOTE — TELEPHONE ENCOUNTER
Patient advised.  I faxed film library request for cd to send to us and I told patient i'd call her to  an bring to  for comparison.

## 2019-08-12 ENCOUNTER — TELEPHONE (OUTPATIENT)
Dept: INTERNAL MEDICINE | Facility: CLINIC | Age: 79
End: 2019-08-12

## 2019-08-12 NOTE — TELEPHONE ENCOUNTER
I faxed a request to [email or web address] on 8/1 for cd of last ultrsound of thyroid.   I have not received yet.    I advised patient I will follow up and check on cd.  She wanted to know if dr will just order another nuclear test of thyroid.  I told her i'd check with dr and see.    Dr fonseca does not want to order unless we are sure necessary.    I faxed note to [email or web address] to advise if cd mailed or what is update.     No

## 2019-08-12 NOTE — TELEPHONE ENCOUNTER
----- Message from Olinda Frankel sent at 8/12/2019  1:10 PM CDT -----  Contact: Patient [phone]  Calling regarding the CD of her US that was discussed previously.    Please call and advise.    Thank You

## 2019-08-13 NOTE — TELEPHONE ENCOUNTER
----- Message from Hannah Ag sent at 8/13/2019  2:18 PM CDT -----  Contact: Jane @ [facility] in the [employer] dept [phone]  Ms. Lara would like a call back in regards to her saying that you requested some information from her about the patient and she would like a call back to find out what information you need?

## 2019-08-13 NOTE — TELEPHONE ENCOUNTER
No answer or recorder. I will call wali again in a few.  I reached wali keyes and she will send to us thru inter office imer .  I told patient we don't want her to do a nuclear iodine test unless we have to.     I told her i'd call her once cd is ready for  to bring to er radiology.

## 2019-08-21 NOTE — TELEPHONE ENCOUNTER
I received cd of our ultrasound.    Prepare note for ej to compare cd and print out with their nuc study done 2018 (also printed a copy of that.)    Patient will  and deliver to  2nd floor file room per  radiology.  They will handle request and fax us report.  I gave fax sheet with instructions with what we needed.

## 2019-08-22 ENCOUNTER — TELEPHONE (OUTPATIENT)
Dept: INTERNAL MEDICINE | Facility: CLINIC | Age: 79
End: 2019-08-22

## 2019-08-22 DIAGNOSIS — E07.9 LUMP IN THYROID: Primary | ICD-10-CM

## 2019-08-22 NOTE — TELEPHONE ENCOUNTER
----- Message from Hannah Ag sent at 8/22/2019 11:36 AM CDT -----  Contact: Eric @ [facility] Direct #[phone]   Eric would like a call back in regards to him saying that the ultrasound that the patient had done here at [facility] in July of this year cannot be compared to the one that she had done last year at [facility]. He would like a call back to discuss this with you please.

## 2019-08-22 NOTE — TELEPHONE ENCOUNTER
I finally received the cd of the  thyroid ultrasound and patient brought to  with request to compare to nuclear study done 3/2018.    Tech called and cannot compare ultrasound to nuclear study.  Suggested we compare to previous ultrasound results.    He will mail us cd of nuclear study in case we do a nuclear study repeat to compare it.    dr marian evans-  I have both ultrasound's printed that we have on file.    Want to order nuclear study?

## 2019-09-10 ENCOUNTER — PES CALL (OUTPATIENT)
Dept: ADMINISTRATIVE | Facility: CLINIC | Age: 79
End: 2019-09-10

## 2019-09-18 ENCOUNTER — TELEPHONE (OUTPATIENT)
Dept: OBSTETRICS AND GYNECOLOGY | Facility: CLINIC | Age: 79
End: 2019-09-18

## 2019-09-18 DIAGNOSIS — Z12.31 SCREENING MAMMOGRAM, ENCOUNTER FOR: Primary | ICD-10-CM

## 2019-09-18 NOTE — TELEPHONE ENCOUNTER
----- Message from Estrella Hastings sent at 9/18/2019  2:15 PM CDT -----  Pt is requesting orders for mammo gram      Pt can be reached at [phone]      Thank you!

## 2019-09-24 ENCOUNTER — TELEPHONE (OUTPATIENT)
Dept: INTERNAL MEDICINE | Facility: CLINIC | Age: 79
End: 2019-09-24

## 2019-09-24 NOTE — TELEPHONE ENCOUNTER
I told her you didn't want her to get another nuclear study on this and would re evaluate at next annual in next summer.   She doesn't want to wait and wants to do the nuclear study again.    She wants to have it at [facility] this time. Ok to order?  If ok, see New order pended to correct previous order put in that was incorrect.

## 2019-09-24 NOTE — TELEPHONE ENCOUNTER
Dr fonseca says no need to do nuclear study at this point again.  We will review at yearly visit and proceed from there.

## 2019-09-24 NOTE — TELEPHONE ENCOUNTER
----- Message from Indiana Ocasio sent at 9/24/2019  9:44 AM CDT -----  Contact: Pt [phone]  She wants to know the status of the CD of the US Thyroid.    Thank you

## 2019-09-25 ENCOUNTER — HOSPITAL ENCOUNTER (OUTPATIENT)
Dept: RADIOLOGY | Facility: HOSPITAL | Age: 79
Discharge: HOME | End: 2019-09-25
Attending: OBSTETRICS & GYNECOLOGY
Payer: MEDICARE

## 2019-09-25 DIAGNOSIS — Z12.31 SCREENING MAMMOGRAM, ENCOUNTER FOR: ICD-10-CM

## 2019-09-25 PROCEDURE — 77067 SCR MAMMO BI INCL CAD: CPT | Mod: TC,HCNC,PO

## 2019-09-27 ENCOUNTER — TELEPHONE (OUTPATIENT)
Dept: INTERNAL MEDICINE | Facility: CLINIC | Age: 79
End: 2019-09-27

## 2019-09-27 NOTE — TELEPHONE ENCOUNTER
----- Message from Kerri Connie sent at 9/27/2019  9:22 AM CDT -----  Just to document, as per Geraldine, she is sending a message to Dr. Carlson to see if  Pt needs this test.  I called patient and explained that the wrong order was in and we had to talk to Dr. Carlson and I would call her back next week.  (NM says wrong order - pt needs NM Thyroid Uptake Scan - 2 day test)

## 2019-10-01 ENCOUNTER — TELEPHONE (OUTPATIENT)
Dept: INTERNAL MEDICINE | Facility: CLINIC | Age: 79
End: 2019-10-01

## 2019-10-01 NOTE — TELEPHONE ENCOUNTER
----- Message from Paty Wood sent at 10/1/2019  9:48 AM CDT -----  Contact:    Patient     [phone]  Geraldine, Patient wants to know if  has corrected the orders for the thyroid scan and if someone will be calling her to schedule it.

## 2019-10-01 NOTE — TELEPHONE ENCOUNTER
Dr fonseca just signed the corrected order for the nuclear study and I send msg to germania to schedule asap to  Make patient happy.

## 2019-10-08 ENCOUNTER — HOSPITAL ENCOUNTER (OUTPATIENT)
Dept: RADIOLOGY | Facility: HOSPITAL | Age: 79
Discharge: HOME | End: 2019-10-08
Attending: INTERNAL MEDICINE
Payer: MEDICARE

## 2019-10-08 DIAGNOSIS — E07.9 LUMP IN THYROID: ICD-10-CM

## 2019-10-08 PROCEDURE — 78014 NM THYROID UPTAKE AND SCAN: ICD-10-PCS | Mod: 26,HCNC,, | Performed by: RADIOLOGY

## 2019-10-08 PROCEDURE — A9516 IODINE I-123 SOD IODIDE MIC: HCPCS | Mod: HCNC

## 2019-10-08 PROCEDURE — 78014 THYROID IMAGING W/BLOOD FLOW: CPT | Mod: 26,HCNC,, | Performed by: RADIOLOGY

## 2019-10-09 ENCOUNTER — HOSPITAL ENCOUNTER (OUTPATIENT)
Dept: RADIOLOGY | Facility: HOSPITAL | Age: 79
Discharge: HOME | End: 2019-10-09
Attending: INTERNAL MEDICINE
Payer: MEDICARE

## 2019-10-11 ENCOUNTER — TELEPHONE (OUTPATIENT)
Dept: INTERNAL MEDICINE | Facility: CLINIC | Age: 79
End: 2019-10-11

## 2019-10-23 ENCOUNTER — OFFICE VISIT (OUTPATIENT)
Dept: SLEEP MEDICINE | Facility: CLINIC | Age: 79
End: 2019-10-23
Payer: MEDICARE

## 2019-10-23 VITALS
SYSTOLIC BLOOD PRESSURE: 135 MMHG | HEIGHT: 63 IN | HEART RATE: 65 BPM | WEIGHT: 172 LBS | BODY MASS INDEX: 30.48 KG/M2 | DIASTOLIC BLOOD PRESSURE: 89 MMHG

## 2019-10-23 DIAGNOSIS — R09.81 NASAL CONGESTION: ICD-10-CM

## 2019-10-23 DIAGNOSIS — G47.33 OSA (OBSTRUCTIVE SLEEP APNEA): ICD-10-CM

## 2019-10-23 DIAGNOSIS — E61.1 IRON DEFICIENCY: Primary | ICD-10-CM

## 2019-10-23 DIAGNOSIS — G25.81 RLS (RESTLESS LEGS SYNDROME): ICD-10-CM

## 2019-10-23 DIAGNOSIS — G47.30 SLEEP APNEA, UNSPECIFIED TYPE: ICD-10-CM

## 2019-10-23 PROCEDURE — 99999 PR PBB SHADOW E&M-EST. PATIENT-LVL III: CPT | Mod: PBBFAC,HCNC,, | Performed by: PSYCHIATRY & NEUROLOGY

## 2019-10-23 PROCEDURE — 99999 PR PBB SHADOW E&M-EST. PATIENT-LVL III: ICD-10-PCS | Mod: PBBFAC,HCNC,, | Performed by: PSYCHIATRY & NEUROLOGY

## 2019-10-23 PROCEDURE — 3079F DIAST BP 80-89 MM HG: CPT | Mod: HCNC,CPTII,S$GLB, | Performed by: PSYCHIATRY & NEUROLOGY

## 2019-10-23 PROCEDURE — 3075F SYST BP GE 130 - 139MM HG: CPT | Mod: HCNC,CPTII,S$GLB, | Performed by: PSYCHIATRY & NEUROLOGY

## 2019-10-23 PROCEDURE — 1101F PR PT FALLS ASSESS DOC 0-1 FALLS W/OUT INJ PAST YR: ICD-10-PCS | Mod: HCNC,CPTII,S$GLB, | Performed by: PSYCHIATRY & NEUROLOGY

## 2019-10-23 PROCEDURE — 3079F PR MOST RECENT DIASTOLIC BLOOD PRESSURE 80-89 MM HG: ICD-10-PCS | Mod: HCNC,CPTII,S$GLB, | Performed by: PSYCHIATRY & NEUROLOGY

## 2019-10-23 PROCEDURE — 1101F PT FALLS ASSESS-DOCD LE1/YR: CPT | Mod: HCNC,CPTII,S$GLB, | Performed by: PSYCHIATRY & NEUROLOGY

## 2019-10-23 PROCEDURE — 99214 PR OFFICE/OUTPT VISIT, EST, LEVL IV, 30-39 MIN: ICD-10-PCS | Mod: HCNC,S$GLB,, | Performed by: PSYCHIATRY & NEUROLOGY

## 2019-10-23 PROCEDURE — 3075F PR MOST RECENT SYSTOLIC BLOOD PRESS GE 130-139MM HG: ICD-10-PCS | Mod: HCNC,CPTII,S$GLB, | Performed by: PSYCHIATRY & NEUROLOGY

## 2019-10-23 PROCEDURE — 99214 OFFICE O/P EST MOD 30 MIN: CPT | Mod: HCNC,S$GLB,, | Performed by: PSYCHIATRY & NEUROLOGY

## 2019-10-23 RX ORDER — PRAMIPEXOLE DIHYDROCHLORIDE 0.12 MG/1
TABLET ORAL
Qty: 180 TABLET | Refills: 3 | Status: SHIPPED | OUTPATIENT
Start: 2019-10-23 | End: 2019-10-23 | Stop reason: SDUPTHER

## 2019-10-23 RX ORDER — FLUTICASONE PROPIONATE 50 MCG
SPRAY, SUSPENSION (ML) NASAL
Qty: 48 G | Refills: 3 | Status: SHIPPED | OUTPATIENT
Start: 2019-10-23 | End: 2020-11-30 | Stop reason: SDUPTHER

## 2019-10-23 RX ORDER — PRAMIPEXOLE DIHYDROCHLORIDE 0.12 MG/1
TABLET ORAL
Qty: 180 TABLET | Refills: 3 | Status: SHIPPED | OUTPATIENT
Start: 2019-10-23 | End: 2021-10-06 | Stop reason: SDUPTHER

## 2019-10-23 NOTE — PROGRESS NOTES
INTERVAL HISTORY:      10/23/2019: Maryann Sorenson   Has been still struggling with RLS - only taking one 1.125 mg tablet though. Tried to keep it low. Sometimes feels groggy when taking Mirapex in the middle of the night.  Taking Allegra in AM    Taking Ferrous bisglycineate - not 3 mo yet.   + significant nasal congestion- daily Flonase.   APAP 8-18 cm H2O:  Therapy Event Summary (Last 14 Days)   90% 10 cm H2O    ESS ([place] Sleepiness Scale) 6/24    The patient reports improved sleep continuity and daytime sleepiness on PAP. ESS today is 6/24.  Denies break through snoring. No dry mouth.     Hide      Compliance Summary  Apnea Indices  Ventilator Statistics    Days with Device Usage:  14 days  Average AHI:  4.0  Average Breath Rate:  13.7 bpm    Percentage of Days >=4 Hours:  100.0%  Average OA Index:  0.7  Average % Patient Triggered Breaths:  N/A    Average Usage (Days Used):  7 hrs. 40 mins. 10 secs.  Average CA Index:  0.8  Average Tidal Volume:  313.0 ml    Average Usage (All Days):  7 hrs. 40 mins. 10 secs.    Average Minute Vent:  N/A        Large Leak  Periodic Breathing     Average Time in Large Leak:  0 secs.  Average % of Night in PB:  0.8%     Average % of Night in Large Leak:  0.0%                     10/12/2016  The patient has not presented any new complaints since the previous visit.   Comes to discuss PSG. Very poor sleep efficiency could have caused underestimation of test results (positive to mild to moderate sleep apnea). Iron test was normal.   Nature's wellness oil at the base of the spine - using for RLS - helps at night - never tried it in the evening.     05/24/2017: Maryann Sorenson comes back to talk about treatment options. Prefers CPAP with nasal pillows mask. Pros and cons were discussed.     07/20/2017: Started using CPAP in June. Passed out in her bedroom-> resulted in ankle sprain.  Has to get up in the middle of the night to the bathroom - now has to turn the light on for  the sake of CPAP - now that she is wide awake -> RLS acts up.     Therapy Event Summary Date Range: 6/20/2017 - 7/19/2017     Doing well with Dreamwear mask.     ESS 9/24.    Hide      Compliance Summary  Apnea Indices  Ventilator Statistics    Days with Device Usage:  30 days  Average AHI:  1.1  Average Breath Rate:  16.5 bpm    Percentage of Days >=4 Hours:  90.0%  Average OA Index:  0.1  Average % Patient Triggered Breaths:  N/A    Average Usage (Days Used):  4 hrs. 34 mins. 42 secs.  Average CA Index:  0.1  Average Tidal Volume:  417.1 ml    Average Usage (All Days):  4 hrs. 34 mins. 42 secs.    Average Minute Vent:  N/A        Large Leak  Periodic Breathing     Average Time in Large Leak:  0 secs.  Average % of Night in PB:  0.3%     Average % of Night in Large Leak:  0.0%            \  10/20/2017:  Therapy Event Summary Date Range: 9/20/2017 - 10/19/2017   Doing better since pressure increase. Resports benefit from CPAP use, but still significantly interrupted sleep, and residual fatigue/sleepiness.    APAP90% pressure 8  Using ramp at 5.    Mirapex  0.125 mg- still controlling RLS well.      Hide      Compliance Summary  Apnea Indices  Ventilator Statistics    Days with Device Usage:  30 days  Average AHI:  4.9  Average Breath Rate:  13.7 bpm    Percentage of Days >=4 Hours:  100.0%  Average OA Index:  0.6  Average % Patient Triggered Breaths:  N/A    Average Usage (Days Used):  7 hrs. 34 mins. 49 secs.  Average CA Index:  1.1  Average Tidal Volume:  316.8 ml    Average Usage (All Days):  7 hrs. 34 mins. 49 secs.    Average Minute Vent:  N/A        Large Leak  Periodic Breathing     Average Time in Large Leak:  0 secs.  Average % of Night in PB:  1.0%     Average % of Night in Large Leak:  0.0%              01/08/2018    CPAP 8-18; not using ramp lately. 90% 9 cm  Now can adjust CPAP in the dark. Most nights were good nights.   Still fatigued; anemia not controlled 0- not tolerating iron supplements.  Bedroom  "is very dark.  Nocturia and joint pain plays a role.    RLS is well controlled on Mirapex.    T is 64%. It is dark in the bedroom. Godd sleep hygiene.     Santy is 'stuck" in one position with CPAP.    Believes she "slept better before CPAP. Needs to readjust the mask.     She has failed REM Fresh Melatonin in terms of sleep continuity and not feeling more refreshed in AM    She has significant anemia (Hb 10), yet won't take iron due to its constipating effects.     Taking magnesium lunch time. Taking Fish oil and Glucosamine for joint pain, yet it seems that her arthritic pain is not well controlled and may contribute to frequent arousals (noted on airflow channel from her CPAP download).  She is trying to avoid regular NSAID or Tylenol intake.   She has not really tried CBTI ruma that I have installed on her phone last time yet, since she wants to "keep it simple:    Therapy Event Summary Date Range: 12/8/2017 - 1/6/2018     Hide      Compliance Summary  Apnea Indices  Ventilator Statistics    Days with Device Usage:  30 days  Average AHI:  3.7  Average Breath Rate:  13.7 bpm    Percentage of Days >=4 Hours:  100.0%  Average OA Index:  0.6  Average % Patient Triggered Breaths:  N/A    Average Usage (Days Used):  7 hrs. 39 mins. 1 secs.  Average CA Index:  0.9  Average Tidal Volume:  324.3 ml    Average Usage (All Days):  7 hrs. 39 mins. 1 secs.    Average Minute Vent:  N/A        Large Leak  Periodic Breathing     Average Time in Large Leak:  0 secs.  Average % of Night in PB:  1.0%     Average % of Night in Large Leak:  0.0%              09/26/2018:    Mirapex is controlling RLS well on Mirapex.    CPAP 8-18  90% 9-10  Cm    Denies sleep interruptions from mask leak  A lot of issues with arthritis with some effect on sleep  Taking Flucosamine, Magnesium - helps her sleep.    EPWORTH SLEEPINESS SCALE 9/20/2018   Sitting and reading 1   Watching TV 1   Sitting, inactive in a public place (e.g. a theatre or a meeting) 0 "   As a passenger in a car for an hour without a break 2   Lying down to rest in the afternoon when circumstances permit 1   Sitting and talking to someone 0   Sitting quietly after a lunch without alcohol 1   In a car, while stopped for a few minutes in traffic 0   Total score 6     Therapy Event Summary  Date Range: 3/20/2018 - 9/15/2018    Hide            Compliance Summary  Apnea Indices  Ventilator Statistics       Days with Device Usage:  178 days  Average AHI:  2.7  Average Breath Rate:  13.6 bpm       Percentage of Days >=4 Hours:  98.9%  Average OA Index:  0.5  Average % Patient Triggered Breaths:  N/A       Average Usage (Days Used):  7 hrs. 31 mins. 47 secs.  Average CA Index:  0.6  Average Tidal Volume:  305.2 ml       Average Usage (All Days):  7 hrs. 26 mins. 46 secs.    Average Minute Vent:  N/A              Large Leak  Periodic Breathing        Average Time in Large Leak:  0 secs.  Average % of Night in PB:  0.4%        Average % of Night in Large Leak:  0.0%                           04/08/2019  Since the last visit had URI in January.  Reports more difficulty falling and staying asleep, nocturia, at times RLS in the middle of the night.  Still not taking iron (did not try organic form)    However was sleeping better over the last several days.  Also reports SOB for a long time - especially at night.  Trouble getting situated positionally.    Stopped using ramp for months ago.  Using Dreamwear mask. She is not aware of significant leak, but is aware of wearing a mask.    APAP 6-16 cm H2O  90% 9    Occasional CA and PB reported  She states that objectively she is not feeling better with CPAP - slept better before.  No TV in her bedroom  No caffeine- but at times decaf during the day way before 4 PM. No cokes, no tea.    Trying to get back to exercise.     Her bedroom is Dark; clock is blocked.      Taking Mirapex - if accidentally taken 2 Mirapex - was very sleepy.    At times left side nose bleed for  the last 3 weeks    Therapy Event Summary Date Range: 3/1/2019 - 3/15/2019     Has been using Allegra.    Hide      Compliance Summary  Apnea Indices  Ventilator Statistics    Days with Device Usage:  15 days  Average AHI:  4.0  Average Breath Rate:  13.6 bpm    Percentage of Days >=4 Hours:  100.0%  Average OA Index:  0.6  Average % Patient Triggered Breaths:  N/A    Average Usage (Days Used):  7 hrs. 57 mins. 45 secs.  Average CA Index:  1.2  Average Tidal Volume:  306.3 ml    Average Usage (All Days):  7 hrs. 57 mins. 45 secs.    Average Minute Vent:  N/A        Large Leak  Periodic Breathing     Average Time in Large Leak:  0 secs.  Average % of Night in PB:  0.8%     Average % of Night in Large Leak:  0.0%                07/22/2019: Maryann Sorenson reports very frequent URI this season - washes her machine and supplies religiously with  Vinegar.  Mirapex - 0.125 mg . 2 pills  Make her very groggy.  Using leg calming wellness oil on her lower back.  Still not using iron supplementation - H&H dropped more since 1/19.  APAP 8-18   90% 9.2   AHI - may be spurious due to more interrupted sleep    Therapy Event Summary (Last 14 Days)     Hide      Compliance Summary  Apnea Indices  Ventilator Statistics    Days with Device Usage:  14 days  Average AHI:  5.3  Average Breath Rate:  13.4 bpm    Percentage of Days >=4 Hours:  100.0%  Average OA Index:  1.2  Average % Patient Triggered Breaths:  N/A    Average Usage (Days Used):  7 hrs. 48 mins. 8 secs.  Average CA Index:  1.4  Average Tidal Volume:  288.4 ml    Average Usage (All Days):  7 hrs. 48 mins. 8 secs.    Average Minute Vent:  N/A        Large Leak  Periodic Breathing     Average Time in Large Leak:  0 secs.  Average % of Night in PB:  0.8%     Average % of Night in Large Leak:  0.0%                08/11/2016 INITIAL HISTORY OF PRESENT ILLNESS:  Maryann Sorenson is a 78 y.o. female is here to be evaluated for a sleep disorder.       CHIEF COMPLAINT:      The  patient's complaints include excessive daytime fatigue, snoring,  witnessed breathing pauses,  gasping for air in sleep and interrupted sleep since  Over 10 years ago.    Being evaluated by Dr. Early for pulmonary HTN.     Reports  dry mouth and sore throat  Denies nasal congestion   Denies  morning headaches  Reports occasional  interrupted sleep  Reports frequent leg movements - only when watching TV. Urge to move her legs. Got it from her father.   Denies symptoms concerning for parasomnia    The ESS ([place] Sleepiness Score) taken on initial visit is 6 /24    Bedroom is dark and quiet.    FH: father and 2 sisters - RLS.      SLEEP ROUTINE AND LIFESTYLE 10/23/2019 :    Occupation: retired    Bed partner:  - sleeps in another room.     Time to bed: 11  Sleep onset latency: 5-10 min  Disruptions or awakenings: 1-2  Time to fall back into sleep: 5 min   Wakeup time: 7 AM   Perceived sleep quality: 3/5  Perceived total sleep time:  7.5  hours.  Daytime naps: sometimes not intentional  Weekend sleep routine: till 7:10   Exercise routine: no - lost routine since her trip to [place].  Caffeine: usually decaf     PREVIOUS SLEEP STUDIES:     PSG 8/25/16: Significant Obstructive sleep apnea (NOE) with AHI (apnea hypopnea Index) of 8.9 and SaO2 of 87% (weight  161 lbs).RDI 15.    DME:       PAST MEDICAL HISTORY:    Active Ambulatory Problems     Diagnosis Date Noted    Meniere's disease 03/07/2010    Mixed hyperlipidemia     Agatston coronary artery calcium score greater than 400 - 577 10/23/2013    Gastroesophageal reflux disease without esophagitis 10/07/2014    Asthmatic bronchitis with acute exacerbation 03/19/2015    Bacterial sinusitis 03/19/2015    Shortness of breath 04/15/2015    Myalgia 06/29/2015    Back pain of thoracolumbar region 03/08/2016    Essential hypertension 04/21/2016    Pulmonary hypertension 05/03/2016    NOE (obstructive sleep apnea)     Atherosclerosis of native coronary  "artery of native heart without angina pectoris 05/30/2017    Carotid stenosis, bilateral 06/29/2017    Restless legs 06/29/2017    Neck muscle spasm 06/29/2017    Fatigue 12/27/2017    Lumbar radiculitis 05/11/2018    Cervical disc disease 06/18/2018    Calcific coronary arteriosclerosis 07/02/2018    Chronic pulmonary heart disease     Dizziness 12/12/2018     Resolved Ambulatory Problems     Diagnosis Date Noted    Annual physical exam 06/20/2013    Fullness in head 10/14/2013    Headache, occipital 10/24/2013    Cast discomfort 06/21/2014    Pre-operative examination for internal medicine 06/24/2014    Dysphagia 10/07/2014    Sore throat 10/07/2014    Syncope 06/29/2017    Post-traumatic headache, not intractable 06/29/2017     Past Medical History:   Diagnosis Date    CAD (coronary artery disease) 11/1/2012    GERD (gastroesophageal reflux disease)     Hyperlipidemia     Hypertension                 PAST SURGICAL HISTORY:    Past Surgical History:   Procedure Laterality Date    broken wrist      "put plate in it"    DILATION AND CURETTAGE OF UTERUS      excision of lipoma Left 2009    near collar bone    tonsillectomy      TONSILLECTOMY           FAMILY HISTORY:                Family History   Problem Relation Age of Onset    Cancer Mother     Colon cancer Mother     Hyperlipidemia Mother     Hypertension Mother     Heart disease Father     Heart attack Neg Hx     Heart failure Neg Hx     Stroke Neg Hx        SOCIAL HISTORY:          Tobacco:   Social History     Tobacco Use   Smoking Status Never Smoker   Smokeless Tobacco Never Used       alcohol use:    Social History     Substance and Sexual Activity   Alcohol Use Yes    Alcohol/week: 0.0 standard drinks    Comment: occasionally/ not weekly                   ALLERGIES:    Allergies   Allergen Reactions    Augmentin [Amoxicillin-Pot Clavulanate] Diarrhea     Given march 2016    Codeine      Other reaction(s): Unknown, " tolerates hydrocodone june 2014    Doxycycline      Other reaction(s): Unknown    Lyrica [Pregabalin] Other (See Comments)     Eye irritation    Relafen [Nabumetone] Diarrhea    Sulfa Dyne      Other reaction(s): Unknown       CURRENT MEDICATIONS:    Current Outpatient Medications   Medication Sig Dispense Refill    aspirin 81 mg Tab Take 1 tablet by mouth Daily. 1 Tablet Oral Every day      calcium carbonate-vitamin D3 (CALTRATE 600 + D) 600 mg (1,500 mg)-800 unit Chew Take 1 tablet by mouth once.      coenzyme Q10 (CO Q-10) 200 mg capsule Take 1 capsule by mouth Daily. 1 Capsule Oral Every day      diazepam (VALIUM) 2 MG tablet Take 1 tablet by mouth as needed. 1 Tablet Oral .  For dizziness      esomeprazole (NEXIUM) 40 MG capsule 1 Capsule, Delayed Release(E.C.) Oral Every day 90 capsule 3    fexofenadine (ALLEGRA) 180 MG tablet Take 1 tablet by mouth Daily. 1 Tablet Oral Every day      fluticasone (FLONASE) 50 mcg/actuation nasal spray 2 sprays by Each Nare route once daily.      GLUCOSAMINE SULFATE ORAL Take 1,000 mg by mouth Daily. 1 Capsule Oral Every day      guaifenesin (MUCINEX) 600 mg 12 hr tablet Take 1,200 mg by mouth as needed.       ibuprofen (ADVIL,MOTRIN) 200 MG tablet Take 400 mg by mouth every 8 (eight) hours as needed for Pain.      LACTOBACILLUS RHAMNOSUS GG (PROBIOTIC ORAL) Take 1 capsule by mouth Daily. 1 Capsule Oral Every day      losartan (COZAAR) 50 MG tablet Take 1 tablet (50 mg total) by mouth once daily. 90 tablet 3    lutein 6 mg Cap Take 6 mg by mouth once daily at 6am.       magnesium 250 mg Tab Take 250 mg by mouth once daily.       omega-3 fatty acids-vitamin E (FISH OIL) 1,000 mg Cap Take 1 capsule by mouth once daily.       pramipexole (MIRAPEX) 0.125 MG tablet Take 1 pill PO QHS for RLS - 90 days supply 90 tablet 3    rosuvastatin (CRESTOR) 40 MG Tab Take 1 tablet (40 mg total) by mouth once daily. 90 tablet 3    FLUZONE HIGH-DOSE 2019-20, PF, 180 mcg/0.5  "mL Syrg        No current facility-administered medications for this visit.                       REVIEW OF SYSTEMS:   Sleep related symptoms as per HPI    denies weight gain  Reports dyspnea  Denies palpitations  Reports acid reflux   Reports polyuria 3-4-> now better on low salt diet.  Denies  mood diturbance  Denies  anemia  Reports occasional  muscle pain  Denies  Gait imbalance    Otherwise, a balance of 10 systems reviewed is negative.    PHYSICAL EXAM:  /89 (BP Location: Left arm, Patient Position: Sitting, BP Method: Large (Automatic))   Pulse 65   Ht 5' 3" (1.6 m)   Wt 78 kg (172 lb)   LMP  (LMP Unknown)   BMI 30.47 kg/m²   GENERAL: Overweight body habitus, well groomed.  HEENT:   HEENT:  Conjunctivae are non-erythematous; Pupils equal, round, and reactive to light; Nose is symmetrical; Nasal mucosa is pink and moist; Septum is midline; Inferior turbinates are hypertrophied; Nasal airflow is normal; Posterior pharynx is pink; Modified Mallampati:II-III; Posterior palate is low; Tonsils not visualized; Uvula is normal and pink;Tongue is enlarged; Dentition is fair; No TMJ tenderness; Jaw opening and protrusion without click and without discomfort.  NECK: Supple. Neck circumference is 16 inches. No thyromegaly. No palpable nodes.     SKIN: On face and neck: No abrasions, no rashes, no lesions.  No subcutaneous nodules are palpable.  RESPIRATORY: Chest is clear to auscultation.  Normal chest expansion and non-labored breathing at rest.  CARDIOVASCULAR: Normal S1, S2.  No murmurs, gallops or rubs. No carotid bruits bilaterally.  No edema. No clubbing. No cyanosis.    NEURO: Oriented to time, place and person. Normal attention span and concentration. Gait normal.    PSYCH: Affect is full. Mood is normal  MUSCULOSKELETAL: Moves 4 extremities. Gait normal.       Using [facility]:       ASSESSMENT:    1. NOE - moderate by RDI. Poor sleep efficiency on the nioght of the study could have led to " "underestimation of NOE severity.  The patient symptomatically has  excessive daytime sleepiness, snoring,  witnessed breathing pauses, excessive daytime fatigue, gasping for air in sleep and interrupted sleep  with exam findings of "a crowded oral airway and elevated body mass index. The patient has medical co-morbidities of CAD and hyperlipidemia,  which can be worsened by NOE. This warrants treatment. Benefiting from CPAP use in terms of sleep continuity and daytime sleepiness. Remains compliant .  NOE is well controlled with minimal mask leak as per CPAP download and she is doing sufficient hours.  Increased AHI - most likely duw to more interrupted sleep secondaary to RLS    2. Interrupted, nonrefreshing  sleep and residual fatigue despite compliant and effective CPAP use.         Reports still interrupted sleep and residual sleepiness - despite good AHI control. May be due to pain interrupting her sleep and feeling limited in 1 sleep position, since mask leaks when she turns.          It seems that her arthritic pain is not well controlled and may contribute to frequent arousals (noted on airflow channel from her CPAP download).  She has not really tried CBTI ruma that I have installed on her phone last time yet, since she wants to "keep it simple". She has very significant anemia, yet won't take iron supplement due to GI side effects.  She has a good overall sleep hygiene.    2. RLS - mild symptoms. Well controlled on low dose Mirapex 0.125 mg QHS. Insufficient effect, but wont tolerate more Mirapex/      PLAN:    Add Gabapentin 100 mg - in the evening and bedtime for RLS in addition to Mirapex 0.125 mg.    Keep CPAP to 8-18 H2O  Will continue Mirapex 0.125 mg  Continue Magnesium.    TRY IRON      Following recommendations were given in the AVS:     Try ferrous bis glycinate - organic form of iron.  I will ad Gabapentin 10 mg  - you can take 1 pill before bed and second pill at bedtime.  Continue Mirapex 0.125 " mg.    Keep up great job with CPAP.     ----------------------------------------------------      Education: During our discussion today, we talked about the etiology of obstructive sleep apnea as well as the potential ramifications of untreated sleep apnea, which could include daytime sleepiness, hypertension, heart disease and/or stroke.      We discussed potential treatment options, which could include weight loss, body positioning, continuous positive airway pressure (CPAP), or referral for surgical consideration. The patient preferred CPAP option.     Discussed purpose of PAP therapy, health benefits of CPAP, as well as the potential ramifications of untreated sleep apnea, which could include daytime sleepiness, hypertension, heart disease and/or stroke. An AHI of 15 is associated with increased risk CVD.     The patient should avoid ETOH and sedatives at night, as it tends to aggravate NOE. Regular replacement of CPAP mask, tubing and filter was recommended.    Precautions: The patient was advised to abstain from driving should he feel sleepy or drowsy.    Follow up: MD/NP after 1 month of PAP use.    Thank you for allowing me the opportunity to participate in the care of your patient.

## 2019-11-11 ENCOUNTER — TELEPHONE (OUTPATIENT)
Dept: INTERNAL MEDICINE | Facility: CLINIC | Age: 79
End: 2019-11-11

## 2019-11-11 ENCOUNTER — TELEPHONE (OUTPATIENT)
Dept: CARDIOLOGY | Facility: CLINIC | Age: 79
End: 2019-11-11

## 2019-11-11 DIAGNOSIS — I50.42 CHRONIC COMBINED SYSTOLIC AND DIASTOLIC HEART FAILURE: ICD-10-CM

## 2019-11-11 DIAGNOSIS — I10 ESSENTIAL HYPERTENSION: Primary | ICD-10-CM

## 2019-11-11 DIAGNOSIS — E04.2 MULTIPLE THYROID NODULES: Primary | ICD-10-CM

## 2019-11-11 DIAGNOSIS — E78.2 MIXED HYPERLIPIDEMIA: ICD-10-CM

## 2019-11-11 NOTE — TELEPHONE ENCOUNTER
----- Message from Zina Gordon sent at 11/11/2019  3:11 PM CST -----  Contact: self/[phone]/ [phone]  Pt is calling to speak with someone in the office in regards to her thyroid scan she had done in October. Please call and advise.        Thank You

## 2019-11-11 NOTE — TELEPHONE ENCOUNTER
I spoke w/pt regarding appointment she scheduled 2/2020 for sob. Pt stated that she is still sob and it has not gotten any better since her last appointment w/you. Pt rescheduled the appointment 2/20/20 to 1/2020 and I placed her on the wait list. Do you want pt to wait unitl 1/2020 to be evaluated again for sob? Please advise.

## 2019-11-12 NOTE — TELEPHONE ENCOUNTER
Pt notified, verbalized understanding. Pt declined to be seen at [facility] and does not want to see NP. Pt scheduled appointment w/Dr. Novoa on 11/26/19.

## 2019-11-13 NOTE — TELEPHONE ENCOUNTER
Results :  Subtle scalloping along the lateral aspect of the mid left lobe which could represent underlying hypofunctioning nodule and may correspond with 1.2 cm nodule on recent ultrasound.    Normal 24 radioiodine thyroid uptake with probable pyramidal lobe.    I, Dean Rider MD, attest that I reviewed and interpreted the images.      Patient has called 3 times for these results.  Please let me know what I can tell her or call her yourself.    Cell [phone],   Phone     Thanks jordon

## 2019-11-19 ENCOUNTER — PATIENT MESSAGE (OUTPATIENT)
Dept: INTERNAL MEDICINE | Facility: CLINIC | Age: 79
End: 2019-11-19

## 2019-11-19 NOTE — TELEPHONE ENCOUNTER
Now she is emailing about nuclear thyroid test results.    Please advise.  This is at least 3rd request.

## 2019-11-20 NOTE — TELEPHONE ENCOUNTER
She has multiple nodules in thyroid, tow of which are sufficient size to be evaluated.  One on the left is the main concern and I would like her to see Dr Santos for consult to see what he would recommend.  Likely she is going to either need biopsy or removal of the lesion on the left, and he would be the most experience one to evaluate which is best and safest.

## 2019-11-20 NOTE — TELEPHONE ENCOUNTER
I reached patient and told her dr's comments.  She now prefers to wait till after January 1 to pursue this.  She said she would call me when ready to book dr huynh.    She wanted to know if  explained contridicting comment about hypofunctioning and hyper functioning in comment.  I told her she specialist can explain all this and answer all  Her questions at visit.        sugeie foam used in track

## 2019-11-25 ENCOUNTER — OFFICE VISIT (OUTPATIENT)
Dept: CARDIOLOGY | Facility: CLINIC | Age: 79
End: 2019-11-25
Payer: MEDICARE

## 2019-11-25 VITALS
BODY MASS INDEX: 29.95 KG/M2 | DIASTOLIC BLOOD PRESSURE: 78 MMHG | WEIGHT: 169 LBS | SYSTOLIC BLOOD PRESSURE: 154 MMHG | HEIGHT: 63 IN | HEART RATE: 71 BPM

## 2019-11-25 DIAGNOSIS — R06.02 SHORTNESS OF BREATH: ICD-10-CM

## 2019-11-25 DIAGNOSIS — R53.83 OTHER FATIGUE: ICD-10-CM

## 2019-11-25 DIAGNOSIS — I25.10 ATHEROSCLEROSIS OF NATIVE CORONARY ARTERY OF NATIVE HEART WITHOUT ANGINA PECTORIS: ICD-10-CM

## 2019-11-25 DIAGNOSIS — I10 ESSENTIAL HYPERTENSION: Primary | ICD-10-CM

## 2019-11-25 DIAGNOSIS — I65.23 CAROTID STENOSIS, BILATERAL: ICD-10-CM

## 2019-11-25 DIAGNOSIS — E78.2 MIXED HYPERLIPIDEMIA: ICD-10-CM

## 2019-11-25 DIAGNOSIS — R93.1 AGATSTON CORONARY ARTERY CALCIUM SCORE GREATER THAN 400: ICD-10-CM

## 2019-11-25 DIAGNOSIS — G47.33 OSA (OBSTRUCTIVE SLEEP APNEA): ICD-10-CM

## 2019-11-25 DIAGNOSIS — H81.09 MENIERE'S DISEASE, UNSPECIFIED LATERALITY: ICD-10-CM

## 2019-11-25 PROCEDURE — 99999 PR PBB SHADOW E&M-EST. PATIENT-LVL III: ICD-10-PCS | Mod: PBBFAC,HCNC,, | Performed by: INTERNAL MEDICINE

## 2019-11-25 PROCEDURE — 1101F PR PT FALLS ASSESS DOC 0-1 FALLS W/OUT INJ PAST YR: ICD-10-PCS | Mod: HCNC,CPTII,S$GLB, | Performed by: INTERNAL MEDICINE

## 2019-11-25 PROCEDURE — 1159F PR MEDICATION LIST DOCUMENTED IN MEDICAL RECORD: ICD-10-PCS | Mod: HCNC,S$GLB,, | Performed by: INTERNAL MEDICINE

## 2019-11-25 PROCEDURE — 99214 OFFICE O/P EST MOD 30 MIN: CPT | Mod: HCNC,S$GLB,, | Performed by: INTERNAL MEDICINE

## 2019-11-25 PROCEDURE — 1159F MED LIST DOCD IN RCRD: CPT | Mod: HCNC,S$GLB,, | Performed by: INTERNAL MEDICINE

## 2019-11-25 PROCEDURE — 3078F DIAST BP <80 MM HG: CPT | Mod: HCNC,CPTII,S$GLB, | Performed by: INTERNAL MEDICINE

## 2019-11-25 PROCEDURE — 99214 PR OFFICE/OUTPT VISIT, EST, LEVL IV, 30-39 MIN: ICD-10-PCS | Mod: HCNC,S$GLB,, | Performed by: INTERNAL MEDICINE

## 2019-11-25 PROCEDURE — 3078F PR MOST RECENT DIASTOLIC BLOOD PRESSURE < 80 MM HG: ICD-10-PCS | Mod: HCNC,CPTII,S$GLB, | Performed by: INTERNAL MEDICINE

## 2019-11-25 PROCEDURE — 3077F SYST BP >= 140 MM HG: CPT | Mod: HCNC,CPTII,S$GLB, | Performed by: INTERNAL MEDICINE

## 2019-11-25 PROCEDURE — 1101F PT FALLS ASSESS-DOCD LE1/YR: CPT | Mod: HCNC,CPTII,S$GLB, | Performed by: INTERNAL MEDICINE

## 2019-11-25 PROCEDURE — 3077F PR MOST RECENT SYSTOLIC BLOOD PRESSURE >= 140 MM HG: ICD-10-PCS | Mod: HCNC,CPTII,S$GLB, | Performed by: INTERNAL MEDICINE

## 2019-11-25 PROCEDURE — 1126F PR PAIN SEVERITY QUANTIFIED, NO PAIN PRESENT: ICD-10-PCS | Mod: HCNC,S$GLB,, | Performed by: INTERNAL MEDICINE

## 2019-11-25 PROCEDURE — 99999 PR PBB SHADOW E&M-EST. PATIENT-LVL III: CPT | Mod: PBBFAC,HCNC,, | Performed by: INTERNAL MEDICINE

## 2019-11-25 PROCEDURE — 1126F AMNT PAIN NOTED NONE PRSNT: CPT | Mod: HCNC,S$GLB,, | Performed by: INTERNAL MEDICINE

## 2019-11-25 RX ORDER — AMLODIPINE BESYLATE 5 MG/1
5 TABLET ORAL DAILY
Qty: 30 TABLET | Refills: 3 | Status: SHIPPED | OUTPATIENT
Start: 2019-11-25 | End: 2020-02-18 | Stop reason: SDUPTHER

## 2019-11-25 RX ORDER — AMLODIPINE BESYLATE 5 MG/1
5 TABLET ORAL DAILY
COMMUNITY
End: 2019-11-25 | Stop reason: SDUPTHER

## 2019-11-25 RX ORDER — SPIRONOLACTONE 25 MG
20 TABLET ORAL DAILY
COMMUNITY

## 2019-11-25 NOTE — PROGRESS NOTES
Subjective:   Patient ID:  Maryann Sorenson is a 78 y.o. female who presents for follow-up of Shortness of Breath      HPI: Patient returns to discuss persistent shortness of breath. She states that she feels winded with her daily activities and while turning  In bed where she is constrained by C-PAP machine..  She also has multiple other complaints mostly secondary to DJD , which she blames for inability to walk and exercise more vigorously.        In the pat we have done many tests and most conclusive was CPX, which identified that SOB is secondary to deconditioning rather than pulmonary or cardiac etiology.   She also has diastolic dysfunction and BP is elevated today. Patient is intolerant of diuretics and Aldactone due to worsened electrolyte disturbance.    Lab Results   Component Value Date     (L) 07/16/2019    K 4.0 07/16/2019    CL 94 (L) 07/16/2019    CO2 27 07/16/2019    BUN 12 07/16/2019    CREATININE 0.67 07/16/2019    GLU 96 07/16/2019    MG 2.4 09/21/2018    AST 36 07/16/2019    ALT 20 07/16/2019    ALBUMIN 4.1 07/16/2019    PROT 7.0 07/16/2019    BILITOT 0.4 07/16/2019    WBC 3.35 (L) 07/16/2019    HGB 11.2 (L) 07/16/2019    HCT 34.7 (L) 07/16/2019    MCV 86 07/16/2019     07/16/2019    TSH 2.570 07/16/2019    CHOL 152 07/16/2019    HDL 71 07/16/2019    LDLCALC 72.6 07/16/2019    TRIG 42 07/16/2019       Review of Systems   Constitution: Negative.   HENT: Negative.    Eyes: Negative.    Cardiovascular: Positive for dyspnea on exertion. Negative for chest pain, claudication, irregular heartbeat, leg swelling, near-syncope, palpitations and syncope.   Respiratory: Positive for sputum production. Negative for cough, shortness of breath, snoring and wheezing.    Endocrine: Negative.  Negative for cold intolerance, heat intolerance, polydipsia, polyphagia and polyuria.   Skin: Negative.    Musculoskeletal: Positive for arthritis, muscle cramps and neck pain.   Gastrointestinal: Positive for  "abdominal pain, constipation, diarrhea and heartburn.   Genitourinary: Negative.    Neurological: Positive for excessive daytime sleepiness, dizziness, light-headedness, numbness and paresthesias.   Psychiatric/Behavioral: Negative.        Objective:   Physical Exam   Constitutional: She is oriented to person, place, and time. She appears well-developed and well-nourished.   BP (!) 154/78   Pulse 71   Ht 5' 3" (1.6 m)   Wt 76.6 kg (168 lb 15.7 oz)   LMP  (LMP Unknown)   BMI 29.93 kg/m²      HENT:   Head: Normocephalic.   Eyes: Pupils are equal, round, and reactive to light.   Neck: Normal range of motion. Neck supple. Carotid bruit is not present. No thyromegaly present.   Cardiovascular: Normal rate, regular rhythm, normal heart sounds and intact distal pulses. Exam reveals no gallop and no friction rub.   No murmur heard.  Pulses:       Carotid pulses are 2+ on the right side, and 2+ on the left side.       Radial pulses are 2+ on the right side, and 2+ on the left side.        Femoral pulses are 2+ on the right side, and 2+ on the left side.       Popliteal pulses are 2+ on the right side, and 2+ on the left side.        Dorsalis pedis pulses are 2+ on the right side, and 2+ on the left side.        Posterior tibial pulses are 2+ on the right side, and 2+ on the left side.   Pulmonary/Chest: Effort normal and breath sounds normal. No respiratory distress. She has no wheezes. She has no rales. She exhibits no tenderness.   Abdominal: Soft. Bowel sounds are normal.   Musculoskeletal: Normal range of motion. She exhibits no edema.   Neurological: She is alert and oriented to person, place, and time.   Skin: Skin is warm and dry.   Psychiatric: She has a normal mood and affect.   Nursing note and vitals reviewed.        Assessment and Plan:     Problem List Items Addressed This Visit        Cardiology Problems    Mixed hyperlipidemia    Agatston coronary artery calcium score greater than 400 - 577    Essential " hypertension - Primary    Atherosclerosis of native coronary artery of native heart without angina pectoris    Carotid stenosis, bilateral       Other    Meniere's disease    Shortness of breath    NOE (obstructive sleep apnea)    Fatigue          Patient's Medications   New Prescriptions    No medications on file   Previous Medications    AMLODIPINE (NORVASC) 5 MG TABLET    Take 5 mg by mouth once daily.    ASPIRIN 81 MG TAB    Take 1 tablet by mouth Daily. 1 Tablet Oral Every day    CALCIUM CARBONATE-VITAMIN D3 (CALTRATE 600 + D) 600 MG (1,500 MG)-800 UNIT CHEW    Take 1 tablet by mouth once.    COENZYME Q10 (CO Q-10) 200 MG CAPSULE    Take 1 capsule by mouth Daily. 1 Capsule Oral Every day    DIAZEPAM (VALIUM) 2 MG TABLET    Take 1 tablet by mouth as needed. 1 Tablet Oral .  For dizziness    ESOMEPRAZOLE (NEXIUM) 40 MG CAPSULE    1 Capsule, Delayed Release(E.C.) Oral Every day    FEXOFENADINE (ALLEGRA) 180 MG TABLET    Take 1 tablet by mouth Daily. 1 Tablet Oral Every day    FLUTICASONE PROPIONATE (FLONASE) 50 MCG/ACTUATION NASAL SPRAY    1 spray each nostril twice a day; total 48 g - please provide 3 months supply.    FLUZONE HIGH-DOSE 2019-20, PF, 180 MCG/0.5 ML SYRG        GLUCOSAMINE SULFATE ORAL    Take 1,000 mg by mouth Daily. 1 Capsule Oral Every day    GUAIFENESIN (MUCINEX) 600 MG 12 HR TABLET    Take 1,200 mg by mouth as needed.     IBUPROFEN (ADVIL,MOTRIN) 200 MG TABLET    Take 400 mg by mouth every 8 (eight) hours as needed for Pain.    IRON GLYCINATE,POLYSACCH CMPLX (IRON BIS GLYCIN-FE P-SAC CMPLX ORAL)    Take 1 tablet by mouth once daily.    LACTOBACILLUS RHAMNOSUS GG (PROBIOTIC ORAL)    Take 1 capsule by mouth Daily. 1 Capsule Oral Every day    LOSARTAN (COZAAR) 50 MG TABLET    Take 1 tablet (50 mg total) by mouth once daily.    LUTEIN 20 MG CAP    Take 20 mg by mouth every other day.    MAGNESIUM 250 MG TAB    Take 250 mg by mouth once daily.     OMEGA-3 FATTY ACIDS-VITAMIN E (FISH OIL) 1,000 MG  CAP    Take 1 capsule by mouth once daily.     PRAMIPEXOLE (MIRAPEX) 0.125 MG TABLET    1 pill PO every  evening and 1 pill PO nightly at bedtime for RLS.    ROSUVASTATIN (CRESTOR) 40 MG TAB    Take 1 tablet (40 mg total) by mouth once daily.   Modified Medications    No medications on file   Discontinued Medications    LUTEIN 6 MG CAP    Take 6 mg by mouth once daily at 6am. Pt taking every other day.     45 minutes face to face time with Counseling More Than 50% of the Appointment Time was spent discussing life style modifications, weight loss, exercise, low sodium diet, and adherence to medical therapy.  Add Amlodipine 5 mg daily and sign up for digital BP monitoring system.  Repeat CFD to address DD and PA pressure prior to the next visit with me scheduled for January 2020.  No follow-ups on file.

## 2019-12-02 ENCOUNTER — OFFICE VISIT (OUTPATIENT)
Dept: OBSTETRICS AND GYNECOLOGY | Facility: CLINIC | Age: 79
End: 2019-12-02
Payer: MEDICARE

## 2019-12-02 VITALS
BODY MASS INDEX: 30.53 KG/M2 | DIASTOLIC BLOOD PRESSURE: 68 MMHG | SYSTOLIC BLOOD PRESSURE: 122 MMHG | WEIGHT: 172.31 LBS | HEIGHT: 63 IN

## 2019-12-02 DIAGNOSIS — Z12.4 ROUTINE PAPANICOLAOU SMEAR: Primary | ICD-10-CM

## 2019-12-02 PROCEDURE — G0101 CA SCREEN;PELVIC/BREAST EXAM: HCPCS | Mod: HCNC,S$GLB,, | Performed by: OBSTETRICS & GYNECOLOGY

## 2019-12-02 PROCEDURE — 99999 PR PBB SHADOW E&M-EST. PATIENT-LVL III: CPT | Mod: PBBFAC,HCNC,, | Performed by: OBSTETRICS & GYNECOLOGY

## 2019-12-02 PROCEDURE — 88175 CYTOPATH C/V AUTO FLUID REDO: CPT | Mod: HCNC

## 2019-12-02 PROCEDURE — 99999 PR PBB SHADOW E&M-EST. PATIENT-LVL III: ICD-10-PCS | Mod: PBBFAC,HCNC,, | Performed by: OBSTETRICS & GYNECOLOGY

## 2019-12-02 PROCEDURE — G0101 PR CA SCREEN;PELVIC/BREAST EXAM: ICD-10-PCS | Mod: HCNC,S$GLB,, | Performed by: OBSTETRICS & GYNECOLOGY

## 2019-12-02 NOTE — PROGRESS NOTES
"HPI:   78 y.o.   OB History        6    Para   5    Term   5            AB   1    Living           SAB        TAB        Ectopic        Multiple        Live Births                  No LMP recorded (lmp unknown). Patient is postmenopausal.    Patient is  here for her annual gynecologic exam.  She has no complaints.     ROS:  GENERAL: No fever, chills, fatigability or weight loss.  SKIN: No rashes, itching or changes in color or texture of skin.  HEAD: No headaches or recent head trauma.  EYES: Visual acuity fine. No photophobia, ocular pain or diplopia.  EARS: Denies ear pain, discharge or vertigo.  NOSE: No loss of smell, no epistaxis or postnasal drip.  MOUTH & THROAT: No hoarseness or change in voice. No excessive gum bleeding.  NODES: Denies swollen glands.  CHEST: Denies AUGUSTE, cyanosis, wheezing, cough and sputum production.  CARDIOVASCULAR: Denies chest pain, PND, orthopnea or reduced exercise tolerance.  ABDOMEN: Appetite fine. No weight loss. Denies diarrhea, abdominal pain, hematemesis or blood in stool.  URINARY: No flank pain, dysuria or hematuria.  PERIPHERAL VASCULAR: No claudication or cyanosis.  MUSCULOSKELETAL: No joint stiffness or swelling. Denies back pain.  NEUROLOGIC: No history of seizures, paralysis, alteration of gait or coordination.    PE:   /68   Ht 5' 3" (1.6 m)   Wt 78.2 kg (172 lb 4.8 oz)   LMP  (LMP Unknown)   BMI 30.52 kg/m²   APPEARANCE: Well nourished, well developed, in no acute distress.  NECK: Neck symmetric without masses or thyromegaly.  BREASTS: Symmetrical, no skin changes or visible lesions. No palpable masses, nipple discharge or adenopathy bilaterally.  ABDOMEN: Flat. Soft. No tenderness or masses. No hepatosplenomegaly. No hernias. No CVA tenderness.  VULVA: No lesions. Normal female genitalia.  URETHRAL MEATUS: Normal size and location, no lesions, no prolapse.  URETHRA: No masses, tenderness, prolapse or scarring.  VAGINA: Moist and well rugated, " no discharge, no significant cystocele or rectocele.  CERVIX: No lesions and discharge. PAP done.  UTERUS: Normal size, regular shape, mobile, non-tender, bladder base nontender.  ADNEXA: No masses, tenderness or CDS nodularity.  ANUS PERINEUM: Normal.    PROCEDURES:  Pap smear    Assessment:  Normal Gynecologic Exam    Plan:  Mammogram and Colonoscopy if indicated by current recommendations.  Return to clinic in one year or for any problems or complaints.  No co

## 2019-12-15 LAB
FINAL PATHOLOGIC DIAGNOSIS: NORMAL
Lab: NORMAL

## 2020-01-08 ENCOUNTER — TELEPHONE (OUTPATIENT)
Dept: CARDIOLOGY | Facility: CLINIC | Age: 80
End: 2020-01-08

## 2020-01-08 ENCOUNTER — LAB VISIT (OUTPATIENT)
Dept: LAB | Facility: HOSPITAL | Age: 80
End: 2020-01-08
Attending: INTERNAL MEDICINE
Payer: MEDICARE

## 2020-01-08 DIAGNOSIS — I10 ESSENTIAL HYPERTENSION: ICD-10-CM

## 2020-01-08 DIAGNOSIS — I50.42 CHRONIC COMBINED SYSTOLIC AND DIASTOLIC HEART FAILURE: ICD-10-CM

## 2020-01-08 DIAGNOSIS — E78.2 MIXED HYPERLIPIDEMIA: ICD-10-CM

## 2020-01-08 LAB
ALBUMIN SERPL BCP-MCNC: 4.4 G/DL (ref 3.5–5.2)
ALP SERPL-CCNC: 82 U/L (ref 38–126)
ALT SERPL W/O P-5'-P-CCNC: 20 U/L (ref 10–44)
ANION GAP SERPL CALC-SCNC: 9 MMOL/L (ref 8–16)
AST SERPL-CCNC: 40 U/L (ref 15–46)
BILIRUB SERPL-MCNC: 0.3 MG/DL (ref 0.1–1)
BUN SERPL-MCNC: 13 MG/DL (ref 7–17)
CALCIUM SERPL-MCNC: 9.4 MG/DL (ref 8.7–10.5)
CHLORIDE SERPL-SCNC: 101 MMOL/L (ref 95–110)
CHOLEST SERPL-MCNC: 167 MG/DL (ref 120–199)
CHOLEST/HDLC SERPL: 2.1 {RATIO} (ref 2–5)
CO2 SERPL-SCNC: 29 MMOL/L (ref 23–29)
CREAT SERPL-MCNC: 0.85 MG/DL (ref 0.5–1.4)
EST. GFR  (AFRICAN AMERICAN): >60 ML/MIN/1.73 M^2
EST. GFR  (NON AFRICAN AMERICAN): >60 ML/MIN/1.73 M^2
GLUCOSE SERPL-MCNC: 104 MG/DL (ref 70–110)
HDLC SERPL-MCNC: 79 MG/DL (ref 40–75)
HDLC SERPL: 47.3 % (ref 20–50)
LDLC SERPL CALC-MCNC: 76 MG/DL (ref 63–159)
NONHDLC SERPL-MCNC: 88 MG/DL
POTASSIUM SERPL-SCNC: 4.3 MMOL/L (ref 3.5–5.1)
PROT SERPL-MCNC: 7.5 G/DL (ref 6–8.4)
SODIUM SERPL-SCNC: 139 MMOL/L (ref 136–145)
TRIGL SERPL-MCNC: 60 MG/DL (ref 30–150)
TSH SERPL DL<=0.005 MIU/L-ACNC: 2.7 UIU/ML (ref 0.4–4)

## 2020-01-08 PROCEDURE — 36415 COLL VENOUS BLD VENIPUNCTURE: CPT | Mod: HCNC,PO

## 2020-01-08 PROCEDURE — 80061 LIPID PANEL: CPT | Mod: HCNC

## 2020-01-08 PROCEDURE — 80053 COMPREHEN METABOLIC PANEL: CPT | Mod: HCNC,PO

## 2020-01-08 PROCEDURE — 84443 ASSAY THYROID STIM HORMONE: CPT | Mod: HCNC,PO

## 2020-01-08 NOTE — TELEPHONE ENCOUNTER
----- Message from Heydi Gavin MD sent at 1/8/2020  3:59 PM CST -----  Please review.  We will discuss the results during your upcoming visit with me. It looks good.

## 2020-01-10 ENCOUNTER — CLINICAL SUPPORT (OUTPATIENT)
Dept: CARDIOLOGY | Facility: CLINIC | Age: 80
End: 2020-01-10
Attending: INTERNAL MEDICINE
Payer: MEDICARE

## 2020-01-10 VITALS
SYSTOLIC BLOOD PRESSURE: 120 MMHG | DIASTOLIC BLOOD PRESSURE: 78 MMHG | HEIGHT: 63 IN | BODY MASS INDEX: 30.48 KG/M2 | WEIGHT: 172 LBS | HEART RATE: 60 BPM

## 2020-01-10 DIAGNOSIS — R06.02 SHORTNESS OF BREATH: ICD-10-CM

## 2020-01-10 DIAGNOSIS — I10 ESSENTIAL HYPERTENSION: ICD-10-CM

## 2020-01-10 DIAGNOSIS — G47.33 OSA (OBSTRUCTIVE SLEEP APNEA): ICD-10-CM

## 2020-01-10 DIAGNOSIS — R93.1 AGATSTON CORONARY ARTERY CALCIUM SCORE GREATER THAN 400: ICD-10-CM

## 2020-01-10 DIAGNOSIS — I25.10 ATHEROSCLEROSIS OF NATIVE CORONARY ARTERY OF NATIVE HEART WITHOUT ANGINA PECTORIS: ICD-10-CM

## 2020-01-10 LAB
ASCENDING AORTA: 4.35 CM
AV INDEX (PROSTH): 0.7
AV MEAN GRADIENT: 7 MMHG
AV PEAK GRADIENT: 13 MMHG
AV REGURGITATION PRESSURE HALF TIME: 460 MS
AV VALVE AREA: 2.13 CM2
AV VELOCITY RATIO: 0.67
BSA FOR ECHO PROCEDURE: 1.86 M2
CV ECHO LV RWT: 0.35 CM
DOP CALC AO PEAK VEL: 1.83 M/S
DOP CALC AO VTI: 41.42 CM
DOP CALC LVOT AREA: 3 CM2
DOP CALC LVOT DIAMETER: 1.97 CM
DOP CALC LVOT PEAK VEL: 1.22 M/S
DOP CALC LVOT STROKE VOLUME: 88.38 CM3
DOP CALCLVOT PEAK VEL VTI: 29.01 CM
E WAVE DECELERATION TIME: 178.34 MSEC
E/A RATIO: 1.11
E/E' RATIO: 14.59 M/S
ECHO LV POSTERIOR WALL: 0.77 CM (ref 0.6–1.1)
FRACTIONAL SHORTENING: 30 % (ref 28–44)
INTERVENTRICULAR SEPTUM: 0.83 CM (ref 0.6–1.1)
IVRT: 0.1 MSEC
LA MAJOR: 5.88 CM
LA MINOR: 5.88 CM
LA WIDTH: 4.41 CM
LEFT ATRIUM SIZE: 3.44 CM
LEFT ATRIUM VOLUME INDEX: 41.8 ML/M2
LEFT ATRIUM VOLUME: 75.82 CM3
LEFT INTERNAL DIMENSION IN SYSTOLE: 3.1 CM (ref 2.1–4)
LEFT VENTRICLE DIASTOLIC VOLUME INDEX: 48.77 ML/M2
LEFT VENTRICLE DIASTOLIC VOLUME: 88.45 ML
LEFT VENTRICLE MASS INDEX: 61 G/M2
LEFT VENTRICLE SYSTOLIC VOLUME INDEX: 20.9 ML/M2
LEFT VENTRICLE SYSTOLIC VOLUME: 37.82 ML
LEFT VENTRICULAR INTERNAL DIMENSION IN DIASTOLE: 4.42 CM (ref 3.5–6)
LEFT VENTRICULAR MASS: 110.27 G
LV LATERAL E/E' RATIO: 13.78 M/S
LV SEPTAL E/E' RATIO: 15.5 M/S
MV PEAK A VEL: 1.12 M/S
MV PEAK E VEL: 1.24 M/S
PISA TR MAX VEL: 3.28 M/S
PULM VEIN S/D RATIO: 1.74
PV PEAK D VEL: 0.43 M/S
PV PEAK S VEL: 0.75 M/S
RA MAJOR: 5.07 CM
RA PRESSURE: 3 MMHG
RA WIDTH: 3.23 CM
RIGHT VENTRICULAR END-DIASTOLIC DIMENSION: 2.58 CM
SINUS: 3.55 CM
STJ: 3.14 CM
TDI LATERAL: 0.09 M/S
TDI SEPTAL: 0.08 M/S
TDI: 0.09 M/S
TR MAX PG: 43 MMHG
TRICUSPID ANNULAR PLANE SYSTOLIC EXCURSION: 2.41 CM
TV REST PULMONARY ARTERY PRESSURE: 46 MMHG

## 2020-01-10 PROCEDURE — 99999 PR PBB SHADOW E&M-EST. PATIENT-LVL II: CPT | Mod: PBBFAC,HCNC,,

## 2020-01-10 PROCEDURE — 93306 ECHO (CUPID ONLY): ICD-10-PCS | Mod: HCNC,S$GLB,, | Performed by: INTERNAL MEDICINE

## 2020-01-10 PROCEDURE — 99999 PR PBB SHADOW E&M-EST. PATIENT-LVL II: ICD-10-PCS | Mod: PBBFAC,HCNC,,

## 2020-01-10 PROCEDURE — 93306 TTE W/DOPPLER COMPLETE: CPT | Mod: HCNC,S$GLB,, | Performed by: INTERNAL MEDICINE

## 2020-01-13 ENCOUNTER — TELEPHONE (OUTPATIENT)
Dept: CARDIOLOGY | Facility: CLINIC | Age: 80
End: 2020-01-13

## 2020-01-13 NOTE — TELEPHONE ENCOUNTER
----- Message from Heydi Gavin MD sent at 1/13/2020  8:17 AM CST -----  Please inform the patient that her heart echo showed normal systolic function. Other reported findings we will discuss during her visit with me on Wednesday

## 2020-01-15 ENCOUNTER — OFFICE VISIT (OUTPATIENT)
Dept: CARDIOLOGY | Facility: CLINIC | Age: 80
End: 2020-01-15
Payer: MEDICARE

## 2020-01-15 VITALS
DIASTOLIC BLOOD PRESSURE: 72 MMHG | WEIGHT: 171.19 LBS | SYSTOLIC BLOOD PRESSURE: 150 MMHG | BODY MASS INDEX: 30.33 KG/M2 | HEIGHT: 63 IN | HEART RATE: 70 BPM | OXYGEN SATURATION: 98 %

## 2020-01-15 DIAGNOSIS — E78.2 MIXED HYPERLIPIDEMIA: ICD-10-CM

## 2020-01-15 DIAGNOSIS — I25.10 CALCIFIC CORONARY ARTERIOSCLEROSIS: ICD-10-CM

## 2020-01-15 DIAGNOSIS — I10 ESSENTIAL HYPERTENSION: ICD-10-CM

## 2020-01-15 DIAGNOSIS — I65.23 CAROTID STENOSIS, BILATERAL: ICD-10-CM

## 2020-01-15 DIAGNOSIS — R06.02 SHORTNESS OF BREATH: Primary | ICD-10-CM

## 2020-01-15 DIAGNOSIS — R93.1 AGATSTON CORONARY ARTERY CALCIUM SCORE GREATER THAN 400: ICD-10-CM

## 2020-01-15 DIAGNOSIS — I25.10 ATHEROSCLEROSIS OF NATIVE CORONARY ARTERY OF NATIVE HEART WITHOUT ANGINA PECTORIS: ICD-10-CM

## 2020-01-15 DIAGNOSIS — I50.32 CHRONIC DIASTOLIC HEART FAILURE: ICD-10-CM

## 2020-01-15 DIAGNOSIS — G47.33 OSA (OBSTRUCTIVE SLEEP APNEA): ICD-10-CM

## 2020-01-15 PROCEDURE — 1159F PR MEDICATION LIST DOCUMENTED IN MEDICAL RECORD: ICD-10-PCS | Mod: HCNC,S$GLB,, | Performed by: INTERNAL MEDICINE

## 2020-01-15 PROCEDURE — 1126F PR PAIN SEVERITY QUANTIFIED, NO PAIN PRESENT: ICD-10-PCS | Mod: HCNC,S$GLB,, | Performed by: INTERNAL MEDICINE

## 2020-01-15 PROCEDURE — 3078F PR MOST RECENT DIASTOLIC BLOOD PRESSURE < 80 MM HG: ICD-10-PCS | Mod: HCNC,CPTII,S$GLB, | Performed by: INTERNAL MEDICINE

## 2020-01-15 PROCEDURE — 1159F MED LIST DOCD IN RCRD: CPT | Mod: HCNC,S$GLB,, | Performed by: INTERNAL MEDICINE

## 2020-01-15 PROCEDURE — 3077F SYST BP >= 140 MM HG: CPT | Mod: HCNC,CPTII,S$GLB, | Performed by: INTERNAL MEDICINE

## 2020-01-15 PROCEDURE — 3077F PR MOST RECENT SYSTOLIC BLOOD PRESSURE >= 140 MM HG: ICD-10-PCS | Mod: HCNC,CPTII,S$GLB, | Performed by: INTERNAL MEDICINE

## 2020-01-15 PROCEDURE — 1101F PR PT FALLS ASSESS DOC 0-1 FALLS W/OUT INJ PAST YR: ICD-10-PCS | Mod: HCNC,CPTII,S$GLB, | Performed by: INTERNAL MEDICINE

## 2020-01-15 PROCEDURE — 3078F DIAST BP <80 MM HG: CPT | Mod: HCNC,CPTII,S$GLB, | Performed by: INTERNAL MEDICINE

## 2020-01-15 PROCEDURE — 99499 RISK ADDL DX/OHS AUDIT: ICD-10-PCS | Mod: HCNC,S$GLB,, | Performed by: INTERNAL MEDICINE

## 2020-01-15 PROCEDURE — 99999 PR PBB SHADOW E&M-EST. PATIENT-LVL III: ICD-10-PCS | Mod: PBBFAC,HCNC,, | Performed by: INTERNAL MEDICINE

## 2020-01-15 PROCEDURE — 99999 PR PBB SHADOW E&M-EST. PATIENT-LVL III: CPT | Mod: PBBFAC,HCNC,, | Performed by: INTERNAL MEDICINE

## 2020-01-15 PROCEDURE — 1101F PT FALLS ASSESS-DOCD LE1/YR: CPT | Mod: HCNC,CPTII,S$GLB, | Performed by: INTERNAL MEDICINE

## 2020-01-15 PROCEDURE — 99214 PR OFFICE/OUTPT VISIT, EST, LEVL IV, 30-39 MIN: ICD-10-PCS | Mod: HCNC,S$GLB,, | Performed by: INTERNAL MEDICINE

## 2020-01-15 PROCEDURE — 1126F AMNT PAIN NOTED NONE PRSNT: CPT | Mod: HCNC,S$GLB,, | Performed by: INTERNAL MEDICINE

## 2020-01-15 PROCEDURE — 99214 OFFICE O/P EST MOD 30 MIN: CPT | Mod: HCNC,S$GLB,, | Performed by: INTERNAL MEDICINE

## 2020-01-15 PROCEDURE — 99499 UNLISTED E&M SERVICE: CPT | Mod: HCNC,S$GLB,, | Performed by: INTERNAL MEDICINE

## 2020-01-15 RX ORDER — LOSARTAN POTASSIUM 50 MG/1
50 TABLET ORAL DAILY
Qty: 90 TABLET | Refills: 3 | Status: SHIPPED | OUTPATIENT
Start: 2020-01-15 | End: 2021-01-20 | Stop reason: SDUPTHER

## 2020-01-15 NOTE — PROGRESS NOTES
Subjective:   Patient ID:  Maryann Sorenson is a 79 y.o. female who presents for follow-up of Shortness of Breath and Hypertension      HPI: Overall no significant change in the presentation. Still shortness of breath on exertion.  Started on Amlodipine last month. BP not at goal.  In the past unabe to continue on thiazide diuretics and Spironolactone due to abnormalities in renal functions and K level.  Did not wish to try beta blocker.    CPET c/w deconditioning and repeat CFD shows presence of grade II DD.    Patient is not exercising and is unable to lose weight. Recently her  was hospitalized and patient was under increased amount of stress.    Lab Results   Component Value Date     01/08/2020    K 4.3 01/08/2020     01/08/2020    CO2 29 01/08/2020    BUN 13 01/08/2020    CREATININE 0.85 01/08/2020     01/08/2020    MG 2.4 09/21/2018    AST 40 01/08/2020    ALT 20 01/08/2020    ALBUMIN 4.4 01/08/2020    PROT 7.5 01/08/2020    BILITOT 0.3 01/08/2020    WBC 3.35 (L) 07/16/2019    HGB 11.2 (L) 07/16/2019    HCT 34.7 (L) 07/16/2019    MCV 86 07/16/2019     07/16/2019    TSH 2.700 01/08/2020    CHOL 167 01/08/2020    HDL 79 (H) 01/08/2020    LDLCALC 76.0 01/08/2020    TRIG 60 01/08/2020       Review of Systems   Constitution: Positive for malaise/fatigue.   HENT: Negative.    Eyes: Negative.    Cardiovascular: Positive for dyspnea on exertion. Negative for chest pain, claudication, irregular heartbeat, leg swelling, near-syncope, palpitations and syncope.   Respiratory: Negative.  Negative for cough, shortness of breath, snoring and wheezing.    Endocrine: Negative.  Negative for cold intolerance, heat intolerance, polydipsia, polyphagia and polyuria.   Skin: Negative.    Musculoskeletal: Positive for arthritis, back pain and neck pain.   Gastrointestinal: Positive for abdominal pain and heartburn.   Genitourinary: Negative.    Neurological: Positive for excessive daytime sleepiness,  "dizziness, light-headedness, numbness and paresthesias.   Psychiatric/Behavioral: Negative.        Objective:   Physical Exam   Constitutional: She is oriented to person, place, and time. She appears well-developed and well-nourished.   BP (!) 150/72   Pulse 70   Ht 5' 3" (1.6 m)   Wt 77.7 kg (171 lb 3 oz)   LMP  (LMP Unknown)   SpO2 98%   BMI 30.32 kg/m²      HENT:   Head: Normocephalic.   Eyes: Pupils are equal, round, and reactive to light.   Neck: Normal range of motion. Neck supple. Carotid bruit is not present. No thyromegaly present.   Cardiovascular: Normal rate, regular rhythm, normal heart sounds and intact distal pulses. Exam reveals no gallop and no friction rub.   No murmur heard.  Pulses:       Carotid pulses are 2+ on the right side, and 2+ on the left side.       Radial pulses are 2+ on the right side, and 2+ on the left side.        Femoral pulses are 2+ on the right side, and 2+ on the left side.       Popliteal pulses are 2+ on the right side, and 2+ on the left side.        Dorsalis pedis pulses are 2+ on the right side, and 2+ on the left side.        Posterior tibial pulses are 2+ on the right side, and 2+ on the left side.   Pulmonary/Chest: Effort normal and breath sounds normal. No respiratory distress. She has no wheezes. She has no rales. She exhibits no tenderness.   Abdominal: Soft. Bowel sounds are normal.   Musculoskeletal: Normal range of motion. She exhibits no edema.   Neurological: She is alert and oriented to person, place, and time.   Skin: Skin is warm and dry.   Psychiatric: She has a normal mood and affect.   Nursing note and vitals reviewed.        Assessment and Plan:     Problem List Items Addressed This Visit        Cardiology Problems    Mixed hyperlipidemia    Agatston coronary artery calcium score greater than 400 - 577    Essential hypertension    Atherosclerosis of native coronary artery of native heart without angina pectoris    Carotid stenosis, bilateral    " Calcific coronary arteriosclerosis       Other    Shortness of breath - Primary    NOE (obstructive sleep apnea)      Other Visit Diagnoses     Chronic diastolic heart failure              Patient's Medications   New Prescriptions    No medications on file   Previous Medications    AMLODIPINE (NORVASC) 5 MG TABLET    Take 1 tablet (5 mg total) by mouth once daily.    ASPIRIN 81 MG TAB    Take 1 tablet by mouth Daily. 1 Tablet Oral Every day    CALCIUM CARBONATE-VITAMIN D3 (CALTRATE 600 + D) 600 MG (1,500 MG)-800 UNIT CHEW    Take 1 tablet by mouth once.    COENZYME Q10 (CO Q-10) 200 MG CAPSULE    Take 1 capsule by mouth Daily. 1 Capsule Oral Every day    DIAZEPAM (VALIUM) 2 MG TABLET    Take 1 tablet by mouth as needed. 1 Tablet Oral .  For dizziness    ESOMEPRAZOLE (NEXIUM) 40 MG CAPSULE    1 Capsule, Delayed Release(E.C.) Oral Every day    FEXOFENADINE (ALLEGRA) 180 MG TABLET    Take 1 tablet by mouth Daily. 1 Tablet Oral Every day    FLUTICASONE PROPIONATE (FLONASE) 50 MCG/ACTUATION NASAL SPRAY    1 spray each nostril twice a day; total 48 g - please provide 3 months supply.    FLUZONE HIGH-DOSE 2019-20, PF, 180 MCG/0.5 ML SYRG        GLUCOSAMINE SULFATE ORAL    Take 1,000 mg by mouth Daily. 1 Capsule Oral Every day    GUAIFENESIN (MUCINEX) 600 MG 12 HR TABLET    Take 1,200 mg by mouth as needed.     IBUPROFEN (ADVIL,MOTRIN) 200 MG TABLET    Take 400 mg by mouth every 8 (eight) hours as needed for Pain.    LACTOBACILLUS RHAMNOSUS GG (PROBIOTIC ORAL)    Take 1 capsule by mouth Daily. 1 Capsule Oral Every day    LOSARTAN (COZAAR) 50 MG TABLET    Take 1 tablet (50 mg total) by mouth once daily.    LUTEIN 20 MG CAP    Take 20 mg by mouth every other day.    MAGNESIUM 250 MG TAB    Take 250 mg by mouth once daily.     OMEGA-3 FATTY ACIDS-VITAMIN E (FISH OIL) 1,000 MG CAP    Take 1 capsule by mouth once daily.     PRAMIPEXOLE (MIRAPEX) 0.125 MG TABLET    1 pill PO every  evening and 1 pill PO nightly at bedtime for  RLS.    ROSUVASTATIN (CRESTOR) 40 MG TAB    Take 1 tablet (40 mg total) by mouth once daily.   Modified Medications    No medications on file   Discontinued Medications    IRON GLYCINATE,POLYSACCH CMPLX (IRON BIS GLYCIN-FE P-SAC CMPLX ORAL)    Take 1 tablet by mouth once daily.     Offered to change Amlodipine to Beta Blocker in order to improve symptoms of HFpEF and controlled BP better.  Patient will think about it.  Advised about life style modifications. Balanced diet, low salt and exercise.  Signed up for digital medicine BP program.  No follow-ups on file.

## 2020-01-16 ENCOUNTER — PATIENT MESSAGE (OUTPATIENT)
Dept: ADMINISTRATIVE | Facility: OTHER | Age: 80
End: 2020-01-16

## 2020-01-22 ENCOUNTER — PATIENT OUTREACH (OUTPATIENT)
Dept: OTHER | Facility: OTHER | Age: 80
End: 2020-01-22

## 2020-01-22 NOTE — LETTER
January 22, 2020     Maryann Sorenson  [address] [address]       Dear Maryann,    Welcome to [facility]! Our goal is to make care effective, proactive and convenient by using data you send us from home to better treat your chronic conditions.              My name is Marie MosquedaJezCallahan, and I am your dedicated Digital Medicine clinician. As an expert in medication management, I will help ensure that the medications you are taking continue to provide the intended benefits and help you reach your goals. You can reach me directly at [phone] or by sending me a message directly through your [facility] account.      I am Aminata Gil and I will be your health . My job is to help you identify lifestyle changes to improve your disease control. We will talk about nutrition, exercise, and other ways you may be able to adjust your current habits to better your health. Additionally, we will help ensure you are completing the tests and screenings that are necessary to help manage your conditions. You can reach me directly at [phone] or by sending me a message directly through your [facility] account.    Most importantly, YOU are at the center of this team. Together, we will work to improve your overall health and encourage you to meet your goals for a healthier lifestyle.     What we expect from YOU:  · Please take frequent home blood pressure measurements. We ask that you take at least 1 blood pressure reading per week, but more information will better help us get you know you. Be sure you rest for a few minutes before taking the reading in a quiet, comfortable place.     Be available to receive phone calls or [facility] messages, when appropriate, from your care team. Please let us know if there are any specific days or times that work best for us to reach you via phone.     Complete routine tests and screenings. Dont worry, we will help keep you on track!           What you  should expect from your Digital Medicine Care Team:   We will work with you to create a personalized plan of care and provide you with encouragement and education, including regarding lifestyle changes, that could help you manage your disease states.     We will adjust your current medications, if needed, and continue to monitor your long-term progress.     We will provide you and your physician with monthly progress reports after you have been in the program for more than 30 days.     We will send you reminders through [facility] and text messages to help ensure you do not miss any testing deadlines to help manage your disease states.    You will be able to reach us by phone or through your [facility] account by clicking our names under Care Team on the right side of the home screen.    I look forward to working with you to achieve your blood pressure goals!    We look forward to working with you to help manage your health,    Sincerely,    Your Digital Medicine Team    Please visit our websites to learn more:   · Hypertension: [email or web address]      Remember, we are not available for emergencies. If you have an emergency, please contact your doctors office directly or call [facility] ([phone] or [phone]) or [phone].

## 2020-01-22 NOTE — PROGRESS NOTES
Digital Medicine: Health  Introduction    Introduced Maryann Sorenson to Digital Medicine. Discussed health  role and recommended lifestyle modifications.    Patient experiencing difficulties with cuff placement. She is used to her old cuff (Omron cuff). Reviewed technique, encouraged patient to submit some more readings and see if she gets used to the new cuff. Will ask her at follow up.        HYPERTENSION  Our goal is to get BP to consistently below 130/80mmHg and make the process convenient so patient can avoid extra trips to the office. Getting your blood pressure below 130/80mmHg (definition of control) will reduce your risk for heart attack, kidney failure, stroke and death (as well as kidney failure, eye disease, & dementia)      Reviewed that the Digital Medicine care team - consisting of a clinician and a health  - will follow the most current evidence-based national guidelines for treating your condition.  The health  will focus on lifestyle modifications and motivation while the clinician will focus on medication therapy.  The care team will review all data on a regular basis and reach out as needed.      Explained that one of the key parts of the program is communication with the care team.  Asked patient to respond to outreach attempts and complete questionnaires.  Stressed importance of medication adherence.    Explained that we expect patient to obtain several blood pressures per week at random times of day.  Instructed patient not to allow anyone else to use phone and monitoring device.  Confirmed appropriate BP monitoring technique.      Explained to patient that the digital medicine team is not available for emergencies.  Patient will call [facility] on[facility] ([phone] or [phone]) or [phone] if needed.      Patient's BP goal is 130/80.Patient's BP average is 133/84 mmHg, which is above goal, per 2017 ACC/AHA Hypertension Guidelines.          Last 5 Patient Entered Readings                                       Current 30 Day Average: 133/84     Recent Readings 1/21/2020 1/20/2020 1/19/2020 1/18/2020 1/17/2020    SBP (mmHg) 139 121 136 124 145    DBP (mmHg) 81 75 88 72 89    Pulse 69 74 70 74 71            INTERVENTION(S)  reviewed monitoring technique, encouragement/support and denied questions    PLAN  patient verbalizes understanding      There are no preventive care reminders to display for this patient.    Reviewed the importance of self-monitoring, medication adherence, and that the health  can be used as a resource for lifestyle modifications to help reduce or maintain a healthy lifestyle.    Sent link to [facility] webpages and my contact information via [facility] for future questions. Follow up scheduled.         Screenings    SDOH

## 2020-01-23 ENCOUNTER — PATIENT OUTREACH (OUTPATIENT)
Dept: OTHER | Facility: OTHER | Age: 80
End: 2020-01-23

## 2020-01-23 NOTE — PROGRESS NOTES
Digital Medicine: Clinician Introduction    Maryann Sorenson is a 79 y.o. female who is newly enrolled in the Digital Medicine Clinic.    The following information was reviewed and updated:  Preferred pharmacy   [facility] - [place], [place] - [address] at the [facility]  [address] [facility] [address]  Phone: [phone] Fax: [phone]    [id number] - [address] AT CORNER OF [address] [address]  Phone: [phone] Fax: [phone]      Patient prefers a 90 days supply.     Review of patient's allergies indicates:   Allergen Reactions    Amoxicillin-pot clavulanate Diarrhea     Given march 2016    Sulfa (sulfonamide antibiotics) Rash    Codeine      Other reaction(s): Unknown, tolerates hydrocodone june 2014    Doxycycline      Other reaction(s): Unknown    Lyrica [pregabalin] Other (See Comments)     Eye irritation    Prevnar [pneumococcal 7-beti conj vacc] Other (See Comments)     Arm sore, redness at injection site and muscle aches. Given shot 7/23/19    Relafen [nabumetone] Diarrhea       HPI:  Called patient to follow up. Patient endorses adherence to medication regimen. Patient denies hypotensive s/sx (lightheadedness, dizziness, nausea, fatigue); patient denies hypertensive s/sx (SOB, CP, severe headaches, changes in vision). Instructed patient to seek medical care if BP > 180/110 and is accompanied by hypertensive s/sx associated, patient confirms understanding.     Patient has been decreasing salt intake and not eating as many unhealthy snacks such as cookies and cake. She is willing to work with the health  to continue improving her diet.    Patient did have some issues with blood pressure technique, which we reviewed.     Patient denies having questions or concerns. Patient has my contact information and knows to call with any concerns or clinical changes.        The history is provided by the  patient. No  was used.     HYPERTENSION  Our goal is to get BP to consistently below 130/80mmHg and make the process convenient so patient can avoid extra trips to the office. Getting your blood pressure below 130/80mmHg (definition of control) will reduce your risk for heart attack, kidney failure, stroke and death (as well as kidney failure, eye disease, & dementia)      Reviewed non-pharmacologic therapies and impact on BP      Explained that we expect patient to obtain several blood pressures per week at random times of day.  Instructed patient not to allow anyone else to use phone and monitoring device.  Confirmed appropriate BP monitoring technique.      Explained to patient that the digital medicine team is not available for emergencies.  Patient will call [facility] on-call ([phone] or [phone]) or [phone] if needed.    Patient's BP goal is 130/80. Patients BP average is 131/83 mmHg, which is at or below goal, per 2017 ACC/AHA Hypertension Guidelines.      Med Review complete.    Allergies reviewed.      Last 5 Patient Entered Readings                                      Current 30 Day Average: 131/83     Recent Readings 1/22/2020 1/21/2020 1/20/2020 1/19/2020 1/18/2020    SBP (mmHg) 123 139 121 136 124    DBP (mmHg) 77 81 75 88 72    Pulse 70 69 74 70 74            INTERVENTION(S)  reviewed appropriate dose schedule, recommended diet modifications, reviewed monitoring technique, encouragement/support and goal setting    PLAN  patient verbalizes understanding, Health  follow up and continue monitoring    Assessment:  Reviewed recent readings. Per 2017 ACC/ AHA HTN guidelines (goal of BP < 130/80), current 30-day average is well controlled.     Plan:  Continue current medication regimen. I will continue to monitor regularly and will follow-up in 2 to 3 months, sooner if blood pressure begins to trend upward or downward.       There are no preventive care reminders to display for  this patient.    Current Medication Regimen:  Hypertension Medications             amLODIPine (NORVASC) 5 MG tablet Take 1 tablet (5 mg total) by mouth once daily.    losartan (COZAAR) 50 MG tablet Take 1 tablet (50 mg total) by mouth once daily.            Reviewed the importance of self-monitoring, medication adherence, and that the health  can be used as a resource for lifestyle modifications to help reduce or maintain a healthy lifestyle.    Sent link to [facility]'s Digital Medicine webpages and my contact information via [employer] for future questions. Follow up scheduled.                     Medication Adherence Screening   She misses doses: never    Patient is not selectively taking diuretics.    She does not wonder if medications are working.  She knows purpose of medications.      Patient identified the following reasons for non-compliance: none

## 2020-01-23 NOTE — PROGRESS NOTES
01/23/2020 2:28 PM   Called patient, I was able to leave a voicemail. I will reach back out in 2 weeks.

## 2020-02-11 ENCOUNTER — PATIENT OUTREACH (OUTPATIENT)
Dept: OTHER | Facility: OTHER | Age: 80
End: 2020-02-11

## 2020-02-12 NOTE — PROGRESS NOTES
Digital Medicine: Health  Follow-Up    Patient stated she believes amlodipine is responsible for lower BP readings. Stated she is happy with recent readings.     The history is provided by the patient.     Follow Up    Routine Education Topics: physical activity        INTERVENTION(S)  recommend physical activity, encouragement/support, denied resources and denied questions      There are no preventive care reminders to display for this patient.    Last 5 Patient Entered Readings                                      Current 30 Day Average: 121/76     Recent Readings 2/11/2020 2/10/2020 2/9/2020 2/8/2020 2/7/2020    SBP (mmHg) 98 120 122 115 126    DBP (mmHg) 70 76 81 74 77    Pulse 75 71 66 75 71                      Diet Screening   Breakfast is typically between. Varies between eggs, oatmeal, cheerios, or a waffle.  Lunch is typically between. Turkey or ham and cheese on toast, salad with vinegrette.    Dinner is typically between. Meat, vegetables, spaghetti squash.    Snacks are typically. Varies between grapes, apples, bananas, cookies, almonds, and wheat thins.    Patient reports eating or drinking the following: water and coffee, Sunday breakfast is a biscuit.    She eats 3 meals and 2 snacks per day.  She cooks for self.      Has been reading sodium content on nutrition labels and using Aidan Clifford low salt seasonings.    Physical Activity Screening   When asked if exercising, patient responded: no  Patient has the following chronic pain: back pain    She identified the following barriers to physical activity: pain/injury/recent surgery, weather and motivation    Patient wants to start walking outdoors again once weather warms up. Does not like treadmills. May add stretching to morning routine. Has back pain so does not do yard or house work.      SDOH

## 2020-02-14 ENCOUNTER — PATIENT MESSAGE (OUTPATIENT)
Dept: INTERNAL MEDICINE | Facility: CLINIC | Age: 80
End: 2020-02-14

## 2020-02-14 NOTE — TELEPHONE ENCOUNTER
"" She has multiple nodules in thyroid, tow of which are sufficient size to be evaluated.  One on the left is the main concern and   I would like her to see Dr Santos for consult to see what he would recommend.  Likely she is going to either need biopsy or   removal of the lesion on the left, and he would be the most experience one to evaluate which is best and safest. "    Patient called and we made appt to see dr steel since he has sooner opening.  appt slip mailed and read back.   "

## 2020-02-18 ENCOUNTER — PATIENT MESSAGE (OUTPATIENT)
Dept: CARDIOLOGY | Facility: CLINIC | Age: 80
End: 2020-02-18

## 2020-02-18 DIAGNOSIS — R06.02 SHORTNESS OF BREATH: ICD-10-CM

## 2020-02-18 DIAGNOSIS — R93.1 AGATSTON CORONARY ARTERY CALCIUM SCORE GREATER THAN 400: ICD-10-CM

## 2020-02-18 DIAGNOSIS — I25.10 ATHEROSCLEROSIS OF NATIVE CORONARY ARTERY OF NATIVE HEART WITHOUT ANGINA PECTORIS: ICD-10-CM

## 2020-02-18 DIAGNOSIS — G47.33 OSA (OBSTRUCTIVE SLEEP APNEA): ICD-10-CM

## 2020-02-18 DIAGNOSIS — I10 ESSENTIAL HYPERTENSION: ICD-10-CM

## 2020-02-18 RX ORDER — AMLODIPINE BESYLATE 5 MG/1
5 TABLET ORAL DAILY
Qty: 90 TABLET | Refills: 3 | Status: SHIPPED | OUTPATIENT
Start: 2020-02-18 | End: 2021-02-08 | Stop reason: SDUPTHER

## 2020-03-04 ENCOUNTER — OFFICE VISIT (OUTPATIENT)
Dept: SURGERY | Facility: CLINIC | Age: 80
End: 2020-03-04
Payer: MEDICARE

## 2020-03-04 VITALS
WEIGHT: 165.81 LBS | HEART RATE: 75 BPM | OXYGEN SATURATION: 98 % | DIASTOLIC BLOOD PRESSURE: 74 MMHG | HEIGHT: 63 IN | BODY MASS INDEX: 29.38 KG/M2 | SYSTOLIC BLOOD PRESSURE: 132 MMHG

## 2020-03-04 DIAGNOSIS — E04.1 LEFT THYROID NODULE: Primary | ICD-10-CM

## 2020-03-04 PROCEDURE — 1101F PT FALLS ASSESS-DOCD LE1/YR: CPT | Mod: HCNC,CPTII,S$GLB, | Performed by: STUDENT IN AN ORGANIZED HEALTH CARE EDUCATION/TRAINING PROGRAM

## 2020-03-04 PROCEDURE — 3075F SYST BP GE 130 - 139MM HG: CPT | Mod: HCNC,CPTII,S$GLB, | Performed by: STUDENT IN AN ORGANIZED HEALTH CARE EDUCATION/TRAINING PROGRAM

## 2020-03-04 PROCEDURE — 3078F DIAST BP <80 MM HG: CPT | Mod: HCNC,CPTII,S$GLB, | Performed by: STUDENT IN AN ORGANIZED HEALTH CARE EDUCATION/TRAINING PROGRAM

## 2020-03-04 PROCEDURE — 3075F PR MOST RECENT SYSTOLIC BLOOD PRESS GE 130-139MM HG: ICD-10-PCS | Mod: HCNC,CPTII,S$GLB, | Performed by: STUDENT IN AN ORGANIZED HEALTH CARE EDUCATION/TRAINING PROGRAM

## 2020-03-04 PROCEDURE — 1101F PR PT FALLS ASSESS DOC 0-1 FALLS W/OUT INJ PAST YR: ICD-10-PCS | Mod: HCNC,CPTII,S$GLB, | Performed by: STUDENT IN AN ORGANIZED HEALTH CARE EDUCATION/TRAINING PROGRAM

## 2020-03-04 PROCEDURE — 99999 PR PBB SHADOW E&M-EST. PATIENT-LVL III: ICD-10-PCS | Mod: PBBFAC,HCNC,, | Performed by: STUDENT IN AN ORGANIZED HEALTH CARE EDUCATION/TRAINING PROGRAM

## 2020-03-04 PROCEDURE — 99999 PR PBB SHADOW E&M-EST. PATIENT-LVL III: CPT | Mod: PBBFAC,HCNC,, | Performed by: STUDENT IN AN ORGANIZED HEALTH CARE EDUCATION/TRAINING PROGRAM

## 2020-03-04 PROCEDURE — 1159F PR MEDICATION LIST DOCUMENTED IN MEDICAL RECORD: ICD-10-PCS | Mod: HCNC,S$GLB,, | Performed by: STUDENT IN AN ORGANIZED HEALTH CARE EDUCATION/TRAINING PROGRAM

## 2020-03-04 PROCEDURE — 3078F PR MOST RECENT DIASTOLIC BLOOD PRESSURE < 80 MM HG: ICD-10-PCS | Mod: HCNC,CPTII,S$GLB, | Performed by: STUDENT IN AN ORGANIZED HEALTH CARE EDUCATION/TRAINING PROGRAM

## 2020-03-04 PROCEDURE — 1126F AMNT PAIN NOTED NONE PRSNT: CPT | Mod: HCNC,S$GLB,, | Performed by: STUDENT IN AN ORGANIZED HEALTH CARE EDUCATION/TRAINING PROGRAM

## 2020-03-04 PROCEDURE — 99204 PR OFFICE/OUTPT VISIT, NEW, LEVL IV, 45-59 MIN: ICD-10-PCS | Mod: HCNC,S$GLB,, | Performed by: STUDENT IN AN ORGANIZED HEALTH CARE EDUCATION/TRAINING PROGRAM

## 2020-03-04 PROCEDURE — 1159F MED LIST DOCD IN RCRD: CPT | Mod: HCNC,S$GLB,, | Performed by: STUDENT IN AN ORGANIZED HEALTH CARE EDUCATION/TRAINING PROGRAM

## 2020-03-04 PROCEDURE — 1126F PR PAIN SEVERITY QUANTIFIED, NO PAIN PRESENT: ICD-10-PCS | Mod: HCNC,S$GLB,, | Performed by: STUDENT IN AN ORGANIZED HEALTH CARE EDUCATION/TRAINING PROGRAM

## 2020-03-04 PROCEDURE — 99204 OFFICE O/P NEW MOD 45 MIN: CPT | Mod: HCNC,S$GLB,, | Performed by: STUDENT IN AN ORGANIZED HEALTH CARE EDUCATION/TRAINING PROGRAM

## 2020-03-04 RX ORDER — GABAPENTIN 100 MG/1
100 CAPSULE ORAL 2 TIMES DAILY
COMMUNITY
End: 2020-08-06

## 2020-03-04 NOTE — LETTER
March 4, 2020      Chucky Carlson MD  [address]  [place] [address]           [place] [facility]  [address]  [place] [address]  Phone: [phone]          Patient: Maryann Sorenson   MR Number: [id number]   YOB: 1940   Date of Visit: 3/4/2020       Dear Dr. Chucky Carlson:    Thank you for referring Maryann Sorenson to me for evaluation. Attached you will find relevant portions of my assessment and plan of care.    If you have questions, please do not hesitate to call me. I look forward to following Maryann Sorenson along with you.    Sincerely,    Abdullahi Wheeler MD    Enclosure  CC:  No Recipients    If you would like to receive this communication electronically, please contact [email or web address] or [phone] to request more information on [facility] [facility] access.    For providers and/or their staff who would like to refer a patient to [facility], please contact us through our one-stop-shop provider referral line, [facility], at [phone].    If you feel you have received this communication in error or would no longer like to receive these types of communications, please e-mail [email or web address]

## 2020-03-04 NOTE — PROGRESS NOTES
Patient ID: Maryann Sorenson is a 79 y.o. female.    Chief Complaint: No chief complaint on file.      HPI:  79F with hx of multiple small thyroid nodules for years. Always had normal thyroid function and denies symptoms like neck pain, dysphagia or symptoms of hypo/hyperthyroidism.  Never had biopsy. No fam hx of thyroid cancer. Mother had colon cancer at age 95. US shows 12mm left mid nodule compared to 10mm in 2017. It was noted to be 9mm in 2016 on US. Has seen ENT at  who has no plans for resection.       Review of Systems   Constitutional: Negative for fever.   HENT: Negative for trouble swallowing.    Respiratory: Negative for shortness of breath.    Cardiovascular: Negative for chest pain.   Gastrointestinal: Negative for abdominal pain, blood in stool, nausea and vomiting.   Genitourinary: Negative for dysuria.   Musculoskeletal: Negative for gait problem.   Skin: Negative for rash and wound.   Allergic/Immunologic: Negative for immunocompromised state.   Neurological: Negative for weakness.   Hematological: Does not bruise/bleed easily.   Psychiatric/Behavioral: Negative for agitation.       Current Outpatient Medications   Medication Sig Dispense Refill    amLODIPine (NORVASC) 5 MG tablet Take 1 tablet (5 mg total) by mouth once daily. 90 tablet 3    aspirin 81 mg Tab Take 1 tablet by mouth Daily. 1 Tablet Oral Every day      calcium carbonate-vitamin D3 (CALTRATE 600 + D) 600 mg (1,500 mg)-800 unit Chew Take 1 tablet by mouth once.      coenzyme Q10 (CO Q-10) 200 mg capsule Take 1 capsule by mouth Daily. 1 Capsule Oral Every day      diazepam (VALIUM) 2 MG tablet Take 1 tablet by mouth as needed. 1 Tablet Oral .  For dizziness      esomeprazole (NEXIUM) 40 MG capsule 1 Capsule, Delayed Release(E.C.) Oral Every day 90 capsule 3    fexofenadine (ALLEGRA) 180 MG tablet Take 1 tablet by mouth Daily. 1 Tablet Oral Every day      fluticasone propionate (FLONASE) 50 mcg/actuation nasal spray 1 spray each  nostril twice a day; total 48 g - please provide 3 months supply. 48 g 3    FLUZONE HIGH-DOSE 2019-20, PF, 180 mcg/0.5 mL Syrg       gabapentin (NEURONTIN) 100 MG capsule Take 100 mg by mouth 2 (two) times daily.      GLUCOSAMINE SULFATE ORAL Take 1,000 mg by mouth Daily. 1 Capsule Oral Every day      guaifenesin (MUCINEX) 600 mg 12 hr tablet Take 1,200 mg by mouth as needed.       ibuprofen (ADVIL,MOTRIN) 200 MG tablet Take 400 mg by mouth every 8 (eight) hours as needed for Pain.      LACTOBACILLUS RHAMNOSUS GG (PROBIOTIC ORAL) Take 1 capsule by mouth Daily. 1 Capsule Oral Every day      losartan (COZAAR) 50 MG tablet Take 1 tablet (50 mg total) by mouth once daily. 90 tablet 3    lutein 20 mg Cap Take 20 mg by mouth every other day.      magnesium 250 mg Tab Take 250 mg by mouth once daily.       pramipexole (MIRAPEX) 0.125 MG tablet 1 pill PO every  evening and 1 pill PO nightly at bedtime for RLS. (Patient taking differently: Pt taking one pill daily.) 180 tablet 3    rosuvastatin (CRESTOR) 40 MG Tab Take 1 tablet (40 mg total) by mouth once daily. 90 tablet 3    omega-3 fatty acids-vitamin E (FISH OIL) 1,000 mg Cap Take 1 capsule by mouth once daily.        No current facility-administered medications for this visit.        Review of patient's allergies indicates:   Allergen Reactions    Amoxicillin-pot clavulanate Diarrhea     Given march 2016    Sulfa (sulfonamide antibiotics) Rash    Codeine      Other reaction(s): Unknown, tolerates hydrocodone june 2014    Doxycycline Nausea Only    Lyrica [pregabalin] Other (See Comments)     Blurry vision    Prevnar [pneumococcal 7-beti conj vacc] Other (See Comments)     Arm sore, redness at injection site and muscle aches. Given shot 7/23/19    Relafen [nabumetone] Diarrhea       Past Medical History:   Diagnosis Date    CAD (coronary artery disease) 11/1/2012    GERD (gastroesophageal reflux disease)     Hyperlipidemia     Hypertension      "Pulmonary hypertension 2016    Syncope        Past Surgical History:   Procedure Laterality Date    broken wrist      "put plate in it"    DILATION AND CURETTAGE OF UTERUS      excision of lipoma Left 2009    near collar bone    tonsillectomy      TONSILLECTOMY         Family History   Problem Relation Age of Onset    Cancer Mother     Colon cancer Mother     Hyperlipidemia Mother     Hypertension Mother     Heart disease Father     Heart attack Neg Hx     Heart failure Neg Hx     Stroke Neg Hx        Social History     Socioeconomic History    Marital status:      Spouse name: Not on file    Number of children: Not on file    Years of education: Not on file    Highest education level: Not on file   Occupational History    Not on file   Social Needs    Financial resource strain: Not hard at all    Food insecurity:     Worry: Never true     Inability: Never true    Transportation needs:     Medical: No     Non-medical: No   Tobacco Use    Smoking status: Never Smoker    Smokeless tobacco: Never Used   Substance and Sexual Activity    Alcohol use: Yes     Alcohol/week: 0.0 standard drinks     Frequency: Monthly or less     Drinks per session: 1 or 2     Binge frequency: Never     Comment: occasionally/ not weekly    Drug use: No    Sexual activity: Not Currently   Lifestyle    Physical activity:     Days per week: Not on file     Minutes per session: Not on file    Stress: Not on file   Relationships    Social connections:     Talks on phone: More than three times a week     Gets together: Three times a week     Attends L[employer] day service: More than 4 times per year     Active member of club or organization: No     Attends meetings of clubs or organizations: Never     Relationship status:    Other Topics Concern    Not on file   Social History Narrative    Not on file       Vitals:    03/04/20 0942   BP: 132/74   Pulse: 75       Physical Exam   Constitutional: She is " oriented to person, place, and time. She appears well-developed and well-nourished. No distress.   HENT:   Head: Normocephalic and atraumatic.   Eyes: No scleral icterus.   Neck: No thyromegaly present.   Cardiovascular: Normal rate.   Pulmonary/Chest: Effort normal. No stridor.   Abdominal: Soft. She exhibits no distension. There is no tenderness.   Lymphadenopathy:     She has no cervical adenopathy.   Neurological: She is alert and oriented to person, place, and time.   Skin: Skin is warm. No erythema.   Psychiatric: She has a normal mood and affect. Her behavior is normal.     US shows 12mm nodule left thyroid midpole, other small nodules  TSH normal      Assessment & Plan:   79F with 12mm left mod thyroid nodule with hypervascularity  US was almost 10 months ago. Will repeat in July. RTC after that.  NM studies typically not helpful for monitoring thyroid nodules in euthyroid patients

## 2020-03-05 ENCOUNTER — PATIENT OUTREACH (OUTPATIENT)
Dept: OTHER | Facility: OTHER | Age: 80
End: 2020-03-05

## 2020-03-05 NOTE — PROGRESS NOTES
Digital Medicine: Health  Follow-Up    The history is provided by the patient.     Follow Up  Follow-up reason(s): routine education      Routine Education Topics: physical activity        INTERVENTION(S)  recommend physical activity, reviewed monitoring technique, encouragement/support, denied resources and denied questions    PLAN  patient verbalizes understanding and patient amenable to changes      There are no preventive care reminders to display for this patient.    Last 5 Patient Entered Readings                                      Current 30 Day Average: 122/76     Recent Readings 3/4/2020 3/3/2020 3/2/2020 3/1/2020 2/29/2020    SBP (mmHg) 124 138 111 125 97    DBP (mmHg) 74 84 71 81 61    Pulse 64 67 71 72 59                      Diet Screening   No change to diet.      Physical Activity Screening   When asked if exercising, patient responded: no  Patient has limited mobility: old injury    She identified the following barriers to physical activity: pain/injury/recent surgery    Patient has been experiencing some ankle pain and is wearing an ankle brace she already had from previous ankle pain. Stated she has not been walking because of pain. She has been doing some shoulder exercises lately because she is starting to have some shoulder pain. Still intends on adding general stretching to her morning routine.      SDOH

## 2020-03-18 ENCOUNTER — PATIENT MESSAGE (OUTPATIENT)
Dept: CARDIOLOGY | Facility: CLINIC | Age: 80
End: 2020-03-18

## 2020-03-18 DIAGNOSIS — E78.2 MIXED HYPERLIPIDEMIA: Primary | ICD-10-CM

## 2020-03-18 RX ORDER — ROSUVASTATIN CALCIUM 40 MG/1
40 TABLET, COATED ORAL DAILY
Qty: 90 TABLET | Refills: 3 | Status: SHIPPED | OUTPATIENT
Start: 2020-03-18 | End: 2021-03-24 | Stop reason: SDUPTHER

## 2020-03-23 ENCOUNTER — TELEPHONE (OUTPATIENT)
Dept: INTERNAL MEDICINE | Facility: CLINIC | Age: 80
End: 2020-03-23

## 2020-03-23 DIAGNOSIS — Z00.00 ANNUAL PHYSICAL EXAM: Primary | ICD-10-CM

## 2020-03-23 DIAGNOSIS — I27.20 PULMONARY HYPERTENSION: ICD-10-CM

## 2020-03-23 DIAGNOSIS — E78.5 HYPERLIPIDEMIA, UNSPECIFIED HYPERLIPIDEMIA TYPE: ICD-10-CM

## 2020-03-23 DIAGNOSIS — R53.83 FATIGUE, UNSPECIFIED TYPE: ICD-10-CM

## 2020-03-23 NOTE — TELEPHONE ENCOUNTER
----- Message from Jane Maya sent at 3/23/2020  2:26 PM CDT -----  Contact: self    Doctor appointment and lab have been scheduled.  Please link lab orders to the lab appointment.  Date of doctor appointment: 07/28/20   Date of lab appointment:  07/23//20  Physical or EP: physical  Comments:

## 2020-03-30 ENCOUNTER — PATIENT MESSAGE (OUTPATIENT)
Dept: INTERNAL MEDICINE | Facility: CLINIC | Age: 80
End: 2020-03-30

## 2020-03-30 ENCOUNTER — PATIENT OUTREACH (OUTPATIENT)
Dept: OTHER | Facility: OTHER | Age: 80
End: 2020-03-30

## 2020-03-30 RX ORDER — ESOMEPRAZOLE MAGNESIUM 40 MG/1
CAPSULE, DELAYED RELEASE ORAL
Qty: 90 CAPSULE | Refills: 3 | Status: SHIPPED | OUTPATIENT
Start: 2020-03-30 | End: 2021-04-05 | Stop reason: SDUPTHER

## 2020-03-30 NOTE — PROGRESS NOTES
Digital Medicine: Health  Follow-Up    Patient stated she is doing well. No complaints.    The history is provided by the patient.     Follow Up    Routine Education Topics: physical activity        INTERVENTION(S)  encouragement/support and denied questions    PLAN  patient verbalizes understanding      There are no preventive care reminders to display for this patient.    Last 5 Patient Entered Readings                                      Current 30 Day Average: 126/77     Recent Readings 3/29/2020 3/27/2020 3/26/2020 3/25/2020 3/24/2020    SBP (mmHg) 131 136 146 139 127    DBP (mmHg) 79 78 80 82 80    Pulse 70 66 64 76 71                      Physical Activity Screening   When asked if exercising, patient responded: yes    Patient participates in the following activities: walking    Patient stated she has been going for walks outside.      SDOH

## 2020-04-16 ENCOUNTER — PATIENT OUTREACH (OUTPATIENT)
Dept: OTHER | Facility: OTHER | Age: 80
End: 2020-04-16

## 2020-04-16 NOTE — PROGRESS NOTES
Digital Medicine: Clinician Follow-Up    Called patient to follow up. Patient endorses adherence to medication regimen. Patient denies hypotensive s/sx (lightheadedness, dizziness, nausea, fatigue); patient denies hypertensive s/sx (SOB, CP, severe headaches, changes in vision).    Patient is doing very well, no med issues.    Patient denies having questions or concerns. Patient has my contact information and knows to call with any concerns or clinical changes.        The history is provided by the patient. No  was used.     Follow Up  Follow-up reason(s): reading review      Readings are trending down     Assessment:  Reviewed recent readings. Per 2017 ACC/ AHA HTN guidelines (goal of BP < 130/80), current 30-day average is well controlled.       INTERVENTION(S)  reviewed appropriate dose schedule and encouragement/support    PLAN  patient verbalizes understanding and continue monitoring    Continue current medication regimen. I will continue to monitor regularly and will follow-up in 5-6 months, sooner if blood pressure begins to trend upward or downward.         There are no preventive care reminders to display for this patient.    Last 5 Patient Entered Readings                                      Current 30 Day Average: 122/75     Recent Readings 4/15/2020 4/14/2020 4/13/2020 4/12/2020 4/11/2020    SBP (mmHg) 120 127 106 137 102    DBP (mmHg) 73 80 69 84 69    Pulse 59 52 61 73 67             Hypertension Medications             amLODIPine (NORVASC) 5 MG tablet Take 1 tablet (5 mg total) by mouth once daily.    losartan (COZAAR) 50 MG tablet Take 1 tablet (50 mg total) by mouth once daily.                 Screenings

## 2020-05-20 ENCOUNTER — PATIENT OUTREACH (OUTPATIENT)
Dept: OTHER | Facility: OTHER | Age: 80
End: 2020-05-20

## 2020-05-20 NOTE — PROGRESS NOTES
Digital Medicine: Health  Follow-Up    Patient stated she is doing well.     The history is provided by the patient.     Follow Up  Follow-up reason(s): reading review      Readings are trending down due to lifestyle change.        INTERVENTION(S)  encouragement/support and denied questions    PLAN  patient verbalizes understanding      There are no preventive care reminders to display for this patient.    Last 5 Patient Entered Readings                                      Current 30 Day Average: 118/72     Recent Readings 5/19/2020 5/18/2020 5/17/2020 5/16/2020 5/14/2020    SBP (mmHg) 112 125 118 105 113    DBP (mmHg) 67 79 72 64 74    Pulse 70 63 75 70 64                      Diet Screening   No change to diet.      She is continuing to track her sodium intake.    Physical Activity Screening   No change to exercise routine.    When asked if exercising, patient responded: yes  Patient has the following chronic pain: back pain    Patient participates in the following activities: walking      SDOH

## 2020-05-31 PROCEDURE — 99454 PR REMOTE MNTR, PHYS PARAM, INITIAL, EA 30 DAYS: ICD-10-PCS | Mod: S$GLB,,, | Performed by: INTERNAL MEDICINE

## 2020-05-31 PROCEDURE — 99454 REM MNTR PHYSIOL PARAM 16-30: CPT | Mod: S$GLB,,, | Performed by: INTERNAL MEDICINE

## 2020-07-15 ENCOUNTER — HOSPITAL ENCOUNTER (OUTPATIENT)
Dept: RADIOLOGY | Facility: HOSPITAL | Age: 80
Discharge: HOME | End: 2020-07-15
Attending: STUDENT IN AN ORGANIZED HEALTH CARE EDUCATION/TRAINING PROGRAM
Payer: MEDICARE

## 2020-07-15 ENCOUNTER — PATIENT OUTREACH (OUTPATIENT)
Dept: OTHER | Facility: OTHER | Age: 80
End: 2020-07-15

## 2020-07-15 DIAGNOSIS — E04.1 LEFT THYROID NODULE: ICD-10-CM

## 2020-07-15 PROCEDURE — 76536 US EXAM OF HEAD AND NECK: CPT | Mod: TC,HCNC,PO

## 2020-07-22 ENCOUNTER — LAB VISIT (OUTPATIENT)
Dept: LAB | Facility: HOSPITAL | Age: 80
End: 2020-07-22
Attending: INTERNAL MEDICINE
Payer: MEDICARE

## 2020-07-22 DIAGNOSIS — E78.5 HYPERLIPIDEMIA, UNSPECIFIED HYPERLIPIDEMIA TYPE: ICD-10-CM

## 2020-07-22 DIAGNOSIS — R53.83 FATIGUE, UNSPECIFIED TYPE: ICD-10-CM

## 2020-07-22 DIAGNOSIS — Z00.00 ANNUAL PHYSICAL EXAM: ICD-10-CM

## 2020-07-22 DIAGNOSIS — I27.20 PULMONARY HYPERTENSION: ICD-10-CM

## 2020-07-22 LAB
ALBUMIN SERPL BCP-MCNC: 4.5 G/DL (ref 3.5–5.2)
ALP SERPL-CCNC: 92 U/L (ref 38–126)
ALT SERPL W/O P-5'-P-CCNC: 18 U/L (ref 10–44)
ANION GAP SERPL CALC-SCNC: 8 MMOL/L (ref 8–16)
AST SERPL-CCNC: 39 U/L (ref 15–46)
BASOPHILS # BLD AUTO: 0.03 K/UL (ref 0–0.2)
BASOPHILS NFR BLD: 0.9 % (ref 0–1.9)
BILIRUB SERPL-MCNC: 0.5 MG/DL (ref 0.1–1)
BUN SERPL-MCNC: 16 MG/DL (ref 7–17)
CALCIUM SERPL-MCNC: 9.3 MG/DL (ref 8.7–10.5)
CHLORIDE SERPL-SCNC: 102 MMOL/L (ref 95–110)
CHOLEST SERPL-MCNC: 143 MG/DL (ref 120–199)
CHOLEST/HDLC SERPL: 2.2 {RATIO} (ref 2–5)
CO2 SERPL-SCNC: 27 MMOL/L (ref 23–29)
CREAT SERPL-MCNC: 0.62 MG/DL (ref 0.5–1.4)
DIFFERENTIAL METHOD: ABNORMAL
EOSINOPHIL # BLD AUTO: 0.2 K/UL (ref 0–0.5)
EOSINOPHIL NFR BLD: 6.3 % (ref 0–8)
ERYTHROCYTE [DISTWIDTH] IN BLOOD BY AUTOMATED COUNT: 14.5 % (ref 11.5–14.5)
EST. GFR  (AFRICAN AMERICAN): >60 ML/MIN/1.73 M^2
EST. GFR  (NON AFRICAN AMERICAN): >60 ML/MIN/1.73 M^2
GLUCOSE SERPL-MCNC: 100 MG/DL (ref 70–110)
HCT VFR BLD AUTO: 35.7 % (ref 37–48.5)
HDLC SERPL-MCNC: 66 MG/DL (ref 40–75)
HDLC SERPL: 46.2 % (ref 20–50)
HGB BLD-MCNC: 11.6 G/DL (ref 12–16)
IMM GRANULOCYTES # BLD AUTO: 0 K/UL (ref 0–0.04)
IMM GRANULOCYTES NFR BLD AUTO: 0 % (ref 0–0.5)
LDLC SERPL CALC-MCNC: 63.6 MG/DL (ref 63–159)
LYMPHOCYTES # BLD AUTO: 1 K/UL (ref 1–4.8)
LYMPHOCYTES NFR BLD: 29.2 % (ref 18–48)
MCH RBC QN AUTO: 28.4 PG (ref 27–31)
MCHC RBC AUTO-ENTMCNC: 32.5 G/DL (ref 32–36)
MCV RBC AUTO: 88 FL (ref 82–98)
MONOCYTES # BLD AUTO: 0.4 K/UL (ref 0.3–1)
MONOCYTES NFR BLD: 12.7 % (ref 4–15)
NEUTROPHILS # BLD AUTO: 1.7 K/UL (ref 1.8–7.7)
NEUTROPHILS NFR BLD: 50.9 % (ref 38–73)
NONHDLC SERPL-MCNC: 77 MG/DL
NRBC BLD-RTO: 0 /100 WBC
PLATELET # BLD AUTO: 264 K/UL (ref 150–350)
PMV BLD AUTO: 11.5 FL (ref 9.2–12.9)
POTASSIUM SERPL-SCNC: 4.5 MMOL/L (ref 3.5–5.1)
PROT SERPL-MCNC: 7.3 G/DL (ref 6–8.4)
RBC # BLD AUTO: 4.08 M/UL (ref 4–5.4)
SODIUM SERPL-SCNC: 137 MMOL/L (ref 136–145)
T4 FREE SERPL-MCNC: 0.74 NG/DL (ref 0.71–1.51)
TRIGL SERPL-MCNC: 67 MG/DL (ref 30–150)
TSH SERPL DL<=0.005 MIU/L-ACNC: 2.05 UIU/ML (ref 0.4–4)
WBC # BLD AUTO: 3.32 K/UL (ref 3.9–12.7)

## 2020-07-22 PROCEDURE — 80061 LIPID PANEL: CPT | Mod: HCNC

## 2020-07-22 PROCEDURE — 84439 ASSAY OF FREE THYROXINE: CPT | Mod: HCNC

## 2020-07-22 PROCEDURE — 80053 COMPREHEN METABOLIC PANEL: CPT | Mod: HCNC,PO

## 2020-07-22 PROCEDURE — 36415 COLL VENOUS BLD VENIPUNCTURE: CPT | Mod: HCNC,PO

## 2020-07-22 PROCEDURE — 85025 COMPLETE CBC W/AUTO DIFF WBC: CPT | Mod: HCNC,PO

## 2020-07-22 PROCEDURE — 84443 ASSAY THYROID STIM HORMONE: CPT | Mod: HCNC,PO

## 2020-07-31 PROCEDURE — 99454 PR REMOTE MNTR, PHYS PARAM, INITIAL, EA 30 DAYS: ICD-10-PCS | Mod: S$GLB,,, | Performed by: INTERNAL MEDICINE

## 2020-07-31 PROCEDURE — 99454 REM MNTR PHYSIOL PARAM 16-30: CPT | Mod: S$GLB,,, | Performed by: INTERNAL MEDICINE

## 2020-07-31 NOTE — PROGRESS NOTES
Digital Medicine: Health  Follow-Up    The history is provided by the patient.             Reason for review: Blood pressure at goal        Topics Covered on Call: physical activity and Diet    Additional Follow-up details: Patient stated she is doing well and is happy with her BP readings. She was having some problems logging in to [employer]. Walked patient thru possible fixes. She will either call me back or call are tech dept if she needs further assistance. Provided tech phone number.        Diet-no change to diet    No change to diet.  Patient reports eating or drinking the following: Patient stated she is continuing to limit sweets.    Additional diet details:    Physical Activity-no change to routine  No change to exercise routine.       Additional physical activity details: Patient stated she is continuing to go for walks.      Medication Adherence-Medication Adherence not addressed.      Substance, Sleep, Stress-Not assessed    PLAN  Continue current diet/physical activity routine:  Provided patient education:    Patient verbalizes understanding. Patient did not express questions or concerns and patient has contact information if needed.    Explained the importance of self-monitoring and medication adherence. Encouraged the patient to communicate with their health  for lifestyle modifications to help improve or maintain a healthy lifestyle.        There are no preventive care reminders to display for this patient.    Last 5 Patient Entered Readings                                      Current 30 Day Average: 114/72     Recent Readings 7/30/2020 7/28/2020 7/27/2020 7/26/2020 7/25/2020    SBP (mmHg) 108 118 115 119 123    DBP (mmHg) 68 72 75 71 73    Pulse 73 71 65 74 69

## 2020-08-06 ENCOUNTER — OFFICE VISIT (OUTPATIENT)
Dept: INTERNAL MEDICINE | Facility: CLINIC | Age: 80
End: 2020-08-06
Payer: MEDICARE

## 2020-08-06 ENCOUNTER — OFFICE VISIT (OUTPATIENT)
Dept: CARDIOLOGY | Facility: CLINIC | Age: 80
End: 2020-08-06
Payer: MEDICARE

## 2020-08-06 VITALS
BODY MASS INDEX: 29.41 KG/M2 | HEIGHT: 63 IN | TEMPERATURE: 97 F | WEIGHT: 166 LBS | RESPIRATION RATE: 14 BRPM | HEART RATE: 70 BPM | DIASTOLIC BLOOD PRESSURE: 76 MMHG | OXYGEN SATURATION: 96 % | SYSTOLIC BLOOD PRESSURE: 132 MMHG

## 2020-08-06 VITALS
DIASTOLIC BLOOD PRESSURE: 75 MMHG | SYSTOLIC BLOOD PRESSURE: 158 MMHG | HEIGHT: 63 IN | WEIGHT: 166.88 LBS | HEART RATE: 83 BPM | BODY MASS INDEX: 29.57 KG/M2

## 2020-08-06 DIAGNOSIS — I25.10 CALCIFIC CORONARY ARTERIOSCLEROSIS: ICD-10-CM

## 2020-08-06 DIAGNOSIS — G47.33 OSA (OBSTRUCTIVE SLEEP APNEA): ICD-10-CM

## 2020-08-06 DIAGNOSIS — I10 ESSENTIAL HYPERTENSION: ICD-10-CM

## 2020-08-06 DIAGNOSIS — I65.23 CAROTID STENOSIS, BILATERAL: ICD-10-CM

## 2020-08-06 DIAGNOSIS — G25.81 RESTLESS LEGS: Primary | ICD-10-CM

## 2020-08-06 DIAGNOSIS — I25.10 ATHEROSCLEROSIS OF NATIVE CORONARY ARTERY OF NATIVE HEART WITHOUT ANGINA PECTORIS: ICD-10-CM

## 2020-08-06 DIAGNOSIS — K21.9 GASTROESOPHAGEAL REFLUX DISEASE WITHOUT ESOPHAGITIS: ICD-10-CM

## 2020-08-06 DIAGNOSIS — I25.10 ATHEROSCLEROSIS OF NATIVE CORONARY ARTERY OF NATIVE HEART WITHOUT ANGINA PECTORIS: Primary | ICD-10-CM

## 2020-08-06 DIAGNOSIS — M54.6 BACK PAIN OF THORACOLUMBAR REGION: ICD-10-CM

## 2020-08-06 DIAGNOSIS — R06.02 SHORTNESS OF BREATH: ICD-10-CM

## 2020-08-06 DIAGNOSIS — H81.09 MENIERE'S DISEASE, UNSPECIFIED LATERALITY: ICD-10-CM

## 2020-08-06 DIAGNOSIS — Z00.00 ANNUAL PHYSICAL EXAM: ICD-10-CM

## 2020-08-06 DIAGNOSIS — R93.1 AGATSTON CORONARY ARTERY CALCIUM SCORE GREATER THAN 400: ICD-10-CM

## 2020-08-06 DIAGNOSIS — I27.20 PULMONARY HYPERTENSION: ICD-10-CM

## 2020-08-06 DIAGNOSIS — M54.50 BACK PAIN OF THORACOLUMBAR REGION: ICD-10-CM

## 2020-08-06 DIAGNOSIS — E78.2 MIXED HYPERLIPIDEMIA: ICD-10-CM

## 2020-08-06 DIAGNOSIS — M54.16 LUMBAR RADICULITIS: ICD-10-CM

## 2020-08-06 PROCEDURE — 1126F PR PAIN SEVERITY QUANTIFIED, NO PAIN PRESENT: ICD-10-PCS | Mod: HCNC,S$GLB,, | Performed by: INTERNAL MEDICINE

## 2020-08-06 PROCEDURE — 99999 PR PBB SHADOW E&M-EST. PATIENT-LVL IV: ICD-10-PCS | Mod: PBBFAC,HCNC,, | Performed by: INTERNAL MEDICINE

## 2020-08-06 PROCEDURE — 99499 RISK ADDL DX/OHS AUDIT: ICD-10-PCS | Mod: HCNC,S$GLB,, | Performed by: INTERNAL MEDICINE

## 2020-08-06 PROCEDURE — 99999 PR PBB SHADOW E&M-EST. PATIENT-LVL IV: CPT | Mod: PBBFAC,HCNC,, | Performed by: INTERNAL MEDICINE

## 2020-08-06 PROCEDURE — 1159F MED LIST DOCD IN RCRD: CPT | Mod: HCNC,S$GLB,, | Performed by: INTERNAL MEDICINE

## 2020-08-06 PROCEDURE — 99214 PR OFFICE/OUTPT VISIT, EST, LEVL IV, 30-39 MIN: ICD-10-PCS | Mod: HCNC,S$GLB,, | Performed by: INTERNAL MEDICINE

## 2020-08-06 PROCEDURE — 1101F PR PT FALLS ASSESS DOC 0-1 FALLS W/OUT INJ PAST YR: ICD-10-PCS | Mod: HCNC,CPTII,S$GLB, | Performed by: INTERNAL MEDICINE

## 2020-08-06 PROCEDURE — 99397 PR PREVENTIVE VISIT,EST,65 & OVER: ICD-10-PCS | Mod: HCNC,S$GLB,, | Performed by: NURSE PRACTITIONER

## 2020-08-06 PROCEDURE — 1101F PT FALLS ASSESS-DOCD LE1/YR: CPT | Mod: HCNC,CPTII,S$GLB, | Performed by: INTERNAL MEDICINE

## 2020-08-06 PROCEDURE — 3078F PR MOST RECENT DIASTOLIC BLOOD PRESSURE < 80 MM HG: ICD-10-PCS | Mod: HCNC,CPTII,S$GLB, | Performed by: INTERNAL MEDICINE

## 2020-08-06 PROCEDURE — 99214 OFFICE O/P EST MOD 30 MIN: CPT | Mod: HCNC,S$GLB,, | Performed by: INTERNAL MEDICINE

## 2020-08-06 PROCEDURE — 1126F AMNT PAIN NOTED NONE PRSNT: CPT | Mod: HCNC,S$GLB,, | Performed by: INTERNAL MEDICINE

## 2020-08-06 PROCEDURE — 99999 PR PBB SHADOW E&M-EST. PATIENT-LVL V: CPT | Mod: PBBFAC,HCNC,, | Performed by: NURSE PRACTITIONER

## 2020-08-06 PROCEDURE — 99999 PR PBB SHADOW E&M-EST. PATIENT-LVL V: ICD-10-PCS | Mod: PBBFAC,HCNC,, | Performed by: NURSE PRACTITIONER

## 2020-08-06 PROCEDURE — 3074F PR MOST RECENT SYSTOLIC BLOOD PRESSURE < 130 MM HG: ICD-10-PCS | Mod: HCNC,CPTII,S$GLB, | Performed by: INTERNAL MEDICINE

## 2020-08-06 PROCEDURE — 1159F PR MEDICATION LIST DOCUMENTED IN MEDICAL RECORD: ICD-10-PCS | Mod: HCNC,S$GLB,, | Performed by: INTERNAL MEDICINE

## 2020-08-06 PROCEDURE — 99499 UNLISTED E&M SERVICE: CPT | Mod: HCNC,S$GLB,, | Performed by: INTERNAL MEDICINE

## 2020-08-06 PROCEDURE — 3074F SYST BP LT 130 MM HG: CPT | Mod: HCNC,CPTII,S$GLB, | Performed by: INTERNAL MEDICINE

## 2020-08-06 PROCEDURE — 3078F DIAST BP <80 MM HG: CPT | Mod: HCNC,CPTII,S$GLB, | Performed by: INTERNAL MEDICINE

## 2020-08-06 PROCEDURE — 99397 PER PM REEVAL EST PAT 65+ YR: CPT | Mod: HCNC,S$GLB,, | Performed by: NURSE PRACTITIONER

## 2020-08-06 RX ORDER — METHYLDOPA 250 MG
1 TABLET ORAL 2 TIMES DAILY
COMMUNITY

## 2020-08-06 NOTE — PROGRESS NOTES
[facility] Note    Chief Complaint      Chief Complaint   Patient presents with    Annual Exam     Physical     History of Present Illness      Maryann Sorenson is a 79 y.o. female patient of Dr. Carlson's who is new to me and presents today for annual visit exam.  Patient feeling well no complaints, denies shortness of breath or chest pain, reviewed meds and history with patient. Pt reports that she started walking again, does help some with her nerve pain/chronic low back pain, that radiates down both legs, pain worse at night.  Follows with Dr. Ihsan antoine for injections, last seen 2018. Stopped gabapentin thinking it didn't work, but would like to restart and trial taking 2 100mg at night and 1 100mg in mornings.     Dr. Gavin- cards, manages SOB, pt reports has had for years.     Labs-reviewed  Colonoscopy-  Eye exam- 6/2020  Gyn- Dr. Arambula, orders mammo yearly  Mammogram-9/2019    Problem List Items Addressed This Visit        Neuro    Restless legs - Primary    Lumbar radiculitis       ENT    Meniere's disease       Pulmonary    Pulmonary hypertension       Cardiac/Vascular    Mixed hyperlipidemia    Essential hypertension    Atherosclerosis of native coronary artery of native heart without angina pectoris    Carotid stenosis, bilateral       GI    Gastroesophageal reflux disease without esophagitis       Orthopedic    Back pain of thoracolumbar region       Other    Shortness of breath    NOE (obstructive sleep apnea)      Other Visit Diagnoses     Annual physical exam              Health Maintenance   Topic Date Due    Hepatitis C Screening  1940    DEXA SCAN  11/17/2017    Aspirin/Antiplatelet Therapy  08/06/2021    TETANUS VACCINE  06/21/2024    Lipid Panel  07/22/2025    Pneumococcal Vaccine (65+ Low/Medium Risk)  Completed       Past Medical History:   Diagnosis Date    CAD (coronary artery disease) 11/1/2012    GERD (gastroesophageal reflux disease)      "Hyperlipidemia     Hypertension     Pulmonary hypertension 2016    Syncope        Past Surgical History:   Procedure Laterality Date    broken wrist      "put plate in it"    DILATION AND CURETTAGE OF UTERUS      excision of lipoma Left 2009    near collar bone    tonsillectomy      TONSILLECTOMY         family history includes Cancer in her mother; Colon cancer in her mother; Heart disease in her father; Hyperlipidemia in her mother; Hypertension in her mother.    Social History     Tobacco Use    Smoking status: Never Smoker    Smokeless tobacco: Never Used   Substance Use Topics    Alcohol use: Yes     Alcohol/week: 0.0 standard drinks     Frequency: Monthly or less     Drinks per session: 1 or 2     Binge frequency: Never     Comment: occasionally/ not weekly    Drug use: No       Review of Systems   Constitutional: Negative for chills and fever.   HENT: Negative for congestion, sinus pain and sore throat.    Eyes: Negative for blurred vision.   Respiratory: Negative for cough, shortness of breath and wheezing.    Cardiovascular: Negative for chest pain, palpitations and leg swelling.   Gastrointestinal: Negative for abdominal pain, constipation, diarrhea, nausea and vomiting.   Genitourinary: Negative for dysuria.   Musculoskeletal: Negative for myalgias.   Skin: Negative for rash.   Neurological: Negative for dizziness, weakness and headaches.   Psychiatric/Behavioral: Negative for depression. The patient is not nervous/anxious.         Outpatient Encounter Medications as of 8/6/2020   Medication Sig Note Dispense Refill    amLODIPine (NORVASC) 5 MG tablet Take 1 tablet (5 mg total) by mouth once daily.  90 tablet 3    ascorbate calcium-bioflavonoid (FRANCISCO-C WITH BIOFLAVONOIDS) 500-200 mg Tab Take by mouth once daily.       aspirin 81 mg Tab Take 1 tablet by mouth Daily. 1 Tablet Oral Every day       calcium carbonate-vitamin D3 (CALTRATE 600 + D) 600 mg (1,500 mg)-800 unit Chew Take 1 tablet " by mouth once.       coenzyme Q10 (CO Q-10) 200 mg capsule Take 1 capsule by mouth Daily. 1 Capsule Oral Every day       diazepam (VALIUM) 2 MG tablet Take 1 tablet by mouth as needed. 1 Tablet Oral .  For dizziness 1/23/2020: Not very often      esomeprazole (NEXIUM) 40 MG capsule 1 Capsule, Delayed Release(E.C.) Oral Every day  90 capsule 3    fexofenadine (ALLEGRA) 180 MG tablet Take 1 tablet by mouth Daily. 1 Tablet Oral Every day       fluticasone propionate (FLONASE) 50 mcg/actuation nasal spray 1 spray each nostril twice a day; total 48 g - please provide 3 months supply.  48 g 3    FLUZONE HIGH-DOSE 2019-20, PF, 180 mcg/0.5 mL Syrg        guaifenesin (MUCINEX) 600 mg 12 hr tablet Take 1,200 mg by mouth as needed.        ibuprofen (ADVIL,MOTRIN) 200 MG tablet Take 400 mg by mouth every 8 (eight) hours as needed for Pain.       LACTOBACILLUS RHAMNOSUS GG (PROBIOTIC ORAL) Take 1 capsule by mouth Daily. 1 Capsule Oral Every day       losartan (COZAAR) 50 MG tablet Take 1 tablet (50 mg total) by mouth once daily.  90 tablet 3    lutein 20 mg Cap Take 20 mg by mouth every other day.       magnesium 250 mg Tab Take 250 mg by mouth once daily.        pramipexole (MIRAPEX) 0.125 MG tablet 1 pill PO every  evening and 1 pill PO nightly at bedtime for RLS. (Patient taking differently: Pt taking one pill daily.)  180 tablet 3    rosuvastatin (CRESTOR) 40 MG Tab Take 1 tablet (40 mg total) by mouth once daily.  90 tablet 3    ZINC ORAL Take 40 mg by mouth once daily.       [DISCONTINUED] gabapentin (NEURONTIN) 100 MG capsule Take 100 mg by mouth 2 (two) times daily.       [DISCONTINUED] GLUCOSAMINE SULFATE ORAL Take 1,000 mg by mouth Daily. 1 Capsule Oral Every day       [DISCONTINUED] omega-3 fatty acids-vitamin E (FISH OIL) 1,000 mg Cap Take 1 capsule by mouth once daily.  6/20/2013: Actually dha 600mg, epa 250mg daily.       No facility-administered encounter medications on file as of 8/6/2020.      "    Review of patient's allergies indicates:   Allergen Reactions    Amoxicillin-pot clavulanate Diarrhea     Given march 2016    Sulfa (sulfonamide antibiotics) Rash    Codeine      Other reaction(s): Unknown, tolerates hydrocodone june 2014    Doxycycline Nausea Only    Lyrica [pregabalin] Other (See Comments)     Blurry vision    Prevnar [pneumococcal 7-beti conj vacc] Other (See Comments)     Arm sore, redness at injection site and muscle aches. Given shot 7/23/19    Relafen [nabumetone] Diarrhea       Physical Exam      Vital Signs  Temp: 97.3 °F (36.3 °C)  Temp src: Temporal  Pulse: 70  Resp: 14  SpO2: 96 %  BP: 132/76  BP Location: Left arm  Patient Position: Sitting  Pain Score: 0-No pain  Height and Weight  Height: 5' 3" (160 cm)  Weight: 75.3 kg (166 lb 0.1 oz)  BSA (Calculated - sq m): 1.83 sq meters  BMI (Calculated): 29.4  Weight in (lb) to have BMI = 25: 140.8]    Physical Exam  Vitals signs and nursing note reviewed.   Constitutional:       Appearance: Normal appearance. She is well-developed.   HENT:      Head: Normocephalic and atraumatic.      Right Ear: External ear normal.      Left Ear: External ear normal.   Eyes:      Conjunctiva/sclera: Conjunctivae normal.      Pupils: Pupils are equal, round, and reactive to light.   Neck:      Musculoskeletal: Normal range of motion and neck supple.      Thyroid: No thyromegaly.      Vascular: No JVD.      Trachea: No tracheal deviation.   Cardiovascular:      Rate and Rhythm: Normal rate and regular rhythm.      Heart sounds: Normal heart sounds. No murmur.   Pulmonary:      Effort: Pulmonary effort is normal.      Breath sounds: Normal breath sounds.   Chest:      Chest wall: No tenderness.   Abdominal:      General: Bowel sounds are normal.      Palpations: Abdomen is soft.      Tenderness: There is no abdominal tenderness. There is no guarding.   Musculoskeletal: Normal range of motion.   Lymphadenopathy:      Cervical: No cervical adenopathy. "   Skin:     General: Skin is warm and dry.   Neurological:      Mental Status: She is alert and oriented to person, place, and time.   Psychiatric:         Behavior: Behavior normal.         Thought Content: Thought content normal.         Judgment: Judgment normal.          Laboratory:  CBC:  Recent Labs   Lab Result Units 07/22/20  0834   WBC K/uL 3.32*   RBC M/uL 4.08   Hemoglobin g/dL 11.6*   Hematocrit % 35.7*   Platelets K/uL 264   Mean Corpuscular Volume fL 88   Mean Corpuscular Hemoglobin pg 28.4   Mean Corpuscular Hemoglobin Conc g/dL 32.5     CMP:  Recent Labs   Lab Result Units 07/22/20  0834   Glucose mg/dL 100   Calcium mg/dL 9.3   Albumin g/dL 4.5   Total Protein g/dL 7.3   Sodium mmol/L 137   Potassium mmol/L 4.5   CO2 mmol/L 27   Chloride mmol/L 102   BUN, Bld mg/dL 16   Alkaline Phosphatase U/L 92   ALT U/L 18   AST U/L 39   Total Bilirubin mg/dL 0.5     URINALYSIS:  Recent Labs   Lab Result Units 07/22/20  0807   Color, UA  Yellow   Specific Gravity, UA  1.010   pH, UA  8.0   Protein, UA  Negative   Nitrite, UA  Negative   Leukocytes, UA  Negative   Urobilinogen, UA EU/dL Negative      LIPIDS:  Recent Labs   Lab Result Units 07/22/20  0834   TSH uIU/mL 2.050   HDL mg/dL 66   Cholesterol mg/dL 143   Triglycerides mg/dL 67   LDL Cholesterol mg/dL 63.6   Hdl/Cholesterol Ratio % 46.2   Non-HDL Cholesterol mg/dL 77   Total Cholesterol/HDL Ratio  2.2     TSH:  Recent Labs   Lab Result Units 07/22/20  0834   TSH uIU/mL 2.050     A1C:  No results for input(s): HGBA1C in the last 2160 hours.      Assessment/Plan     Maryann Sorenson is a 79 y.o.female with:    1. Annual physical exam    2. Restless legs    3. Lumbar radiculitis    4. Meniere's disease, unspecified laterality    5. Pulmonary hypertension    6. Mixed hyperlipidemia    7. Essential hypertension    8. Atherosclerosis of native coronary artery of native heart without angina pectoris    9. Carotid stenosis, bilateral    10. Gastroesophageal reflux  disease without esophagitis    11. Back pain of thoracolumbar region    12. Shortness of breath    13. NOE (obstructive sleep apnea)      -Continue current medications and maintain follow up with specialists.  Return to clinic in 6 months with Dr. Carlson or sooner for any concerns       Erin Jiminez, NP-C [facility]

## 2020-08-06 NOTE — Clinical Note
Pt would like to restart gabapentin to trial 2 100mg tabs at night and 1 100mg tab in am, is this something that you are ok with me starting?, last appt with Dr. Champagne 2018, pt has chr sciatic and RLS. Please advise.     Thanks, Liz

## 2020-08-06 NOTE — PROGRESS NOTES
"Subjective:   Patient ID:  Maryann Sorenson is a 79 y.o. female who presents for follow-up of Hypertension      HPI: Persistent shortness of breath on exertion and at night. Uses-C-Pap nightly.  Multiple work up; until now only diagnosed with deconditioning and grade II DD.  Patient is enrolled in digital medicine program and records indicate good BP control, but in the office BP is consistently elevated ( about 2 hours after taking meds). In the past either intolerant or declined additional meds.    Last stress test in 2019- negative for ischemia.   Echo does not show increased wall thickness, ECG does not shows low voltage      Lab Results   Component Value Date     07/22/2020    K 4.5 07/22/2020     07/22/2020    CO2 27 07/22/2020    BUN 16 07/22/2020    CREATININE 0.62 07/22/2020     07/22/2020    MG 2.4 09/21/2018    AST 39 07/22/2020    ALT 18 07/22/2020    ALBUMIN 4.5 07/22/2020    PROT 7.3 07/22/2020    BILITOT 0.5 07/22/2020    WBC 3.32 (L) 07/22/2020    HGB 11.6 (L) 07/22/2020    HCT 35.7 (L) 07/22/2020    MCV 88 07/22/2020     07/22/2020    TSH 2.050 07/22/2020    CHOL 143 07/22/2020    HDL 66 07/22/2020    LDLCALC 63.6 07/22/2020    TRIG 67 07/22/2020       ROS    Objective:   Physical Exam   Constitutional: She is oriented to person, place, and time. She appears well-developed and well-nourished.   BP (!) 158/75   Pulse 83   Ht 5' 3" (1.6 m)   Wt 75.7 kg (166 lb 14.2 oz)   LMP  (LMP Unknown)   BMI 29.56 kg/m²      HENT:   Head: Normocephalic.   Eyes: Pupils are equal, round, and reactive to light.   Neck: Normal range of motion. Neck supple. Carotid bruit is not present. No thyromegaly present.   Cardiovascular: Normal rate, regular rhythm, normal heart sounds and intact distal pulses. Exam reveals no gallop and no friction rub.   No murmur heard.  Pulses:       Carotid pulses are 2+ on the right side and 2+ on the left side.       Radial pulses are 2+ on the right side and " 2+ on the left side.        Femoral pulses are 2+ on the right side and 2+ on the left side.       Popliteal pulses are 2+ on the right side and 2+ on the left side.        Dorsalis pedis pulses are 2+ on the right side and 2+ on the left side.        Posterior tibial pulses are 2+ on the right side and 2+ on the left side.   Pulmonary/Chest: Effort normal and breath sounds normal. No respiratory distress. She has no wheezes. She has no rales. She exhibits no tenderness.   Abdominal: Soft. Bowel sounds are normal.   Musculoskeletal: Normal range of motion.         General: No edema.   Neurological: She is alert and oriented to person, place, and time.   Skin: Skin is warm and dry.   Psychiatric: She has a normal mood and affect.   Nursing note and vitals reviewed.        Assessment and Plan:     Problem List Items Addressed This Visit        Cardiology Problems    Mixed hyperlipidemia    Relevant Orders    PROTEIN ELECTROPHORESIS, SERUM    IMMUNOGLOBULIN FREE LT CHAINS BLOOD    Nuclear Stress - Cardiology Interpreted    Agatston coronary artery calcium score greater than 400 - 577    Relevant Orders    PROTEIN ELECTROPHORESIS, SERUM    IMMUNOGLOBULIN FREE LT CHAINS BLOOD    Nuclear Stress - Cardiology Interpreted    Essential hypertension    Relevant Orders    PROTEIN ELECTROPHORESIS, SERUM    IMMUNOGLOBULIN FREE LT CHAINS BLOOD    Nuclear Stress - Cardiology Interpreted    Calcific coronary arteriosclerosis    Relevant Orders    PROTEIN ELECTROPHORESIS, SERUM    IMMUNOGLOBULIN FREE LT CHAINS BLOOD    Nuclear Stress - Cardiology Interpreted       Other    Shortness of breath    Relevant Orders    PROTEIN ELECTROPHORESIS, SERUM    IMMUNOGLOBULIN FREE LT CHAINS BLOOD    Nuclear Stress - Cardiology Interpreted    NOE (obstructive sleep apnea)    Relevant Orders    PROTEIN ELECTROPHORESIS, SERUM    IMMUNOGLOBULIN FREE LT CHAINS BLOOD    Nuclear Stress - Cardiology Interpreted    Atherosclerosis of native coronary artery  of native heart without angina pectoris - Primary    Relevant Orders    PROTEIN ELECTROPHORESIS, SERUM    IMMUNOGLOBULIN FREE LT CHAINS BLOOD    Nuclear Stress - Cardiology Interpreted          Patient's Medications   New Prescriptions    No medications on file   Previous Medications    AMLODIPINE (NORVASC) 5 MG TABLET    Take 1 tablet (5 mg total) by mouth once daily.    ASCORBATE CALCIUM-BIOFLAVONOID (FRANCISCO-C WITH BIOFLAVONOIDS) 500-200 MG TAB    Take by mouth once daily.    ASPIRIN 81 MG TAB    Take 1 tablet by mouth Daily. 1 Tablet Oral Every day    CALCIUM CARBONATE-VITAMIN D3 (CALTRATE 600 + D) 600 MG (1,500 MG)-800 UNIT CHEW    Take 1 tablet by mouth once.    COENZYME Q10 (CO Q-10) 200 MG CAPSULE    Take 1 capsule by mouth Daily. 1 Capsule Oral Every day    DIAZEPAM (VALIUM) 2 MG TABLET    Take 1 tablet by mouth as needed. 1 Tablet Oral .  For dizziness    ESOMEPRAZOLE (NEXIUM) 40 MG CAPSULE    1 Capsule, Delayed Release(E.C.) Oral Every day    FEXOFENADINE (ALLEGRA) 180 MG TABLET    Take 1 tablet by mouth Daily. 1 Tablet Oral Every day    FLUTICASONE PROPIONATE (FLONASE) 50 MCG/ACTUATION NASAL SPRAY    1 spray each nostril twice a day; total 48 g - please provide 3 months supply.    FLUZONE HIGH-DOSE 2019-20, PF, 180 MCG/0.5 ML SYRG        GUAIFENESIN (MUCINEX) 600 MG 12 HR TABLET    Take 1,200 mg by mouth as needed.     IBUPROFEN (ADVIL,MOTRIN) 200 MG TABLET    Take 400 mg by mouth every 8 (eight) hours as needed for Pain.    LACTOBACILLUS RHAMNOSUS GG (PROBIOTIC ORAL)    Take 1 capsule by mouth Daily. 1 Capsule Oral Every day    LOSARTAN (COZAAR) 50 MG TABLET    Take 1 tablet (50 mg total) by mouth once daily.    LUTEIN 20 MG CAP    Take 20 mg by mouth every other day.    MAGNESIUM 250 MG TAB    Take 250 mg by mouth once daily.     PRAMIPEXOLE (MIRAPEX) 0.125 MG TABLET    1 pill PO every  evening and 1 pill PO nightly at bedtime for RLS.    ROSUVASTATIN (CRESTOR) 40 MG TAB    Take 1 tablet (40 mg total) by  mouth once daily.    ZINC ORAL    Take 40 mg by mouth once daily.   Modified Medications    No medications on file   Discontinued Medications    GABAPENTIN (NEURONTIN) 100 MG CAPSULE    Take 100 mg by mouth 2 (two) times daily.    GLUCOSAMINE SULFATE ORAL    Take 1,000 mg by mouth Daily. 1 Capsule Oral Every day    OMEGA-3 FATTY ACIDS-VITAMIN E (FISH OIL) 1,000 MG CAP    Take 1 capsule by mouth once daily.      Due to persistent symptoms and presence of chronic mild anemia I will go ahead and schedule SPEP and serum light chains to work up for ATTR Amyloidosis.  Review Lexiscan and advise.  Continue to monitor BP with digital program.  Weight loss, exercise encouraged.  Possibly follow up with sleep center to address efficacy of C-PAP Rx.  Defer to PCP.  Follow up in about 1 year (around 8/6/2021).

## 2020-08-11 ENCOUNTER — LAB VISIT (OUTPATIENT)
Dept: LAB | Facility: HOSPITAL | Age: 80
End: 2020-08-11
Attending: INTERNAL MEDICINE
Payer: MEDICARE

## 2020-08-11 DIAGNOSIS — I25.10 ATHEROSCLEROSIS OF NATIVE CORONARY ARTERY OF NATIVE HEART WITHOUT ANGINA PECTORIS: ICD-10-CM

## 2020-08-11 DIAGNOSIS — I10 ESSENTIAL HYPERTENSION: ICD-10-CM

## 2020-08-11 DIAGNOSIS — E78.2 MIXED HYPERLIPIDEMIA: ICD-10-CM

## 2020-08-11 DIAGNOSIS — R93.1 AGATSTON CORONARY ARTERY CALCIUM SCORE GREATER THAN 400: ICD-10-CM

## 2020-08-11 DIAGNOSIS — G47.33 OSA (OBSTRUCTIVE SLEEP APNEA): ICD-10-CM

## 2020-08-11 DIAGNOSIS — I25.10 CALCIFIC CORONARY ARTERIOSCLEROSIS: ICD-10-CM

## 2020-08-11 DIAGNOSIS — R06.02 SHORTNESS OF BREATH: ICD-10-CM

## 2020-08-11 PROCEDURE — 84165 PATHOLOGIST INTERPRETATION SPE: ICD-10-PCS | Mod: 26,HCNC,, | Performed by: PATHOLOGY

## 2020-08-11 PROCEDURE — 84165 PROTEIN E-PHORESIS SERUM: CPT | Mod: HCNC,PO

## 2020-08-11 PROCEDURE — 83520 IMMUNOASSAY QUANT NOS NONAB: CPT | Mod: 59,HCNC,PO

## 2020-08-11 PROCEDURE — 36415 COLL VENOUS BLD VENIPUNCTURE: CPT | Mod: HCNC,PO

## 2020-08-11 PROCEDURE — 84165 PROTEIN E-PHORESIS SERUM: CPT | Mod: 26,HCNC,, | Performed by: PATHOLOGY

## 2020-08-12 ENCOUNTER — TELEPHONE (OUTPATIENT)
Dept: CARDIOLOGY | Facility: CLINIC | Age: 80
End: 2020-08-12

## 2020-08-12 LAB
ALBUMIN SERPL ELPH-MCNC: 4.05 G/DL (ref 3.35–5.55)
ALPHA1 GLOB SERPL ELPH-MCNC: 0.25 G/DL (ref 0.17–0.41)
ALPHA2 GLOB SERPL ELPH-MCNC: 0.71 G/DL (ref 0.43–0.99)
B-GLOBULIN SERPL ELPH-MCNC: 0.66 G/DL (ref 0.5–1.1)
GAMMA GLOB SERPL ELPH-MCNC: 0.64 G/DL (ref 0.67–1.58)
KAPPA LC SER QL IA: 1.13 MG/DL (ref 0.33–1.94)
KAPPA LC/LAMBDA SER IA: 1.08 (ref 0.26–1.65)
LAMBDA LC SER QL IA: 1.05 MG/DL (ref 0.57–2.63)
PATHOLOGIST INTERPRETATION SPE: NORMAL
PROT SERPL-MCNC: 6.3 G/DL (ref 6–8.4)

## 2020-08-12 NOTE — TELEPHONE ENCOUNTER
----- Message from Heydi Gavin MD sent at 8/12/2020  1:16 PM CDT -----  Please inform the patient that her blood work was normal.  Let's wait for results of nuclear stress test and decide if we should order another test after that.   Breath sounds clear and equal bilaterally.

## 2020-08-17 ENCOUNTER — TELEPHONE (OUTPATIENT)
Dept: CARDIOLOGY | Facility: CLINIC | Age: 80
End: 2020-08-17

## 2020-08-19 ENCOUNTER — HOSPITAL ENCOUNTER (OUTPATIENT)
Dept: CARDIOLOGY | Facility: HOSPITAL | Age: 80
Discharge: HOME | End: 2020-08-19
Attending: INTERNAL MEDICINE
Payer: MEDICARE

## 2020-08-19 VITALS — BODY MASS INDEX: 29.41 KG/M2 | WEIGHT: 166 LBS | HEIGHT: 63 IN

## 2020-08-19 DIAGNOSIS — R06.02 SHORTNESS OF BREATH: ICD-10-CM

## 2020-08-19 DIAGNOSIS — R93.1 AGATSTON CORONARY ARTERY CALCIUM SCORE GREATER THAN 400: ICD-10-CM

## 2020-08-19 DIAGNOSIS — I25.10 CALCIFIC CORONARY ARTERIOSCLEROSIS: ICD-10-CM

## 2020-08-19 DIAGNOSIS — E78.2 MIXED HYPERLIPIDEMIA: ICD-10-CM

## 2020-08-19 DIAGNOSIS — I10 ESSENTIAL HYPERTENSION: ICD-10-CM

## 2020-08-19 DIAGNOSIS — I25.10 ATHEROSCLEROSIS OF NATIVE CORONARY ARTERY OF NATIVE HEART WITHOUT ANGINA PECTORIS: ICD-10-CM

## 2020-08-19 DIAGNOSIS — G47.33 OSA (OBSTRUCTIVE SLEEP APNEA): ICD-10-CM

## 2020-08-19 LAB
CV PHARM DOSE: 0.4 MG
CV STRESS BASE HR: 57 BPM
DIASTOLIC BLOOD PRESSURE: 75 MMHG
END DIASTOLIC INDEX-HIGH: 170 ML/M2
END SYSTOLIC INDEX-HIGH: 70 ML/M2
OHS CV CPX 85 PERCENT MAX PREDICTED HEART RATE MALE: 118
OHS CV CPX MAX PREDICTED HEART RATE: 138
OHS CV CPX PATIENT IS FEMALE: 1
OHS CV CPX PATIENT IS MALE: 0
OHS CV CPX PEAK DIASTOLIC BLOOD PRESSURE: 74 MMHG
OHS CV CPX PEAK HEAR RATE: 77 BPM
OHS CV CPX PEAK RATE PRESSURE PRODUCT: NORMAL
OHS CV CPX PEAK SYSTOLIC BLOOD PRESSURE: 143 MMHG
OHS CV CPX PERCENT MAX PREDICTED HEART RATE ACHIEVED: 56
OHS CV CPX RATE PRESSURE PRODUCT PRESENTING: 8322
RETIRED EF AND QEF - SEE NOTES: 51 %
SUMMED DIFFERENCE: 0
SUMMED REST SCORE: 0
SUMMED STRESS SCORE: 0
SYSTOLIC BLOOD PRESSURE: 146 MMHG

## 2020-08-19 PROCEDURE — 93016 CV STRESS TEST SUPVJ ONLY: CPT | Mod: HCNC,,, | Performed by: INTERNAL MEDICINE

## 2020-08-19 PROCEDURE — 93016 STRESS TEST WITH MYOCARDIAL PERFUSION (CUPID ONLY): ICD-10-PCS | Mod: HCNC,,, | Performed by: INTERNAL MEDICINE

## 2020-08-19 PROCEDURE — 78452 HT MUSCLE IMAGE SPECT MULT: CPT | Mod: 26,HCNC,, | Performed by: INTERNAL MEDICINE

## 2020-08-19 PROCEDURE — 63600175 PHARM REV CODE 636 W HCPCS: Mod: HCNC | Performed by: INTERNAL MEDICINE

## 2020-08-19 PROCEDURE — 78452 STRESS TEST WITH MYOCARDIAL PERFUSION (CUPID ONLY): ICD-10-PCS | Mod: 26,HCNC,, | Performed by: INTERNAL MEDICINE

## 2020-08-19 PROCEDURE — 93018 CV STRESS TEST I&R ONLY: CPT | Mod: HCNC,,, | Performed by: INTERNAL MEDICINE

## 2020-08-19 PROCEDURE — 93018 STRESS TEST WITH MYOCARDIAL PERFUSION (CUPID ONLY): ICD-10-PCS | Mod: HCNC,,, | Performed by: INTERNAL MEDICINE

## 2020-08-19 PROCEDURE — 78452 HT MUSCLE IMAGE SPECT MULT: CPT | Mod: HCNC

## 2020-08-19 PROCEDURE — A9502 TC99M TETROFOSMIN: HCPCS

## 2020-08-19 RX ORDER — REGADENOSON 0.08 MG/ML
0.4 INJECTION, SOLUTION INTRAVENOUS
Status: COMPLETED | OUTPATIENT
Start: 2020-08-19 | End: 2020-08-19

## 2020-08-19 RX ADMIN — REGADENOSON 0.4 MG: 0.08 INJECTION, SOLUTION INTRAVENOUS at 09:08

## 2020-08-21 ENCOUNTER — TELEPHONE (OUTPATIENT)
Dept: CARDIOLOGY | Facility: CLINIC | Age: 80
End: 2020-08-21

## 2020-08-21 NOTE — TELEPHONE ENCOUNTER
Pt notified, verbalized understanding. Pt stated that she does not wants to wait to do PYP test. Dr. Gavin made aware.

## 2020-08-21 NOTE — TELEPHONE ENCOUNTER
"----- Message from Heydi Gavin MD sent at 8/21/2020 11:28 AM CDT -----  Please inform the patient that her nuclear stress test was normal and did not show ischemia.  The heart function was normal.  All her blood work was normal as well.  There is one more nuclear test we could do to look for " heart reasons" for shortness of breath.  It is call PY nuclear test. I can schedule if patient agrees. IT is done to rule out any infiltration in the heart muscle.  SO far her work up was negative, but we are still looking for answers.  "

## 2020-08-31 PROCEDURE — 99454 REM MNTR PHYSIOL PARAM 16-30: CPT | Mod: S$GLB,,, | Performed by: INTERNAL MEDICINE

## 2020-08-31 PROCEDURE — 99454 PR REMOTE MNTR, PHYS PARAM, INITIAL, EA 30 DAYS: ICD-10-PCS | Mod: S$GLB,,, | Performed by: INTERNAL MEDICINE

## 2020-09-15 ENCOUNTER — OFFICE VISIT (OUTPATIENT)
Dept: SLEEP MEDICINE | Facility: CLINIC | Age: 80
End: 2020-09-15
Payer: MEDICARE

## 2020-09-15 ENCOUNTER — PATIENT OUTREACH (OUTPATIENT)
Dept: ADMINISTRATIVE | Facility: OTHER | Age: 80
End: 2020-09-15

## 2020-09-15 VITALS
BODY MASS INDEX: 30.12 KG/M2 | WEIGHT: 170 LBS | HEIGHT: 63 IN | SYSTOLIC BLOOD PRESSURE: 123 MMHG | HEART RATE: 79 BPM | TEMPERATURE: 97 F | DIASTOLIC BLOOD PRESSURE: 71 MMHG

## 2020-09-15 DIAGNOSIS — G25.81 RESTLESS LEGS SYNDROME (RLS): ICD-10-CM

## 2020-09-15 DIAGNOSIS — G47.33 OSA (OBSTRUCTIVE SLEEP APNEA): ICD-10-CM

## 2020-09-15 DIAGNOSIS — M79.2 NEUROPATHIC PAIN OF LOWER EXTREMITY, UNSPECIFIED LATERALITY: Primary | ICD-10-CM

## 2020-09-15 PROCEDURE — 3078F PR MOST RECENT DIASTOLIC BLOOD PRESSURE < 80 MM HG: ICD-10-PCS | Mod: HCNC,CPTII,S$GLB, | Performed by: PSYCHIATRY & NEUROLOGY

## 2020-09-15 PROCEDURE — 1101F PT FALLS ASSESS-DOCD LE1/YR: CPT | Mod: HCNC,CPTII,S$GLB, | Performed by: PSYCHIATRY & NEUROLOGY

## 2020-09-15 PROCEDURE — 99214 PR OFFICE/OUTPT VISIT, EST, LEVL IV, 30-39 MIN: ICD-10-PCS | Mod: HCNC,S$GLB,, | Performed by: PSYCHIATRY & NEUROLOGY

## 2020-09-15 PROCEDURE — 1101F PR PT FALLS ASSESS DOC 0-1 FALLS W/OUT INJ PAST YR: ICD-10-PCS | Mod: HCNC,CPTII,S$GLB, | Performed by: PSYCHIATRY & NEUROLOGY

## 2020-09-15 PROCEDURE — 1159F PR MEDICATION LIST DOCUMENTED IN MEDICAL RECORD: ICD-10-PCS | Mod: HCNC,S$GLB,, | Performed by: PSYCHIATRY & NEUROLOGY

## 2020-09-15 PROCEDURE — 99214 OFFICE O/P EST MOD 30 MIN: CPT | Mod: HCNC,S$GLB,, | Performed by: PSYCHIATRY & NEUROLOGY

## 2020-09-15 PROCEDURE — 3078F DIAST BP <80 MM HG: CPT | Mod: HCNC,CPTII,S$GLB, | Performed by: PSYCHIATRY & NEUROLOGY

## 2020-09-15 PROCEDURE — 1126F AMNT PAIN NOTED NONE PRSNT: CPT | Mod: HCNC,S$GLB,, | Performed by: PSYCHIATRY & NEUROLOGY

## 2020-09-15 PROCEDURE — 1126F PR PAIN SEVERITY QUANTIFIED, NO PAIN PRESENT: ICD-10-PCS | Mod: HCNC,S$GLB,, | Performed by: PSYCHIATRY & NEUROLOGY

## 2020-09-15 PROCEDURE — 1159F MED LIST DOCD IN RCRD: CPT | Mod: HCNC,S$GLB,, | Performed by: PSYCHIATRY & NEUROLOGY

## 2020-09-15 PROCEDURE — 99999 PR PBB SHADOW E&M-EST. PATIENT-LVL V: ICD-10-PCS | Mod: PBBFAC,HCNC,, | Performed by: PSYCHIATRY & NEUROLOGY

## 2020-09-15 PROCEDURE — 3074F PR MOST RECENT SYSTOLIC BLOOD PRESSURE < 130 MM HG: ICD-10-PCS | Mod: HCNC,CPTII,S$GLB, | Performed by: PSYCHIATRY & NEUROLOGY

## 2020-09-15 PROCEDURE — 99999 PR PBB SHADOW E&M-EST. PATIENT-LVL V: CPT | Mod: PBBFAC,HCNC,, | Performed by: PSYCHIATRY & NEUROLOGY

## 2020-09-15 PROCEDURE — 3074F SYST BP LT 130 MM HG: CPT | Mod: HCNC,CPTII,S$GLB, | Performed by: PSYCHIATRY & NEUROLOGY

## 2020-09-15 RX ORDER — GABAPENTIN 100 MG/1
CAPSULE ORAL
Qty: 360 CAPSULE | Refills: 3 | Status: SHIPPED | OUTPATIENT
Start: 2020-09-15 | End: 2021-10-06 | Stop reason: SDUPTHER

## 2020-09-15 NOTE — PROGRESS NOTES
Maryann Sorenson is a 79 y.o. female seen  today for NOE and RLS  follow up.     The patient reports improved sleep continuity and daytime sleepiness on PAP. ESS today is 824.  Denies break through snoring.  No dry mouth.   No aerophagia, but recently reports air hunger.She does not like her new mask N30i; states it is even worse than Dreamwear mask she had tried before that.    She has been noticing increasing AHI and periodic breathing on APAP screen -feedback (that was not present before)  RLS - has been poorly controlled; can not take more than 0.125 mg Mirapex usually, or she feels groggy the next day. She  took her 's Gabapentin 300 mg yesterday  - sleeps well, AHi was 1.0, but felt groggy int he morning.  Mirapex -0.125 ng - 1 pills helps; 2 pills work better than one-> but then feels groggy next day.     She has also reporting leg pain b/l, shooting from her lower back, causing significant discomfort during the day.    Although she has been allergic to Lyrica, she has previously tolerated Gabapentin, and tolerated it well last night, when she took her 's pill    APAP 8-18 cm H2O  Therapy Event Summary   Date Range: 8/16/2020 - 9/14/2020   Compliance Summary   Days with Device Usage: 30 days   Percentage of Days >=4 Hours: 100.%   Average Usage (Days Used): 7 hrs. 29 mins. 13 secs.   Average Usage (All Days): 7 hrs. 29 mins. 13 secs.   Apnea Indices Average AHI: 5.9   Average OA Index: 1.1   Average CA Index: 1.6   Average Time in Large Leak: 8 secs.   Average % of Night in Large Leak: 0.0%   Average % of Night in PB: 1.9%       Previous PAP downloads are listed in the table below:    Date 10/23/19 09/15/2020           Settings 8-18 cm H2O 8-18 cm H2O         Use/night 7 7:30         Days over 4 hrs 100% 100%         AHI 4.0 5.9         Leak 0%          PB 0% 2%         Mask Dreamwear N30i - worse that Dreamwear;  Can not adjust headgear                   PSG 8/25/16: Significant Obstructive sleep  "apnea (NOE) with AHI (apnea hypopnea Index) of 8.9 and SaO2 of 87% (weight  161 lbs).RDI 15.    DME: [facility]      PAST MEDICAL HISTORY:    Active Ambulatory Problems     Diagnosis Date Noted    Meniere's disease 03/07/2010    Mixed hyperlipidemia     Agatston coronary artery calcium score greater than 400 - 577 10/23/2013    Gastroesophageal reflux disease without esophagitis 10/07/2014    Asthmatic bronchitis with acute exacerbation 03/19/2015    Bacterial sinusitis 03/19/2015    Shortness of breath 04/15/2015    Myalgia 06/29/2015    Back pain of thoracolumbar region 03/08/2016    Essential hypertension 04/21/2016    Pulmonary hypertension 05/03/2016    NOE (obstructive sleep apnea)     Atherosclerosis of native coronary artery of native heart without angina pectoris 05/30/2017    Carotid stenosis, bilateral 06/29/2017    Restless legs 06/29/2017    Neck muscle spasm 06/29/2017    Fatigue 12/27/2017    Lumbar radiculitis 05/11/2018    Cervical disc disease 06/18/2018    Calcific coronary arteriosclerosis 07/02/2018    Chronic pulmonary heart disease     Dizziness 12/12/2018    Left thyroid nodule 03/04/2020     Resolved Ambulatory Problems     Diagnosis Date Noted    Annual physical exam 06/20/2013    Fullness in head 10/14/2013    Headache, occipital 10/24/2013    Cast discomfort 06/21/2014    Pre-operative examination for internal medicine 06/24/2014    Dysphagia 10/07/2014    Sore throat 10/07/2014    Syncope 06/29/2017    Post-traumatic headache, not intractable 06/29/2017     Past Medical History:   Diagnosis Date    CAD (coronary artery disease) 11/1/2012    GERD (gastroesophageal reflux disease)     Hyperlipidemia     Hypertension                 PAST SURGICAL HISTORY:    Past Surgical History:   Procedure Laterality Date    broken wrist      "put plate in it"    DILATION AND CURETTAGE OF UTERUS      excision of lipoma Left 2009    near collar bone    tonsillectomy   "    TONSILLECTOMY           FAMILY HISTORY:                Family History   Problem Relation Age of Onset    Cancer Mother     Colon cancer Mother     Hyperlipidemia Mother     Hypertension Mother     Heart disease Father     Heart attack Neg Hx     Heart failure Neg Hx     Stroke Neg Hx        SOCIAL HISTORY:          Tobacco:   Social History     Tobacco Use   Smoking Status Never Smoker   Smokeless Tobacco Never Used       alcohol use:    Social History     Substance and Sexual Activity   Alcohol Use Yes    Alcohol/week: 0.0 standard drinks    Frequency: Monthly or less    Drinks per session: 1 or 2    Binge frequency: Never    Comment: occasionally/ not weekly                   ALLERGIES:    Allergies   Allergen Reactions    Augmentin [Amoxicillin-Pot Clavulanate] Diarrhea     Given march 2016    Codeine      Other reaction(s): Unknown, tolerates hydrocodone june 2014    Doxycycline      Other reaction(s): Unknown    Lyrica [Pregabalin] Other (See Comments)     Eye irritation    Relafen [Nabumetone] Diarrhea    Sulfa Dyne      Other reaction(s): Unknown       CURRENT MEDICATIONS:    Current Outpatient Medications   Medication Sig Dispense Refill    amLODIPine (NORVASC) 5 MG tablet Take 1 tablet (5 mg total) by mouth once daily. 90 tablet 3    ascorbate calcium-bioflavonoid (FRANCISCO-C WITH BIOFLAVONOIDS) 500-200 mg Tab Take by mouth once daily.      aspirin 81 mg Tab Take 1 tablet by mouth Daily. 1 Tablet Oral Every day      calcium carbonate-vitamin D3 (CALTRATE 600 + D) 600 mg (1,500 mg)-800 unit Chew Take 1 tablet by mouth once.      coenzyme Q10 (CO Q-10) 200 mg capsule Take 1 capsule by mouth Daily. 1 Capsule Oral Every day      diazepam (VALIUM) 2 MG tablet Take 1 tablet by mouth as needed. 1 Tablet Oral .  For dizziness      esomeprazole (NEXIUM) 40 MG capsule 1 Capsule, Delayed Release(E.C.) Oral Every day 90 capsule 3    fexofenadine (ALLEGRA) 180 MG tablet Take 1 tablet by mouth  "Daily. 1 Tablet Oral Every day      fluticasone propionate (FLONASE) 50 mcg/actuation nasal spray 1 spray each nostril twice a day; total 48 g - please provide 3 months supply. 48 g 3    FLUZONE HIGH-DOSE 2019-20, PF, 180 mcg/0.5 mL Syrg       guaifenesin (MUCINEX) 600 mg 12 hr tablet Take 1,200 mg by mouth as needed.       ibuprofen (ADVIL,MOTRIN) 200 MG tablet Take 400 mg by mouth every 8 (eight) hours as needed for Pain.      LACTOBACILLUS RHAMNOSUS GG (PROBIOTIC ORAL) Take 1 capsule by mouth Daily. 1 Capsule Oral Every day      losartan (COZAAR) 50 MG tablet Take 1 tablet (50 mg total) by mouth once daily. 90 tablet 3    lutein 20 mg Cap Take 20 mg by mouth every other day.      magnesium 250 mg Tab Take 250 mg by mouth once daily.       pramipexole (MIRAPEX) 0.125 MG tablet 1 pill PO every  evening and 1 pill PO nightly at bedtime for RLS. (Patient taking differently: Pt taking one pill daily.) 180 tablet 3    rosuvastatin (CRESTOR) 40 MG Tab Take 1 tablet (40 mg total) by mouth once daily. 90 tablet 3    ZINC ORAL Take 50 mg by mouth once daily.       gabapentin (NEURONTIN) 100 MG capsule 1 pill PO four times a day as needed for shooting leg pain. Please provide 90 days  supply. 360 capsule 3     No current facility-administered medications for this visit.                       REVIEW OF SYSTEMS:   Sleep related symptoms as per HPI    denies weight gain  Reports dyspnea  Denies palpitations  Reports acid reflux   Reports polyuria 3-4-> now better on low salt diet.  Denies  mood diturbance  Denies  anemia  Reports occasional  muscle pain  Denies  Gait imbalance    Otherwise, a balance of 10 systems reviewed is negative.    PHYSICAL EXAM:  /71 (BP Location: Left arm, Patient Position: Sitting, BP Method: Large (Automatic))   Pulse 79   Temp 97.4 °F (36.3 °C)   Ht 5' 3" (1.6 m)   Wt 77.1 kg (170 lb)   LMP  (LMP Unknown)   BMI 30.11 kg/m²   GENERAL: Overweight body habitus, well " "groomed.  HEENT:   HEENT:  Conjunctivae are non-erythematous; Pupils equal, round, and reactive to light; Nose is symmetrical; Nasal mucosa is pink and moist; Septum is midline; Inferior turbinates are hypertrophied; Nasal airflow is normal; Posterior pharynx is pink; Modified Mallampati:II-III; Posterior palate is low; Tonsils not visualized; Uvula is normal and pink;Tongue is enlarged; Dentition is fair; No TMJ tenderness; Jaw opening and protrusion without click and without discomfort.  NECK: Supple. Neck circumference is 16 inches. No thyromegaly. No palpable nodes.     SKIN: On face and neck: No abrasions, no rashes, no lesions.  No subcutaneous nodules are palpable.  RESPIRATORY: Chest is clear to auscultation.  Normal chest expansion and non-labored breathing at rest.  CARDIOVASCULAR: Normal S1, S2.  No murmurs, gallops or rubs. No carotid bruits bilaterally.  No edema. No clubbing. No cyanosis.    NEURO: Oriented to time, place and person. Normal attention span and concentration. Gait normal.    PSYCH: Affect is full. Mood is normal  MUSCULOSKELETAL: Moves 4 extremities. Gait normal.       Using [facility]:       ASSESSMENT:    1. NOE - moderate by RDI. Poor sleep efficiency on the nioght of the study could have led to underestimation of NOE severity.  The patient symptomatically has  excessive daytime sleepiness, snoring,  witnessed breathing pauses, excessive daytime fatigue, gasping for air in sleep and interrupted sleep  with exam findings of "a crowded oral airway and elevated body mass index. The patient has medical co-morbidities of CAD and hyperlipidemia,  which can be worsened by NOE. This warrants treatment. Benefiting from CPAP use in terms of sleep continuity and daytime sleepiness. Remains  100% compliant . Mask discomfort may contribute to sleep interruptions and periodic breathing (she has not been comfortable with neither Dreamwear, nor N30i masks). Some air hunger and slightly increased " Obstructive Apnea index may signify residual obstructive events; AHi is opver 5/hr lately.            2. Interrupted, nonrefreshing  sleep and residual fatigue despite compliant and effective CPAP use: RLS, neuropathic leg pain stemming for LS -radiculopathy may be playing a role.     3. RLS -  Well controlled on low dose Mirapex 0.125 mg BID; but adding a second dose makes her groggy. Seems to respond well to Gabapentin, tut groggy on 300 mg dosage.     PLAN:    I will increase APAP from 8-18 to 9-18 cm H2O; ramp set at 8, instructions and demonstration was provided.     Will change CPAP  Prescription for Wisp mask.    Add Gabapentin 100 mg - in the evening and bedtime for RLS in addition to Mirapex 0.125 mg.  She can also take Gabapentin 100 mg for LS neuropathy - will prescribe 4 pills a day as needed.    Will continue Mirapex 0.125 mg once at bedtime  together with Gabapentin 100 mg for RLS  Continue Magnesium.       ----------------------------------------------------      Education: During our discussion today, we talked about the etiology of obstructive sleep apnea as well as the potential ramifications of untreated sleep apnea, which could include daytime sleepiness, hypertension, heart disease and/or stroke.      We discussed potential treatment options, which could include weight loss, body positioning, continuous positive airway pressure (CPAP), or referral for surgical consideration. The patient preferred CPAP option.     Discussed purpose of PAP therapy, health benefits of CPAP, as well as the potential ramifications of untreated sleep apnea, which could include daytime sleepiness, hypertension, heart disease and/or stroke. An AHI of 15 is associated with increased risk CVD.     The patient should avoid ETOH and sedatives at night, as it tends to aggravate NOE. Regular replacement of CPAP mask, tubing and filter was recommended.    Precautions: The patient was advised to abstain from driving should he  feel sleepy or drowsy.    Follow up: MD in 6 months    Thank you for allowing me the opportunity to participate in the care of your patient.

## 2020-09-18 ENCOUNTER — TELEPHONE (OUTPATIENT)
Dept: OBSTETRICS AND GYNECOLOGY | Facility: CLINIC | Age: 80
End: 2020-09-18

## 2020-09-18 DIAGNOSIS — Z12.31 SCREENING MAMMOGRAM, ENCOUNTER FOR: ICD-10-CM

## 2020-09-18 DIAGNOSIS — Z12.31 SCREENING MAMMOGRAM FOR HIGH-RISK PATIENT: Primary | ICD-10-CM

## 2020-09-18 NOTE — TELEPHONE ENCOUNTER
----- Message from Cindy Hawthorne sent at 9/18/2020  1:29 PM CDT -----  Contact: [phone]  Type:  Mammogram    Caller is requesting to schedule their annual mammogram appointment.  Order is not listed in EPIC.  Please enter order and contact patient to schedule.  Name of Caller: PT  Where would they like the mammogram performed? [facility]  Would the patient rather a call back or a response via [facility]?  Call Back  Best Call Back Number:[phone]

## 2020-09-28 ENCOUNTER — HOSPITAL ENCOUNTER (OUTPATIENT)
Dept: RADIOLOGY | Facility: HOSPITAL | Age: 80
Discharge: HOME | End: 2020-09-28
Attending: OBSTETRICS & GYNECOLOGY
Payer: MEDICARE

## 2020-09-28 DIAGNOSIS — Z12.31 SCREENING MAMMOGRAM, ENCOUNTER FOR: ICD-10-CM

## 2020-09-28 PROCEDURE — 77067 SCR MAMMO BI INCL CAD: CPT | Mod: TC,HCNC,PO

## 2020-09-30 ENCOUNTER — PES CALL (OUTPATIENT)
Dept: ADMINISTRATIVE | Facility: CLINIC | Age: 80
End: 2020-09-30

## 2020-10-06 ENCOUNTER — PATIENT OUTREACH (OUTPATIENT)
Dept: OTHER | Facility: OTHER | Age: 80
End: 2020-10-06

## 2020-10-06 DIAGNOSIS — I10 ESSENTIAL HYPERTENSION: Primary | ICD-10-CM

## 2020-10-06 NOTE — PROGRESS NOTES
Digital Medicine: Clinician Follow-Up    Patient attributes higher readings to news that insurance coverage may be changing, and she is frustrated with that. Overall she is doing well with no concerns or complaints about HTN meds or BP cuff.     The history is provided by the patient.   Follow-up reason(s): routine follow up.     Hypertension    Patient's blood pressure is stable. Patient is not experiencing signs/symptoms of hypertension.        Patient Characteristics    Last 5 Patient Entered Readings                                      Current 30 Day Average: 115/70     Recent Readings 10/5/2020 10/4/2020 10/2/2020 10/1/2020 9/29/2020    SBP (mmHg) 135 129 127 100 116    DBP (mmHg) 75 74 77 66 71    Pulse 66 70 66 76 64                 Depression Screening  Did not address depression screening.    Sleep Apnea Screening    Did not address sleep apnea screening.     Medication Affordability Screening  Did not address medication affordability screening.     Medication Adherence-Medication adherence was assessed.        Verified meds.      ASSESSMENT(S)  Patients BP average is 115/70 mmHg, which is at goal. Patient's BP goal is less than or equal to 130/80.    No med changes recommended.       Hypertension Plan  Continue current therapy.  F/u in 6 months, but will sent [employer] msg prior to the end of the year to see if there are any major updates/changes.      Addressed patient questions and patient has my contact information if needed prior to next outreach. Patient verbalizes understanding.      Explained the importance of self-monitoring and medication adherence. Encouraged the patient to communicate with their health  for lifestyle modifications to help improve or maintain a healthy lifestyle.              There are no preventive care reminders to display for this patient.  There are no preventive care reminders to display for this patient.      Hypertension Medications             amLODIPine (NORVASC) 5  MG tablet Take 1 tablet (5 mg total) by mouth once daily.    losartan (COZAAR) 50 MG tablet Take 1 tablet (50 mg total) by mouth once daily.
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
## 2020-11-30 ENCOUNTER — PATIENT MESSAGE (OUTPATIENT)
Dept: SLEEP MEDICINE | Facility: CLINIC | Age: 80
End: 2020-11-30

## 2020-11-30 ENCOUNTER — TELEPHONE (OUTPATIENT)
Dept: SLEEP MEDICINE | Facility: CLINIC | Age: 80
End: 2020-11-30

## 2020-11-30 DIAGNOSIS — R09.81 NASAL CONGESTION: ICD-10-CM

## 2020-11-30 RX ORDER — FLUTICASONE PROPIONATE 50 MCG
SPRAY, SUSPENSION (ML) NASAL
Qty: 48 G | Refills: 3 | Status: SHIPPED | OUTPATIENT
Start: 2020-11-30 | End: 2022-03-14 | Stop reason: SDUPTHER

## 2020-12-01 NOTE — TELEPHONE ENCOUNTER
Dear Mr. Sorenson       I will         Sincerely,    Katja Recinos MD            Please advise!    Routing comment       Maryann Sorenson  You 7 hours ago (1:47 PM)        Dr. Recinos,      Would you please send a new prescription to [facility] mail order for Fluticasone nasal spray, 90 days supply with 3 refills.      The old prescription has .       I have been sleeping much better since I started taking Gabapentin and changed to a new mask. My AHI averages 2.0.  I don't need to renew Pramipexole.  I have plenty left since I could only take one per day rather than two.      Thank you very much      Maryann Sorenson            Responsible Party    Katja Recinos MD

## 2020-12-03 ENCOUNTER — OFFICE VISIT (OUTPATIENT)
Dept: OBSTETRICS AND GYNECOLOGY | Facility: CLINIC | Age: 80
End: 2020-12-03
Payer: MEDICARE

## 2020-12-03 VITALS
WEIGHT: 169.38 LBS | HEIGHT: 63 IN | BODY MASS INDEX: 30.01 KG/M2 | SYSTOLIC BLOOD PRESSURE: 122 MMHG | DIASTOLIC BLOOD PRESSURE: 74 MMHG

## 2020-12-03 DIAGNOSIS — Z01.419 WELL WOMAN EXAM WITH ROUTINE GYNECOLOGICAL EXAM: ICD-10-CM

## 2020-12-03 DIAGNOSIS — Z12.4 ROUTINE PAPANICOLAOU SMEAR: Primary | ICD-10-CM

## 2020-12-03 PROCEDURE — 3288F FALL RISK ASSESSMENT DOCD: CPT | Mod: HCNC,CPTII,S$GLB, | Performed by: OBSTETRICS & GYNECOLOGY

## 2020-12-03 PROCEDURE — 1101F PT FALLS ASSESS-DOCD LE1/YR: CPT | Mod: HCNC,CPTII,S$GLB, | Performed by: OBSTETRICS & GYNECOLOGY

## 2020-12-03 PROCEDURE — G0101 CA SCREEN;PELVIC/BREAST EXAM: HCPCS | Mod: GZ,HCNC,S$GLB, | Performed by: OBSTETRICS & GYNECOLOGY

## 2020-12-03 PROCEDURE — 99999 PR PBB SHADOW E&M-EST. PATIENT-LVL III: ICD-10-PCS | Mod: PBBFAC,HCNC,, | Performed by: OBSTETRICS & GYNECOLOGY

## 2020-12-03 PROCEDURE — 1126F PR PAIN SEVERITY QUANTIFIED, NO PAIN PRESENT: ICD-10-PCS | Mod: HCNC,S$GLB,, | Performed by: OBSTETRICS & GYNECOLOGY

## 2020-12-03 PROCEDURE — 1101F PR PT FALLS ASSESS DOC 0-1 FALLS W/OUT INJ PAST YR: ICD-10-PCS | Mod: HCNC,CPTII,S$GLB, | Performed by: OBSTETRICS & GYNECOLOGY

## 2020-12-03 PROCEDURE — G0101 PR CA SCREEN;PELVIC/BREAST EXAM: ICD-10-PCS | Mod: GZ,HCNC,S$GLB, | Performed by: OBSTETRICS & GYNECOLOGY

## 2020-12-03 PROCEDURE — 88175 CYTOPATH C/V AUTO FLUID REDO: CPT | Mod: HCNC

## 2020-12-03 PROCEDURE — 99999 PR PBB SHADOW E&M-EST. PATIENT-LVL III: CPT | Mod: PBBFAC,HCNC,, | Performed by: OBSTETRICS & GYNECOLOGY

## 2020-12-03 PROCEDURE — 3288F PR FALLS RISK ASSESSMENT DOCUMENTED: ICD-10-PCS | Mod: HCNC,CPTII,S$GLB, | Performed by: OBSTETRICS & GYNECOLOGY

## 2020-12-03 PROCEDURE — 1126F AMNT PAIN NOTED NONE PRSNT: CPT | Mod: HCNC,S$GLB,, | Performed by: OBSTETRICS & GYNECOLOGY

## 2020-12-03 NOTE — PROGRESS NOTES
"HPI:   79 y.o.   OB History        6    Para   5    Term   5            AB   1    Living           SAB        TAB        Ectopic        Multiple        Live Births                  No LMP recorded (lmp unknown). Patient is postmenopausal.    Patient is  here for her annual gynecologic exam.  She has no complaints.     ROS:  GENERAL: No fever, chills, fatigability or weight loss.  SKIN: No rashes, itching or changes in color or texture of skin.  HEAD: No headaches or recent head trauma.  EYES: Visual acuity fine. No photophobia, ocular pain or diplopia.  EARS: Denies ear pain, discharge or vertigo.  NOSE: No loss of smell, no epistaxis or postnasal drip.  MOUTH & THROAT: No hoarseness or change in voice. No excessive gum bleeding.  NODES: Denies swollen glands.  CHEST: Denies AUGUSTE, cyanosis, wheezing, cough and sputum production.  CARDIOVASCULAR: Denies chest pain, PND, orthopnea or reduced exercise tolerance.  ABDOMEN: Appetite fine. No weight loss. Denies diarrhea, abdominal pain, hematemesis or blood in stool.  URINARY: No flank pain, dysuria or hematuria.  PERIPHERAL VASCULAR: No claudication or cyanosis.  MUSCULOSKELETAL: No joint stiffness or swelling. Denies back pain.  NEUROLOGIC: No history of seizures, paralysis, alteration of gait or coordination.    PE:   /74   Ht 5' 3" (1.6 m)   Wt 76.8 kg (169 lb 6.4 oz)   LMP  (LMP Unknown)   BMI 30.01 kg/m²   APPEARANCE: Well nourished, well developed, in no acute distress.  NECK: Neck symmetric without masses or thyromegaly.  BREASTS: Symmetrical, no skin changes or visible lesions. No palpable masses, nipple discharge or adenopathy bilaterally.  ABDOMEN: Flat. Soft. No tenderness or masses. No hepatosplenomegaly. No hernias. No CVA tenderness.  VULVA: No lesions. Normal female genitalia.  URETHRAL MEATUS: Normal size and location, no lesions, no prolapse.  URETHRA: No masses, tenderness, prolapse or scarring.  VAGINA: Moist and well rugated, " no discharge, no significant cystocele or rectocele.  CERVIX: No lesions and discharge. PAP done.  UTERUS: Normal size, regular shape, mobile, non-tender, bladder base nontender.  ADNEXA: No masses, tenderness or CDS nodularity.  ANUS PERINEUM: Normal.    PROCEDURES:  Pap smear    Assessment:  Normal Gynecologic Exam    Plan:  Mammogram and Colonoscopy if indicated by current recommendations.  Return to clinic in one year or for any problems or complaints.  No gyn co,

## 2020-12-08 ENCOUNTER — PATIENT OUTREACH (OUTPATIENT)
Dept: OTHER | Facility: OTHER | Age: 80
End: 2020-12-08

## 2020-12-08 NOTE — PROGRESS NOTES
Digital Medicine: Health  Follow-Up    The history is provided by the patient.             Reason for review: Blood pressure at goal        Topics Covered on Call: physical activity and Diet    Additional Follow-up details: Patient stated she is doing well.            Diet-no change to diet    No change to diet.  Patient reports eating or drinking the following: Patient continues to follow a low sodium diet.      Physical Activity-Change      Additional physical activity details: Patient stated she has not been getting any exercise. She likes to walk outside, but not when it is windy. She does have some at home exercises to do for back pain, but has not been doing them lately.      Medication Adherence-Medication Adherence not addressed.      Substance, Sleep, Stress-Not assessed      Provided patient education.       Addressed patient questions and patient has my contact information if needed prior to next outreach. Patient verbalizes understanding.      Explained the importance of self-monitoring and medication adherence. Encouraged the patient to communicate with their health  for lifestyle modifications to help improve or maintain a healthy lifestyle.               There are no preventive care reminders to display for this patient.      Last 5 Patient Entered Readings                                      Current 30 Day Average: 119/72     Recent Readings 12/7/2020 12/4/2020 12/1/2020 11/29/2020 11/27/2020    SBP (mmHg) 114 114 122 104 127    DBP (mmHg) 71 73 74 66 71    Pulse 72 71 74 73 72

## 2020-12-15 LAB
FINAL PATHOLOGIC DIAGNOSIS: NORMAL
Lab: NORMAL

## 2020-12-30 NOTE — PROGRESS NOTES
"HPI:   76 y.o.   OB History      Para Term  AB Living    6 5 5   1      SAB TAB Ectopic Multiple Live Births                    No LMP recorded (lmp unknown). Patient is postmenopausal.    Patient is  here for her annual gynecologic exam.  She has no complaints.     ROS:  GENERAL: No fever, chills, fatigability or weight loss.  SKIN: No rashes, itching or changes in color or texture of skin.  HEAD: No headaches or recent head trauma.  EYES: Visual acuity fine. No photophobia, ocular pain or diplopia.  EARS: Denies ear pain, discharge or vertigo.  NOSE: No loss of smell, no epistaxis or postnasal drip.  MOUTH & THROAT: No hoarseness or change in voice. No excessive gum bleeding.  NODES: Denies swollen glands.  CHEST: Denies AUGUSTE, cyanosis, wheezing, cough and sputum production.  CARDIOVASCULAR: Denies chest pain, PND, orthopnea or reduced exercise tolerance.  ABDOMEN: Appetite fine. No weight loss. Denies diarrhea, abdominal pain, hematemesis or blood in stool.  URINARY: No flank pain, dysuria or hematuria.  PERIPHERAL VASCULAR: No claudication or cyanosis.  MUSCULOSKELETAL: No joint stiffness or swelling. Denies back pain.  NEUROLOGIC: No history of seizures, paralysis, alteration of gait or coordination.    PE:   /66   Ht 5' 3" (1.6 m)   Wt 75.1 kg (165 lb 9.1 oz)   LMP  (LMP Unknown)   BMI 29.33 kg/m²   APPEARANCE: Well nourished, well developed, in no acute distress.  NECK: Neck symmetric without masses or thyromegaly.  BREASTS: Symmetrical, no skin changes or visible lesions. No palpable masses, nipple discharge or adenopathy bilaterally.  ABDOMEN: Flat. Soft. No tenderness or masses. No hepatosplenomegaly. No hernias. No CVA tenderness.  VULVA: No lesions. Normal female genitalia.  URETHRAL MEATUS: Normal size and location, no lesions, no prolapse.  URETHRA: No masses, tenderness, prolapse or scarring.  VAGINA: Moist and well rugated, no discharge, no significant cystocele or " rectocele.  CERVIX: No lesions and discharge. PAP done.  UTERUS: Normal size, regular shape, mobile, non-tender, bladder base nontender.  ADNEXA: No masses, tenderness or CDS nodularity.  ANUS PERINEUM: Normal.    PROCEDURES:  Pap smear    Assessment:  Normal Gynecologic Exam    Plan:  Mammogram and Colonoscopy if indicated by current recommendations.  Return to clinic in one year or for any problems or complaints.  Pt states 'they' have paying for pap   No cyanosis, clubbing or edema

## 2021-01-06 ENCOUNTER — PATIENT MESSAGE (OUTPATIENT)
Dept: INTERNAL MEDICINE | Facility: CLINIC | Age: 81
End: 2021-01-06

## 2021-01-11 ENCOUNTER — IMMUNIZATION (OUTPATIENT)
Dept: INTERNAL MEDICINE | Facility: CLINIC | Age: 81
End: 2021-01-11
Payer: MEDICARE

## 2021-01-11 DIAGNOSIS — Z23 NEED FOR VACCINATION: ICD-10-CM

## 2021-01-11 PROCEDURE — 91300 COVID-19, MRNA, LNP-S, PF, 30 MCG/0.3 ML DOSE VACCINE: CPT | Mod: HCNC,PBBFAC | Performed by: FAMILY MEDICINE

## 2021-01-20 ENCOUNTER — PATIENT MESSAGE (OUTPATIENT)
Dept: CARDIOLOGY | Facility: CLINIC | Age: 81
End: 2021-01-20

## 2021-01-20 DIAGNOSIS — I10 ESSENTIAL HYPERTENSION: Primary | ICD-10-CM

## 2021-01-20 RX ORDER — LOSARTAN POTASSIUM 50 MG/1
50 TABLET ORAL DAILY
Qty: 90 TABLET | Refills: 2 | Status: SHIPPED | OUTPATIENT
Start: 2021-01-20 | End: 2021-10-04

## 2021-01-25 ENCOUNTER — PATIENT MESSAGE (OUTPATIENT)
Dept: ADMINISTRATIVE | Facility: OTHER | Age: 81
End: 2021-01-25

## 2021-02-01 ENCOUNTER — TELEPHONE (OUTPATIENT)
Dept: CARDIOLOGY | Facility: CLINIC | Age: 81
End: 2021-02-01

## 2021-02-01 DIAGNOSIS — E78.2 MIXED HYPERLIPIDEMIA: ICD-10-CM

## 2021-02-01 DIAGNOSIS — I10 ESSENTIAL HYPERTENSION: Primary | ICD-10-CM

## 2021-02-02 ENCOUNTER — IMMUNIZATION (OUTPATIENT)
Dept: INTERNAL MEDICINE | Facility: CLINIC | Age: 81
End: 2021-02-02
Payer: MEDICARE

## 2021-02-02 DIAGNOSIS — Z23 NEED FOR VACCINATION: Primary | ICD-10-CM

## 2021-02-02 PROCEDURE — 0002A COVID-19, MRNA, LNP-S, PF, 30 MCG/0.3 ML DOSE VACCINE: CPT | Mod: HCNC,PBBFAC | Performed by: FAMILY MEDICINE

## 2021-02-02 PROCEDURE — 91300 COVID-19, MRNA, LNP-S, PF, 30 MCG/0.3 ML DOSE VACCINE: CPT | Mod: HCNC,PBBFAC | Performed by: FAMILY MEDICINE

## 2021-02-08 ENCOUNTER — PATIENT MESSAGE (OUTPATIENT)
Dept: CARDIOLOGY | Facility: CLINIC | Age: 81
End: 2021-02-08

## 2021-02-08 DIAGNOSIS — G47.33 OSA (OBSTRUCTIVE SLEEP APNEA): ICD-10-CM

## 2021-02-08 DIAGNOSIS — I10 ESSENTIAL HYPERTENSION: ICD-10-CM

## 2021-02-08 DIAGNOSIS — R93.1 AGATSTON CORONARY ARTERY CALCIUM SCORE GREATER THAN 400: ICD-10-CM

## 2021-02-08 DIAGNOSIS — R06.02 SHORTNESS OF BREATH: ICD-10-CM

## 2021-02-08 DIAGNOSIS — I25.10 ATHEROSCLEROSIS OF NATIVE CORONARY ARTERY OF NATIVE HEART WITHOUT ANGINA PECTORIS: ICD-10-CM

## 2021-02-08 RX ORDER — AMLODIPINE BESYLATE 5 MG/1
5 TABLET ORAL DAILY
Qty: 90 TABLET | Refills: 0 | Status: SHIPPED | OUTPATIENT
Start: 2021-02-08 | End: 2021-05-10 | Stop reason: SDUPTHER

## 2021-02-17 ENCOUNTER — PATIENT MESSAGE (OUTPATIENT)
Dept: CARDIOLOGY | Facility: CLINIC | Age: 81
End: 2021-02-17

## 2021-02-22 ENCOUNTER — OFFICE VISIT (OUTPATIENT)
Dept: CARDIOLOGY | Facility: CLINIC | Age: 81
End: 2021-02-22
Payer: MEDICARE

## 2021-02-22 VITALS
HEART RATE: 74 BPM | BODY MASS INDEX: 30.02 KG/M2 | DIASTOLIC BLOOD PRESSURE: 67 MMHG | HEIGHT: 63 IN | SYSTOLIC BLOOD PRESSURE: 137 MMHG | WEIGHT: 169.44 LBS

## 2021-02-22 DIAGNOSIS — I65.23 CAROTID STENOSIS, BILATERAL: ICD-10-CM

## 2021-02-22 DIAGNOSIS — G47.33 OSA (OBSTRUCTIVE SLEEP APNEA): ICD-10-CM

## 2021-02-22 DIAGNOSIS — I27.20 PULMONARY HYPERTENSION: ICD-10-CM

## 2021-02-22 DIAGNOSIS — I25.10 ATHEROSCLEROSIS OF NATIVE CORONARY ARTERY OF NATIVE HEART WITHOUT ANGINA PECTORIS: Primary | ICD-10-CM

## 2021-02-22 DIAGNOSIS — I10 ESSENTIAL HYPERTENSION: ICD-10-CM

## 2021-02-22 DIAGNOSIS — I25.10 CALCIFIC CORONARY ARTERIOSCLEROSIS: ICD-10-CM

## 2021-02-22 DIAGNOSIS — E78.2 MIXED HYPERLIPIDEMIA: ICD-10-CM

## 2021-02-22 DIAGNOSIS — R93.1 AGATSTON CORONARY ARTERY CALCIUM SCORE GREATER THAN 400: ICD-10-CM

## 2021-02-22 DIAGNOSIS — R53.83 FATIGUE, UNSPECIFIED TYPE: ICD-10-CM

## 2021-02-22 DIAGNOSIS — R00.2 HEART PALPITATIONS: ICD-10-CM

## 2021-02-22 PROCEDURE — 99499 RISK ADDL DX/OHS AUDIT: ICD-10-PCS | Mod: S$GLB,,, | Performed by: INTERNAL MEDICINE

## 2021-02-22 PROCEDURE — 1159F MED LIST DOCD IN RCRD: CPT | Mod: S$GLB,,, | Performed by: INTERNAL MEDICINE

## 2021-02-22 PROCEDURE — 1126F AMNT PAIN NOTED NONE PRSNT: CPT | Mod: S$GLB,,, | Performed by: INTERNAL MEDICINE

## 2021-02-22 PROCEDURE — 93005 ELECTROCARDIOGRAM TRACING: CPT | Mod: S$GLB,,, | Performed by: INTERNAL MEDICINE

## 2021-02-22 PROCEDURE — 93010 EKG 12-LEAD: ICD-10-PCS | Mod: S$GLB,,, | Performed by: INTERNAL MEDICINE

## 2021-02-22 PROCEDURE — 1126F PR PAIN SEVERITY QUANTIFIED, NO PAIN PRESENT: ICD-10-PCS | Mod: S$GLB,,, | Performed by: INTERNAL MEDICINE

## 2021-02-22 PROCEDURE — 99499 UNLISTED E&M SERVICE: CPT | Mod: S$GLB,,, | Performed by: INTERNAL MEDICINE

## 2021-02-22 PROCEDURE — 3078F DIAST BP <80 MM HG: CPT | Mod: CPTII,S$GLB,, | Performed by: INTERNAL MEDICINE

## 2021-02-22 PROCEDURE — 3075F SYST BP GE 130 - 139MM HG: CPT | Mod: CPTII,S$GLB,, | Performed by: INTERNAL MEDICINE

## 2021-02-22 PROCEDURE — 1159F PR MEDICATION LIST DOCUMENTED IN MEDICAL RECORD: ICD-10-PCS | Mod: S$GLB,,, | Performed by: INTERNAL MEDICINE

## 2021-02-22 PROCEDURE — 3075F PR MOST RECENT SYSTOLIC BLOOD PRESS GE 130-139MM HG: ICD-10-PCS | Mod: CPTII,S$GLB,, | Performed by: INTERNAL MEDICINE

## 2021-02-22 PROCEDURE — 99214 PR OFFICE/OUTPT VISIT, EST, LEVL IV, 30-39 MIN: ICD-10-PCS | Mod: S$GLB,,, | Performed by: INTERNAL MEDICINE

## 2021-02-22 PROCEDURE — 93010 ELECTROCARDIOGRAM REPORT: CPT | Mod: S$GLB,,, | Performed by: INTERNAL MEDICINE

## 2021-02-22 PROCEDURE — 99999 PR PBB SHADOW E&M-EST. PATIENT-LVL IV: CPT | Mod: PBBFAC,,, | Performed by: INTERNAL MEDICINE

## 2021-02-22 PROCEDURE — 99999 PR PBB SHADOW E&M-EST. PATIENT-LVL IV: ICD-10-PCS | Mod: PBBFAC,,, | Performed by: INTERNAL MEDICINE

## 2021-02-22 PROCEDURE — 99214 OFFICE O/P EST MOD 30 MIN: CPT | Mod: S$GLB,,, | Performed by: INTERNAL MEDICINE

## 2021-02-22 PROCEDURE — 3078F PR MOST RECENT DIASTOLIC BLOOD PRESSURE < 80 MM HG: ICD-10-PCS | Mod: CPTII,S$GLB,, | Performed by: INTERNAL MEDICINE

## 2021-02-22 PROCEDURE — 93005 EKG 12-LEAD: ICD-10-PCS | Mod: S$GLB,,, | Performed by: INTERNAL MEDICINE

## 2021-02-23 ENCOUNTER — HOSPITAL ENCOUNTER (OUTPATIENT)
Dept: CARDIOLOGY | Facility: HOSPITAL | Age: 81
Discharge: HOME | End: 2021-02-23
Attending: INTERNAL MEDICINE
Payer: MEDICARE

## 2021-02-23 DIAGNOSIS — R93.1 AGATSTON CORONARY ARTERY CALCIUM SCORE GREATER THAN 400: ICD-10-CM

## 2021-02-23 DIAGNOSIS — R53.83 FATIGUE, UNSPECIFIED TYPE: ICD-10-CM

## 2021-02-23 DIAGNOSIS — I25.10 CALCIFIC CORONARY ARTERIOSCLEROSIS: ICD-10-CM

## 2021-02-23 DIAGNOSIS — I10 ESSENTIAL HYPERTENSION: ICD-10-CM

## 2021-02-23 DIAGNOSIS — I27.20 PULMONARY HYPERTENSION: ICD-10-CM

## 2021-02-23 DIAGNOSIS — E78.2 MIXED HYPERLIPIDEMIA: ICD-10-CM

## 2021-02-23 DIAGNOSIS — G47.33 OSA (OBSTRUCTIVE SLEEP APNEA): ICD-10-CM

## 2021-02-23 DIAGNOSIS — I25.10 ATHEROSCLEROSIS OF NATIVE CORONARY ARTERY OF NATIVE HEART WITHOUT ANGINA PECTORIS: ICD-10-CM

## 2021-02-23 DIAGNOSIS — I65.23 CAROTID STENOSIS, BILATERAL: ICD-10-CM

## 2021-02-23 PROCEDURE — 93227 XTRNL ECG REC<48 HR R&I: CPT | Mod: ,,, | Performed by: INTERNAL MEDICINE

## 2021-02-23 PROCEDURE — 93227 HOLTER MONITOR - 48 HOUR (CUPID ONLY): ICD-10-PCS | Mod: ,,, | Performed by: INTERNAL MEDICINE

## 2021-02-23 PROCEDURE — 93225 XTRNL ECG REC<48 HRS REC: CPT

## 2021-02-26 LAB
OHS CV EVENT MONITOR DAY: 0
OHS CV HOLTER LENGTH DECIMAL HOURS: 48
OHS CV HOLTER LENGTH HOURS: 48
OHS CV HOLTER LENGTH MINUTES: 0

## 2021-03-01 ENCOUNTER — TELEPHONE (OUTPATIENT)
Dept: CARDIOLOGY | Facility: CLINIC | Age: 81
End: 2021-03-01

## 2021-03-24 ENCOUNTER — PATIENT MESSAGE (OUTPATIENT)
Dept: CARDIOLOGY | Facility: CLINIC | Age: 81
End: 2021-03-24

## 2021-03-24 DIAGNOSIS — E78.2 MIXED HYPERLIPIDEMIA: ICD-10-CM

## 2021-03-24 RX ORDER — ROSUVASTATIN CALCIUM 40 MG/1
40 TABLET, COATED ORAL DAILY
Qty: 90 TABLET | Refills: 3 | Status: SHIPPED | OUTPATIENT
Start: 2021-03-24 | End: 2022-03-14 | Stop reason: SDUPTHER

## 2021-04-05 ENCOUNTER — PATIENT MESSAGE (OUTPATIENT)
Dept: INTERNAL MEDICINE | Facility: CLINIC | Age: 81
End: 2021-04-05

## 2021-04-06 RX ORDER — ESOMEPRAZOLE MAGNESIUM 40 MG/1
CAPSULE, DELAYED RELEASE ORAL
Qty: 90 CAPSULE | Refills: 3 | Status: SHIPPED | OUTPATIENT
Start: 2021-04-06 | End: 2022-02-15 | Stop reason: SDUPTHER

## 2021-04-13 ENCOUNTER — LAB VISIT (OUTPATIENT)
Dept: LAB | Facility: HOSPITAL | Age: 81
End: 2021-04-13
Attending: INTERNAL MEDICINE
Payer: MEDICARE

## 2021-04-13 ENCOUNTER — TELEPHONE (OUTPATIENT)
Dept: SLEEP MEDICINE | Facility: CLINIC | Age: 81
End: 2021-04-13

## 2021-04-13 DIAGNOSIS — E78.2 MIXED HYPERLIPIDEMIA: ICD-10-CM

## 2021-04-13 DIAGNOSIS — I10 ESSENTIAL HYPERTENSION: ICD-10-CM

## 2021-04-13 LAB
ALBUMIN SERPL BCP-MCNC: 4.5 G/DL (ref 3.5–5.2)
ALP SERPL-CCNC: 99 U/L (ref 38–126)
ALT SERPL W/O P-5'-P-CCNC: 17 U/L (ref 10–44)
ANION GAP SERPL CALC-SCNC: 9 MMOL/L (ref 8–16)
AST SERPL-CCNC: 33 U/L (ref 15–46)
BILIRUB SERPL-MCNC: 0.4 MG/DL (ref 0.1–1)
CALCIUM SERPL-MCNC: 9.5 MG/DL (ref 8.7–10.5)
CHLORIDE SERPL-SCNC: 102 MMOL/L (ref 95–110)
CHOLEST SERPL-MCNC: 143 MG/DL (ref 120–199)
CHOLEST/HDLC SERPL: 2.2 {RATIO} (ref 2–5)
CO2 SERPL-SCNC: 29 MMOL/L (ref 23–29)
CREAT SERPL-MCNC: 0.78 MG/DL (ref 0.5–1.4)
EST. GFR  (AFRICAN AMERICAN): >60 ML/MIN/1.73 M^2
EST. GFR  (NON AFRICAN AMERICAN): >60 ML/MIN/1.73 M^2
GLUCOSE SERPL-MCNC: 96 MG/DL (ref 70–110)
HDLC SERPL-MCNC: 66 MG/DL (ref 40–75)
HDLC SERPL: 46.2 % (ref 20–50)
LDLC SERPL CALC-MCNC: 63.4 MG/DL (ref 63–159)
NONHDLC SERPL-MCNC: 77 MG/DL
POTASSIUM SERPL-SCNC: 4.2 MMOL/L (ref 3.5–5.1)
PROT SERPL-MCNC: 7.1 G/DL (ref 6–8.4)
SODIUM SERPL-SCNC: 140 MMOL/L (ref 136–145)
TRIGL SERPL-MCNC: 68 MG/DL (ref 30–150)
TSH SERPL DL<=0.005 MIU/L-ACNC: 2.76 UIU/ML (ref 0.4–4)
UUN UR-MCNC: 18 MG/DL (ref 7–17)

## 2021-04-13 PROCEDURE — 84443 ASSAY THYROID STIM HORMONE: CPT | Mod: HCNC,PO | Performed by: INTERNAL MEDICINE

## 2021-04-13 PROCEDURE — 80061 LIPID PANEL: CPT | Mod: HCNC | Performed by: INTERNAL MEDICINE

## 2021-04-13 PROCEDURE — 36415 COLL VENOUS BLD VENIPUNCTURE: CPT | Mod: HCNC,PO | Performed by: INTERNAL MEDICINE

## 2021-04-13 PROCEDURE — 80053 COMPREHEN METABOLIC PANEL: CPT | Mod: HCNC,PO | Performed by: INTERNAL MEDICINE

## 2021-04-14 ENCOUNTER — TELEPHONE (OUTPATIENT)
Dept: CARDIOLOGY | Facility: CLINIC | Age: 81
End: 2021-04-14

## 2021-04-14 ENCOUNTER — OFFICE VISIT (OUTPATIENT)
Dept: SLEEP MEDICINE | Facility: CLINIC | Age: 81
End: 2021-04-14
Payer: MEDICARE

## 2021-04-14 VITALS
HEIGHT: 63 IN | WEIGHT: 169 LBS | HEART RATE: 59 BPM | BODY MASS INDEX: 29.95 KG/M2 | SYSTOLIC BLOOD PRESSURE: 129 MMHG | DIASTOLIC BLOOD PRESSURE: 77 MMHG

## 2021-04-14 DIAGNOSIS — G47.33 OSA (OBSTRUCTIVE SLEEP APNEA): Primary | ICD-10-CM

## 2021-04-14 PROCEDURE — 99999 PR PBB SHADOW E&M-EST. PATIENT-LVL IV: ICD-10-PCS | Mod: PBBFAC,HCNC,, | Performed by: PSYCHIATRY & NEUROLOGY

## 2021-04-14 PROCEDURE — 1159F PR MEDICATION LIST DOCUMENTED IN MEDICAL RECORD: ICD-10-PCS | Mod: HCNC,S$GLB,, | Performed by: PSYCHIATRY & NEUROLOGY

## 2021-04-14 PROCEDURE — 99999 PR PBB SHADOW E&M-EST. PATIENT-LVL IV: CPT | Mod: PBBFAC,HCNC,, | Performed by: PSYCHIATRY & NEUROLOGY

## 2021-04-14 PROCEDURE — 1159F MED LIST DOCD IN RCRD: CPT | Mod: HCNC,S$GLB,, | Performed by: PSYCHIATRY & NEUROLOGY

## 2021-04-14 PROCEDURE — 1126F AMNT PAIN NOTED NONE PRSNT: CPT | Mod: HCNC,S$GLB,, | Performed by: PSYCHIATRY & NEUROLOGY

## 2021-04-14 PROCEDURE — 3288F PR FALLS RISK ASSESSMENT DOCUMENTED: ICD-10-PCS | Mod: HCNC,CPTII,S$GLB, | Performed by: PSYCHIATRY & NEUROLOGY

## 2021-04-14 PROCEDURE — 3288F FALL RISK ASSESSMENT DOCD: CPT | Mod: HCNC,CPTII,S$GLB, | Performed by: PSYCHIATRY & NEUROLOGY

## 2021-04-14 PROCEDURE — 1126F PR PAIN SEVERITY QUANTIFIED, NO PAIN PRESENT: ICD-10-PCS | Mod: HCNC,S$GLB,, | Performed by: PSYCHIATRY & NEUROLOGY

## 2021-04-14 PROCEDURE — 99214 PR OFFICE/OUTPT VISIT, EST, LEVL IV, 30-39 MIN: ICD-10-PCS | Mod: HCNC,S$GLB,, | Performed by: PSYCHIATRY & NEUROLOGY

## 2021-04-14 PROCEDURE — 1101F PT FALLS ASSESS-DOCD LE1/YR: CPT | Mod: HCNC,CPTII,S$GLB, | Performed by: PSYCHIATRY & NEUROLOGY

## 2021-04-14 PROCEDURE — 1101F PR PT FALLS ASSESS DOC 0-1 FALLS W/OUT INJ PAST YR: ICD-10-PCS | Mod: HCNC,CPTII,S$GLB, | Performed by: PSYCHIATRY & NEUROLOGY

## 2021-04-14 PROCEDURE — 99214 OFFICE O/P EST MOD 30 MIN: CPT | Mod: HCNC,S$GLB,, | Performed by: PSYCHIATRY & NEUROLOGY

## 2021-04-19 ENCOUNTER — OFFICE VISIT (OUTPATIENT)
Dept: CARDIOLOGY | Facility: CLINIC | Age: 81
End: 2021-04-19
Payer: MEDICARE

## 2021-04-19 VITALS
HEART RATE: 62 BPM | DIASTOLIC BLOOD PRESSURE: 72 MMHG | SYSTOLIC BLOOD PRESSURE: 141 MMHG | HEIGHT: 63 IN | BODY MASS INDEX: 30.02 KG/M2 | WEIGHT: 169.44 LBS

## 2021-04-19 DIAGNOSIS — I10 ESSENTIAL HYPERTENSION: ICD-10-CM

## 2021-04-19 DIAGNOSIS — R42 DIZZINESS: ICD-10-CM

## 2021-04-19 DIAGNOSIS — R53.83 FATIGUE, UNSPECIFIED TYPE: ICD-10-CM

## 2021-04-19 DIAGNOSIS — G47.33 OSA (OBSTRUCTIVE SLEEP APNEA): ICD-10-CM

## 2021-04-19 DIAGNOSIS — I25.10 CALCIFIC CORONARY ARTERIOSCLEROSIS: ICD-10-CM

## 2021-04-19 DIAGNOSIS — R06.02 SHORTNESS OF BREATH: ICD-10-CM

## 2021-04-19 DIAGNOSIS — R93.1 AGATSTON CORONARY ARTERY CALCIUM SCORE GREATER THAN 400: ICD-10-CM

## 2021-04-19 DIAGNOSIS — I25.10 ATHEROSCLEROSIS OF NATIVE CORONARY ARTERY OF NATIVE HEART WITHOUT ANGINA PECTORIS: Primary | ICD-10-CM

## 2021-04-19 DIAGNOSIS — E78.2 MIXED HYPERLIPIDEMIA: ICD-10-CM

## 2021-04-19 DIAGNOSIS — I65.23 CAROTID STENOSIS, BILATERAL: ICD-10-CM

## 2021-04-19 DIAGNOSIS — R00.2 HEART PALPITATIONS: ICD-10-CM

## 2021-04-19 PROCEDURE — 1125F AMNT PAIN NOTED PAIN PRSNT: CPT | Mod: HCNC,S$GLB,, | Performed by: INTERNAL MEDICINE

## 2021-04-19 PROCEDURE — 99999 PR PBB SHADOW E&M-EST. PATIENT-LVL IV: ICD-10-PCS | Mod: PBBFAC,HCNC,, | Performed by: INTERNAL MEDICINE

## 2021-04-19 PROCEDURE — 1159F PR MEDICATION LIST DOCUMENTED IN MEDICAL RECORD: ICD-10-PCS | Mod: HCNC,S$GLB,, | Performed by: INTERNAL MEDICINE

## 2021-04-19 PROCEDURE — 1125F PR PAIN SEVERITY QUANTIFIED, PAIN PRESENT: ICD-10-PCS | Mod: HCNC,S$GLB,, | Performed by: INTERNAL MEDICINE

## 2021-04-19 PROCEDURE — 99214 OFFICE O/P EST MOD 30 MIN: CPT | Mod: HCNC,S$GLB,, | Performed by: INTERNAL MEDICINE

## 2021-04-19 PROCEDURE — 99999 PR PBB SHADOW E&M-EST. PATIENT-LVL IV: CPT | Mod: PBBFAC,HCNC,, | Performed by: INTERNAL MEDICINE

## 2021-04-19 PROCEDURE — 99214 PR OFFICE/OUTPT VISIT, EST, LEVL IV, 30-39 MIN: ICD-10-PCS | Mod: HCNC,S$GLB,, | Performed by: INTERNAL MEDICINE

## 2021-04-19 PROCEDURE — 1159F MED LIST DOCD IN RCRD: CPT | Mod: HCNC,S$GLB,, | Performed by: INTERNAL MEDICINE

## 2021-05-10 ENCOUNTER — PATIENT MESSAGE (OUTPATIENT)
Dept: CARDIOLOGY | Facility: CLINIC | Age: 81
End: 2021-05-10

## 2021-05-10 DIAGNOSIS — R93.1 AGATSTON CORONARY ARTERY CALCIUM SCORE GREATER THAN 400: ICD-10-CM

## 2021-05-10 DIAGNOSIS — G47.33 OSA (OBSTRUCTIVE SLEEP APNEA): ICD-10-CM

## 2021-05-10 DIAGNOSIS — I10 ESSENTIAL HYPERTENSION: ICD-10-CM

## 2021-05-10 DIAGNOSIS — R06.02 SHORTNESS OF BREATH: ICD-10-CM

## 2021-05-10 DIAGNOSIS — I25.10 ATHEROSCLEROSIS OF NATIVE CORONARY ARTERY OF NATIVE HEART WITHOUT ANGINA PECTORIS: ICD-10-CM

## 2021-05-10 RX ORDER — AMLODIPINE BESYLATE 5 MG/1
5 TABLET ORAL DAILY
Qty: 90 TABLET | Refills: 3 | Status: SHIPPED | OUTPATIENT
Start: 2021-05-10 | End: 2022-04-04

## 2021-05-14 ENCOUNTER — PES CALL (OUTPATIENT)
Dept: ADMINISTRATIVE | Facility: CLINIC | Age: 81
End: 2021-05-14

## 2021-06-04 ENCOUNTER — TELEPHONE (OUTPATIENT)
Dept: INTERNAL MEDICINE | Facility: CLINIC | Age: 81
End: 2021-06-04

## 2021-08-17 ENCOUNTER — TELEPHONE (OUTPATIENT)
Dept: INTERNAL MEDICINE | Facility: CLINIC | Age: 81
End: 2021-08-17

## 2021-08-17 ENCOUNTER — TELEPHONE (OUTPATIENT)
Dept: OBSTETRICS AND GYNECOLOGY | Facility: CLINIC | Age: 81
End: 2021-08-17

## 2021-08-17 ENCOUNTER — LAB VISIT (OUTPATIENT)
Dept: LAB | Facility: HOSPITAL | Age: 81
End: 2021-08-17
Attending: INTERNAL MEDICINE
Payer: MEDICARE

## 2021-08-17 DIAGNOSIS — N39.0 URINARY TRACT INFECTION WITHOUT HEMATURIA, SITE UNSPECIFIED: Primary | ICD-10-CM

## 2021-08-17 DIAGNOSIS — N39.0 URINARY TRACT INFECTION WITHOUT HEMATURIA, SITE UNSPECIFIED: ICD-10-CM

## 2021-08-17 LAB
BILIRUB UR QL STRIP: NEGATIVE
CLARITY UR REFRACT.AUTO: CLEAR
COLOR UR AUTO: YELLOW
GLUCOSE UR QL STRIP: NEGATIVE
HGB UR QL STRIP: NEGATIVE
KETONES UR QL STRIP: NEGATIVE
LEUKOCYTE ESTERASE UR QL STRIP: NEGATIVE
NITRITE UR QL STRIP: NEGATIVE
PH UR STRIP: 7 [PH] (ref 5–8)
PROT UR QL STRIP: NEGATIVE
SP GR UR STRIP: 1 (ref 1–1.03)
URN SPEC COLLECT METH UR: NORMAL
UROBILINOGEN UR STRIP-ACNC: NEGATIVE EU/DL

## 2021-08-17 PROCEDURE — 81003 URINALYSIS AUTO W/O SCOPE: CPT | Mod: HCNC,PO | Performed by: INTERNAL MEDICINE

## 2021-08-17 PROCEDURE — 87086 URINE CULTURE/COLONY COUNT: CPT | Mod: HCNC,PO | Performed by: INTERNAL MEDICINE

## 2021-08-17 RX ORDER — CIPROFLOXACIN 250 MG/1
250 TABLET, FILM COATED ORAL 2 TIMES DAILY
Qty: 20 TABLET | Refills: 0 | Status: SHIPPED | OUTPATIENT
Start: 2021-08-17 | End: 2021-08-23

## 2021-08-19 LAB — BACTERIA UR CULT: NORMAL

## 2021-08-23 ENCOUNTER — PATIENT MESSAGE (OUTPATIENT)
Dept: INTERNAL MEDICINE | Facility: CLINIC | Age: 81
End: 2021-08-23

## 2021-08-23 ENCOUNTER — LAB VISIT (OUTPATIENT)
Dept: LAB | Facility: HOSPITAL | Age: 81
End: 2021-08-23
Attending: INTERNAL MEDICINE
Payer: MEDICARE

## 2021-08-23 ENCOUNTER — OFFICE VISIT (OUTPATIENT)
Dept: INTERNAL MEDICINE | Facility: CLINIC | Age: 81
End: 2021-08-23
Payer: MEDICARE

## 2021-08-23 VITALS
HEIGHT: 63 IN | WEIGHT: 167.75 LBS | SYSTOLIC BLOOD PRESSURE: 132 MMHG | HEART RATE: 78 BPM | DIASTOLIC BLOOD PRESSURE: 82 MMHG | TEMPERATURE: 98 F | BODY MASS INDEX: 29.72 KG/M2 | OXYGEN SATURATION: 98 %

## 2021-08-23 DIAGNOSIS — Z78.0 POSTMENOPAUSAL ESTROGEN DEFICIENCY: ICD-10-CM

## 2021-08-23 DIAGNOSIS — Z11.59 NEED FOR HEPATITIS C SCREENING TEST: ICD-10-CM

## 2021-08-23 DIAGNOSIS — G25.81 RESTLESS LEGS: ICD-10-CM

## 2021-08-23 DIAGNOSIS — M54.16 LUMBAR RADICULITIS: ICD-10-CM

## 2021-08-23 DIAGNOSIS — G47.00 INSOMNIA, UNSPECIFIED TYPE: ICD-10-CM

## 2021-08-23 DIAGNOSIS — D50.9 IRON DEFICIENCY ANEMIA, UNSPECIFIED IRON DEFICIENCY ANEMIA TYPE: ICD-10-CM

## 2021-08-23 DIAGNOSIS — K44.9 HIATAL HERNIA: ICD-10-CM

## 2021-08-23 DIAGNOSIS — Z00.00 ANNUAL PHYSICAL EXAM: Primary | ICD-10-CM

## 2021-08-23 DIAGNOSIS — K21.9 GASTROESOPHAGEAL REFLUX DISEASE WITHOUT ESOPHAGITIS: ICD-10-CM

## 2021-08-23 LAB
BASOPHILS # BLD AUTO: 0.04 K/UL (ref 0–0.2)
BASOPHILS NFR BLD: 0.8 % (ref 0–1.9)
DIFFERENTIAL METHOD: ABNORMAL
EOSINOPHIL # BLD AUTO: 0.1 K/UL (ref 0–0.5)
EOSINOPHIL NFR BLD: 2 % (ref 0–8)
ERYTHROCYTE [DISTWIDTH] IN BLOOD BY AUTOMATED COUNT: 15.2 % (ref 11.5–14.5)
HCT VFR BLD AUTO: 37 % (ref 37–48.5)
HGB BLD-MCNC: 11.8 G/DL (ref 12–16)
IMM GRANULOCYTES # BLD AUTO: 0.01 K/UL (ref 0–0.04)
IMM GRANULOCYTES NFR BLD AUTO: 0.2 % (ref 0–0.5)
IRON SERPL-MCNC: 61 UG/DL (ref 30–160)
LYMPHOCYTES # BLD AUTO: 1 K/UL (ref 1–4.8)
LYMPHOCYTES NFR BLD: 19.3 % (ref 18–48)
MCH RBC QN AUTO: 27.8 PG (ref 27–31)
MCHC RBC AUTO-ENTMCNC: 31.9 G/DL (ref 32–36)
MCV RBC AUTO: 87 FL (ref 82–98)
MONOCYTES # BLD AUTO: 0.5 K/UL (ref 0.3–1)
MONOCYTES NFR BLD: 9.1 % (ref 4–15)
NEUTROPHILS # BLD AUTO: 3.4 K/UL (ref 1.8–7.7)
NEUTROPHILS NFR BLD: 68.6 % (ref 38–73)
NRBC BLD-RTO: 0 /100 WBC
PLATELET # BLD AUTO: 254 K/UL (ref 150–450)
PMV BLD AUTO: 11.7 FL (ref 9.2–12.9)
RBC # BLD AUTO: 4.25 M/UL (ref 4–5.4)
SATURATED IRON: 14 % (ref 20–50)
TOTAL IRON BINDING CAPACITY: 429 UG/DL (ref 250–450)
TRANSFERRIN SERPL-MCNC: 290 MG/DL (ref 200–375)
WBC # BLD AUTO: 4.92 K/UL (ref 3.9–12.7)

## 2021-08-23 PROCEDURE — 3288F PR FALLS RISK ASSESSMENT DOCUMENTED: ICD-10-PCS | Mod: HCNC,CPTII,S$GLB, | Performed by: INTERNAL MEDICINE

## 2021-08-23 PROCEDURE — 1101F PT FALLS ASSESS-DOCD LE1/YR: CPT | Mod: HCNC,CPTII,S$GLB, | Performed by: INTERNAL MEDICINE

## 2021-08-23 PROCEDURE — 99397 PR PREVENTIVE VISIT,EST,65 & OVER: ICD-10-PCS | Mod: HCNC,S$GLB,, | Performed by: INTERNAL MEDICINE

## 2021-08-23 PROCEDURE — 3075F PR MOST RECENT SYSTOLIC BLOOD PRESS GE 130-139MM HG: ICD-10-PCS | Mod: HCNC,CPTII,S$GLB, | Performed by: INTERNAL MEDICINE

## 2021-08-23 PROCEDURE — 99397 PER PM REEVAL EST PAT 65+ YR: CPT | Mod: HCNC,S$GLB,, | Performed by: INTERNAL MEDICINE

## 2021-08-23 PROCEDURE — 85025 COMPLETE CBC W/AUTO DIFF WBC: CPT | Mod: HCNC | Performed by: INTERNAL MEDICINE

## 2021-08-23 PROCEDURE — 1126F AMNT PAIN NOTED NONE PRSNT: CPT | Mod: HCNC,CPTII,S$GLB, | Performed by: INTERNAL MEDICINE

## 2021-08-23 PROCEDURE — 86803 HEPATITIS C AB TEST: CPT | Mod: HCNC | Performed by: INTERNAL MEDICINE

## 2021-08-23 PROCEDURE — 99999 PR PBB SHADOW E&M-EST. PATIENT-LVL V: ICD-10-PCS | Mod: PBBFAC,HCNC,, | Performed by: INTERNAL MEDICINE

## 2021-08-23 PROCEDURE — 3075F SYST BP GE 130 - 139MM HG: CPT | Mod: HCNC,CPTII,S$GLB, | Performed by: INTERNAL MEDICINE

## 2021-08-23 PROCEDURE — 99499 UNLISTED E&M SERVICE: CPT | Mod: HCNC,S$GLB,, | Performed by: INTERNAL MEDICINE

## 2021-08-23 PROCEDURE — 1126F PR PAIN SEVERITY QUANTIFIED, NO PAIN PRESENT: ICD-10-PCS | Mod: HCNC,CPTII,S$GLB, | Performed by: INTERNAL MEDICINE

## 2021-08-23 PROCEDURE — 1159F MED LIST DOCD IN RCRD: CPT | Mod: HCNC,CPTII,S$GLB, | Performed by: INTERNAL MEDICINE

## 2021-08-23 PROCEDURE — 3288F FALL RISK ASSESSMENT DOCD: CPT | Mod: HCNC,CPTII,S$GLB, | Performed by: INTERNAL MEDICINE

## 2021-08-23 PROCEDURE — 99499 RISK ADDL DX/OHS AUDIT: ICD-10-PCS | Mod: HCNC,S$GLB,, | Performed by: INTERNAL MEDICINE

## 2021-08-23 PROCEDURE — 1159F PR MEDICATION LIST DOCUMENTED IN MEDICAL RECORD: ICD-10-PCS | Mod: HCNC,CPTII,S$GLB, | Performed by: INTERNAL MEDICINE

## 2021-08-23 PROCEDURE — 3079F PR MOST RECENT DIASTOLIC BLOOD PRESSURE 80-89 MM HG: ICD-10-PCS | Mod: HCNC,CPTII,S$GLB, | Performed by: INTERNAL MEDICINE

## 2021-08-23 PROCEDURE — 1101F PR PT FALLS ASSESS DOC 0-1 FALLS W/OUT INJ PAST YR: ICD-10-PCS | Mod: HCNC,CPTII,S$GLB, | Performed by: INTERNAL MEDICINE

## 2021-08-23 PROCEDURE — 84466 ASSAY OF TRANSFERRIN: CPT | Mod: HCNC | Performed by: INTERNAL MEDICINE

## 2021-08-23 PROCEDURE — 36415 COLL VENOUS BLD VENIPUNCTURE: CPT | Mod: HCNC,PO | Performed by: INTERNAL MEDICINE

## 2021-08-23 PROCEDURE — 3079F DIAST BP 80-89 MM HG: CPT | Mod: HCNC,CPTII,S$GLB, | Performed by: INTERNAL MEDICINE

## 2021-08-23 PROCEDURE — 99999 PR PBB SHADOW E&M-EST. PATIENT-LVL V: CPT | Mod: PBBFAC,HCNC,, | Performed by: INTERNAL MEDICINE

## 2021-08-24 ENCOUNTER — TELEPHONE (OUTPATIENT)
Dept: INTERNAL MEDICINE | Facility: CLINIC | Age: 81
End: 2021-08-24

## 2021-08-24 LAB — HCV AB SERPL QL IA: NEGATIVE

## 2021-08-26 ENCOUNTER — HOSPITAL ENCOUNTER (OUTPATIENT)
Dept: RADIOLOGY | Facility: HOSPITAL | Age: 81
Discharge: HOME | End: 2021-08-26
Attending: INTERNAL MEDICINE
Payer: MEDICARE

## 2021-08-26 DIAGNOSIS — Z78.0 POSTMENOPAUSAL ESTROGEN DEFICIENCY: ICD-10-CM

## 2021-08-26 PROCEDURE — 77080 DXA BONE DENSITY AXIAL: CPT | Mod: TC,HCNC,PO

## 2021-09-05 ENCOUNTER — HOSPITAL ENCOUNTER (EMERGENCY)
Facility: HOSPITAL | Age: 81
Discharge: HOME | End: 2021-09-05
Attending: EMERGENCY MEDICINE
Payer: MEDICARE

## 2021-09-05 VITALS
RESPIRATION RATE: 20 BRPM | WEIGHT: 167 LBS | OXYGEN SATURATION: 98 % | DIASTOLIC BLOOD PRESSURE: 77 MMHG | SYSTOLIC BLOOD PRESSURE: 140 MMHG | HEIGHT: 63 IN | BODY MASS INDEX: 29.59 KG/M2 | HEART RATE: 71 BPM | TEMPERATURE: 98 F

## 2021-09-05 DIAGNOSIS — R53.1 GENERALIZED WEAKNESS: Primary | ICD-10-CM

## 2021-09-05 DIAGNOSIS — R53.1 WEAK: ICD-10-CM

## 2021-09-05 LAB
ALBUMIN SERPL BCP-MCNC: 4.2 G/DL (ref 3.5–5.2)
ALP SERPL-CCNC: 106 U/L (ref 55–135)
ALT SERPL W/O P-5'-P-CCNC: 19 U/L (ref 10–44)
ANION GAP SERPL CALC-SCNC: 13 MMOL/L (ref 8–16)
AST SERPL-CCNC: 35 U/L (ref 10–40)
BASOPHILS # BLD AUTO: 0.03 K/UL (ref 0–0.2)
BASOPHILS NFR BLD: 0.7 % (ref 0–1.9)
BILIRUB SERPL-MCNC: 0.3 MG/DL (ref 0.1–1)
BUN SERPL-MCNC: 8 MG/DL (ref 8–23)
CALCIUM SERPL-MCNC: 8.9 MG/DL (ref 8.7–10.5)
CHLORIDE SERPL-SCNC: 102 MMOL/L (ref 95–110)
CO2 SERPL-SCNC: 21 MMOL/L (ref 23–29)
CREAT SERPL-MCNC: 0.7 MG/DL (ref 0.5–1.4)
CTP QC/QA: YES
DIFFERENTIAL METHOD: ABNORMAL
EOSINOPHIL # BLD AUTO: 0.1 K/UL (ref 0–0.5)
EOSINOPHIL NFR BLD: 2.3 % (ref 0–8)
ERYTHROCYTE [DISTWIDTH] IN BLOOD BY AUTOMATED COUNT: 14.7 % (ref 11.5–14.5)
EST. GFR  (AFRICAN AMERICAN): >60 ML/MIN/1.73 M^2
EST. GFR  (NON AFRICAN AMERICAN): >60 ML/MIN/1.73 M^2
GLUCOSE SERPL-MCNC: 125 MG/DL (ref 70–110)
HCT VFR BLD AUTO: 34 % (ref 37–48.5)
HGB BLD-MCNC: 11 G/DL (ref 12–16)
IMM GRANULOCYTES # BLD AUTO: 0.01 K/UL (ref 0–0.04)
IMM GRANULOCYTES NFR BLD AUTO: 0.2 % (ref 0–0.5)
LYMPHOCYTES # BLD AUTO: 0.9 K/UL (ref 1–4.8)
LYMPHOCYTES NFR BLD: 21.6 % (ref 18–48)
MAGNESIUM SERPL-MCNC: 2.2 MG/DL (ref 1.6–2.6)
MCH RBC QN AUTO: 27.4 PG (ref 27–31)
MCHC RBC AUTO-ENTMCNC: 32.4 G/DL (ref 32–36)
MCV RBC AUTO: 85 FL (ref 82–98)
MONOCYTES # BLD AUTO: 0.4 K/UL (ref 0.3–1)
MONOCYTES NFR BLD: 9.5 % (ref 4–15)
NEUTROPHILS # BLD AUTO: 2.8 K/UL (ref 1.8–7.7)
NEUTROPHILS NFR BLD: 65.7 % (ref 38–73)
NRBC BLD-RTO: 0 /100 WBC
PLATELET # BLD AUTO: 250 K/UL (ref 150–450)
PMV BLD AUTO: 11 FL (ref 9.2–12.9)
POTASSIUM SERPL-SCNC: 3.7 MMOL/L (ref 3.5–5.1)
PROT SERPL-MCNC: 7 G/DL (ref 6–8.4)
RBC # BLD AUTO: 4.01 M/UL (ref 4–5.4)
SARS-COV-2 RDRP RESP QL NAA+PROBE: NEGATIVE
SODIUM SERPL-SCNC: 136 MMOL/L (ref 136–145)
TROPONIN I SERPL DL<=0.01 NG/ML-MCNC: <0.006 NG/ML (ref 0–0.03)
WBC # BLD AUTO: 4.31 K/UL (ref 3.9–12.7)

## 2021-09-05 PROCEDURE — 99284 EMERGENCY DEPT VISIT MOD MDM: CPT | Mod: HCNC

## 2021-09-05 PROCEDURE — 80053 COMPREHEN METABOLIC PANEL: CPT | Mod: HCNC | Performed by: EMERGENCY MEDICINE

## 2021-09-05 PROCEDURE — 93010 EKG 12-LEAD: ICD-10-PCS | Mod: HCNC,,, | Performed by: INTERNAL MEDICINE

## 2021-09-05 PROCEDURE — 83735 ASSAY OF MAGNESIUM: CPT | Mod: HCNC | Performed by: EMERGENCY MEDICINE

## 2021-09-05 PROCEDURE — 85025 COMPLETE CBC W/AUTO DIFF WBC: CPT | Mod: HCNC | Performed by: EMERGENCY MEDICINE

## 2021-09-05 PROCEDURE — 93005 ELECTROCARDIOGRAM TRACING: CPT | Mod: HCNC

## 2021-09-05 PROCEDURE — U0002 COVID-19 LAB TEST NON-CDC: HCPCS | Mod: HCNC | Performed by: EMERGENCY MEDICINE

## 2021-09-05 PROCEDURE — 93010 ELECTROCARDIOGRAM REPORT: CPT | Mod: HCNC,,, | Performed by: INTERNAL MEDICINE

## 2021-09-05 PROCEDURE — 84484 ASSAY OF TROPONIN QUANT: CPT | Mod: HCNC | Performed by: EMERGENCY MEDICINE

## 2021-10-06 ENCOUNTER — TELEPHONE (OUTPATIENT)
Dept: OBSTETRICS AND GYNECOLOGY | Facility: CLINIC | Age: 81
End: 2021-10-06

## 2021-10-06 DIAGNOSIS — M79.2 NEUROPATHIC PAIN OF LOWER EXTREMITY, UNSPECIFIED LATERALITY: ICD-10-CM

## 2021-10-06 DIAGNOSIS — G25.81 RLS (RESTLESS LEGS SYNDROME): ICD-10-CM

## 2021-10-06 DIAGNOSIS — Z12.31 VISIT FOR SCREENING MAMMOGRAM: Primary | ICD-10-CM

## 2021-10-06 DIAGNOSIS — G47.33 OSA (OBSTRUCTIVE SLEEP APNEA): ICD-10-CM

## 2021-10-06 RX ORDER — GABAPENTIN 100 MG/1
CAPSULE ORAL
Qty: 360 CAPSULE | Refills: 3 | Status: SHIPPED | OUTPATIENT
Start: 2021-10-06 | End: 2022-10-24

## 2021-10-06 RX ORDER — PRAMIPEXOLE DIHYDROCHLORIDE 0.12 MG/1
TABLET ORAL
Qty: 180 TABLET | Refills: 3 | Status: SHIPPED | OUTPATIENT
Start: 2021-10-06 | End: 2022-10-07 | Stop reason: SDUPTHER

## 2021-10-07 ENCOUNTER — HOSPITAL ENCOUNTER (OUTPATIENT)
Dept: RADIOLOGY | Facility: HOSPITAL | Age: 81
Discharge: HOME | End: 2021-10-07
Attending: OBSTETRICS & GYNECOLOGY
Payer: MEDICARE

## 2021-10-07 DIAGNOSIS — Z12.31 VISIT FOR SCREENING MAMMOGRAM: ICD-10-CM

## 2021-10-07 PROCEDURE — 77067 SCR MAMMO BI INCL CAD: CPT | Mod: TC,HCNC,PO

## 2021-10-08 ENCOUNTER — TELEPHONE (OUTPATIENT)
Dept: SLEEP MEDICINE | Facility: CLINIC | Age: 81
End: 2021-10-08

## 2021-10-09 ENCOUNTER — OFFICE VISIT (OUTPATIENT)
Dept: URGENT CARE | Facility: CLINIC | Age: 81
End: 2021-10-09
Payer: MEDICARE

## 2021-10-09 VITALS
DIASTOLIC BLOOD PRESSURE: 76 MMHG | BODY MASS INDEX: 29.23 KG/M2 | HEIGHT: 63 IN | HEART RATE: 63 BPM | TEMPERATURE: 98 F | OXYGEN SATURATION: 97 % | SYSTOLIC BLOOD PRESSURE: 132 MMHG | RESPIRATION RATE: 20 BRPM | WEIGHT: 165 LBS

## 2021-10-09 DIAGNOSIS — J40 BRONCHITIS: Primary | ICD-10-CM

## 2021-10-09 PROBLEM — E78.00 HYPERCHOLESTEROLEMIA: Status: ACTIVE | Noted: 2019-05-11

## 2021-10-09 LAB
CTP QC/QA: YES
SARS-COV-2 RDRP RESP QL NAA+PROBE: NEGATIVE

## 2021-10-09 PROCEDURE — 3075F PR MOST RECENT SYSTOLIC BLOOD PRESS GE 130-139MM HG: ICD-10-PCS | Mod: CPTII,S$GLB,, | Performed by: PHYSICIAN ASSISTANT

## 2021-10-09 PROCEDURE — U0002 COVID-19 LAB TEST NON-CDC: HCPCS | Mod: QW,S$GLB,, | Performed by: PHYSICIAN ASSISTANT

## 2021-10-09 PROCEDURE — 1160F PR REVIEW ALL MEDS BY PRESCRIBER/CLIN PHARMACIST DOCUMENTED: ICD-10-PCS | Mod: CPTII,S$GLB,, | Performed by: PHYSICIAN ASSISTANT

## 2021-10-09 PROCEDURE — 99203 PR OFFICE/OUTPT VISIT, NEW, LEVL III, 30-44 MIN: ICD-10-PCS | Mod: S$GLB,,, | Performed by: PHYSICIAN ASSISTANT

## 2021-10-09 PROCEDURE — 99203 OFFICE O/P NEW LOW 30 MIN: CPT | Mod: S$GLB,,, | Performed by: PHYSICIAN ASSISTANT

## 2021-10-09 PROCEDURE — 1159F PR MEDICATION LIST DOCUMENTED IN MEDICAL RECORD: ICD-10-PCS | Mod: CPTII,S$GLB,, | Performed by: PHYSICIAN ASSISTANT

## 2021-10-09 PROCEDURE — 1159F MED LIST DOCD IN RCRD: CPT | Mod: CPTII,S$GLB,, | Performed by: PHYSICIAN ASSISTANT

## 2021-10-09 PROCEDURE — 3078F DIAST BP <80 MM HG: CPT | Mod: CPTII,S$GLB,, | Performed by: PHYSICIAN ASSISTANT

## 2021-10-09 PROCEDURE — U0002: ICD-10-PCS | Mod: QW,S$GLB,, | Performed by: PHYSICIAN ASSISTANT

## 2021-10-09 PROCEDURE — 3075F SYST BP GE 130 - 139MM HG: CPT | Mod: CPTII,S$GLB,, | Performed by: PHYSICIAN ASSISTANT

## 2021-10-09 PROCEDURE — 3078F PR MOST RECENT DIASTOLIC BLOOD PRESSURE < 80 MM HG: ICD-10-PCS | Mod: CPTII,S$GLB,, | Performed by: PHYSICIAN ASSISTANT

## 2021-10-09 PROCEDURE — 1126F AMNT PAIN NOTED NONE PRSNT: CPT | Mod: CPTII,S$GLB,, | Performed by: PHYSICIAN ASSISTANT

## 2021-10-09 PROCEDURE — 1126F PR PAIN SEVERITY QUANTIFIED, NO PAIN PRESENT: ICD-10-PCS | Mod: CPTII,S$GLB,, | Performed by: PHYSICIAN ASSISTANT

## 2021-10-09 PROCEDURE — 1160F RVW MEDS BY RX/DR IN RCRD: CPT | Mod: CPTII,S$GLB,, | Performed by: PHYSICIAN ASSISTANT

## 2021-10-09 RX ORDER — PROMETHAZINE HYDROCHLORIDE AND DEXTROMETHORPHAN HYDROBROMIDE 6.25; 15 MG/5ML; MG/5ML
5 SYRUP ORAL NIGHTLY PRN
Qty: 50 ML | Refills: 0 | Status: SHIPPED | OUTPATIENT
Start: 2021-10-09 | End: 2021-10-19

## 2021-10-09 RX ORDER — BENZONATATE 100 MG/1
200 CAPSULE ORAL 3 TIMES DAILY PRN
Qty: 60 CAPSULE | Refills: 0 | Status: SHIPPED | OUTPATIENT
Start: 2021-10-09 | End: 2022-02-15

## 2021-10-14 ENCOUNTER — PATIENT MESSAGE (OUTPATIENT)
Dept: CARDIOLOGY | Facility: CLINIC | Age: 81
End: 2021-10-14
Payer: MEDICARE

## 2021-10-19 ENCOUNTER — PATIENT OUTREACH (OUTPATIENT)
Dept: ADMINISTRATIVE | Facility: HOSPITAL | Age: 81
End: 2021-10-19

## 2021-10-25 ENCOUNTER — PATIENT OUTREACH (OUTPATIENT)
Dept: ADMINISTRATIVE | Facility: OTHER | Age: 81
End: 2021-10-25
Payer: MEDICARE

## 2021-10-25 ENCOUNTER — TELEPHONE (OUTPATIENT)
Dept: SLEEP MEDICINE | Facility: CLINIC | Age: 81
End: 2021-10-25
Payer: MEDICARE

## 2021-10-26 ENCOUNTER — OFFICE VISIT (OUTPATIENT)
Dept: SLEEP MEDICINE | Facility: CLINIC | Age: 81
End: 2021-10-26
Payer: MEDICARE

## 2021-10-26 VITALS
BODY MASS INDEX: 29.23 KG/M2 | HEIGHT: 63 IN | WEIGHT: 165 LBS | SYSTOLIC BLOOD PRESSURE: 138 MMHG | DIASTOLIC BLOOD PRESSURE: 80 MMHG | HEART RATE: 82 BPM

## 2021-10-26 DIAGNOSIS — G47.33 OSA (OBSTRUCTIVE SLEEP APNEA): Primary | ICD-10-CM

## 2021-10-26 PROCEDURE — 1160F PR REVIEW ALL MEDS BY PRESCRIBER/CLIN PHARMACIST DOCUMENTED: ICD-10-PCS | Mod: HCNC,CPTII,S$GLB, | Performed by: PSYCHIATRY & NEUROLOGY

## 2021-10-26 PROCEDURE — 1159F PR MEDICATION LIST DOCUMENTED IN MEDICAL RECORD: ICD-10-PCS | Mod: HCNC,CPTII,S$GLB, | Performed by: PSYCHIATRY & NEUROLOGY

## 2021-10-26 PROCEDURE — 99999 PR PBB SHADOW E&M-EST. PATIENT-LVL IV: CPT | Mod: PBBFAC,HCNC,, | Performed by: PSYCHIATRY & NEUROLOGY

## 2021-10-26 PROCEDURE — 1160F RVW MEDS BY RX/DR IN RCRD: CPT | Mod: HCNC,CPTII,S$GLB, | Performed by: PSYCHIATRY & NEUROLOGY

## 2021-10-26 PROCEDURE — 3075F PR MOST RECENT SYSTOLIC BLOOD PRESS GE 130-139MM HG: ICD-10-PCS | Mod: HCNC,CPTII,S$GLB, | Performed by: PSYCHIATRY & NEUROLOGY

## 2021-10-26 PROCEDURE — 1126F AMNT PAIN NOTED NONE PRSNT: CPT | Mod: HCNC,CPTII,S$GLB, | Performed by: PSYCHIATRY & NEUROLOGY

## 2021-10-26 PROCEDURE — 3075F SYST BP GE 130 - 139MM HG: CPT | Mod: HCNC,CPTII,S$GLB, | Performed by: PSYCHIATRY & NEUROLOGY

## 2021-10-26 PROCEDURE — 99214 OFFICE O/P EST MOD 30 MIN: CPT | Mod: HCNC,S$GLB,, | Performed by: PSYCHIATRY & NEUROLOGY

## 2021-10-26 PROCEDURE — 3079F PR MOST RECENT DIASTOLIC BLOOD PRESSURE 80-89 MM HG: ICD-10-PCS | Mod: HCNC,CPTII,S$GLB, | Performed by: PSYCHIATRY & NEUROLOGY

## 2021-10-26 PROCEDURE — 99214 PR OFFICE/OUTPT VISIT, EST, LEVL IV, 30-39 MIN: ICD-10-PCS | Mod: HCNC,S$GLB,, | Performed by: PSYCHIATRY & NEUROLOGY

## 2021-10-26 PROCEDURE — 99999 PR PBB SHADOW E&M-EST. PATIENT-LVL IV: ICD-10-PCS | Mod: PBBFAC,HCNC,, | Performed by: PSYCHIATRY & NEUROLOGY

## 2021-10-26 PROCEDURE — 1101F PR PT FALLS ASSESS DOC 0-1 FALLS W/OUT INJ PAST YR: ICD-10-PCS | Mod: HCNC,CPTII,S$GLB, | Performed by: PSYCHIATRY & NEUROLOGY

## 2021-10-26 PROCEDURE — 3288F FALL RISK ASSESSMENT DOCD: CPT | Mod: HCNC,CPTII,S$GLB, | Performed by: PSYCHIATRY & NEUROLOGY

## 2021-10-26 PROCEDURE — 1159F MED LIST DOCD IN RCRD: CPT | Mod: HCNC,CPTII,S$GLB, | Performed by: PSYCHIATRY & NEUROLOGY

## 2021-10-26 PROCEDURE — 1101F PT FALLS ASSESS-DOCD LE1/YR: CPT | Mod: HCNC,CPTII,S$GLB, | Performed by: PSYCHIATRY & NEUROLOGY

## 2021-10-26 PROCEDURE — 1126F PR PAIN SEVERITY QUANTIFIED, NO PAIN PRESENT: ICD-10-PCS | Mod: HCNC,CPTII,S$GLB, | Performed by: PSYCHIATRY & NEUROLOGY

## 2021-10-26 PROCEDURE — 3288F PR FALLS RISK ASSESSMENT DOCUMENTED: ICD-10-PCS | Mod: HCNC,CPTII,S$GLB, | Performed by: PSYCHIATRY & NEUROLOGY

## 2021-10-26 PROCEDURE — 3079F DIAST BP 80-89 MM HG: CPT | Mod: HCNC,CPTII,S$GLB, | Performed by: PSYCHIATRY & NEUROLOGY

## 2021-12-03 ENCOUNTER — PATIENT OUTREACH (OUTPATIENT)
Dept: ADMINISTRATIVE | Facility: OTHER | Age: 81
End: 2021-12-03
Payer: MEDICARE

## 2021-12-06 ENCOUNTER — OFFICE VISIT (OUTPATIENT)
Dept: OBSTETRICS AND GYNECOLOGY | Facility: CLINIC | Age: 81
End: 2021-12-06
Payer: MEDICARE

## 2021-12-06 VITALS
WEIGHT: 172.63 LBS | DIASTOLIC BLOOD PRESSURE: 74 MMHG | SYSTOLIC BLOOD PRESSURE: 122 MMHG | BODY MASS INDEX: 30.58 KG/M2

## 2021-12-06 DIAGNOSIS — Z01.419 WELL WOMAN EXAM WITH ROUTINE GYNECOLOGICAL EXAM: Primary | ICD-10-CM

## 2021-12-06 PROCEDURE — G0101 CA SCREEN;PELVIC/BREAST EXAM: HCPCS | Mod: HCNC,GZ,S$GLB, | Performed by: OBSTETRICS & GYNECOLOGY

## 2021-12-06 PROCEDURE — 99999 PR PBB SHADOW E&M-EST. PATIENT-LVL III: ICD-10-PCS | Mod: PBBFAC,HCNC,, | Performed by: OBSTETRICS & GYNECOLOGY

## 2021-12-06 PROCEDURE — G0101 PR CA SCREEN;PELVIC/BREAST EXAM: ICD-10-PCS | Mod: HCNC,GZ,S$GLB, | Performed by: OBSTETRICS & GYNECOLOGY

## 2021-12-06 PROCEDURE — 88175 CYTOPATH C/V AUTO FLUID REDO: CPT | Mod: HCNC | Performed by: OBSTETRICS & GYNECOLOGY

## 2021-12-06 PROCEDURE — 99999 PR PBB SHADOW E&M-EST. PATIENT-LVL III: CPT | Mod: PBBFAC,HCNC,, | Performed by: OBSTETRICS & GYNECOLOGY

## 2021-12-10 LAB
FINAL PATHOLOGIC DIAGNOSIS: NORMAL
Lab: NORMAL

## 2022-01-07 ENCOUNTER — TELEPHONE (OUTPATIENT)
Dept: CARDIOLOGY | Facility: CLINIC | Age: 82
End: 2022-01-07
Payer: MEDICARE

## 2022-01-07 DIAGNOSIS — I10 ESSENTIAL HYPERTENSION: Primary | ICD-10-CM

## 2022-01-07 DIAGNOSIS — E78.2 MIXED HYPERLIPIDEMIA: ICD-10-CM

## 2022-02-12 NOTE — PROGRESS NOTES
CC:     81 y.o. female presents for f/up chronic medical problems  HTN, Pulmonary HTN, HLD, heart palpitations, GERD, and,NOE    Hypertension, tx:  Amlodipine 5 mg  And losartan 50 mg.  Pulmonary hypertension w/ PA pressure 46  Patient denied headache or dizziness  BP today==>134/74    Hyperlipidemia, Tx:  Rosuvastatin 40 mg  Carotid stenosis,  Ultrasound carotids, 10/2015   20-39% on the left  Coronary artery disease, agonist and coronary artery calcium score 400-577  Denied angina or dyspnea on exertion  LDL==> 63.4 ( 4/2021)    Heart palpitations  Denied fast heartbeat or syncope symptom  Pulse today=>80    GERD, tx esomeprazole 40mg    Stress, damage to home   Waiting for cabinets  Cooking on counter w/ toaster/microwave  Waiting for new mattress  + burns and skin infections w/clean up    Obstructive sleep apnea  Restless leg syndrome  Degenerative disc disease  Vertigo , tx diazepam     Non smoker   Social ETOH      HEALTH MAINTENANCE:  Cholesterol/labs: pending w/ cards  C-scope: N/A  EGD-+ hiatal hernia, s/p dilatation Dr. Pop  --pt to see Dr. Pop this spring  C-scope, s/p polyp, every 5 years  WWE: gyn  Mammo:gyn  DEXA: pending- did not take bisphosphonate  2/2 to esophageal disease   EYE exam: UTD  DDS exam: UTD-     VACCINATIONS:  TD: 2014  Flu:yearly  Pneumovax:2009  Prevnar: 2019  Shingles: 2017-@ [facility]  Covid: 2/2 , + booster      MEDCARD: Reviewed  ROS:  No fever, chills, or night sweats  No visual disturbance or d/c  No ear or sinus pain or pressure  No dysphagia or early satiety  No chest pain or palpitations  No cough or wheezing  No PND or orthopnea  No GERD or abdominal pain; occ breakthrough reflux  No change in bowels or blood in stool  No hematuria or dysuria;  +  Nocturia  No vaginal bleeding or pelvic pain or bloating  No skin rashes or lesions  No joint swelling or erythema  No unusual HA or focal deficits  No cold or heat intolerance  No increased thirst or urination  No  unusual bruising or bleeding    Remainder of review negative except as previously noted    PMHX:Reviewed  PSHX: Reviewed  SHX: Reviewed      PE:  VS: As noted  GENERAL: Well developed well nourished, alert and oriented in NAD  Conversant and co-operative  EYES: Conjunctiva and lids normal, sclera anicteric    NECK: Supple w/o thyromegaly or lymphadenopathy  RESPIRATORY: Efforts are unlabored; lungs are CTAP  CARDIOVASCULAR: Heart RRR w/o mumur, gallop or rub; No carotid bruits noted  1+ pedal pulses; no LE edema noted  GASTROINTESTINAL: Bowel sounds are present, soft, nontender, nondistended  No hepatosplenomegaly noted.    MUSCULOSKELETAL: Gait normal. No clubbing, cyanosis, or edema noted.  NEUROLOGIC: STEVENS. No tremor noted  SKIN: Warm and dry    IMPRESSION:  HTN, stable  Pulm HTN/Chronic pulmonary heart ds  MICHELLE  GERD/hiatal hernia  Insomnia- not interested in sleep med  RLS, stable  Chronic back pain, lumbar radiculitis    PLAN:   Lab-     Continue present meds  Rx update  Call prn  RTC 6 mos    Greater than 30 minutes  was spent today in review of patient's MR, H/P and MDM

## 2022-02-15 ENCOUNTER — OFFICE VISIT (OUTPATIENT)
Dept: INTERNAL MEDICINE | Facility: CLINIC | Age: 82
End: 2022-02-15
Payer: MEDICARE

## 2022-02-15 ENCOUNTER — TELEPHONE (OUTPATIENT)
Dept: INTERNAL MEDICINE | Facility: CLINIC | Age: 82
End: 2022-02-15

## 2022-02-15 VITALS
HEIGHT: 63 IN | HEART RATE: 80 BPM | BODY MASS INDEX: 30.62 KG/M2 | DIASTOLIC BLOOD PRESSURE: 74 MMHG | RESPIRATION RATE: 16 BRPM | SYSTOLIC BLOOD PRESSURE: 134 MMHG | OXYGEN SATURATION: 97 % | TEMPERATURE: 98 F | WEIGHT: 172.81 LBS

## 2022-02-15 DIAGNOSIS — K21.9 GASTROESOPHAGEAL REFLUX DISEASE WITHOUT ESOPHAGITIS: ICD-10-CM

## 2022-02-15 DIAGNOSIS — I10 ESSENTIAL HYPERTENSION: Primary | ICD-10-CM

## 2022-02-15 DIAGNOSIS — D50.9 IRON DEFICIENCY ANEMIA, UNSPECIFIED IRON DEFICIENCY ANEMIA TYPE: ICD-10-CM

## 2022-02-15 DIAGNOSIS — M54.16 LUMBAR RADICULITIS: ICD-10-CM

## 2022-02-15 DIAGNOSIS — G25.81 RESTLESS LEGS: ICD-10-CM

## 2022-02-15 DIAGNOSIS — G47.00 INSOMNIA, UNSPECIFIED TYPE: ICD-10-CM

## 2022-02-15 DIAGNOSIS — R73.9 HYPERGLYCEMIA: ICD-10-CM

## 2022-02-15 DIAGNOSIS — I27.20 PULMONARY HYPERTENSION: ICD-10-CM

## 2022-02-15 PROCEDURE — 99499 RISK ADDL DX/OHS AUDIT: ICD-10-PCS | Mod: HCNC,S$GLB,, | Performed by: INTERNAL MEDICINE

## 2022-02-15 PROCEDURE — 99999 PR PBB SHADOW E&M-EST. PATIENT-LVL IV: ICD-10-PCS | Mod: PBBFAC,HCNC,, | Performed by: INTERNAL MEDICINE

## 2022-02-15 PROCEDURE — 99214 OFFICE O/P EST MOD 30 MIN: CPT | Mod: HCNC,S$GLB,, | Performed by: INTERNAL MEDICINE

## 2022-02-15 PROCEDURE — 1125F AMNT PAIN NOTED PAIN PRSNT: CPT | Mod: HCNC,CPTII,S$GLB, | Performed by: INTERNAL MEDICINE

## 2022-02-15 PROCEDURE — 1125F PR PAIN SEVERITY QUANTIFIED, PAIN PRESENT: ICD-10-PCS | Mod: HCNC,CPTII,S$GLB, | Performed by: INTERNAL MEDICINE

## 2022-02-15 PROCEDURE — 3288F FALL RISK ASSESSMENT DOCD: CPT | Mod: HCNC,CPTII,S$GLB, | Performed by: INTERNAL MEDICINE

## 2022-02-15 PROCEDURE — 99999 PR PBB SHADOW E&M-EST. PATIENT-LVL IV: CPT | Mod: PBBFAC,HCNC,, | Performed by: INTERNAL MEDICINE

## 2022-02-15 PROCEDURE — 3288F PR FALLS RISK ASSESSMENT DOCUMENTED: ICD-10-PCS | Mod: HCNC,CPTII,S$GLB, | Performed by: INTERNAL MEDICINE

## 2022-02-15 PROCEDURE — 1159F PR MEDICATION LIST DOCUMENTED IN MEDICAL RECORD: ICD-10-PCS | Mod: HCNC,CPTII,S$GLB, | Performed by: INTERNAL MEDICINE

## 2022-02-15 PROCEDURE — 1159F MED LIST DOCD IN RCRD: CPT | Mod: HCNC,CPTII,S$GLB, | Performed by: INTERNAL MEDICINE

## 2022-02-15 PROCEDURE — 3075F SYST BP GE 130 - 139MM HG: CPT | Mod: HCNC,CPTII,S$GLB, | Performed by: INTERNAL MEDICINE

## 2022-02-15 PROCEDURE — 3075F PR MOST RECENT SYSTOLIC BLOOD PRESS GE 130-139MM HG: ICD-10-PCS | Mod: HCNC,CPTII,S$GLB, | Performed by: INTERNAL MEDICINE

## 2022-02-15 PROCEDURE — 1101F PT FALLS ASSESS-DOCD LE1/YR: CPT | Mod: HCNC,CPTII,S$GLB, | Performed by: INTERNAL MEDICINE

## 2022-02-15 PROCEDURE — 99499 UNLISTED E&M SERVICE: CPT | Mod: HCNC,S$GLB,, | Performed by: INTERNAL MEDICINE

## 2022-02-15 PROCEDURE — 1101F PR PT FALLS ASSESS DOC 0-1 FALLS W/OUT INJ PAST YR: ICD-10-PCS | Mod: HCNC,CPTII,S$GLB, | Performed by: INTERNAL MEDICINE

## 2022-02-15 PROCEDURE — 99214 PR OFFICE/OUTPT VISIT, EST, LEVL IV, 30-39 MIN: ICD-10-PCS | Mod: HCNC,S$GLB,, | Performed by: INTERNAL MEDICINE

## 2022-02-15 PROCEDURE — 3078F DIAST BP <80 MM HG: CPT | Mod: HCNC,CPTII,S$GLB, | Performed by: INTERNAL MEDICINE

## 2022-02-15 PROCEDURE — 3078F PR MOST RECENT DIASTOLIC BLOOD PRESSURE < 80 MM HG: ICD-10-PCS | Mod: HCNC,CPTII,S$GLB, | Performed by: INTERNAL MEDICINE

## 2022-02-15 RX ORDER — CHOLECALCIFEROL (VITAMIN D3) 50 MCG
TABLET ORAL DAILY
COMMUNITY

## 2022-02-15 RX ORDER — MUPIROCIN 20 MG/G
OINTMENT TOPICAL 3 TIMES DAILY
Qty: 30 G | Refills: 1 | Status: SHIPPED | OUTPATIENT
Start: 2022-02-15 | End: 2024-02-20

## 2022-02-15 RX ORDER — ESOMEPRAZOLE MAGNESIUM 40 MG/1
CAPSULE, DELAYED RELEASE ORAL
Qty: 90 CAPSULE | Refills: 3 | Status: SHIPPED | OUTPATIENT
Start: 2022-02-15 | End: 2023-02-22 | Stop reason: SDUPTHER

## 2022-02-15 RX ORDER — SILVER SULFADIAZINE 10 G/1000G
CREAM TOPICAL 2 TIMES DAILY
Qty: 20 G | Refills: 0 | Status: SHIPPED | OUTPATIENT
Start: 2022-02-15

## 2022-02-15 RX ORDER — DIAZEPAM 2 MG/1
TABLET ORAL
Qty: 30 TABLET | Refills: 1 | Status: SHIPPED | OUTPATIENT
Start: 2022-02-15 | End: 2023-11-20

## 2022-02-15 NOTE — TELEPHONE ENCOUNTER
----- Message from Ira Smith MD sent at 2/15/2022  1:42 PM CST -----  Added lab to be drawn w/ [facility] lab in June

## 2022-03-14 ENCOUNTER — PATIENT MESSAGE (OUTPATIENT)
Dept: CARDIOLOGY | Facility: CLINIC | Age: 82
End: 2022-03-14
Payer: MEDICARE

## 2022-03-14 ENCOUNTER — PATIENT MESSAGE (OUTPATIENT)
Dept: SLEEP MEDICINE | Facility: CLINIC | Age: 82
End: 2022-03-14
Payer: MEDICARE

## 2022-03-14 DIAGNOSIS — R09.81 NASAL CONGESTION: ICD-10-CM

## 2022-03-14 DIAGNOSIS — E78.2 MIXED HYPERLIPIDEMIA: ICD-10-CM

## 2022-03-14 RX ORDER — ROSUVASTATIN CALCIUM 40 MG/1
40 TABLET, COATED ORAL DAILY
Qty: 90 TABLET | Refills: 1 | Status: SHIPPED | OUTPATIENT
Start: 2022-03-14 | End: 2022-09-06 | Stop reason: SDUPTHER

## 2022-03-14 RX ORDER — FLUTICASONE PROPIONATE 50 MCG
SPRAY, SUSPENSION (ML) NASAL
Qty: 48 G | Refills: 3 | Status: SHIPPED | OUTPATIENT
Start: 2022-03-14 | End: 2023-12-11 | Stop reason: SDUPTHER

## 2022-03-14 NOTE — TELEPHONE ENCOUNTER
"Rx Request Fluticasone 50 mcg/act nasal spray   Pt request "Please send a new prescription for 90 days with 3 refills to [facility] mail order. "    "

## 2022-03-18 ENCOUNTER — PATIENT MESSAGE (OUTPATIENT)
Dept: ADMINISTRATIVE | Facility: OTHER | Age: 82
End: 2022-03-18
Payer: MEDICARE

## 2022-03-24 ENCOUNTER — PATIENT MESSAGE (OUTPATIENT)
Dept: INTERNAL MEDICINE | Facility: CLINIC | Age: 82
End: 2022-03-24
Payer: MEDICARE

## 2022-03-24 DIAGNOSIS — U07.1 COVID: ICD-10-CM

## 2022-03-24 DIAGNOSIS — U07.1 COVID-19 VIRUS INFECTION: Primary | ICD-10-CM

## 2022-03-24 NOTE — TELEPHONE ENCOUNTER
Its the risk of having a more serious case of Covid  There are several locations, she can discuss options when they call  Think I put in for [facility], but if there is a better location for her changing should not be a problem

## 2022-03-24 NOTE — TELEPHONE ENCOUNTER
Patient's COVID risk score calculates to be 6/8  Is recommended that she have infusion with that level of risk an order was placed and she should be called but the infusion center to set up the treatment

## 2022-03-24 NOTE — TELEPHONE ENCOUNTER
----- Message from Hannah Ag sent at 3/24/2022  3:52 PM CDT -----  Contact: Pt Mobile [phone]  Patient would like a call back in regards to her saying that she's been talking to you on the portal on today but now her  is not working and she would like to speak with you over the phone please.

## 2022-03-25 ENCOUNTER — INFUSION (OUTPATIENT)
Dept: INFECTIOUS DISEASES | Facility: HOSPITAL | Age: 82
End: 2022-03-25
Attending: INTERNAL MEDICINE
Payer: MEDICARE

## 2022-03-25 VITALS
WEIGHT: 169 LBS | RESPIRATION RATE: 20 BRPM | TEMPERATURE: 98 F | HEIGHT: 63 IN | BODY MASS INDEX: 29.95 KG/M2 | DIASTOLIC BLOOD PRESSURE: 63 MMHG | SYSTOLIC BLOOD PRESSURE: 120 MMHG | HEART RATE: 57 BPM | OXYGEN SATURATION: 99 %

## 2022-03-25 DIAGNOSIS — U07.1 COVID-19 VIRUS INFECTION: ICD-10-CM

## 2022-03-25 PROCEDURE — 63600175 PHARM REV CODE 636 W HCPCS: Performed by: INTERNAL MEDICINE

## 2022-03-25 PROCEDURE — M0247 HC IV INFUSION, SOTROVIMAB, INCL POST ADMIN MONIT: HCPCS | Performed by: INTERNAL MEDICINE

## 2022-03-25 PROCEDURE — 25000003 PHARM REV CODE 250: Performed by: INTERNAL MEDICINE

## 2022-03-25 RX ORDER — ACETAMINOPHEN 325 MG/1
650 TABLET ORAL ONCE AS NEEDED
Status: DISCONTINUED | OUTPATIENT
Start: 2022-03-25 | End: 2022-04-27

## 2022-03-25 RX ORDER — ALBUTEROL SULFATE 90 UG/1
2 AEROSOL, METERED RESPIRATORY (INHALATION)
Status: ACTIVE | OUTPATIENT
Start: 2022-03-25 | End: 2022-04-01

## 2022-03-25 RX ORDER — ONDANSETRON 4 MG/1
4 TABLET, ORALLY DISINTEGRATING ORAL
Status: ACTIVE | OUTPATIENT
Start: 2022-03-25 | End: 2022-04-01

## 2022-03-25 RX ORDER — DIPHENHYDRAMINE HYDROCHLORIDE 50 MG/ML
25 INJECTION INTRAMUSCULAR; INTRAVENOUS
Status: ACTIVE | OUTPATIENT
Start: 2022-03-25 | End: 2022-04-01

## 2022-03-25 RX ORDER — EPINEPHRINE 0.3 MG/.3ML
0.3 INJECTION SUBCUTANEOUS
Status: ACTIVE | OUTPATIENT
Start: 2022-03-25 | End: 2022-04-01

## 2022-03-25 RX ORDER — SODIUM CHLORIDE 0.9 % (FLUSH) 0.9 %
10 SYRINGE (ML) INJECTION
Status: ACTIVE | OUTPATIENT
Start: 2022-03-25 | End: 2022-04-01

## 2022-03-25 RX ADMIN — SODIUM CHLORIDE 500 MG: 9 INJECTION, SOLUTION INTRAVENOUS at 10:03

## 2022-03-25 NOTE — PROGRESS NOTES
Patient remains with no signs of complications noted. Patient received sotrovimab infusion with filtered tubing according to FDA recommendations and [facility] without complications noted and left with mask in place. Drug fact sheet provided. Pt discharged home. Ambulatory. Remains AAox3. No distress noted. RR even and unlabored.

## 2022-03-25 NOTE — PROGRESS NOTES
Patient arrives for sotrovimab infusion. Ambulatory. Pt AAox3. No distress noted. RR even and unlabored.     Symptoms and onset date: ACHES, HEADACHE, POST NASAL DROP, COUGH - 3/22    Tested COVID + on 03/23/22

## 2022-04-01 ENCOUNTER — HOSPITAL ENCOUNTER (EMERGENCY)
Facility: HOSPITAL | Age: 82
Discharge: HOME | End: 2022-04-01
Attending: FAMILY MEDICINE
Payer: MEDICARE

## 2022-04-01 VITALS
WEIGHT: 169 LBS | HEART RATE: 71 BPM | TEMPERATURE: 98 F | SYSTOLIC BLOOD PRESSURE: 108 MMHG | BODY MASS INDEX: 29.94 KG/M2 | DIASTOLIC BLOOD PRESSURE: 63 MMHG | OXYGEN SATURATION: 98 % | RESPIRATION RATE: 14 BRPM

## 2022-04-01 DIAGNOSIS — I48.91 ATRIAL FIBRILLATION: ICD-10-CM

## 2022-04-01 DIAGNOSIS — I48.91 ATRIAL FIBRILLATION WITH RVR: Primary | ICD-10-CM

## 2022-04-01 DIAGNOSIS — R00.2 PALPITATIONS: ICD-10-CM

## 2022-04-01 LAB
ALBUMIN SERPL BCP-MCNC: 4.3 G/DL (ref 3.5–5.2)
ALP SERPL-CCNC: 108 U/L (ref 38–126)
ALT SERPL W/O P-5'-P-CCNC: 22 U/L (ref 10–44)
ANION GAP SERPL CALC-SCNC: 12 MMOL/L (ref 8–16)
AST SERPL-CCNC: 29 U/L (ref 15–46)
BASOPHILS # BLD AUTO: 0.03 K/UL (ref 0–0.2)
BASOPHILS NFR BLD: 0.5 % (ref 0–1.9)
BILIRUB SERPL-MCNC: 0.4 MG/DL (ref 0.1–1)
BILIRUB UR QL STRIP: NEGATIVE
CALCIUM SERPL-MCNC: 8.6 MG/DL (ref 8.7–10.5)
CHLORIDE SERPL-SCNC: 103 MMOL/L (ref 95–110)
CLARITY UR REFRACT.AUTO: CLEAR
CO2 SERPL-SCNC: 24 MMOL/L (ref 23–29)
COLOR UR AUTO: YELLOW
CREAT SERPL-MCNC: 0.63 MG/DL (ref 0.5–1.4)
DIFFERENTIAL METHOD: NORMAL
EOSINOPHIL # BLD AUTO: 0.1 K/UL (ref 0–0.5)
EOSINOPHIL NFR BLD: 2.1 % (ref 0–8)
ERYTHROCYTE [DISTWIDTH] IN BLOOD BY AUTOMATED COUNT: 14.3 % (ref 11.5–14.5)
EST. GFR  (AFRICAN AMERICAN): >60 ML/MIN/1.73 M^2
EST. GFR  (NON AFRICAN AMERICAN): >60 ML/MIN/1.73 M^2
GLUCOSE SERPL-MCNC: 122 MG/DL (ref 70–110)
GLUCOSE UR QL STRIP: NEGATIVE
HCT VFR BLD AUTO: 37.7 % (ref 37–48.5)
HGB BLD-MCNC: 12.3 G/DL (ref 12–16)
HGB UR QL STRIP: NEGATIVE
IMM GRANULOCYTES # BLD AUTO: 0.03 K/UL (ref 0–0.04)
IMM GRANULOCYTES NFR BLD AUTO: 0.5 % (ref 0–0.5)
KETONES UR QL STRIP: NEGATIVE
LEUKOCYTE ESTERASE UR QL STRIP: ABNORMAL
LYMPHOCYTES # BLD AUTO: 1.5 K/UL (ref 1–4.8)
LYMPHOCYTES NFR BLD: 22.9 % (ref 18–48)
MCH RBC QN AUTO: 28.3 PG (ref 27–31)
MCHC RBC AUTO-ENTMCNC: 32.6 G/DL (ref 32–36)
MCV RBC AUTO: 87 FL (ref 82–98)
MICROSCOPIC COMMENT: NORMAL
MONOCYTES # BLD AUTO: 0.5 K/UL (ref 0.3–1)
MONOCYTES NFR BLD: 7.5 % (ref 4–15)
NEUTROPHILS # BLD AUTO: 4.4 K/UL (ref 1.8–7.7)
NEUTROPHILS NFR BLD: 66.5 % (ref 38–73)
NITRITE UR QL STRIP: NEGATIVE
NRBC BLD-RTO: 0 /100 WBC
NT-PROBNP SERPL-MCNC: 47 PG/ML (ref 5–1800)
PH UR STRIP: 7 [PH] (ref 5–8)
PLATELET # BLD AUTO: 279 K/UL (ref 150–450)
PMV BLD AUTO: 10.6 FL (ref 9.2–12.9)
POTASSIUM SERPL-SCNC: 3.7 MMOL/L (ref 3.5–5.1)
PROT SERPL-MCNC: 7.1 G/DL (ref 6–8.4)
PROT UR QL STRIP: NEGATIVE
RBC # BLD AUTO: 4.34 M/UL (ref 4–5.4)
SODIUM SERPL-SCNC: 139 MMOL/L (ref 136–145)
SP GR UR STRIP: 1.01 (ref 1–1.03)
TROPONIN I SERPL-MCNC: <0.012 NG/ML (ref 0.01–0.03)
TSH SERPL DL<=0.005 MIU/L-ACNC: 0.83 UIU/ML (ref 0.4–4)
URN SPEC COLLECT METH UR: ABNORMAL
UROBILINOGEN UR STRIP-ACNC: NEGATIVE EU/DL
UUN UR-MCNC: 16 MG/DL (ref 7–17)
WBC # BLD AUTO: 6.56 K/UL (ref 3.9–12.7)
WBC #/AREA URNS AUTO: 2 /HPF (ref 0–5)

## 2022-04-01 PROCEDURE — 25000003 PHARM REV CODE 250: Mod: ER | Performed by: FAMILY MEDICINE

## 2022-04-01 PROCEDURE — 96374 THER/PROPH/DIAG INJ IV PUSH: CPT | Mod: ER

## 2022-04-01 PROCEDURE — 96361 HYDRATE IV INFUSION ADD-ON: CPT | Mod: ER

## 2022-04-01 PROCEDURE — 81000 URINALYSIS NONAUTO W/SCOPE: CPT | Mod: ER | Performed by: FAMILY MEDICINE

## 2022-04-01 PROCEDURE — 93010 ELECTROCARDIOGRAM REPORT: CPT | Mod: ,,, | Performed by: INTERNAL MEDICINE

## 2022-04-01 PROCEDURE — 84443 ASSAY THYROID STIM HORMONE: CPT | Mod: ER | Performed by: FAMILY MEDICINE

## 2022-04-01 PROCEDURE — 84484 ASSAY OF TROPONIN QUANT: CPT | Mod: ER | Performed by: FAMILY MEDICINE

## 2022-04-01 PROCEDURE — 93010 EKG 12-LEAD: ICD-10-PCS | Mod: ,,, | Performed by: INTERNAL MEDICINE

## 2022-04-01 PROCEDURE — 83880 ASSAY OF NATRIURETIC PEPTIDE: CPT | Mod: ER | Performed by: FAMILY MEDICINE

## 2022-04-01 PROCEDURE — 96376 TX/PRO/DX INJ SAME DRUG ADON: CPT | Mod: ER

## 2022-04-01 PROCEDURE — 85025 COMPLETE CBC W/AUTO DIFF WBC: CPT | Mod: ER | Performed by: FAMILY MEDICINE

## 2022-04-01 PROCEDURE — 93005 ELECTROCARDIOGRAM TRACING: CPT | Mod: ER

## 2022-04-01 PROCEDURE — 99285 EMERGENCY DEPT VISIT HI MDM: CPT | Mod: 25,ER

## 2022-04-01 PROCEDURE — 80053 COMPREHEN METABOLIC PANEL: CPT | Mod: ER | Performed by: FAMILY MEDICINE

## 2022-04-01 RX ORDER — DILTIAZEM HYDROCHLORIDE 5 MG/ML
20 INJECTION INTRAVENOUS
Status: COMPLETED | OUTPATIENT
Start: 2022-04-01 | End: 2022-04-01

## 2022-04-01 RX ORDER — METOPROLOL TARTRATE 50 MG/1
50 TABLET ORAL
Status: COMPLETED | OUTPATIENT
Start: 2022-04-01 | End: 2022-04-01

## 2022-04-01 RX ORDER — METOPROLOL TARTRATE 25 MG/1
25 TABLET, FILM COATED ORAL 2 TIMES DAILY
Qty: 60 TABLET | Refills: 0 | Status: SHIPPED | OUTPATIENT
Start: 2022-04-01 | End: 2022-04-04

## 2022-04-01 RX ORDER — ASPIRIN 325 MG
325 TABLET, DELAYED RELEASE (ENTERIC COATED) ORAL
Status: COMPLETED | OUTPATIENT
Start: 2022-04-01 | End: 2022-04-01

## 2022-04-01 RX ADMIN — METOPROLOL TARTRATE 50 MG: 50 TABLET, FILM COATED ORAL at 03:04

## 2022-04-01 RX ADMIN — ASPIRIN 325 MG: 325 TABLET, COATED ORAL at 04:04

## 2022-04-01 RX ADMIN — DILTIAZEM HYDROCHLORIDE 20 MG: 5 INJECTION INTRAVENOUS at 05:04

## 2022-04-01 RX ADMIN — SODIUM CHLORIDE 1000 ML: 0.9 INJECTION, SOLUTION INTRAVENOUS at 02:04

## 2022-04-01 RX ADMIN — DILTIAZEM HYDROCHLORIDE 20 MG: 5 INJECTION INTRAVENOUS at 02:04

## 2022-04-01 NOTE — ED PROVIDER NOTES
"Encounter Date: 4/1/2022       History     Chief Complaint   Patient presents with    Tachycardia     Patient reports she was diagnosed with covid on 03/23/22 and had antibody infusion on 03/25/22. Patient had a episode of tachycardia 3 days ago that resolved. Approximately 1 hour ago she began having tachycardia and pulse is now irregular.      41-year-old female complains of palpitation for last 1 hour.  She had a similar kind of episode on Tuesday.  Today her rhythm is regular.  Mild shortness of breath.  No chest pain.  She has been diagnosed with COVID about a week ago and received antibody infusion.  She has a very mild cough but no respiratory distress.  No fever.  No prior history of palpitations.    The history is provided by the patient.     Review of patient's allergies indicates:   Allergen Reactions    Amoxicillin-pot clavulanate Diarrhea     Given march 2016    Sulfa (sulfonamide antibiotics) Rash    Codeine      Other reaction(s): Unknown, tolerates hydrocodone june 2014    Doxycycline Nausea Only    Lyrica [pregabalin] Other (See Comments)     Blurry vision    Macrobid [nitrofurantoin monohyd/m-cryst] Other (See Comments)      2019 caused cramps in legs. After stopped medication it cleared.     Prevnar [pneumococcal 7-beti conj vacc] Other (See Comments)     Arm sore, redness at injection site and muscle aches. Given shot 7/23/19    Relafen [nabumetone] Diarrhea     Past Medical History:   Diagnosis Date    CAD (coronary artery disease) 11/1/2012    GERD (gastroesophageal reflux disease)     Hyperlipidemia     Hypertension     Pulmonary hypertension 2016    Syncope      Past Surgical History:   Procedure Laterality Date    broken wrist      "put plate in it"    DILATION AND CURETTAGE OF UTERUS      excision of lipoma Left 2009    near collar bone    tonsillectomy      TONSILLECTOMY       Family History   Problem Relation Age of Onset    Colon cancer Mother 95        d. 99 w/ brain " mets    Hyperlipidemia Mother     Hypertension Mother     Heart disease Father     Kidney failure Father         d.89    Arthritis Sister         20+ surgeries- most 2/2 MSK problems- 80(2021)    Other Brother         [place]36 [place]    Chronic back pain Sister         71(2021)    Heart disease Daughter         d.7 yo in heart surgery; Tetrology of Fallot    No Known Problems Daughter         lives in [place]    Heart disease Son         congenital; Tetralogy of Fallot, surgery at 40yo    Depression Son         lives near pt- works in [employer]     Social History     Tobacco Use    Smoking status: Never Smoker    Smokeless tobacco: Never Used   Substance Use Topics    Alcohol use: Yes     Alcohol/week: 0.0 standard drinks     Comment: occasionally/ not weekly    Drug use: No     Review of Systems   Constitutional: Negative for activity change, appetite change, chills and fever.   HENT: Negative for congestion, ear discharge, rhinorrhea, sinus pressure, sinus pain, sore throat and trouble swallowing.    Eyes: Negative for photophobia, pain, discharge, redness, itching and visual disturbance.   Respiratory: Negative for cough, chest tightness, shortness of breath and wheezing.    Cardiovascular: Positive for palpitations. Negative for chest pain and leg swelling.   Gastrointestinal: Negative for abdominal distention, abdominal pain, constipation, diarrhea, nausea and vomiting.   Genitourinary: Negative for dysuria, flank pain, frequency and hematuria.   Musculoskeletal: Negative for back pain, gait problem, neck pain and neck stiffness.   Skin: Negative for rash and wound.   Neurological: Negative for dizziness, tremors, seizures, syncope, speech difficulty, weakness, light-headedness, numbness and headaches.   Psychiatric/Behavioral: Negative for behavioral problems, confusion, hallucinations and sleep disturbance. The patient is not nervous/anxious.    All other systems reviewed and are  negative.      Physical Exam     Initial Vitals   BP Pulse Resp Temp SpO2   04/01/22 1413 04/01/22 1413 04/01/22 1413 04/01/22 1421 04/01/22 1413   (!) 185/80 (!) 155 (!) 24 98.2 °F (36.8 °C) 99 %      MAP       --                Physical Exam    Nursing note and vitals reviewed.  Constitutional: Vital signs are normal. She appears well-developed and well-nourished. She is active. No distress.   HENT:   Head: Normocephalic.   Nose: Nose normal.   Mouth/Throat: Oropharynx is clear and moist and mucous membranes are normal.   Eyes: Conjunctivae, EOM and lids are normal.   Neck: Neck supple.   Normal range of motion.  Cardiovascular: S1 normal, S2 normal, normal heart sounds and intact distal pulses. A regularly irregular rhythm present.  Tachycardia present.    Pulmonary/Chest: Breath sounds normal. No tachypnea. No respiratory distress. She has no wheezes. She has no rales.   Abdominal: Abdomen is soft. Bowel sounds are normal.   Musculoskeletal:         General: Normal range of motion.      Right upper arm: Normal.      Left upper arm: Normal.      Cervical back: Normal range of motion and neck supple.      Right lower leg: Normal.      Left lower leg: Normal.     Neurological: She is alert and oriented to person, place, and time. She has normal strength. She displays normal reflexes. No cranial nerve deficit or sensory deficit. GCS score is 15. GCS eye subscore is 4. GCS verbal subscore is 5. GCS motor subscore is 6.   Skin: Skin is warm. Capillary refill takes less than 2 seconds.   Psychiatric: She has a normal mood and affect. Her speech is normal and behavior is normal. Thought content normal. Cognition and memory are normal.         ED Course   Procedures  Labs Reviewed   COMPREHENSIVE METABOLIC PANEL - Abnormal; Notable for the following components:       Result Value    Glucose 122 (*)     Calcium 8.6 (*)     All other components within normal limits   URINALYSIS, REFLEX TO URINE CULTURE - Abnormal; Notable  for the following components:    Leukocytes, UA 1+ (*)     All other components within normal limits    Narrative:     Preferred Collection Type->Urine, Clean Catch  Specimen Source->Urine  Collection Type->Urine, Clean Catch   NT-PRO NATRIURETIC PEPTIDE   CBC W/ AUTO DIFFERENTIAL   TROPONIN I   URINALYSIS MICROSCOPIC    Narrative:     Preferred Collection Type->Urine, Clean Catch  Specimen Source->Urine  Collection Type->Urine, Clean Catch   TSH   TSH        ECG Results          EKG 12-lead (Final result)  Result time 04/01/22 16:25:38    Final result by Interface, Lab In [facility] (04/01/22 16:25:38)                 Narrative:    Test Reason : I48.91,    Vent. Rate : 115 BPM     Atrial Rate : 108 BPM     P-R Int : 000 ms          QRS Dur : 074 ms      QT Int : 288 ms       P-R-T Axes : 000 062 070 degrees     QTc Int : 398 ms    Atrial fibrillation with rapid ventricular response  Low voltage QRS  Nonspecific ST and T wave abnormality  Abnormal ECG  When compared with ECG of 01-APR-2022 14:16,  Nonspecific T wave abnormality no longer evident in Inferior leads  Confirmed by Yelena Silvestre MD ([id number]) on 4/1/2022 4:25:27 PM    Referred By: [email or web address]   SELF           Confirmed By:Yelena Silvestre MD                             EKG 12-lead (Final result)  Result time 04/01/22 16:25:26    Final result by Interface, Lab In [facility] (04/01/22 16:25:26)                 Narrative:    Test Reason : R00.2,    Vent. Rate : 153 BPM     Atrial Rate : 097 BPM     P-R Int : 000 ms          QRS Dur : 076 ms      QT Int : 276 ms       P-R-T Axes : 000 070 122 degrees     QTc Int : 440 ms    Atrial fibrillation with rapid ventricular response  Nonspecific ST and T wave abnormality  Abnormal ECG  When compared with ECG of 05-SEP-2021 12:49,  Atrial fibrillation has replaced Sinus rhythm  Vent. rate has increased BY  80 BPM  Non-specific change in ST segment in Inferior leads  ST now depressed in Anterior leads  Nonspecific T wave abnormality  now evident in Lateral leads  Confirmed by Yelena Silvestre MD ([id number]) on 4/1/2022 4:25:15 PM    Referred By: [email or web address]   SELF           Confirmed By:Yelena Silvestre MD                            Imaging Results          X-Ray Chest AP Portable (Final result)  Result time 04/01/22 15:19:55    Final result by BELKIS Roberson Sr., MD (04/01/22 15:19:55)                 Impression:      1. There is no focal pulmonary infiltrate visualized.  2. The costophrenic angles are opacified.  Tiny pleural effusions cannot be excluded.  3. There are findings characteristic of a moderate size hiatal hernia.  4. The size of the heart is prominent.  This may be secondary to magnification.  .      Electronically signed by: García Roberson MD  Date:    04/01/2022  Time:    15:19             Narrative:    EXAMINATION:  XR CHEST AP PORTABLE    CLINICAL HISTORY:  palpitation;    COMPARISON:  01/10/2019    FINDINGS:  The size of the heart is prominent.  There is no focal pulmonary infiltrate visualized.  There are findings characteristic of a moderate size hiatal hernia.  There is no pneumothorax.  The costophrenic angles are opacified.                                 Medications   diltiaZEM injection 20 mg (20 mg Intravenous Given 4/1/22 1429)   sodium chloride 0.9% bolus 1,000 mL (0 mLs Intravenous Stopped 4/1/22 1741)   metoprolol tartrate (LOPRESSOR) tablet 50 mg (50 mg Oral Given 4/1/22 1545)   aspirin EC tablet 325 mg (325 mg Oral Given 4/1/22 1632)   diltiaZEM injection 20 mg (20 mg Intravenous Given 4/1/22 1744)     Medical Decision Making:   Initial Assessment:   Palpitations with irregular heart rate.  Differential Diagnosis:   Tachycardia, cardiac arrhythmia, atrial fibrillation, SVT,  Clinical Tests:   Lab Tests: Ordered and Reviewed  Radiological Study: Ordered and Reviewed  Medical Tests: Ordered and Reviewed  ED Management:  Atrial fibrillation with RVR.  Cardizem 20 mg IV and metoprolol p.o. has been given with intermittent  improvement of heart rate.  Heart rate stabilized after 2nd dose of Cardizem.  Patient also given Lopressor in ER.  Blood pressure stable.  Atrial fibrillation looks like paroxysmal.  Patient given aspirin.  Patient advised to hold her Norvasc and start metoprolol.  Continue aspirin.  CHADS2 score is 2.  Less than 4% risk .  Advised to follow-up Cardiology.  Patient will call cardiology on Monday.  Follow-up ED immediately with any worsening symptoms like palpitations, low blood pressure, chest pain or shortness of breath.                      Clinical Impression:   Final diagnoses:  [R00.2] Palpitations  [I48.91] Atrial fibrillation with RVR (Primary)  [I48.91] Atrial fibrillation          ED Disposition Condition    Discharge Stable        ED Prescriptions     Medication Sig Dispense Start Date End Date Auth. Provider    metoprolol tartrate (LOPRESSOR) 25 MG tablet Take 1 tablet (25 mg total) by mouth 2 (two) times daily. 60 tablet 4/1/2022  Rich Elena MD        Follow-up Information    None          Rich Elena MD  04/02/22 [id number]

## 2022-04-04 ENCOUNTER — OFFICE VISIT (OUTPATIENT)
Dept: CARDIOLOGY | Facility: CLINIC | Age: 82
End: 2022-04-04
Payer: MEDICARE

## 2022-04-04 VITALS
SYSTOLIC BLOOD PRESSURE: 147 MMHG | HEIGHT: 63 IN | BODY MASS INDEX: 30.31 KG/M2 | WEIGHT: 171.06 LBS | DIASTOLIC BLOOD PRESSURE: 76 MMHG | HEART RATE: 60 BPM

## 2022-04-04 DIAGNOSIS — R93.1 AGATSTON CORONARY ARTERY CALCIUM SCORE GREATER THAN 400: ICD-10-CM

## 2022-04-04 DIAGNOSIS — I10 ESSENTIAL HYPERTENSION: ICD-10-CM

## 2022-04-04 DIAGNOSIS — I65.23 CAROTID STENOSIS, BILATERAL: ICD-10-CM

## 2022-04-04 DIAGNOSIS — G47.33 OSA (OBSTRUCTIVE SLEEP APNEA): ICD-10-CM

## 2022-04-04 DIAGNOSIS — R06.02 SHORTNESS OF BREATH: ICD-10-CM

## 2022-04-04 DIAGNOSIS — E78.00 HYPERCHOLESTEROLEMIA: ICD-10-CM

## 2022-04-04 DIAGNOSIS — I25.10 ATHEROSCLEROSIS OF NATIVE CORONARY ARTERY OF NATIVE HEART WITHOUT ANGINA PECTORIS: ICD-10-CM

## 2022-04-04 DIAGNOSIS — I48.0 PAROXYSMAL ATRIAL FIBRILLATION: Primary | ICD-10-CM

## 2022-04-04 PROCEDURE — 1160F PR REVIEW ALL MEDS BY PRESCRIBER/CLIN PHARMACIST DOCUMENTED: ICD-10-PCS | Mod: CPTII,S$GLB,, | Performed by: INTERNAL MEDICINE

## 2022-04-04 PROCEDURE — 1160F RVW MEDS BY RX/DR IN RCRD: CPT | Mod: CPTII,S$GLB,, | Performed by: INTERNAL MEDICINE

## 2022-04-04 PROCEDURE — 1159F MED LIST DOCD IN RCRD: CPT | Mod: CPTII,S$GLB,, | Performed by: INTERNAL MEDICINE

## 2022-04-04 PROCEDURE — 99215 PR OFFICE/OUTPT VISIT, EST, LEVL V, 40-54 MIN: ICD-10-PCS | Mod: S$GLB,,, | Performed by: INTERNAL MEDICINE

## 2022-04-04 PROCEDURE — 1126F PR PAIN SEVERITY QUANTIFIED, NO PAIN PRESENT: ICD-10-PCS | Mod: CPTII,S$GLB,, | Performed by: INTERNAL MEDICINE

## 2022-04-04 PROCEDURE — 99999 PR PBB SHADOW E&M-EST. PATIENT-LVL IV: CPT | Mod: PBBFAC,,, | Performed by: INTERNAL MEDICINE

## 2022-04-04 PROCEDURE — 3078F DIAST BP <80 MM HG: CPT | Mod: CPTII,S$GLB,, | Performed by: INTERNAL MEDICINE

## 2022-04-04 PROCEDURE — 3288F FALL RISK ASSESSMENT DOCD: CPT | Mod: CPTII,S$GLB,, | Performed by: INTERNAL MEDICINE

## 2022-04-04 PROCEDURE — 99499 UNLISTED E&M SERVICE: CPT | Mod: S$GLB,,, | Performed by: INTERNAL MEDICINE

## 2022-04-04 PROCEDURE — 3078F PR MOST RECENT DIASTOLIC BLOOD PRESSURE < 80 MM HG: ICD-10-PCS | Mod: CPTII,S$GLB,, | Performed by: INTERNAL MEDICINE

## 2022-04-04 PROCEDURE — 93000 EKG 12-LEAD: ICD-10-PCS | Mod: S$GLB,,, | Performed by: INTERNAL MEDICINE

## 2022-04-04 PROCEDURE — 1126F AMNT PAIN NOTED NONE PRSNT: CPT | Mod: CPTII,S$GLB,, | Performed by: INTERNAL MEDICINE

## 2022-04-04 PROCEDURE — 3077F SYST BP >= 140 MM HG: CPT | Mod: CPTII,S$GLB,, | Performed by: INTERNAL MEDICINE

## 2022-04-04 PROCEDURE — 99999 PR PBB SHADOW E&M-EST. PATIENT-LVL IV: ICD-10-PCS | Mod: PBBFAC,,, | Performed by: INTERNAL MEDICINE

## 2022-04-04 PROCEDURE — 99215 OFFICE O/P EST HI 40 MIN: CPT | Mod: S$GLB,,, | Performed by: INTERNAL MEDICINE

## 2022-04-04 PROCEDURE — 1101F PT FALLS ASSESS-DOCD LE1/YR: CPT | Mod: CPTII,S$GLB,, | Performed by: INTERNAL MEDICINE

## 2022-04-04 PROCEDURE — 1101F PR PT FALLS ASSESS DOC 0-1 FALLS W/OUT INJ PAST YR: ICD-10-PCS | Mod: CPTII,S$GLB,, | Performed by: INTERNAL MEDICINE

## 2022-04-04 PROCEDURE — 99499 RISK ADDL DX/OHS AUDIT: ICD-10-PCS | Mod: S$GLB,,, | Performed by: INTERNAL MEDICINE

## 2022-04-04 PROCEDURE — 93000 ELECTROCARDIOGRAM COMPLETE: CPT | Mod: S$GLB,,, | Performed by: INTERNAL MEDICINE

## 2022-04-04 PROCEDURE — 3288F PR FALLS RISK ASSESSMENT DOCUMENTED: ICD-10-PCS | Mod: CPTII,S$GLB,, | Performed by: INTERNAL MEDICINE

## 2022-04-04 PROCEDURE — 3077F PR MOST RECENT SYSTOLIC BLOOD PRESSURE >= 140 MM HG: ICD-10-PCS | Mod: CPTII,S$GLB,, | Performed by: INTERNAL MEDICINE

## 2022-04-04 PROCEDURE — 1159F PR MEDICATION LIST DOCUMENTED IN MEDICAL RECORD: ICD-10-PCS | Mod: CPTII,S$GLB,, | Performed by: INTERNAL MEDICINE

## 2022-04-04 RX ORDER — DEXAMETHASONE 4 MG/1
4 TABLET ORAL DAILY
COMMUNITY
Start: 2022-03-23 | End: 2022-04-04 | Stop reason: ALTCHOICE

## 2022-04-04 RX ORDER — METOPROLOL TARTRATE 25 MG/1
12.5 TABLET, FILM COATED ORAL 2 TIMES DAILY PRN
Qty: 60 TABLET | Refills: 0 | Status: SHIPPED | OUTPATIENT
Start: 2022-04-04 | End: 2022-04-27 | Stop reason: ALTCHOICE

## 2022-04-04 RX ORDER — AMLODIPINE BESYLATE 5 MG/1
2.5 TABLET ORAL DAILY
Qty: 90 TABLET | Refills: 3 | Status: SHIPPED | OUTPATIENT
Start: 2022-04-04 | End: 2022-04-27 | Stop reason: DRUGHIGH

## 2022-04-04 NOTE — PROGRESS NOTES
Subjective:   04/04/2022     Patient ID:  Maryann Sorenson is a 81 y.o. female who presents for [facility] Follow Up      Patient with history of diastolic dysfunction, coronary calcification on calcium score, negative nuclear stress test last year, presents for follow-up after an episode of atrial fibrillation with a rapid ventricular response.  She had COVID 2 weeks ago.  Three days ago, she developed rapid palpitations and was seen in the [facility] Emergency Room.  There she was found have atrial fibrillation with a rapid response.  She was treated with intravenous diltiazem and oral metoprolol.  She was discharged off of anticoagulation.  Aspirin only.  Her CHADS2 Vasc score was noted to be too, but I calculate that is 5.     She has not had recurrent palpitations since the emergency room visit.  She has noted bradycardia and has only been taking metoprolol tartrate 25 mg once daily.  Her blood pressure was noted to be elevated yesterday.          Past Medical History:   Diagnosis Date    GERD (gastroesophageal reflux disease)     Pulmonary hypertension 2016    Syncope        Review of patient's allergies indicates:   Allergen Reactions    Amoxicillin-pot clavulanate Diarrhea     Given march 2016    Sulfa (sulfonamide antibiotics) Rash    Codeine      Other reaction(s): Unknown, tolerates hydrocodone june 2014    Doxycycline Nausea Only    Lyrica [pregabalin] Other (See Comments)     Blurry vision    Macrobid [nitrofurantoin monohyd/m-cryst] Other (See Comments)      2019 caused cramps in legs. After stopped medication it cleared.     Prevnar [pneumococcal 7-beti conj vacc] Other (See Comments)     Arm sore, redness at injection site and muscle aches. Given shot 7/23/19    Relafen [nabumetone] Diarrhea         Current Outpatient Medications:     ascorbate calcium-bioflavonoid (FRANCISCO-C WITH BIOFLAVONOIDS) 500-200 mg Tab, Take by mouth once daily., Disp: , Rfl:     calcium  carbonate-vitamin D3 600 mg (1,500 mg)-800 unit Chew, Take 1 tablet by mouth once., Disp: , Rfl:     cholecalciferol, vitamin D3, (VITAMIN D3) 50 mcg (2,000 unit) Tab, Take by mouth once daily., Disp: , Rfl:     coenzyme Q10 200 mg capsule, Take 1 capsule by mouth Daily. 1 Capsule Oral Every day, Disp: , Rfl:     diazePAM (VALIUM) 2 MG tablet, Take one tablet when needed for vertigo, may repeat in one hour if symptoms persist, Disp: 30 tablet, Rfl: 1    esomeprazole (NEXIUM) 40 MG capsule, 1 Capsule, Delayed Release(E.C.) Oral Every day, Disp: 90 capsule, Rfl: 3    fexofenadine (ALLEGRA) 180 MG tablet, Take 1 tablet by mouth Daily. 1 Tablet Oral Every day, Disp: , Rfl:     fluticasone propionate (FLONASE) 50 mcg/actuation nasal spray, 1 spray each nostril twice a day; total 48 g - please provide 3 months supply., Disp: 48 g, Rfl: 3    gabapentin (NEURONTIN) 100 MG capsule, 1 pill PO four times a day as needed for shooting leg pain. Please provide 90 days  supply., Disp: 360 capsule, Rfl: 3    guaiFENesin (MUCINEX) 600 mg 12 hr tablet, Take 1,200 mg by mouth as needed. , Disp: , Rfl:     ibuprofen (ADVIL,MOTRIN) 200 MG tablet, Take 400 mg by mouth every 8 (eight) hours as needed for Pain., Disp: , Rfl:     LACTOBACILLUS RHAMNOSUS GG (PROBIOTIC ORAL), Take 1 capsule by mouth Daily. 1 Capsule Oral Every day, Disp: , Rfl:     losartan (COZAAR) 50 MG tablet, TAKE 1 TABLET (50 MG TOTAL) BY MOUTH ONCE DAILY., Disp: 90 tablet, Rfl: 2    lutein 20 mg Cap, Take 20 mg by mouth every other day., Disp: , Rfl:     magnesium 250 mg Tab, Take 250 mg by mouth once daily. , Disp: , Rfl:     mupirocin (BACTROBAN) 2 % ointment, Apply topically 3 (three) times daily., Disp: 30 g, Rfl: 1    pramipexole (MIRAPEX) 0.125 MG tablet, Pt taking one pill daily., Disp: 180 tablet, Rfl: 3    rosuvastatin (CRESTOR) 40 MG Tab, Take 1 tablet (40 mg total) by mouth once daily., Disp: 90 tablet, Rfl: 1    silver sulfADIAZINE 1%  (SILVADENE) 1 % cream, Apply topically 2 (two) times daily., Disp: 20 g, Rfl: 0    ZINC ORAL, Take 50 mg by mouth once daily. , Disp: , Rfl:     amLODIPine (NORVASC) 5 MG tablet, Take 0.5 tablets (2.5 mg total) by mouth once daily., Disp: 90 tablet, Rfl: 3    apixaban (ELIQUIS) 5 mg Tab, Take 1 tablet (5 mg total) by mouth 2 (two) times daily., Disp: 60 tablet, Rfl: 11    metoprolol tartrate (LOPRESSOR) 25 MG tablet, Take 0.5 tablets (12.5 mg total) by mouth 2 (two) times daily as needed (Take 1/2 tablet twice a day, but hold for pulse less than 60 beats per minute)., Disp: 60 tablet, Rfl: 0    Current Facility-Administered Medications:     acetaminophen tablet 650 mg, 650 mg, Oral, Once PRN, Bruce Ramon MD     Objective:   Review of Systems   Cardiovascular: Positive for irregular heartbeat and palpitations. Negative for chest pain, claudication, cyanosis, dyspnea on exertion, leg swelling, near-syncope, orthopnea, paroxysmal nocturnal dyspnea and syncope.         Vitals:    04/04/22 1628   BP: (!) 147/76   Pulse:      Wt Readings from Last 3 Encounters:   04/04/22 77.6 kg (171 lb 1.2 oz)   04/01/22 76.7 kg (169 lb)   03/25/22 76.7 kg (169 lb)     Temp Readings from Last 3 Encounters:   04/01/22 98.2 °F (36.8 °C) (Oral)   03/25/22 98 °F (36.7 °C) (Oral)   02/15/22 97.7 °F (36.5 °C) (Skin)     BP Readings from Last 3 Encounters:   04/04/22 (!) 147/76   04/01/22 108/63   03/25/22 120/63     Pulse Readings from Last 3 Encounters:   04/04/22 60   04/01/22 71   03/25/22 (!) 57             Physical Exam  Vitals reviewed.   Constitutional:       General: She is not in acute distress.     Appearance: She is well-developed.   HENT:      Head: Normocephalic and atraumatic.      Nose: Nose normal.   Eyes:      Conjunctiva/sclera: Conjunctivae normal.      Pupils: Pupils are equal, round, and reactive to light.   Neck:      Vascular: No JVD.   Cardiovascular:      Rate and Rhythm: Normal rate and regular rhythm.       Pulses: Intact distal pulses.      Heart sounds: Normal heart sounds. No murmur heard.    No friction rub. No gallop.   Pulmonary:      Effort: Pulmonary effort is normal. No respiratory distress.      Breath sounds: Normal breath sounds. No wheezing or rales.   Chest:      Chest wall: No tenderness.   Abdominal:      General: Bowel sounds are normal. There is no distension.      Palpations: Abdomen is soft.      Tenderness: There is no abdominal tenderness.   Musculoskeletal:         General: No tenderness or deformity. Normal range of motion.      Cervical back: Normal range of motion and neck supple.   Skin:     General: Skin is warm and dry.      Findings: No erythema or rash.   Neurological:      Mental Status: She is alert and oriented to person, place, and time.      Cranial Nerves: No cranial nerve deficit.      Motor: No abnormal muscle tone.      Coordination: Coordination normal.   Psychiatric:         Behavior: Behavior normal.         Thought Content: Thought content normal.         Judgment: Judgment normal.           Lab Results   Component Value Date    CHOL 143 04/13/2021    CHOL 143 07/22/2020    CHOL 167 01/08/2020     Lab Results   Component Value Date    HDL 66 04/13/2021    HDL 66 07/22/2020    HDL 79 (H) 01/08/2020     Lab Results   Component Value Date    LDLCALC 63.4 04/13/2021    LDLCALC 63.6 07/22/2020    LDLCALC 76.0 01/08/2020     Lab Results   Component Value Date    ALT 22 04/01/2022    AST 29 04/01/2022    AST 35 09/05/2021    AST 33 04/13/2021     Lab Results   Component Value Date    CREATININE 0.63 04/01/2022    BUN 16 04/01/2022     04/01/2022    K 3.7 04/01/2022    CO2 24 04/01/2022    CO2 21 (L) 09/05/2021    CO2 29 04/13/2021     Lab Results   Component Value Date    HGB 12.3 04/01/2022    HCT 37.7 04/01/2022    HCT 34.0 (L) 09/05/2021    HCT 37.0 08/23/2021         Component      Latest Ref Rng & Units 4/1/2022   WBC      3.90 - 12.70 K/uL 6.56   RBC      4.00 - 5.40  M/uL 4.34   Hemoglobin      12.0 - 16.0 g/dL 12.3   Hematocrit      37.0 - 48.5 % 37.7   MCV      82 - 98 fL 87   MCH      27.0 - 31.0 pg 28.3   MCHC      32.0 - 36.0 g/dL 32.6   RDW      11.5 - 14.5 % 14.3   Platelets      150 - 450 K/uL 279   MPV      9.2 - 12.9 fL 10.6   Immature Granulocytes      0.0 - 0.5 % 0.5   Gran # (ANC)      1.8 - 7.7 K/uL 4.4   Immature Grans (Abs)      0.00 - 0.04 K/uL 0.03   Lymph #      1.0 - 4.8 K/uL 1.5   Mono #      0.3 - 1.0 K/uL 0.5   Eos #      0.0 - 0.5 K/uL 0.1   Baso #      0.00 - 0.20 K/uL 0.03   nRBC      0 /100 WBC 0   Gran %      38.0 - 73.0 % 66.5   Lymph %      18.0 - 48.0 % 22.9   Mono %      4.0 - 15.0 % 7.5   Eosinophil %      0.0 - 8.0 % 2.1   Basophil %      0.0 - 1.9 % 0.5   Differential Method       Automated   Sodium      136 - 145 mmol/L 139   Potassium      3.5 - 5.1 mmol/L 3.7   Chloride      95 - 110 mmol/L 103   CO2      23 - 29 mmol/L 24   Glucose      70 - 110 mg/dL 122 (H)   BUN      7 - 17 mg/dL 16   Creatinine      0.50 - 1.40 mg/dL 0.63   Calcium      8.7 - 10.5 mg/dL 8.6 (L)   PROTEIN TOTAL      6.0 - 8.4 g/dL 7.1   Albumin      3.5 - 5.2 g/dL 4.3   BILIRUBIN TOTAL      0.1 - 1.0 mg/dL 0.4   Alkaline Phosphatase      38 - 126 U/L 108   AST      15 - 46 U/L 29   ALT      10 - 44 U/L 22   Anion Gap      8 - 16 mmol/L 12   eGFR if African American      >60 mL/min/1.73 m:2 >60.0   eGFR if non African American      >60 mL/min/1.73 m:2 >60.0   WBC, UA      0 - 5 /hpf 2   Microscopic Comment       SEE COMMENT   NT-proBNP      5 - 1800 pg/mL 47   Troponin I      0.012 - 0.034 ng/mL <0.012   TSH      0.400 - 4.000 uIU/mL 0.830             Electrocardiogram shows sinus bradycardia heart rate 53 beats per minute, first-degree AV block      Assessment and Plan:     Paroxysmal atrial fibrillation  Comments:  Chads 2 Vasc score is 5, discontinue aspirin, begin Eliquis 5 mg twice a day  Orders:  -     Discontinue: apixaban (ELIQUIS) 5 mg Tab; Take 1 tablet (5 mg  total) by mouth 2 (two) times daily.  Dispense: 60 tablet; Refill: 11  -     metoprolol tartrate (LOPRESSOR) 25 MG tablet; Take 0.5 tablets (12.5 mg total) by mouth 2 (two) times daily as needed (Take 1/2 tablet twice a day, but hold for pulse less than 60 beats per minute).  Dispense: 60 tablet; Refill: 0  -     apixaban (ELIQUIS) 5 mg Tab; Take 1 tablet (5 mg total) by mouth 2 (two) times daily.  Dispense: 60 tablet; Refill: 11    Hypercholesterolemia  Comments:  On high-dose statin therapy    Agatston coronary artery calcium score greater than 400 - 577  Comments:  Asymptomatic    Essential hypertension  -     amLODIPine (NORVASC) 5 MG tablet; Take 0.5 tablets (2.5 mg total) by mouth once daily.  Dispense: 90 tablet; Refill: 3    Carotid stenosis, bilateral    Atherosclerosis of native coronary artery of native heart without angina pectoris    Agatston coronary artery calcium score greater than 400 - 577    Shortness of breath    NOE (obstructive sleep apnea)     Patient with an episode of atrial fibrillation with rapid ventricular response.  Currently bradycardic.  She is to continue to take metoprolol tartrate 25 mg tabs, take 1/2 tablet twice a day, holding for pulse less than 60 beats per minute.    Blood pressure is elevated, she will resume amlodipine, but 1/2 tablet daily.    She does have elevated CHADS2 Vasc score, aspirin will be discontinued, replaced with Eliquis 5 mg twice a day.    Follow-up with Dr. Gavin, echocardiography to be obtained    No follow-ups on file.

## 2022-04-14 ENCOUNTER — PATIENT MESSAGE (OUTPATIENT)
Dept: CARDIOLOGY | Facility: CLINIC | Age: 82
End: 2022-04-14
Payer: MEDICARE

## 2022-04-26 ENCOUNTER — LAB VISIT (OUTPATIENT)
Dept: LAB | Facility: HOSPITAL | Age: 82
End: 2022-04-26
Attending: INTERNAL MEDICINE
Payer: MEDICARE

## 2022-04-26 ENCOUNTER — TELEPHONE (OUTPATIENT)
Dept: CARDIOLOGY | Facility: CLINIC | Age: 82
End: 2022-04-26
Payer: MEDICARE

## 2022-04-26 ENCOUNTER — PATIENT OUTREACH (OUTPATIENT)
Dept: ADMINISTRATIVE | Facility: OTHER | Age: 82
End: 2022-04-26
Payer: MEDICARE

## 2022-04-26 DIAGNOSIS — D50.9 IRON DEFICIENCY ANEMIA, UNSPECIFIED IRON DEFICIENCY ANEMIA TYPE: ICD-10-CM

## 2022-04-26 DIAGNOSIS — R73.9 HYPERGLYCEMIA: ICD-10-CM

## 2022-04-26 DIAGNOSIS — E78.2 MIXED HYPERLIPIDEMIA: ICD-10-CM

## 2022-04-26 DIAGNOSIS — I10 ESSENTIAL HYPERTENSION: ICD-10-CM

## 2022-04-26 LAB
ALBUMIN SERPL BCP-MCNC: 4.5 G/DL (ref 3.5–5.2)
ALP SERPL-CCNC: 87 U/L (ref 38–126)
ALT SERPL W/O P-5'-P-CCNC: 21 U/L (ref 10–44)
ANION GAP SERPL CALC-SCNC: 13 MMOL/L (ref 8–16)
AST SERPL-CCNC: 35 U/L (ref 15–46)
BASOPHILS # BLD AUTO: 0.04 K/UL (ref 0–0.2)
BASOPHILS NFR BLD: 1 % (ref 0–1.9)
BILIRUB SERPL-MCNC: 0.2 MG/DL (ref 0.1–1)
CALCIUM SERPL-MCNC: 9 MG/DL (ref 8.7–10.5)
CHLORIDE SERPL-SCNC: 99 MMOL/L (ref 95–110)
CHOLEST SERPL-MCNC: 155 MG/DL (ref 120–199)
CHOLEST/HDLC SERPL: 2.7 {RATIO} (ref 2–5)
CO2 SERPL-SCNC: 29 MMOL/L (ref 23–29)
CREAT SERPL-MCNC: 0.68 MG/DL (ref 0.5–1.4)
DIFFERENTIAL METHOD: ABNORMAL
EOSINOPHIL # BLD AUTO: 0.2 K/UL (ref 0–0.5)
EOSINOPHIL NFR BLD: 4.3 % (ref 0–8)
ERYTHROCYTE [DISTWIDTH] IN BLOOD BY AUTOMATED COUNT: 14.3 % (ref 11.5–14.5)
EST. GFR  (AFRICAN AMERICAN): >60 ML/MIN/1.73 M^2
EST. GFR  (NON AFRICAN AMERICAN): >60 ML/MIN/1.73 M^2
ESTIMATED AVG GLUCOSE: 131 MG/DL (ref 68–131)
GLUCOSE SERPL-MCNC: 108 MG/DL (ref 70–110)
HBA1C MFR BLD: 6.2 % (ref 4–5.6)
HCT VFR BLD AUTO: 37.8 % (ref 37–48.5)
HDLC SERPL-MCNC: 58 MG/DL (ref 40–75)
HDLC SERPL: 37.4 % (ref 20–50)
HGB BLD-MCNC: 11.9 G/DL (ref 12–16)
IMM GRANULOCYTES # BLD AUTO: 0.01 K/UL (ref 0–0.04)
IMM GRANULOCYTES NFR BLD AUTO: 0.3 % (ref 0–0.5)
IRON SERPL-MCNC: 56 UG/DL (ref 30–160)
LDLC SERPL CALC-MCNC: 79.4 MG/DL (ref 63–159)
LYMPHOCYTES # BLD AUTO: 1.4 K/UL (ref 1–4.8)
LYMPHOCYTES NFR BLD: 35.3 % (ref 18–48)
MCH RBC QN AUTO: 28.1 PG (ref 27–31)
MCHC RBC AUTO-ENTMCNC: 31.5 G/DL (ref 32–36)
MCV RBC AUTO: 89 FL (ref 82–98)
MONOCYTES # BLD AUTO: 0.4 K/UL (ref 0.3–1)
MONOCYTES NFR BLD: 10.8 % (ref 4–15)
NEUTROPHILS # BLD AUTO: 1.9 K/UL (ref 1.8–7.7)
NEUTROPHILS NFR BLD: 48.3 % (ref 38–73)
NONHDLC SERPL-MCNC: 97 MG/DL
NRBC BLD-RTO: 0 /100 WBC
PLATELET # BLD AUTO: 302 K/UL (ref 150–450)
PMV BLD AUTO: 11.1 FL (ref 9.2–12.9)
POTASSIUM SERPL-SCNC: 4.4 MMOL/L (ref 3.5–5.1)
PROT SERPL-MCNC: 7.2 G/DL (ref 6–8.4)
RBC # BLD AUTO: 4.23 M/UL (ref 4–5.4)
SATURATED IRON: 13 % (ref 20–50)
SODIUM SERPL-SCNC: 141 MMOL/L (ref 136–145)
TOTAL IRON BINDING CAPACITY: 417 UG/DL (ref 250–450)
TRANSFERRIN SERPL-MCNC: 282 MG/DL (ref 200–375)
TRIGL SERPL-MCNC: 88 MG/DL (ref 30–150)
TSH SERPL DL<=0.005 MIU/L-ACNC: 2.32 UIU/ML (ref 0.4–4)
UUN UR-MCNC: 14 MG/DL (ref 7–17)
WBC # BLD AUTO: 4 K/UL (ref 3.9–12.7)

## 2022-04-26 PROCEDURE — 80053 COMPREHEN METABOLIC PANEL: CPT | Mod: PO | Performed by: INTERNAL MEDICINE

## 2022-04-26 PROCEDURE — 84466 ASSAY OF TRANSFERRIN: CPT | Mod: PO | Performed by: INTERNAL MEDICINE

## 2022-04-26 PROCEDURE — 85025 COMPLETE CBC W/AUTO DIFF WBC: CPT | Mod: PO | Performed by: INTERNAL MEDICINE

## 2022-04-26 PROCEDURE — 83036 HEMOGLOBIN GLYCOSYLATED A1C: CPT | Performed by: INTERNAL MEDICINE

## 2022-04-26 PROCEDURE — 80061 LIPID PANEL: CPT | Performed by: INTERNAL MEDICINE

## 2022-04-26 PROCEDURE — 84443 ASSAY THYROID STIM HORMONE: CPT | Mod: PO | Performed by: INTERNAL MEDICINE

## 2022-04-26 PROCEDURE — 36415 COLL VENOUS BLD VENIPUNCTURE: CPT | Mod: PO | Performed by: INTERNAL MEDICINE

## 2022-04-26 NOTE — TELEPHONE ENCOUNTER
----- Message from Heydi Gavin MD sent at 4/26/2022  2:57 PM CDT -----  Please review.  We will discuss the results during your upcoming visit with me. The results look good.

## 2022-04-27 ENCOUNTER — OFFICE VISIT (OUTPATIENT)
Dept: CARDIOLOGY | Facility: CLINIC | Age: 82
End: 2022-04-27
Payer: MEDICARE

## 2022-04-27 VITALS
BODY MASS INDEX: 30.54 KG/M2 | SYSTOLIC BLOOD PRESSURE: 124 MMHG | HEIGHT: 63 IN | WEIGHT: 172.38 LBS | DIASTOLIC BLOOD PRESSURE: 57 MMHG | HEART RATE: 50 BPM

## 2022-04-27 DIAGNOSIS — I48.0 PAROXYSMAL ATRIAL FIBRILLATION: ICD-10-CM

## 2022-04-27 DIAGNOSIS — E04.1 LEFT THYROID NODULE: ICD-10-CM

## 2022-04-27 DIAGNOSIS — G47.33 OSA (OBSTRUCTIVE SLEEP APNEA): ICD-10-CM

## 2022-04-27 DIAGNOSIS — R53.83 FATIGUE, UNSPECIFIED TYPE: ICD-10-CM

## 2022-04-27 DIAGNOSIS — I25.10 ATHEROSCLEROSIS OF NATIVE CORONARY ARTERY OF NATIVE HEART WITHOUT ANGINA PECTORIS: Primary | ICD-10-CM

## 2022-04-27 DIAGNOSIS — R06.02 SHORTNESS OF BREATH: ICD-10-CM

## 2022-04-27 DIAGNOSIS — I27.9 CHRONIC PULMONARY HEART DISEASE: ICD-10-CM

## 2022-04-27 DIAGNOSIS — I65.23 CAROTID STENOSIS, BILATERAL: ICD-10-CM

## 2022-04-27 DIAGNOSIS — R93.1 AGATSTON CORONARY ARTERY CALCIUM SCORE GREATER THAN 400: ICD-10-CM

## 2022-04-27 DIAGNOSIS — R42 DIZZINESS: ICD-10-CM

## 2022-04-27 DIAGNOSIS — E78.00 HYPERCHOLESTEROLEMIA: ICD-10-CM

## 2022-04-27 DIAGNOSIS — I10 ESSENTIAL HYPERTENSION: ICD-10-CM

## 2022-04-27 PROCEDURE — 3074F PR MOST RECENT SYSTOLIC BLOOD PRESSURE < 130 MM HG: ICD-10-PCS | Mod: CPTII,S$GLB,, | Performed by: INTERNAL MEDICINE

## 2022-04-27 PROCEDURE — 1159F PR MEDICATION LIST DOCUMENTED IN MEDICAL RECORD: ICD-10-PCS | Mod: CPTII,S$GLB,, | Performed by: INTERNAL MEDICINE

## 2022-04-27 PROCEDURE — 1160F RVW MEDS BY RX/DR IN RCRD: CPT | Mod: CPTII,S$GLB,, | Performed by: INTERNAL MEDICINE

## 2022-04-27 PROCEDURE — 3288F FALL RISK ASSESSMENT DOCD: CPT | Mod: CPTII,S$GLB,, | Performed by: INTERNAL MEDICINE

## 2022-04-27 PROCEDURE — 3078F DIAST BP <80 MM HG: CPT | Mod: CPTII,S$GLB,, | Performed by: INTERNAL MEDICINE

## 2022-04-27 PROCEDURE — 3288F PR FALLS RISK ASSESSMENT DOCUMENTED: ICD-10-PCS | Mod: CPTII,S$GLB,, | Performed by: INTERNAL MEDICINE

## 2022-04-27 PROCEDURE — 99214 OFFICE O/P EST MOD 30 MIN: CPT | Mod: S$GLB,,, | Performed by: INTERNAL MEDICINE

## 2022-04-27 PROCEDURE — 99214 PR OFFICE/OUTPT VISIT, EST, LEVL IV, 30-39 MIN: ICD-10-PCS | Mod: S$GLB,,, | Performed by: INTERNAL MEDICINE

## 2022-04-27 PROCEDURE — 99999 PR PBB SHADOW E&M-EST. PATIENT-LVL IV: CPT | Mod: PBBFAC,,, | Performed by: INTERNAL MEDICINE

## 2022-04-27 PROCEDURE — 1101F PT FALLS ASSESS-DOCD LE1/YR: CPT | Mod: CPTII,S$GLB,, | Performed by: INTERNAL MEDICINE

## 2022-04-27 PROCEDURE — 99999 PR PBB SHADOW E&M-EST. PATIENT-LVL IV: ICD-10-PCS | Mod: PBBFAC,,, | Performed by: INTERNAL MEDICINE

## 2022-04-27 PROCEDURE — 3074F SYST BP LT 130 MM HG: CPT | Mod: CPTII,S$GLB,, | Performed by: INTERNAL MEDICINE

## 2022-04-27 PROCEDURE — 3078F PR MOST RECENT DIASTOLIC BLOOD PRESSURE < 80 MM HG: ICD-10-PCS | Mod: CPTII,S$GLB,, | Performed by: INTERNAL MEDICINE

## 2022-04-27 PROCEDURE — 1125F AMNT PAIN NOTED PAIN PRSNT: CPT | Mod: CPTII,S$GLB,, | Performed by: INTERNAL MEDICINE

## 2022-04-27 PROCEDURE — 1159F MED LIST DOCD IN RCRD: CPT | Mod: CPTII,S$GLB,, | Performed by: INTERNAL MEDICINE

## 2022-04-27 PROCEDURE — 1125F PR PAIN SEVERITY QUANTIFIED, PAIN PRESENT: ICD-10-PCS | Mod: CPTII,S$GLB,, | Performed by: INTERNAL MEDICINE

## 2022-04-27 PROCEDURE — 1101F PR PT FALLS ASSESS DOC 0-1 FALLS W/OUT INJ PAST YR: ICD-10-PCS | Mod: CPTII,S$GLB,, | Performed by: INTERNAL MEDICINE

## 2022-04-27 PROCEDURE — 1160F PR REVIEW ALL MEDS BY PRESCRIBER/CLIN PHARMACIST DOCUMENTED: ICD-10-PCS | Mod: CPTII,S$GLB,, | Performed by: INTERNAL MEDICINE

## 2022-04-27 RX ORDER — METOPROLOL SUCCINATE 25 MG/1
25 TABLET, EXTENDED RELEASE ORAL DAILY
COMMUNITY
End: 2022-04-27 | Stop reason: SDUPTHER

## 2022-04-27 RX ORDER — AMLODIPINE BESYLATE 5 MG/1
5 TABLET ORAL DAILY
COMMUNITY
End: 2022-04-27 | Stop reason: SDUPTHER

## 2022-04-27 NOTE — PROGRESS NOTES
Subjective:   Patient ID:  Maryann Sorenson is a 81 y.o. female who presents for follow-up of Hypertension      HPI: Patient was recently evaluated in ER with PAF. She has been diagnosed with COVID about a week prior to ER visit and received antibody infusion. She was seen in follow up by my partner, Dr. Villa. He has restarted half dose of Amlodipine that was stopped in ER when Metoprolol was initiated.  She was also  Started on Eliquis due to elevated [id number] score.  Echo was ordered.Today she is still fatigued and short of breath. She is using C-PAP nightly.  Blood pressure is elevated.     Blood work OK.     Lab Results   Component Value Date     04/26/2022    K 4.4 04/26/2022    CL 99 04/26/2022    CO2 29 04/26/2022    BUN 14 04/26/2022    CREATININE 0.68 04/26/2022     04/26/2022    HGBA1C 6.2 (H) 04/26/2022    MG 2.2 09/05/2021    AST 35 04/26/2022    ALT 21 04/26/2022    ALBUMIN 4.5 04/26/2022    PROT 7.2 04/26/2022    BILITOT 0.2 04/26/2022    WBC 4.00 04/26/2022    HGB 11.9 (L) 04/26/2022    HCT 37.8 04/26/2022    MCV 89 04/26/2022     04/26/2022    TSH 2.320 04/26/2022    CHOL 155 04/26/2022    HDL 58 04/26/2022    LDLCALC 79.4 04/26/2022    TRIG 88 04/26/2022       No results found in the last 24 hours.     Review of Systems   Constitutional: Positive for malaise/fatigue.   HENT: Positive for hearing loss.    Eyes: Negative.    Cardiovascular: Positive for dyspnea on exertion and irregular heartbeat. Negative for chest pain, claudication, leg swelling, near-syncope, palpitations and syncope.   Respiratory: Positive for cough. Negative for shortness of breath, snoring and wheezing.    Endocrine: Negative.  Negative for cold intolerance, heat intolerance, polydipsia, polyphagia and polyuria.   Skin: Negative.    Musculoskeletal: Positive for arthritis and back pain.   Gastrointestinal: Positive for heartburn.   Genitourinary: Negative.    Neurological: Positive for excessive  "daytime sleepiness, dizziness, headaches and light-headedness.   Psychiatric/Behavioral: Negative.        Objective:   Physical Exam  Vitals and nursing note reviewed.   Constitutional:       Appearance: She is well-developed.      Comments: BP (!) 124/57   Pulse (!) 50   Ht 5' 3" (1.6 m)   Wt 78.2 kg (172 lb 6.4 oz)   LMP  (LMP Unknown)   BMI 30.54 kg/m²      HENT:      Head: Normocephalic.   Eyes:      Pupils: Pupils are equal, round, and reactive to light.   Neck:      Thyroid: No thyromegaly.      Vascular: No carotid bruit.   Cardiovascular:      Rate and Rhythm: Normal rate and regular rhythm.      Pulses: Intact distal pulses.           Carotid pulses are 2+ on the right side and 2+ on the left side.       Radial pulses are 2+ on the right side and 2+ on the left side.        Femoral pulses are 2+ on the right side and 2+ on the left side.       Popliteal pulses are 2+ on the right side and 2+ on the left side.        Dorsalis pedis pulses are 2+ on the right side and 2+ on the left side.        Posterior tibial pulses are 2+ on the right side and 2+ on the left side.      Heart sounds: Normal heart sounds. No murmur heard.    No friction rub. No gallop.   Pulmonary:      Effort: Pulmonary effort is normal. No respiratory distress.      Breath sounds: Normal breath sounds. No wheezing or rales.   Chest:      Chest wall: No tenderness.   Abdominal:      General: Bowel sounds are normal.      Palpations: Abdomen is soft.   Musculoskeletal:         General: Normal range of motion.      Cervical back: Normal range of motion and neck supple.   Skin:     General: Skin is warm and dry.   Neurological:      Mental Status: She is alert and oriented to person, place, and time.           Assessment and Plan:     Problem List Items Addressed This Visit        Cardiology Problems    Hypercholesterolemia    Agatston coronary artery calcium score greater than 400 - 577    Essential hypertension    Atherosclerosis of " native coronary artery of native heart without angina pectoris - Primary    Carotid stenosis, bilateral    Chronic pulmonary heart disease    Paroxysmal atrial fibrillation       Other    Shortness of breath    NOE (obstructive sleep apnea)    Fatigue    Dizziness    Left thyroid nodule          Patient's Medications   New Prescriptions    No medications on file   Previous Medications    AMLODIPINE (NORVASC) 5 MG TABLET    Take 5 mg by mouth once daily.    APIXABAN (ELIQUIS) 5 MG TAB    Take 1 tablet (5 mg total) by mouth 2 (two) times daily.    ASCORBATE CALCIUM-BIOFLAVONOID (FRANCISCO-C WITH BIOFLAVONOIDS) 500-200 MG TAB    Take by mouth once daily.    CALCIUM CARBONATE-VITAMIN D3 600 MG (1,500 MG)-800 UNIT CHEW    Take 1 tablet by mouth once.    CHOLECALCIFEROL, VITAMIN D3, (VITAMIN D3) 50 MCG (2,000 UNIT) TAB    Take by mouth once daily.    COENZYME Q10 200 MG CAPSULE    Take 1 capsule by mouth Daily. 1 Capsule Oral Every day    DIAZEPAM (VALIUM) 2 MG TABLET    Take one tablet when needed for vertigo, may repeat in one hour if symptoms persist    ESOMEPRAZOLE (NEXIUM) 40 MG CAPSULE    1 Capsule, Delayed Release(E.C.) Oral Every day    FEXOFENADINE (ALLEGRA) 180 MG TABLET    Take 1 tablet by mouth Daily. 1 Tablet Oral Every day    FLUTICASONE PROPIONATE (FLONASE) 50 MCG/ACTUATION NASAL SPRAY    1 spray each nostril twice a day; total 48 g - please provide 3 months supply.    GABAPENTIN (NEURONTIN) 100 MG CAPSULE    1 pill PO four times a day as needed for shooting leg pain. Please provide 90 days  supply.    GUAIFENESIN (MUCINEX) 600 MG 12 HR TABLET    Take 1,200 mg by mouth as needed.     LACTOBACILLUS RHAMNOSUS GG (PROBIOTIC ORAL)    Take 1 capsule by mouth Daily. 1 Capsule Oral Every day    LOSARTAN (COZAAR) 50 MG TABLET    TAKE 1 TABLET (50 MG TOTAL) BY MOUTH ONCE DAILY.    LUTEIN 20 MG CAP    Take 20 mg by mouth every other day.    MAGNESIUM 250 MG TAB    Take 250 mg by mouth once daily.     METOPROLOL SUCCINATE  (TOPROL-XL) 25 MG 24 HR TABLET    Take 25 mg by mouth once daily. Pt to take 1/2 pill once a day per Dr. Gavin.    MUPIROCIN (BACTROBAN) 2 % OINTMENT    Apply topically 3 (three) times daily.    PRAMIPEXOLE (MIRAPEX) 0.125 MG TABLET    Pt taking one pill daily.    ROSUVASTATIN (CRESTOR) 40 MG TAB    Take 1 tablet (40 mg total) by mouth once daily.    SILVER SULFADIAZINE 1% (SILVADENE) 1 % CREAM    Apply topically 2 (two) times daily.    ZINC ORAL    Take 50 mg by mouth once daily.    Modified Medications    No medications on file   Discontinued Medications    AMLODIPINE (NORVASC) 5 MG TABLET    Take 0.5 tablets (2.5 mg total) by mouth once daily.    IBUPROFEN (ADVIL,MOTRIN) 200 MG TABLET    Take 400 mg by mouth every 8 (eight) hours as needed for Pain.    METOPROLOL TARTRATE (LOPRESSOR) 25 MG TABLET    Take 0.5 tablets (12.5 mg total) by mouth 2 (two) times daily as needed (Take 1/2 tablet twice a day, but hold for pulse less than 60 beats per minute).     Increase Amlodipine to 5 mg and continue to monitor BP.   May need to add thiazide diuretic.  Review echo results. If DD noted would consider SGLT2 inhibitor.  Defer to PCP.  Change Metoprolol Tartrate to Succinate 25 mg Daily.    No follow-ups on file.

## 2022-04-28 RX ORDER — AMLODIPINE BESYLATE 5 MG/1
5 TABLET ORAL DAILY
Qty: 90 TABLET | Refills: 3 | Status: SHIPPED | OUTPATIENT
Start: 2022-04-28 | End: 2023-06-20 | Stop reason: SDUPTHER

## 2022-04-28 RX ORDER — METOPROLOL SUCCINATE 25 MG/1
TABLET, EXTENDED RELEASE ORAL
Qty: 45 TABLET | Refills: 3 | Status: SHIPPED | OUTPATIENT
Start: 2022-04-28 | End: 2023-04-10 | Stop reason: SDUPTHER

## 2022-05-04 ENCOUNTER — TELEPHONE (OUTPATIENT)
Dept: INTERNAL MEDICINE | Facility: CLINIC | Age: 82
End: 2022-05-04
Payer: MEDICARE

## 2022-05-04 NOTE — TELEPHONE ENCOUNTER
----- Message from Aminata Gil sent at 5/4/2022 11:55 AM CDT -----  Regarding: Digital Medicine Discharge  Dr. Smith,    Your patient Maryann Sorenson was enrolled in [facility] on 1/22/2020. Unfortunately, she has requested discharge from Digital Medicine monitoring and we wanted to make you aware.     Thanks for your support of Digital Medicine programs! Please let me know if you any questions or concerns.    Sincerely,   Aminata Gil

## 2022-05-05 ENCOUNTER — TELEPHONE (OUTPATIENT)
Dept: INTERNAL MEDICINE | Facility: CLINIC | Age: 82
End: 2022-05-05
Payer: MEDICARE

## 2022-05-05 NOTE — TELEPHONE ENCOUNTER
----- Message from Ira Simth MD sent at 5/5/2022 10:41 AM CDT -----  Your lab has resulted and you're hemoglobin A1c, that is a measure of your blood sugar over 90 days, is in the pre diabetic range.  Please avoid sugar, keep well hydrated, remember water is your best option, and strive to exercise 180 minutes weekly.  This lab measure is usually checked every 6 months, or sooner if the values elevate.    Your blood count is stable, although your iron studies reveal your saturated iron is still low.  At your last office visit you reported you would be seeing your gastroenterologist, Dr. Pop, this spring.  Please share these results with him during the course of your visit.  Please do not hesitate to call/email if you have any questions or concerns.  Ira Vila

## 2022-05-17 ENCOUNTER — PATIENT MESSAGE (OUTPATIENT)
Dept: CARDIOLOGY | Facility: CLINIC | Age: 82
End: 2022-05-17
Payer: MEDICARE

## 2022-05-17 ENCOUNTER — HOSPITAL ENCOUNTER (OUTPATIENT)
Dept: CARDIOLOGY | Facility: HOSPITAL | Age: 82
Discharge: HOME | End: 2022-05-17
Attending: INTERNAL MEDICINE
Payer: MEDICARE

## 2022-05-17 VITALS — HEIGHT: 63 IN | WEIGHT: 172 LBS | BODY MASS INDEX: 30.48 KG/M2

## 2022-05-17 DIAGNOSIS — I48.0 PAROXYSMAL ATRIAL FIBRILLATION: ICD-10-CM

## 2022-05-17 LAB
AORTIC ROOT ANNULUS: 2.95 CM
AV INDEX (PROSTH): 0.75
AV MEAN GRADIENT: 5 MMHG
AV PEAK GRADIENT: 10 MMHG
AV VALVE AREA: 2.01 CM2
AV VELOCITY RATIO: 0.75
BSA FOR ECHO PROCEDURE: 1.86 M2
CV ECHO LV RWT: 0.36 CM
DOP CALC AO PEAK VEL: 1.62 M/S
DOP CALC AO VTI: 35.2 CM
DOP CALC LVOT AREA: 2.7 CM2
DOP CALC LVOT DIAMETER: 1.85 CM
DOP CALC LVOT PEAK VEL: 1.22 M/S
DOP CALC LVOT STROKE VOLUME: 70.77 CM3
DOP CALC MV VTI: 40.27 CM
DOP CALCLVOT PEAK VEL VTI: 26.34 CM
E WAVE DECELERATION TIME: 235.91 MSEC
E/A RATIO: 1.18
E/E' RATIO: 13.25 M/S
ECHO LV POSTERIOR WALL: 0.8 CM (ref 0.6–1.1)
EJECTION FRACTION: 55 %
FRACTIONAL SHORTENING: 57 % (ref 28–44)
INTERVENTRICULAR SEPTUM: 0.86 CM (ref 0.6–1.1)
IVRT: 103.81 MSEC
LA MAJOR: 4.9 CM
LA MINOR: 5.6 CM
LA WIDTH: 4.9 CM
LEFT ATRIUM SIZE: 3.7 CM
LEFT ATRIUM VOLUME INDEX MOD: 44.9 ML/M2
LEFT ATRIUM VOLUME INDEX: 44.5 ML/M2
LEFT ATRIUM VOLUME MOD: 81.33 CM3
LEFT ATRIUM VOLUME: 80.55 CM3
LEFT INTERNAL DIMENSION IN SYSTOLE: 1.9 CM (ref 2.1–4)
LEFT VENTRICLE DIASTOLIC VOLUME INDEX: 49.44 ML/M2
LEFT VENTRICLE DIASTOLIC VOLUME: 89.48 ML
LEFT VENTRICLE MASS INDEX: 64 G/M2
LEFT VENTRICLE SYSTOLIC VOLUME INDEX: 16.2 ML/M2
LEFT VENTRICLE SYSTOLIC VOLUME: 29.39 ML
LEFT VENTRICULAR INTERNAL DIMENSION IN DIASTOLE: 4.44 CM (ref 3.5–6)
LEFT VENTRICULAR MASS: 116.62 G
LV LATERAL E/E' RATIO: 13.25 M/S
LV SEPTAL E/E' RATIO: 13.25 M/S
MV A" WAVE DURATION": 15.69 MSEC
MV PEAK A VEL: 0.9 M/S
MV PEAK E VEL: 1.06 M/S
MV PEAK GRADIENT: 5 MMHG
MV STENOSIS PRESSURE HALF TIME: 68.42 MS
MV VALVE AREA BY CONTINUITY EQUATION: 1.76 CM2
MV VALVE AREA P 1/2 METHOD: 3.22 CM2
PISA TR MAX VEL: 2.99 M/S
PULM VEIN S/D RATIO: 1.12
PV PEAK D VEL: 0.52 M/S
PV PEAK S VEL: 0.58 M/S
PV PEAK VELOCITY: 1.16 CM/S
RA MAJOR: 4.7 CM
RA PRESSURE: 3 MMHG
RA WIDTH: 2.4 CM
RIGHT VENTRICULAR END-DIASTOLIC DIMENSION: 2.4 CM
RIGHT VENTRICULAR LENGTH IN DIASTOLE (APICAL 4-CHAMBER VIEW): 5.4 CM
SINUS: 2.89 CM
TDI LATERAL: 0.08 M/S
TDI SEPTAL: 0.08 M/S
TDI: 0.08 M/S
TR MAX PG: 36 MMHG
TRICUSPID ANNULAR PLANE SYSTOLIC EXCURSION: 1.8 CM
TV REST PULMONARY ARTERY PRESSURE: 39 MMHG

## 2022-05-17 PROCEDURE — 93306 TTE W/DOPPLER COMPLETE: CPT | Mod: 26,,, | Performed by: INTERNAL MEDICINE

## 2022-05-17 PROCEDURE — 93306 TTE W/DOPPLER COMPLETE: CPT | Mod: PO

## 2022-05-17 PROCEDURE — 93306 ECHO (CUPID ONLY): ICD-10-PCS | Mod: 26,,, | Performed by: INTERNAL MEDICINE

## 2022-06-07 ENCOUNTER — TELEPHONE (OUTPATIENT)
Dept: CARDIOLOGY | Facility: CLINIC | Age: 82
End: 2022-06-07
Payer: MEDICARE

## 2022-06-07 NOTE — TELEPHONE ENCOUNTER
----- Message from Heydi Gavin MD sent at 6/7/2022 12:45 PM CDT -----  I think she can wait till October.  Y  ----- Message -----  From: Viktoriya Ford MA  Sent: 6/7/2022  11:18 AM CDT  To: Heydi Gavin MD    Hi,   Pt saw you on 4/27/22. She is scheduled to see you again 6/13/22(appt was made 1/2022). Do you need to see pt again? Your note says that pt should f/u in 6 months (10/2022). Please advise.

## 2022-06-07 NOTE — TELEPHONE ENCOUNTER
Pt notified, verbalized understanding. I offered to schedule pt for 11/28/22 at 8am and pt declined and asked to be placed on the wait list for an appointment in October.

## 2022-07-21 ENCOUNTER — PATIENT MESSAGE (OUTPATIENT)
Dept: CARDIOLOGY | Facility: CLINIC | Age: 82
End: 2022-07-21
Payer: MEDICARE

## 2022-07-21 DIAGNOSIS — I10 ESSENTIAL HYPERTENSION: ICD-10-CM

## 2022-07-21 RX ORDER — LOSARTAN POTASSIUM 50 MG/1
50 TABLET ORAL DAILY
Qty: 90 TABLET | Refills: 2 | Status: SHIPPED | OUTPATIENT
Start: 2022-07-21 | End: 2023-04-19 | Stop reason: SDUPTHER

## 2022-07-25 ENCOUNTER — PES CALL (OUTPATIENT)
Dept: ADMINISTRATIVE | Facility: OTHER | Age: 82
End: 2022-07-25
Payer: MEDICARE

## 2022-08-19 NOTE — PROGRESS NOTES
CC: Annual PE    81 y.o. female presents for PE   Non smoker   Social ETOH      HEALTH MAINTENANCE:  Cholesterol/labs: 4/2021  C-scope: N/A  EGD-+ hiatal hernia, s/p dilatation Dr. Pop  C-scope, s/p polyp, every 5 years  WWE: gyn  Mammo:gyn  DEXA: 2021-osteoporosis- did not take bisphosphonate  2/2 to esophageal disease   +vitamin D supplements and calcium  EYE exam: UTD  DDS exam: UTD- s/p root canal, left side    VACCINATIONS:  TD: 2014  Flu:yearly  Pneumovax:2009  Prevnar: 2019  Shingles: 2017-@ [facility]  Covid: x 3      MEDCARD: Reviewed  ROS:  No fever, chills, or night sweats  No visual disturbance or d/c  No ear or sinus pain or pressure  No dysphagia or early satiety  No chest pain or palpitations  No cough or wheezing  No PND or orthopnea  No GERD or abdominal pain; occ breakthrough reflux  Dr. Pop advised against procedure til on Eliquis at least 6 mos  Had UGI -pt ++ early satiety and foods stay in upper GI  Reviewed upper GI report, 50% of stomach above   ++ known hiatal hernia   US abdomen- unremarkable  No change in bowels or blood in stool  No hematuria or dysuria;  +  Nocturia  No vaginal bleeding or pelvic pain or bloating  No skin rashes or lesions  ++ hand pain , right hand digits, Tx: APAP not helpful  voltaren gel she was advised to take sparringly  ++ LBP w/ B/L pain lateral thighs  Worse after standing and cooking  No unusual HA or focal deficits  No cold or heat intolerance  No increased thirst or urination  No unusual bruising or bleeding  ADL's: 100% independent  AD's: + will,  Consider AD, and M/POA   Memory: Good  Mental health: spouse w/ significant health issues    Gait: no falls   Nutrition: healthy-+/- sweets, spouse DM but doesn't want to amend his diet  Safety: Intact  Urinary incontinence: stress incontinence    Remainder of review negative except as previously noted    PMHX:Reviewed  PSHX: Reviewed  SHX: Reviewed      PE:  VS: As noted  GENERAL: Well developed well  nourished, alert and oriented in NAD  Conversant and co-operative,  Pleasant as always  EYES: Conjunctiva and lids normal, sclera anicteric  NECK: Supple w/o thyromegaly or lymphadenopathy    RESPIRATORY: Efforts are unlabored; lungs are CTAP  CARDIOVASCULAR: Heart RRR w/o mumur, gallop or rub; No carotid bruits noted  1+ pedal pulses; no LE edema noted  GASTROINTESTINAL: Bowel sounds are present, soft, nontender, nondistended  No hepatosplenomegaly noted.    MUSCULOSKELETAL: Gait normal. No clubbing, cyanosis, or edema noted.  Spine: NT, neg SLR  Left hip mild tenderness to palpation w/ good ROM and no exacerbation w/ hip rotation  NEUROLOGIC: STEVENS. No tremor noted  SKIN: Warm and dry    IMPRESSION:  Annual PE  PAF, started after Covid dx   HTN, stable  Pulmonary HTN, asx  HLD, asx  Carotid stenosis,asx  Atherosclerosis CAD/Agatston +,asx  Hyperglycemia, consideration for Jardiance  MICHELLE, on supplement  GERD/hiatal hernia, on PPI  Chronic back pain, lumbar radiculitis,   RLS, better w/ gabapentin   Pre-DM  Vitamin D deficiency, on supplements  Osteoporosis, on supplements    PLAN:   Lab- today  Gabapentin- try to take 200mg dose after dinner to see if helps w/o excess sedation  PM-has seen in past, to consider  PT-has done in past-to consider  Continue present meds  Call prn  RTC 6 mos

## 2022-08-22 ENCOUNTER — OFFICE VISIT (OUTPATIENT)
Dept: INTERNAL MEDICINE | Facility: CLINIC | Age: 82
End: 2022-08-22
Payer: MEDICARE

## 2022-08-22 ENCOUNTER — LAB VISIT (OUTPATIENT)
Dept: LAB | Facility: HOSPITAL | Age: 82
End: 2022-08-22
Attending: INTERNAL MEDICINE
Payer: MEDICARE

## 2022-08-22 VITALS
BODY MASS INDEX: 30.54 KG/M2 | HEIGHT: 63 IN | RESPIRATION RATE: 18 BRPM | SYSTOLIC BLOOD PRESSURE: 132 MMHG | HEART RATE: 76 BPM | TEMPERATURE: 97 F | OXYGEN SATURATION: 97 % | DIASTOLIC BLOOD PRESSURE: 74 MMHG | WEIGHT: 172.38 LBS

## 2022-08-22 DIAGNOSIS — I25.10 ATHEROSCLEROSIS OF NATIVE CORONARY ARTERY OF NATIVE HEART WITHOUT ANGINA PECTORIS: ICD-10-CM

## 2022-08-22 DIAGNOSIS — D50.9 IRON DEFICIENCY ANEMIA, UNSPECIFIED IRON DEFICIENCY ANEMIA TYPE: ICD-10-CM

## 2022-08-22 DIAGNOSIS — G25.81 RESTLESS LEGS: ICD-10-CM

## 2022-08-22 DIAGNOSIS — R73.03 PRE-DIABETES: ICD-10-CM

## 2022-08-22 DIAGNOSIS — K21.9 GASTROESOPHAGEAL REFLUX DISEASE WITHOUT ESOPHAGITIS: ICD-10-CM

## 2022-08-22 DIAGNOSIS — E55.9 VITAMIN D DEFICIENCY: ICD-10-CM

## 2022-08-22 DIAGNOSIS — K44.9 HIATAL HERNIA: ICD-10-CM

## 2022-08-22 DIAGNOSIS — I27.20 PULMONARY HYPERTENSION: ICD-10-CM

## 2022-08-22 DIAGNOSIS — R93.1 AGATSTON CORONARY ARTERY CALCIUM SCORE GREATER THAN 400: ICD-10-CM

## 2022-08-22 DIAGNOSIS — I65.23 CAROTID STENOSIS, BILATERAL: ICD-10-CM

## 2022-08-22 DIAGNOSIS — R73.9 HYPERGLYCEMIA: ICD-10-CM

## 2022-08-22 DIAGNOSIS — M54.16 LUMBAR RADICULITIS: ICD-10-CM

## 2022-08-22 DIAGNOSIS — Z00.00 ANNUAL PHYSICAL EXAM: Primary | ICD-10-CM

## 2022-08-22 DIAGNOSIS — M81.0 OSTEOPOROSIS, UNSPECIFIED OSTEOPOROSIS TYPE, UNSPECIFIED PATHOLOGICAL FRACTURE PRESENCE: ICD-10-CM

## 2022-08-22 DIAGNOSIS — E78.5 HYPERLIPIDEMIA, UNSPECIFIED HYPERLIPIDEMIA TYPE: ICD-10-CM

## 2022-08-22 DIAGNOSIS — I10 ESSENTIAL HYPERTENSION: ICD-10-CM

## 2022-08-22 DIAGNOSIS — I48.0 PAROXYSMAL ATRIAL FIBRILLATION: ICD-10-CM

## 2022-08-22 LAB
25(OH)D3+25(OH)D2 SERPL-MCNC: 60 NG/ML (ref 30–96)
ESTIMATED AVG GLUCOSE: 128 MG/DL (ref 68–131)
HBA1C MFR BLD: 6.1 % (ref 4–5.6)

## 2022-08-22 PROCEDURE — 99397 PER PM REEVAL EST PAT 65+ YR: CPT | Mod: S$GLB,,, | Performed by: INTERNAL MEDICINE

## 2022-08-22 PROCEDURE — 1125F AMNT PAIN NOTED PAIN PRSNT: CPT | Mod: CPTII,S$GLB,, | Performed by: INTERNAL MEDICINE

## 2022-08-22 PROCEDURE — 3078F PR MOST RECENT DIASTOLIC BLOOD PRESSURE < 80 MM HG: ICD-10-PCS | Mod: CPTII,S$GLB,, | Performed by: INTERNAL MEDICINE

## 2022-08-22 PROCEDURE — 1160F PR REVIEW ALL MEDS BY PRESCRIBER/CLIN PHARMACIST DOCUMENTED: ICD-10-PCS | Mod: CPTII,S$GLB,, | Performed by: INTERNAL MEDICINE

## 2022-08-22 PROCEDURE — 83036 HEMOGLOBIN GLYCOSYLATED A1C: CPT | Performed by: INTERNAL MEDICINE

## 2022-08-22 PROCEDURE — 3075F SYST BP GE 130 - 139MM HG: CPT | Mod: CPTII,S$GLB,, | Performed by: INTERNAL MEDICINE

## 2022-08-22 PROCEDURE — 3078F DIAST BP <80 MM HG: CPT | Mod: CPTII,S$GLB,, | Performed by: INTERNAL MEDICINE

## 2022-08-22 PROCEDURE — 1101F PR PT FALLS ASSESS DOC 0-1 FALLS W/OUT INJ PAST YR: ICD-10-PCS | Mod: CPTII,S$GLB,, | Performed by: INTERNAL MEDICINE

## 2022-08-22 PROCEDURE — 99999 PR PBB SHADOW E&M-EST. PATIENT-LVL V: CPT | Mod: PBBFAC,,, | Performed by: INTERNAL MEDICINE

## 2022-08-22 PROCEDURE — 3075F PR MOST RECENT SYSTOLIC BLOOD PRESS GE 130-139MM HG: ICD-10-PCS | Mod: CPTII,S$GLB,, | Performed by: INTERNAL MEDICINE

## 2022-08-22 PROCEDURE — 1159F MED LIST DOCD IN RCRD: CPT | Mod: CPTII,S$GLB,, | Performed by: INTERNAL MEDICINE

## 2022-08-22 PROCEDURE — 1101F PT FALLS ASSESS-DOCD LE1/YR: CPT | Mod: CPTII,S$GLB,, | Performed by: INTERNAL MEDICINE

## 2022-08-22 PROCEDURE — 3288F PR FALLS RISK ASSESSMENT DOCUMENTED: ICD-10-PCS | Mod: CPTII,S$GLB,, | Performed by: INTERNAL MEDICINE

## 2022-08-22 PROCEDURE — 1125F PR PAIN SEVERITY QUANTIFIED, PAIN PRESENT: ICD-10-PCS | Mod: CPTII,S$GLB,, | Performed by: INTERNAL MEDICINE

## 2022-08-22 PROCEDURE — 36415 COLL VENOUS BLD VENIPUNCTURE: CPT | Mod: PO | Performed by: INTERNAL MEDICINE

## 2022-08-22 PROCEDURE — 1160F RVW MEDS BY RX/DR IN RCRD: CPT | Mod: CPTII,S$GLB,, | Performed by: INTERNAL MEDICINE

## 2022-08-22 PROCEDURE — 82306 VITAMIN D 25 HYDROXY: CPT | Performed by: INTERNAL MEDICINE

## 2022-08-22 PROCEDURE — 3288F FALL RISK ASSESSMENT DOCD: CPT | Mod: CPTII,S$GLB,, | Performed by: INTERNAL MEDICINE

## 2022-08-22 PROCEDURE — 99397 PR PREVENTIVE VISIT,EST,65 & OVER: ICD-10-PCS | Mod: S$GLB,,, | Performed by: INTERNAL MEDICINE

## 2022-08-22 PROCEDURE — 99999 PR PBB SHADOW E&M-EST. PATIENT-LVL V: ICD-10-PCS | Mod: PBBFAC,,, | Performed by: INTERNAL MEDICINE

## 2022-08-22 PROCEDURE — 1159F PR MEDICATION LIST DOCUMENTED IN MEDICAL RECORD: ICD-10-PCS | Mod: CPTII,S$GLB,, | Performed by: INTERNAL MEDICINE

## 2022-09-06 ENCOUNTER — PATIENT MESSAGE (OUTPATIENT)
Dept: CARDIOLOGY | Facility: CLINIC | Age: 82
End: 2022-09-06
Payer: MEDICARE

## 2022-09-06 ENCOUNTER — TELEPHONE (OUTPATIENT)
Dept: INTERNAL MEDICINE | Facility: CLINIC | Age: 82
End: 2022-09-06
Payer: MEDICARE

## 2022-09-06 ENCOUNTER — PATIENT MESSAGE (OUTPATIENT)
Dept: INTERNAL MEDICINE | Facility: CLINIC | Age: 82
End: 2022-09-06
Payer: MEDICARE

## 2022-09-06 DIAGNOSIS — E78.2 MIXED HYPERLIPIDEMIA: ICD-10-CM

## 2022-09-06 RX ORDER — EMPAGLIFLOZIN 10 MG/1
10 TABLET, FILM COATED ORAL DAILY
Qty: 90 TABLET | Refills: 0 | Status: SHIPPED | OUTPATIENT
Start: 2022-09-06 | End: 2022-12-05 | Stop reason: SDUPTHER

## 2022-09-06 RX ORDER — ROSUVASTATIN CALCIUM 40 MG/1
40 TABLET, COATED ORAL DAILY
Qty: 90 TABLET | Refills: 3 | Status: SHIPPED | OUTPATIENT
Start: 2022-09-06 | End: 2023-09-12 | Stop reason: SDUPTHER

## 2022-09-07 ENCOUNTER — PATIENT MESSAGE (OUTPATIENT)
Dept: INTERNAL MEDICINE | Facility: CLINIC | Age: 82
End: 2022-09-07
Payer: MEDICARE

## 2022-09-21 ENCOUNTER — TELEPHONE (OUTPATIENT)
Dept: CARDIOLOGY | Facility: CLINIC | Age: 82
End: 2022-09-21

## 2022-09-21 DIAGNOSIS — I10 ESSENTIAL HYPERTENSION: Primary | ICD-10-CM

## 2022-09-21 DIAGNOSIS — E78.00 HYPERCHOLESTEROLEMIA: ICD-10-CM

## 2022-09-28 ENCOUNTER — TELEPHONE (OUTPATIENT)
Dept: OBSTETRICS AND GYNECOLOGY | Facility: CLINIC | Age: 82
End: 2022-09-28
Payer: MEDICARE

## 2022-09-28 ENCOUNTER — PATIENT MESSAGE (OUTPATIENT)
Dept: OBSTETRICS AND GYNECOLOGY | Facility: CLINIC | Age: 82
End: 2022-09-28
Payer: MEDICARE

## 2022-09-28 DIAGNOSIS — Z12.31 VISIT FOR SCREENING MAMMOGRAM: Primary | ICD-10-CM

## 2022-09-28 NOTE — TELEPHONE ENCOUNTER
----- Message from Marylou Hinton sent at 9/28/2022  2:38 PM CDT -----  Type:  Needs Medical Advice    Who Called: self  Reason:mammogram orders   Would the patient rather a call back or a response via [facility]? call  Best Call Back Number:[phone]  Additional Informationnone

## 2022-10-05 ENCOUNTER — OFFICE VISIT (OUTPATIENT)
Dept: SLEEP MEDICINE | Facility: CLINIC | Age: 82
End: 2022-10-05
Payer: MEDICARE

## 2022-10-05 VITALS
DIASTOLIC BLOOD PRESSURE: 73 MMHG | BODY MASS INDEX: 30.65 KG/M2 | WEIGHT: 173 LBS | SYSTOLIC BLOOD PRESSURE: 117 MMHG | HEART RATE: 63 BPM

## 2022-10-05 DIAGNOSIS — G47.33 OSA (OBSTRUCTIVE SLEEP APNEA): Primary | ICD-10-CM

## 2022-10-05 DIAGNOSIS — G25.81 RLS (RESTLESS LEGS SYNDROME): ICD-10-CM

## 2022-10-05 PROCEDURE — 1125F PR PAIN SEVERITY QUANTIFIED, PAIN PRESENT: ICD-10-PCS | Mod: CPTII,S$GLB,, | Performed by: PSYCHIATRY & NEUROLOGY

## 2022-10-05 PROCEDURE — 3074F SYST BP LT 130 MM HG: CPT | Mod: CPTII,S$GLB,, | Performed by: PSYCHIATRY & NEUROLOGY

## 2022-10-05 PROCEDURE — 99999 PR PBB SHADOW E&M-EST. PATIENT-LVL IV: CPT | Mod: PBBFAC,,, | Performed by: PSYCHIATRY & NEUROLOGY

## 2022-10-05 PROCEDURE — 1159F MED LIST DOCD IN RCRD: CPT | Mod: CPTII,S$GLB,, | Performed by: PSYCHIATRY & NEUROLOGY

## 2022-10-05 PROCEDURE — 1159F PR MEDICATION LIST DOCUMENTED IN MEDICAL RECORD: ICD-10-PCS | Mod: CPTII,S$GLB,, | Performed by: PSYCHIATRY & NEUROLOGY

## 2022-10-05 PROCEDURE — 1125F AMNT PAIN NOTED PAIN PRSNT: CPT | Mod: CPTII,S$GLB,, | Performed by: PSYCHIATRY & NEUROLOGY

## 2022-10-05 PROCEDURE — 99214 PR OFFICE/OUTPT VISIT, EST, LEVL IV, 30-39 MIN: ICD-10-PCS | Mod: S$GLB,,, | Performed by: PSYCHIATRY & NEUROLOGY

## 2022-10-05 PROCEDURE — 1101F PR PT FALLS ASSESS DOC 0-1 FALLS W/OUT INJ PAST YR: ICD-10-PCS | Mod: CPTII,S$GLB,, | Performed by: PSYCHIATRY & NEUROLOGY

## 2022-10-05 PROCEDURE — 1101F PT FALLS ASSESS-DOCD LE1/YR: CPT | Mod: CPTII,S$GLB,, | Performed by: PSYCHIATRY & NEUROLOGY

## 2022-10-05 PROCEDURE — 3288F FALL RISK ASSESSMENT DOCD: CPT | Mod: CPTII,S$GLB,, | Performed by: PSYCHIATRY & NEUROLOGY

## 2022-10-05 PROCEDURE — 3078F DIAST BP <80 MM HG: CPT | Mod: CPTII,S$GLB,, | Performed by: PSYCHIATRY & NEUROLOGY

## 2022-10-05 PROCEDURE — 99999 PR PBB SHADOW E&M-EST. PATIENT-LVL IV: ICD-10-PCS | Mod: PBBFAC,,, | Performed by: PSYCHIATRY & NEUROLOGY

## 2022-10-05 PROCEDURE — 99214 OFFICE O/P EST MOD 30 MIN: CPT | Mod: S$GLB,,, | Performed by: PSYCHIATRY & NEUROLOGY

## 2022-10-05 PROCEDURE — 3078F PR MOST RECENT DIASTOLIC BLOOD PRESSURE < 80 MM HG: ICD-10-PCS | Mod: CPTII,S$GLB,, | Performed by: PSYCHIATRY & NEUROLOGY

## 2022-10-05 PROCEDURE — 3288F PR FALLS RISK ASSESSMENT DOCUMENTED: ICD-10-PCS | Mod: CPTII,S$GLB,, | Performed by: PSYCHIATRY & NEUROLOGY

## 2022-10-05 PROCEDURE — 3074F PR MOST RECENT SYSTOLIC BLOOD PRESSURE < 130 MM HG: ICD-10-PCS | Mod: CPTII,S$GLB,, | Performed by: PSYCHIATRY & NEUROLOGY

## 2022-10-05 RX ORDER — GABAPENTIN ENACARBIL 600 MG/1
600 TABLET, EXTENDED RELEASE ORAL NIGHTLY
Qty: 30 TABLET | Refills: 11 | Status: SHIPPED | OUTPATIENT
Start: 2022-10-05 | End: 2023-02-22 | Stop reason: ALTCHOICE

## 2022-10-05 NOTE — PROGRESS NOTES
"      EPWORTH SLEEPINESS SCALE TOTAL SCORE  10/5/2022 10/26/2021 4/14/2021 9/15/2020 10/23/2019 7/9/2019 4/8/2019   Total score 4 5 6 8 6 6 6       Maryann Sorenson is a 81 y.o. female seen today for CPAP and RLS follow up. Last seen on 10/26/2021.    Uisng Dreamstation 2 9-18 cm H2O  with Wisp mask  HAs questions about humidity regulation and wants her ramp off.    Reports still poor control of RLS on high dose Gabapentin (100 mg TID) taken around the clock (also for neck and back pain) and 0.125 mg Mirapex.      The patient reports improved sleep continuity and daytime sleepiness on PAP. ESS today is 8/24.  Denies break through snoring.  No  dry mouth.  No aerophagia or air hunger. Denies occasional mask leaks and discomfort. Using a Wisp mask     7:33  hrs : mins  Avg Usage (Days Used)  33.8  l/min  Avg total leak  9.1  l/min  Avg unintentional leak  32.0  l/min  Median total leak  8.0  l/min  Median unintentional leak  4.2  Avg AHI  1.0  Avg CA Index  1.3  Avg OA index  11.2  cmH?O  Avg 90% CPAP      DME: Dream station  got machine in [facility] - DS2 provided by [employer]      Sleep studies:           EPWORTH SLEEPINESS SCALE TOTAL SCORE  10/5/2022 10/26/2021 4/14/2021 9/15/2020 10/23/2019 7/9/2019 4/8/2019   Total score 4 5 6 8 6 6 6       Medications pertinent to sleep disorders: *Gabapentin and Mirapex  Previously tried medications:    DME: [facility]  SLEEP STUDIES:       PSG 8/25/16: Significant Obstructive sleep apnea (NOE) with AHI (apnea hypopnea Index) of 8.9 and SaO2 of 87% (weight  161 lbs).RDI 15.    DME: [facility]      Using [facility]:       ASSESSMENT:    1. NOE - moderate by RDI. Poor sleep efficiency on the nioght of the study could have led to underestimation of NOE severity.  The patient symptomatically has  excessive daytime sleepiness, snoring,  witnessed breathing pauses, excessive daytime fatigue, gasping for air in sleep and interrupted sleep  with exam findings of "a crowded oral airway and elevated " body mass index. The patient has medical co-morbidities of CAD and hyperlipidemia,  which can be worsened by NOE. Benefiting from CPAP use in terms of sleep continuity and daytime sleepiness. Complaint.           2. Interrupted, nonrefreshing  sleep and residual fatigue despite compliant and effective CPAP use: RLS, neuropathic leg pain stemming for LS -radiculopathy may be playing a role.     3. RLS -   Using Gabapentin 300 mg total per day (divided in 3 doses)  + Mirapex 0.125 mg with variable response.       PLAN:    Continue 9-18 cm H2O; ramp set at 8, instructions and demonstration was provided.   I have demonstrated how to use new DS2 in terms of humidity, ramping and getting the AHI feed back.      Continue  Gabapentin 100 mg - increase the bedtime dose from 100 mg to 200 mg for RLS in addition to Mirapex 0.125 mg.    Will continue Mirapex 0.125 mg once at bedtime  - will refill    Will start [employer] authorization process (will send to [facility] pharmacy)    Continue Magnesium. 250 mg   Continue consuming iron-rich foods and MVT with iron.     ----------------------------------------------------      Education: During our discussion today, we talked about the etiology of obstructive sleep apnea as well as the potential ramifications of untreated sleep apnea, which could include daytime sleepiness, hypertension, heart disease and/or stroke.      We discussed potential treatment options, which could include weight loss, body positioning, continuous positive airway pressure (CPAP), or referral for surgical consideration. The patient preferred CPAP option.     Discussed purpose of PAP therapy, health benefits of CPAP, as well as the potential ramifications of untreated sleep apnea, which could include daytime sleepiness, hypertension, heart disease and/or stroke. An AHI of 15 is associated with increased risk CVD.     The patient should avoid ETOH and sedatives at night, as it tends to aggravate NOE. Regular  replacement of CPAP mask, tubing and filter was recommended.    Precautions: The patient was advised to abstain from driving should he feel sleepy or drowsy.    Follow up: MD in 6 months    Thank you for allowing me the opportunity to participate in the care of your patient.

## 2022-10-06 ENCOUNTER — TELEPHONE (OUTPATIENT)
Dept: PHARMACY | Facility: CLINIC | Age: 82
End: 2022-10-06
Payer: MEDICARE

## 2022-10-07 ENCOUNTER — PATIENT MESSAGE (OUTPATIENT)
Dept: SLEEP MEDICINE | Facility: CLINIC | Age: 82
End: 2022-10-07
Payer: MEDICARE

## 2022-10-07 DIAGNOSIS — G25.81 RLS (RESTLESS LEGS SYNDROME): ICD-10-CM

## 2022-10-07 RX ORDER — PRAMIPEXOLE DIHYDROCHLORIDE 0.12 MG/1
TABLET ORAL
Qty: 180 TABLET | Refills: 3 | Status: SHIPPED | OUTPATIENT
Start: 2022-10-07 | End: 2022-10-09 | Stop reason: SDUPTHER

## 2022-10-09 DIAGNOSIS — G25.81 RLS (RESTLESS LEGS SYNDROME): ICD-10-CM

## 2022-10-09 RX ORDER — PRAMIPEXOLE DIHYDROCHLORIDE 0.12 MG/1
TABLET ORAL
Qty: 180 TABLET | Refills: 3 | Status: SHIPPED | OUTPATIENT
Start: 2022-10-09 | End: 2023-10-09

## 2022-10-12 ENCOUNTER — PATIENT MESSAGE (OUTPATIENT)
Dept: SLEEP MEDICINE | Facility: CLINIC | Age: 82
End: 2022-10-12
Payer: MEDICARE

## 2022-10-13 ENCOUNTER — TELEPHONE (OUTPATIENT)
Dept: PHARMACY | Facility: CLINIC | Age: 82
End: 2022-10-13
Payer: MEDICARE

## 2022-10-13 ENCOUNTER — PATIENT MESSAGE (OUTPATIENT)
Dept: SLEEP MEDICINE | Facility: CLINIC | Age: 82
End: 2022-10-13
Payer: MEDICARE

## 2022-10-17 ENCOUNTER — LAB VISIT (OUTPATIENT)
Dept: LAB | Facility: HOSPITAL | Age: 82
End: 2022-10-17
Attending: INTERNAL MEDICINE
Payer: MEDICARE

## 2022-10-17 DIAGNOSIS — E78.00 HYPERCHOLESTEROLEMIA: ICD-10-CM

## 2022-10-17 DIAGNOSIS — I10 ESSENTIAL HYPERTENSION: ICD-10-CM

## 2022-10-17 LAB
ALBUMIN SERPL BCP-MCNC: 4.3 G/DL (ref 3.5–5.2)
ALP SERPL-CCNC: 94 U/L (ref 38–126)
ALT SERPL W/O P-5'-P-CCNC: 19 U/L (ref 10–44)
ANION GAP SERPL CALC-SCNC: 9 MMOL/L (ref 8–16)
AST SERPL-CCNC: 30 U/L (ref 15–46)
BILIRUB SERPL-MCNC: 0.2 MG/DL (ref 0.1–1)
CALCIUM SERPL-MCNC: 9 MG/DL (ref 8.7–10.5)
CHLORIDE SERPL-SCNC: 103 MMOL/L (ref 95–110)
CHOLEST SERPL-MCNC: 144 MG/DL (ref 120–199)
CHOLEST/HDLC SERPL: 2.3 {RATIO} (ref 2–5)
CO2 SERPL-SCNC: 28 MMOL/L (ref 23–29)
CREAT SERPL-MCNC: 0.69 MG/DL (ref 0.5–1.4)
EST. GFR  (NO RACE VARIABLE): >60 ML/MIN/1.73 M^2
GLUCOSE SERPL-MCNC: 102 MG/DL (ref 70–110)
HDLC SERPL-MCNC: 62 MG/DL (ref 40–75)
HDLC SERPL: 43.1 % (ref 20–50)
LDLC SERPL CALC-MCNC: 68.8 MG/DL (ref 63–159)
NONHDLC SERPL-MCNC: 82 MG/DL
POTASSIUM SERPL-SCNC: 4.1 MMOL/L (ref 3.5–5.1)
PROT SERPL-MCNC: 6.7 G/DL (ref 6–8.4)
SODIUM SERPL-SCNC: 140 MMOL/L (ref 136–145)
TRIGL SERPL-MCNC: 66 MG/DL (ref 30–150)
TSH SERPL DL<=0.005 MIU/L-ACNC: 2.68 UIU/ML (ref 0.4–4)
UUN UR-MCNC: 13 MG/DL (ref 7–17)

## 2022-10-17 PROCEDURE — 36415 COLL VENOUS BLD VENIPUNCTURE: CPT | Mod: PO | Performed by: INTERNAL MEDICINE

## 2022-10-17 PROCEDURE — 80053 COMPREHEN METABOLIC PANEL: CPT | Mod: PO | Performed by: INTERNAL MEDICINE

## 2022-10-17 PROCEDURE — 84443 ASSAY THYROID STIM HORMONE: CPT | Mod: PO | Performed by: INTERNAL MEDICINE

## 2022-10-17 PROCEDURE — 80061 LIPID PANEL: CPT | Performed by: INTERNAL MEDICINE

## 2022-10-18 ENCOUNTER — TELEPHONE (OUTPATIENT)
Dept: CARDIOLOGY | Facility: CLINIC | Age: 82
End: 2022-10-18
Payer: MEDICARE

## 2022-10-18 NOTE — TELEPHONE ENCOUNTER
----- Message from Heydi Gavin MD sent at 10/18/2022 11:06 AM CDT -----  Please inform the patient that her blood work was fine.

## 2022-10-20 ENCOUNTER — HOSPITAL ENCOUNTER (OUTPATIENT)
Dept: RADIOLOGY | Facility: HOSPITAL | Age: 82
Discharge: HOME | End: 2022-10-20
Attending: OBSTETRICS & GYNECOLOGY
Payer: MEDICARE

## 2022-10-20 DIAGNOSIS — Z12.31 VISIT FOR SCREENING MAMMOGRAM: ICD-10-CM

## 2022-10-20 PROCEDURE — 77063 BREAST TOMOSYNTHESIS BI: CPT | Mod: TC,PO

## 2022-10-20 PROCEDURE — 77067 SCR MAMMO BI INCL CAD: CPT | Mod: TC,PO

## 2022-10-24 ENCOUNTER — OFFICE VISIT (OUTPATIENT)
Dept: CARDIOLOGY | Facility: CLINIC | Age: 82
End: 2022-10-24
Payer: MEDICARE

## 2022-10-24 VITALS
SYSTOLIC BLOOD PRESSURE: 143 MMHG | HEIGHT: 63 IN | BODY MASS INDEX: 30.74 KG/M2 | DIASTOLIC BLOOD PRESSURE: 69 MMHG | WEIGHT: 173.5 LBS | HEART RATE: 67 BPM

## 2022-10-24 DIAGNOSIS — E78.00 HYPERCHOLESTEROLEMIA: ICD-10-CM

## 2022-10-24 DIAGNOSIS — R53.83 FATIGUE, UNSPECIFIED TYPE: ICD-10-CM

## 2022-10-24 DIAGNOSIS — R93.1 AGATSTON CORONARY ARTERY CALCIUM SCORE GREATER THAN 400: Primary | ICD-10-CM

## 2022-10-24 DIAGNOSIS — G47.33 OSA (OBSTRUCTIVE SLEEP APNEA): ICD-10-CM

## 2022-10-24 DIAGNOSIS — I25.10 ATHEROSCLEROSIS OF NATIVE CORONARY ARTERY OF NATIVE HEART WITHOUT ANGINA PECTORIS: ICD-10-CM

## 2022-10-24 DIAGNOSIS — G25.81 RESTLESS LEGS: ICD-10-CM

## 2022-10-24 DIAGNOSIS — I65.23 CAROTID STENOSIS, BILATERAL: ICD-10-CM

## 2022-10-24 DIAGNOSIS — R00.2 HEART PALPITATIONS: ICD-10-CM

## 2022-10-24 DIAGNOSIS — I27.9 CHRONIC PULMONARY HEART DISEASE: ICD-10-CM

## 2022-10-24 DIAGNOSIS — I10 ESSENTIAL HYPERTENSION: ICD-10-CM

## 2022-10-24 DIAGNOSIS — R73.03 PRE-DIABETES: ICD-10-CM

## 2022-10-24 DIAGNOSIS — I48.0 PAROXYSMAL ATRIAL FIBRILLATION: ICD-10-CM

## 2022-10-24 PROCEDURE — 1101F PR PT FALLS ASSESS DOC 0-1 FALLS W/OUT INJ PAST YR: ICD-10-PCS | Mod: CPTII,S$GLB,, | Performed by: INTERNAL MEDICINE

## 2022-10-24 PROCEDURE — 1126F AMNT PAIN NOTED NONE PRSNT: CPT | Mod: CPTII,S$GLB,, | Performed by: INTERNAL MEDICINE

## 2022-10-24 PROCEDURE — 1159F PR MEDICATION LIST DOCUMENTED IN MEDICAL RECORD: ICD-10-PCS | Mod: CPTII,S$GLB,, | Performed by: INTERNAL MEDICINE

## 2022-10-24 PROCEDURE — 99999 PR PBB SHADOW E&M-EST. PATIENT-LVL IV: CPT | Mod: PBBFAC,,, | Performed by: INTERNAL MEDICINE

## 2022-10-24 PROCEDURE — 1159F MED LIST DOCD IN RCRD: CPT | Mod: CPTII,S$GLB,, | Performed by: INTERNAL MEDICINE

## 2022-10-24 PROCEDURE — 3288F PR FALLS RISK ASSESSMENT DOCUMENTED: ICD-10-PCS | Mod: CPTII,S$GLB,, | Performed by: INTERNAL MEDICINE

## 2022-10-24 PROCEDURE — 1101F PT FALLS ASSESS-DOCD LE1/YR: CPT | Mod: CPTII,S$GLB,, | Performed by: INTERNAL MEDICINE

## 2022-10-24 PROCEDURE — 3077F SYST BP >= 140 MM HG: CPT | Mod: CPTII,S$GLB,, | Performed by: INTERNAL MEDICINE

## 2022-10-24 PROCEDURE — 1160F PR REVIEW ALL MEDS BY PRESCRIBER/CLIN PHARMACIST DOCUMENTED: ICD-10-PCS | Mod: CPTII,S$GLB,, | Performed by: INTERNAL MEDICINE

## 2022-10-24 PROCEDURE — 99999 PR PBB SHADOW E&M-EST. PATIENT-LVL IV: ICD-10-PCS | Mod: PBBFAC,,, | Performed by: INTERNAL MEDICINE

## 2022-10-24 PROCEDURE — 99214 OFFICE O/P EST MOD 30 MIN: CPT | Mod: S$GLB,,, | Performed by: INTERNAL MEDICINE

## 2022-10-24 PROCEDURE — 3077F PR MOST RECENT SYSTOLIC BLOOD PRESSURE >= 140 MM HG: ICD-10-PCS | Mod: CPTII,S$GLB,, | Performed by: INTERNAL MEDICINE

## 2022-10-24 PROCEDURE — 3078F PR MOST RECENT DIASTOLIC BLOOD PRESSURE < 80 MM HG: ICD-10-PCS | Mod: CPTII,S$GLB,, | Performed by: INTERNAL MEDICINE

## 2022-10-24 PROCEDURE — 3288F FALL RISK ASSESSMENT DOCD: CPT | Mod: CPTII,S$GLB,, | Performed by: INTERNAL MEDICINE

## 2022-10-24 PROCEDURE — 99214 PR OFFICE/OUTPT VISIT, EST, LEVL IV, 30-39 MIN: ICD-10-PCS | Mod: S$GLB,,, | Performed by: INTERNAL MEDICINE

## 2022-10-24 PROCEDURE — 1160F RVW MEDS BY RX/DR IN RCRD: CPT | Mod: CPTII,S$GLB,, | Performed by: INTERNAL MEDICINE

## 2022-10-24 PROCEDURE — 3078F DIAST BP <80 MM HG: CPT | Mod: CPTII,S$GLB,, | Performed by: INTERNAL MEDICINE

## 2022-10-24 PROCEDURE — 1126F PR PAIN SEVERITY QUANTIFIED, NO PAIN PRESENT: ICD-10-PCS | Mod: CPTII,S$GLB,, | Performed by: INTERNAL MEDICINE

## 2022-10-24 NOTE — PROGRESS NOTES
Subjective:   Patient ID:  Maryann Sorenson is a 81 y.o. female who presents for follow-up of Shortness of Breath      HPI: Patient started walking exercises and walks 15 min a day. Overall doing well, but has complaints of weakness and fatigue.  Also reports 1 episode of sharp chest pain across left sided rib cage to the back. IT lasted several minutes and resolved spontaneously. Patient is concerned that it might have been related to her coronary atherosclerosis. She is also concerned about status of carotid plaques.  BP is elevated today, but home record indicates good BP control ( 100-120 systolic).  She seems to tolerate Jardiance well. In the past did not tolerate Aldactone due to increased potassium level.    Lab Results   Component Value Date     10/17/2022    K 4.1 10/17/2022     10/17/2022    CO2 28 10/17/2022    BUN 13 10/17/2022    CREATININE 0.69 10/17/2022     10/17/2022    HGBA1C 6.1 (H) 08/22/2022    MG 2.2 09/05/2021    AST 30 10/17/2022    ALT 19 10/17/2022    ALBUMIN 4.3 10/17/2022    PROT 6.7 10/17/2022    BILITOT 0.2 10/17/2022    WBC 4.00 04/26/2022    HGB 11.9 (L) 04/26/2022    HCT 37.8 04/26/2022    MCV 89 04/26/2022     04/26/2022    TSH 2.680 10/17/2022    CHOL 144 10/17/2022    HDL 62 10/17/2022    LDLCALC 68.8 10/17/2022    TRIG 66 10/17/2022       No results found in the last 24 hours.     Review of Systems   Constitutional: Positive for malaise/fatigue.   HENT:  Positive for hearing loss.    Eyes: Negative.    Cardiovascular:  Positive for chest pain, dyspnea on exertion and irregular heartbeat. Negative for claudication, leg swelling, near-syncope, palpitations and syncope.   Respiratory: Negative.  Negative for cough, shortness of breath, snoring and wheezing.    Endocrine: Negative.  Negative for cold intolerance, heat intolerance, polydipsia, polyphagia and polyuria.   Skin: Negative.    Musculoskeletal:  Positive for arthritis, back pain, joint pain, muscle  "cramps and neck pain.   Gastrointestinal:  Positive for heartburn.   Genitourinary: Negative.    Neurological:  Positive for excessive daytime sleepiness, dizziness, headaches and light-headedness.   Psychiatric/Behavioral: Negative.       Objective:   Physical Exam  Vitals and nursing note reviewed.   Constitutional:       Appearance: Normal appearance. She is well-developed.      Comments: BP (!) 143/69   Pulse 67   Ht 5' 3" (1.6 m)   Wt 78.7 kg (173 lb 8 oz)   LMP  (LMP Unknown)   BMI 30.73 kg/m²      HENT:      Head: Normocephalic.   Eyes:      Pupils: Pupils are equal, round, and reactive to light.   Neck:      Thyroid: No thyromegaly.      Vascular: No carotid bruit.   Cardiovascular:      Rate and Rhythm: Normal rate and regular rhythm.      Pulses: Intact distal pulses.           Carotid pulses are 2+ on the right side and 2+ on the left side.       Radial pulses are 2+ on the right side and 2+ on the left side.        Femoral pulses are 2+ on the right side and 2+ on the left side.       Popliteal pulses are 2+ on the right side and 2+ on the left side.        Dorsalis pedis pulses are 2+ on the right side and 2+ on the left side.        Posterior tibial pulses are 2+ on the right side and 2+ on the left side.      Heart sounds: Normal heart sounds. No murmur heard.    No friction rub. No gallop.   Pulmonary:      Effort: Pulmonary effort is normal. No respiratory distress.      Breath sounds: Normal breath sounds. No wheezing or rales.   Chest:      Chest wall: No tenderness.   Abdominal:      Palpations: Abdomen is soft.   Musculoskeletal:         General: Normal range of motion.      Cervical back: Normal range of motion and neck supple.      Right lower leg: Edema present.      Left lower leg: Edema present.      Comments: Trace bilateral edema   Skin:     General: Skin is warm and dry.   Neurological:      Mental Status: She is alert and oriented to person, place, and time.         Assessment and " Plan:     Problem List Items Addressed This Visit          Cardiology Problems    Hypercholesterolemia    Agatston coronary artery calcium score greater than 400 - 577 - Primary    Relevant Orders    Nuclear Stress - Cardiology Interpreted    CV Ultrasound Bilateral Doppler Carotid    Essential hypertension    Atherosclerosis of native coronary artery of native heart without angina pectoris    Relevant Orders    Nuclear Stress - Cardiology Interpreted    CV Ultrasound Bilateral Doppler Carotid    Carotid stenosis, bilateral    Relevant Orders    Nuclear Stress - Cardiology Interpreted    CV Ultrasound Bilateral Doppler Carotid    Chronic pulmonary heart disease    Paroxysmal atrial fibrillation       Other    NOE (obstructive sleep apnea)    Restless legs    Fatigue    Heart palpitations    Pre-diabetes       Patient's Medications   New Prescriptions    No medications on file   Previous Medications    AMLODIPINE (NORVASC) 5 MG TABLET    Take 1 tablet (5 mg total) by mouth once daily.    APIXABAN (ELIQUIS) 5 MG TAB    Take 1 tablet (5 mg total) by mouth 2 (two) times daily.    ASCORBATE CALCIUM-BIOFLAVONOID (FRANCISCO-C WITH BIOFLAVONOIDS) 500-200 MG TAB    Take by mouth once daily.    CALCIUM CARBONATE-VITAMIN D3 600 MG (1,500 MG)-800 UNIT CHEW    Take 1 tablet by mouth once.    CHOLECALCIFEROL, VITAMIN D3, (VITAMIN D3) 50 MCG (2,000 UNIT) TAB    Take by mouth once daily.    COENZYME Q10 200 MG CAPSULE    Take 1 capsule by mouth Daily. 1 Capsule Oral Every day    DIAZEPAM (VALIUM) 2 MG TABLET    Take one tablet when needed for vertigo, may repeat in one hour if symptoms persist    EMPAGLIFLOZIN (JARDIANCE) 10 MG TABLET    Take 1 tablet (10 mg total) by mouth once daily.    ESOMEPRAZOLE (NEXIUM) 40 MG CAPSULE    1 Capsule, Delayed Release(E.C.) Oral Every day    FEXOFENADINE (ALLEGRA) 180 MG TABLET    Take 1 tablet by mouth Daily. 1 Tablet Oral Every day    FLUTICASONE PROPIONATE (FLONASE) 50 MCG/ACTUATION NASAL SPRAY     1 spray each nostril twice a day; total 48 g - please provide 3 months supply.    GABAPENTIN ENACARBIL (HORIZANT) 600 MG TBSR    Take 1 tablet (600 mg) by mouth every evening.    GUAIFENESIN (MUCINEX) 600 MG 12 HR TABLET    Take 1,200 mg by mouth as needed.     LACTOBACILLUS RHAMNOSUS GG (PROBIOTIC ORAL)    Take 1 capsule by mouth Daily. 1 Capsule Oral Every day    LOSARTAN (COZAAR) 50 MG TABLET    Take 1 tablet (50 mg total) by mouth once daily.    LUTEIN 20 MG CAP    Take 20 mg by mouth every other day.    MAGNESIUM 250 MG TAB    Take 250 mg by mouth once daily.     METOPROLOL SUCCINATE (TOPROL-XL) 25 MG 24 HR TABLET    Pt to take 1/2 pill once a day per Dr. Gavin on 4/27/22.    MUPIROCIN (BACTROBAN) 2 % OINTMENT    Apply topically 3 (three) times daily.    PRAMIPEXOLE (MIRAPEX) 0.125 MG TABLET    Pt taking one pill daily.    ROSUVASTATIN (CRESTOR) 40 MG TAB    Take 1 tablet (40 mg total) by mouth once daily.    SILVER SULFADIAZINE 1% (SILVADENE) 1 % CREAM    Apply topically 2 (two) times daily.    ZINC ORAL    Take 50 mg by mouth once daily.    Modified Medications    No medications on file   Discontinued Medications    GABAPENTIN (NEURONTIN) 100 MG CAPSULE    1 pill PO four times a day as needed for shooting leg pain. Please provide 90 days  supply.     Review ordered tests and advise.  Primary prevention, weight loss, exercise and the same medical regimen encouraged.  Follow up in about 1 year (around 10/24/2023).

## 2022-10-31 ENCOUNTER — TELEPHONE (OUTPATIENT)
Dept: CARDIOLOGY | Facility: HOSPITAL | Age: 82
End: 2022-10-31
Payer: MEDICARE

## 2022-11-02 ENCOUNTER — HOSPITAL ENCOUNTER (OUTPATIENT)
Dept: CARDIOLOGY | Facility: HOSPITAL | Age: 82
Discharge: HOME | End: 2022-11-02
Attending: INTERNAL MEDICINE
Payer: MEDICARE

## 2022-11-02 ENCOUNTER — TELEPHONE (OUTPATIENT)
Dept: CARDIOLOGY | Facility: CLINIC | Age: 82
End: 2022-11-02
Payer: MEDICARE

## 2022-11-02 VITALS
HEIGHT: 63 IN | BODY MASS INDEX: 30.65 KG/M2 | SYSTOLIC BLOOD PRESSURE: 148 MMHG | HEART RATE: 56 BPM | DIASTOLIC BLOOD PRESSURE: 94 MMHG | WEIGHT: 173 LBS

## 2022-11-02 DIAGNOSIS — I25.10 ATHEROSCLEROSIS OF NATIVE CORONARY ARTERY OF NATIVE HEART WITHOUT ANGINA PECTORIS: ICD-10-CM

## 2022-11-02 DIAGNOSIS — R93.1 AGATSTON CORONARY ARTERY CALCIUM SCORE GREATER THAN 400: ICD-10-CM

## 2022-11-02 DIAGNOSIS — I65.23 CAROTID STENOSIS, BILATERAL: ICD-10-CM

## 2022-11-02 LAB
CV PHARM DOSE: 0.4 MG
CV STRESS BASE HR: 56 BPM
DIASTOLIC BLOOD PRESSURE: 94 MMHG
EJECTION FRACTION- HIGH: 65 %
END DIASTOLIC INDEX-HIGH: 153 ML/M2
END DIASTOLIC INDEX-LOW: 93 ML/M2
END SYSTOLIC INDEX-HIGH: 71 ML/M2
END SYSTOLIC INDEX-LOW: 31 ML/M2
LEFT ARM DIASTOLIC BLOOD PRESSURE: 73 MMHG
LEFT ARM SYSTOLIC BLOOD PRESSURE: 130 MMHG
LEFT CBA DIAS: 14 CM/S
LEFT CBA SYS: 47 CM/S
LEFT CCA DIST DIAS: 13 CM/S
LEFT CCA DIST SYS: 53 CM/S
LEFT CCA MID DIAS: 17 CM/S
LEFT CCA MID SYS: 68 CM/S
LEFT CCA PROX DIAS: 18 CM/S
LEFT CCA PROX SYS: 75 CM/S
LEFT ECA DIAS: 8 CM/S
LEFT ECA SYS: 59 CM/S
LEFT ICA DIST DIAS: 15 CM/S
LEFT ICA DIST SYS: 72 CM/S
LEFT ICA MID DIAS: 28 CM/S
LEFT ICA MID SYS: 83 CM/S
LEFT ICA PROX DIAS: 15 CM/S
LEFT ICA PROX SYS: 55 CM/S
LEFT VERTEBRAL DIAS: 0 CM/S
LEFT VERTEBRAL SYS: 23 CM/S
OHS CV CAROTID RIGHT ICA EDV HIGHEST: 24
OHS CV CAROTID ULTRASOUND LEFT ICA/CCA RATIO: 1.57
OHS CV CAROTID ULTRASOUND RIGHT ICA/CCA RATIO: 1.39
OHS CV CPX 1 MINUTE RECOVERY HEART RATE: 83 BPM
OHS CV CPX 85 PERCENT MAX PREDICTED HEART RATE MALE: 116
OHS CV CPX MAX PREDICTED HEART RATE: 136
OHS CV CPX PATIENT IS FEMALE: 1
OHS CV CPX PATIENT IS MALE: 0
OHS CV CPX PEAK DIASTOLIC BLOOD PRESSURE: 91 MMHG
OHS CV CPX PEAK HEAR RATE: 55 BPM
OHS CV CPX PEAK RATE PRESSURE PRODUCT: 8525
OHS CV CPX PEAK SYSTOLIC BLOOD PRESSURE: 155 MMHG
OHS CV CPX PERCENT MAX PREDICTED HEART RATE ACHIEVED: 40
OHS CV CPX RATE PRESSURE PRODUCT PRESENTING: 8288
OHS CV PV CAROTID LEFT HIGHEST CCA: 75
OHS CV PV CAROTID LEFT HIGHEST ICA: 83
OHS CV PV CAROTID RIGHT HIGHEST CCA: 59
OHS CV PV CAROTID RIGHT HIGHEST ICA: 79
OHS CV US CAROTID LEFT HIGHEST EDV: 28
RETIRED EF AND QEF - SEE NOTES: 53 %
RIGHT ARM DIASTOLIC BLOOD PRESSURE: 78 MMHG
RIGHT ARM SYSTOLIC BLOOD PRESSURE: 132 MMHG
RIGHT CBA DIAS: 14 CM/S
RIGHT CBA SYS: 50 CM/S
RIGHT CCA DIST DIAS: 19 CM/S
RIGHT CCA DIST SYS: 57 CM/S
RIGHT CCA MID DIAS: 14 CM/S
RIGHT CCA MID SYS: 57 CM/S
RIGHT CCA PROX DIAS: 15 CM/S
RIGHT CCA PROX SYS: 59 CM/S
RIGHT ECA DIAS: 9 CM/S
RIGHT ECA SYS: 80 CM/S
RIGHT ICA DIST DIAS: 24 CM/S
RIGHT ICA DIST SYS: 75 CM/S
RIGHT ICA MID DIAS: 24 CM/S
RIGHT ICA MID SYS: 79 CM/S
RIGHT ICA PROX DIAS: 15 CM/S
RIGHT ICA PROX SYS: 63 CM/S
RIGHT VERTEBRAL DIAS: 11 CM/S
RIGHT VERTEBRAL SYS: 54 CM/S
SYSTOLIC BLOOD PRESSURE: 148 MMHG

## 2022-11-02 PROCEDURE — A9502 TC99M TETROFOSMIN: HCPCS

## 2022-11-02 PROCEDURE — 93880 EXTRACRANIAL BILAT STUDY: CPT | Mod: 26,,, | Performed by: INTERNAL MEDICINE

## 2022-11-02 PROCEDURE — 93018 CV STRESS TEST I&R ONLY: CPT | Mod: ,,, | Performed by: INTERNAL MEDICINE

## 2022-11-02 PROCEDURE — 93880 CV US DOPPLER CAROTID (CUPID ONLY): ICD-10-PCS | Mod: 26,,, | Performed by: INTERNAL MEDICINE

## 2022-11-02 PROCEDURE — 63600175 PHARM REV CODE 636 W HCPCS: Performed by: INTERNAL MEDICINE

## 2022-11-02 PROCEDURE — 93880 EXTRACRANIAL BILAT STUDY: CPT

## 2022-11-02 PROCEDURE — 78452 HT MUSCLE IMAGE SPECT MULT: CPT | Mod: 26,,, | Performed by: INTERNAL MEDICINE

## 2022-11-02 PROCEDURE — 93016 NUCLEAR STRESS - CARDIOLOGY INTERPRETED (CUPID ONLY): ICD-10-PCS | Mod: ,,, | Performed by: INTERNAL MEDICINE

## 2022-11-02 PROCEDURE — 93018 NUCLEAR STRESS - CARDIOLOGY INTERPRETED (CUPID ONLY): ICD-10-PCS | Mod: ,,, | Performed by: INTERNAL MEDICINE

## 2022-11-02 PROCEDURE — 78452 NUCLEAR STRESS - CARDIOLOGY INTERPRETED (CUPID ONLY): ICD-10-PCS | Mod: 26,,, | Performed by: INTERNAL MEDICINE

## 2022-11-02 PROCEDURE — 93016 CV STRESS TEST SUPVJ ONLY: CPT | Mod: ,,, | Performed by: INTERNAL MEDICINE

## 2022-11-02 RX ORDER — REGADENOSON 0.08 MG/ML
0.4 INJECTION, SOLUTION INTRAVENOUS ONCE
Status: COMPLETED | OUTPATIENT
Start: 2022-11-02 | End: 2022-11-02

## 2022-11-02 RX ORDER — CAFFEINE CITRATE 20 MG/ML
60 SOLUTION INTRAVENOUS
Status: COMPLETED | OUTPATIENT
Start: 2022-11-02 | End: 2022-11-02

## 2022-11-02 RX ADMIN — CAFFEINE CITRATE 60 MG: 20 INJECTION INTRAVENOUS at 09:11

## 2022-11-02 RX ADMIN — REGADENOSON 0.4 MG: 0.08 INJECTION, SOLUTION INTRAVENOUS at 09:11

## 2022-11-02 NOTE — PROGRESS NOTES
Please inform the patient that her nuclear stress test was negative for ischemia and the heart function was normal.  Good news.

## 2022-11-02 NOTE — PROGRESS NOTES
Please inform the patient that her carotid duplex shows minimal plaques on the right and mild on the left. She is on good medical therapy. We will repeat it in couple of years.

## 2022-11-02 NOTE — TELEPHONE ENCOUNTER
----- Message from Heydi Gavin MD sent at 11/2/2022  1:26 PM CDT -----  Please inform the patient that her nuclear stress test was negative for ischemia and the heart function was normal.  Good news.

## 2022-11-04 ENCOUNTER — TELEPHONE (OUTPATIENT)
Dept: CARDIOLOGY | Facility: CLINIC | Age: 82
End: 2022-11-04
Payer: MEDICARE

## 2022-11-04 NOTE — TELEPHONE ENCOUNTER
----- Message from Sweetie Mosquera MA sent at 11/2/2022  4:52 PM CDT -----  MD ELZA Thompson Staff  Please inform the patient that her carotid duplex shows minimal plaques on the right and mild on the left. She is on good medical therapy. We will repeat it in couple of years.

## 2022-12-05 ENCOUNTER — PATIENT MESSAGE (OUTPATIENT)
Dept: INTERNAL MEDICINE | Facility: CLINIC | Age: 82
End: 2022-12-05
Payer: MEDICARE

## 2022-12-09 ENCOUNTER — OFFICE VISIT (OUTPATIENT)
Dept: OBSTETRICS AND GYNECOLOGY | Facility: CLINIC | Age: 82
End: 2022-12-09
Payer: MEDICARE

## 2022-12-09 VITALS
WEIGHT: 172.94 LBS | BODY MASS INDEX: 30.64 KG/M2 | SYSTOLIC BLOOD PRESSURE: 132 MMHG | DIASTOLIC BLOOD PRESSURE: 78 MMHG

## 2022-12-09 DIAGNOSIS — Z01.419 WELL WOMAN EXAM WITH ROUTINE GYNECOLOGICAL EXAM: Primary | ICD-10-CM

## 2022-12-09 PROCEDURE — 88175 CYTOPATH C/V AUTO FLUID REDO: CPT | Performed by: OBSTETRICS & GYNECOLOGY

## 2022-12-09 PROCEDURE — 1160F PR REVIEW ALL MEDS BY PRESCRIBER/CLIN PHARMACIST DOCUMENTED: ICD-10-PCS | Mod: CPTII,S$GLB,, | Performed by: OBSTETRICS & GYNECOLOGY

## 2022-12-09 PROCEDURE — 3075F SYST BP GE 130 - 139MM HG: CPT | Mod: CPTII,S$GLB,, | Performed by: OBSTETRICS & GYNECOLOGY

## 2022-12-09 PROCEDURE — G0101 PR CA SCREEN;PELVIC/BREAST EXAM: ICD-10-PCS | Mod: S$GLB,,, | Performed by: OBSTETRICS & GYNECOLOGY

## 2022-12-09 PROCEDURE — 99999 PR PBB SHADOW E&M-EST. PATIENT-LVL III: CPT | Mod: PBBFAC,,, | Performed by: OBSTETRICS & GYNECOLOGY

## 2022-12-09 PROCEDURE — 1126F PR PAIN SEVERITY QUANTIFIED, NO PAIN PRESENT: ICD-10-PCS | Mod: CPTII,S$GLB,, | Performed by: OBSTETRICS & GYNECOLOGY

## 2022-12-09 PROCEDURE — 99999 PR PBB SHADOW E&M-EST. PATIENT-LVL III: ICD-10-PCS | Mod: PBBFAC,,, | Performed by: OBSTETRICS & GYNECOLOGY

## 2022-12-09 PROCEDURE — 1159F PR MEDICATION LIST DOCUMENTED IN MEDICAL RECORD: ICD-10-PCS | Mod: CPTII,S$GLB,, | Performed by: OBSTETRICS & GYNECOLOGY

## 2022-12-09 PROCEDURE — 3078F DIAST BP <80 MM HG: CPT | Mod: CPTII,S$GLB,, | Performed by: OBSTETRICS & GYNECOLOGY

## 2022-12-09 PROCEDURE — G0101 CA SCREEN;PELVIC/BREAST EXAM: HCPCS | Mod: S$GLB,,, | Performed by: OBSTETRICS & GYNECOLOGY

## 2022-12-09 PROCEDURE — 1126F AMNT PAIN NOTED NONE PRSNT: CPT | Mod: CPTII,S$GLB,, | Performed by: OBSTETRICS & GYNECOLOGY

## 2022-12-09 PROCEDURE — 1160F RVW MEDS BY RX/DR IN RCRD: CPT | Mod: CPTII,S$GLB,, | Performed by: OBSTETRICS & GYNECOLOGY

## 2022-12-09 PROCEDURE — 3075F PR MOST RECENT SYSTOLIC BLOOD PRESS GE 130-139MM HG: ICD-10-PCS | Mod: CPTII,S$GLB,, | Performed by: OBSTETRICS & GYNECOLOGY

## 2022-12-09 PROCEDURE — 1159F MED LIST DOCD IN RCRD: CPT | Mod: CPTII,S$GLB,, | Performed by: OBSTETRICS & GYNECOLOGY

## 2022-12-09 PROCEDURE — 3078F PR MOST RECENT DIASTOLIC BLOOD PRESSURE < 80 MM HG: ICD-10-PCS | Mod: CPTII,S$GLB,, | Performed by: OBSTETRICS & GYNECOLOGY

## 2022-12-09 NOTE — PROGRESS NOTES
HPI:   81 y.o.   OB History          6    Para   5    Term   5            AB   1    Living             SAB        IAB        Ectopic        Multiple        Live Births                  No LMP recorded (lmp unknown). Patient is postmenopausal.    Patient is  here for her annual gynecologic exam.  She has no complaints.     ROS:  GENERAL: No fever, chills, fatigability or weight loss.  SKIN: No rashes, itching or changes in color or texture of skin.  HEAD: No headaches or recent head trauma.  EYES: Visual acuity fine. No photophobia, ocular pain or diplopia.  EARS: Denies ear pain, discharge or vertigo.  NOSE: No loss of smell, no epistaxis or postnasal drip.  MOUTH & THROAT: No hoarseness or change in voice. No excessive gum bleeding.  NODES: Denies swollen glands.  CHEST: Denies AUGUSTE, cyanosis, wheezing, cough and sputum production.  CARDIOVASCULAR: Denies chest pain, PND, orthopnea or reduced exercise tolerance.  ABDOMEN: Appetite fine. No weight loss. Denies diarrhea, abdominal pain, hematemesis or blood in stool.  URINARY: No flank pain, dysuria or hematuria.  PERIPHERAL VASCULAR: No claudication or cyanosis.  MUSCULOSKELETAL: No joint stiffness or swelling. Denies back pain.  NEUROLOGIC: No history of seizures, paralysis, alteration of gait or coordination.    PE:   /78   Wt 78.4 kg (172 lb 15.2 oz)   LMP  (LMP Unknown)   BMI 30.64 kg/m²   APPEARANCE: Well nourished, well developed, in no acute distress.  NECK: Neck symmetric without masses or thyromegaly.  BREASTS: Symmetrical, no skin changes or visible lesions. No palpable masses, nipple discharge or adenopathy bilaterally.  ABDOMEN: Flat. Soft. No tenderness or masses. No hepatosplenomegaly. No hernias. No CVA tenderness.  VULVA: No lesions. Normal female genitalia.  URETHRAL MEATUS: Normal size and location, no lesions, no prolapse.  URETHRA: No masses, tenderness, prolapse or scarring.  VAGINA: Moist and well rugated, no discharge, no  significant cystocele or rectocele.  CERVIX: No lesions and discharge. PAP done.  UTERUS: Normal size, regular shape, mobile, non-tender, bladder base nontender.  ADNEXA: No masses, tenderness or CDS nodularity.  ANUS PERINEUM: Normal.    PROCEDURES:  Pap smear    Assessment:  Normal Gynecologic Exam    Plan:  Mammogram and Colonoscopy if indicated by current recommendations.  Return to clinic in one year or for any problems or complaints.  No gyn co

## 2022-12-14 ENCOUNTER — PATIENT MESSAGE (OUTPATIENT)
Dept: SLEEP MEDICINE | Facility: CLINIC | Age: 82
End: 2022-12-14
Payer: MEDICARE

## 2023-01-04 ENCOUNTER — PES CALL (OUTPATIENT)
Dept: ADMINISTRATIVE | Facility: CLINIC | Age: 83
End: 2023-01-04
Payer: MEDICARE

## 2023-01-24 DIAGNOSIS — N13.30 HYDRONEPHROSIS: Primary | ICD-10-CM

## 2023-01-25 ENCOUNTER — HOSPITAL ENCOUNTER (OUTPATIENT)
Dept: RADIOLOGY | Facility: HOSPITAL | Age: 83
Discharge: HOME | End: 2023-01-25
Attending: FAMILY MEDICINE
Payer: MEDICARE

## 2023-01-25 DIAGNOSIS — N13.30 HYDRONEPHROSIS: ICD-10-CM

## 2023-01-25 PROCEDURE — 76700 US EXAM ABDOM COMPLETE: CPT | Mod: TC,HCNC,PO

## 2023-01-25 PROCEDURE — 76700 US EXAM ABDOM COMPLETE: CPT | Mod: 26,HCNC,, | Performed by: RADIOLOGY

## 2023-01-25 PROCEDURE — 76700 US ABDOMEN COMPLETE: ICD-10-PCS | Mod: 26,HCNC,, | Performed by: RADIOLOGY

## 2023-01-30 ENCOUNTER — PATIENT MESSAGE (OUTPATIENT)
Dept: CARDIOLOGY | Facility: CLINIC | Age: 83
End: 2023-01-30
Payer: MEDICARE

## 2023-02-21 NOTE — PROGRESS NOTES
82 y.o. femalepresents in f/u to pre-diabetes, hypertension, dyslipidemia, and PAF    Pre-DM tx w/Jardiance 10mg  Denied increased thirst, urination, or hypoglycemia episodes  A1C ==>    Lab Results   Component Value Date    HGBA1C 6.1 (H) 08/22/2022     HTN tx w/amlodipine 5mg and metoprolol XL 25  Pulmonary HTN  Denied HA or dizziness  BP today==>132/82    Dyslipidemia tx w/rosuvastatin 40mg  Carotid, B/L  + Agatston 400-577  Aortic atherosclerosis  Denied unusual muscle pain or chest pain  LDL==>  Lab Results   Component Value Date    CHOL 144 10/17/2022    CHOL 155 04/26/2022    CHOL 143 04/13/2021     Lab Results   Component Value Date    HDL 62 10/17/2022    HDL 58 04/26/2022    HDL 66 04/13/2021     Lab Results   Component Value Date    LDLCALC 68.8 10/17/2022    LDLCALC 79.4 04/26/2022    LDLCALC 63.4 04/13/2021     Lab Results   Component Value Date    TRIG 66 10/17/2022    TRIG 88 04/26/2022    TRIG 68 04/13/2021     Lab Results   Component Value Date    CHOLHDL 43.1 10/17/2022    CHOLHDL 37.4 04/26/2022    CHOLHDL 46.2 04/13/2021     PAF, tx BB and apixaban 5mg BID  Denied dizziness or focal deficits  Pulse==>68    ROS:  No fever, chills, or night sweats  No blurry vision or visual disturbance  No dysphagia or early satiety  No palpitations or tachycardia  No cough or wheezing  No GERD or abdominal pain  ++ hand OA  ++ B/L shoulder pain   ++ back pain, NADYA pending  ++ CTS- right wrist  2 concerns:  #1   ++ pain left flank that wraps around from the back to the front to the breast, crampy/spasms  2 bad episodes , and several mild over several mos  SOB not exacerbated, hasn't noticed any heart palpitations  Duration 2-5'  Both bad episodes when sitting on the sofa and once in [employer]  ( Last Easter)  #2  ++ humming in ears constantly then got really loud ringing and then started to get numbness down the left arm and left leg  First episode lasted 3-4 '  Second episode 2 weeks ago,1-2'  No CN deficits, no HA,  no confusion  Tx: rest and resolved w/I minutes    Remainder of review negative except as previously noted    PAST MEDICAL HX: Reviewed  PAST SURGICAL HX: Reviewed  SOCIAL HX: Reviewed  FAMILY HX: Reviewed    PHYSICAL EXAM:  VS: As noted  GENERAL: WDWN, A&O, NAD, conversant and co-operative  EYES: Conj/lids unremarkable, sclera anicteric  ENT:  Canal without cerumen TMs unremarkable  NECK: Supple w/o lymphadenopathy or thyromegaly  RESPIRATORY: Efforts are unlabored.   Lungs CTAP  CARDIOVASCULAR: Heart RRR. No carotid bruits noted.   1+ pedal pulses. trace LE edema  GASTROINTESTINAL: BS+, soft , NT/ND, no HSM noted  MUSCULOSKELETAL: Gait, + walker No CCE  Hands -DJD  Right wrist + CTS  Upper thoracic spine and paraspinal region over the chest wall nontender.  No weakness noted with rising from the chair or with gait although she is you using old  NEUROLOGIC: STEVENS. No tremor noted  DTRs 2+ and equal upper extremity, strength 5/5 with grasp equal bilaterally  SKIN: Warm and dry.    IMPRESSION:  HTN, stable continue amlodipine 5mg,  Metoprolol XL 25 qd,  and losartan 50mg  Pulmonary HTN/ chronic pulmonary heart ds, stable  HLD, stable on rosuvastatin 40, continue qd  Carotid AA ds, asx  CAD-Agatston 400-577, asx  Aortic atherosclerosis, noted on chest CT -10/2010  Pre-DM, A1C stable on Jardiance( also for heart)  PAF, stable on apixaban and BB continue regimen  Back pain, chronic NADYA pending  Left chest wall pain, c/w MSK  TIA, possible w/ left sided numbness  OA- hands  CTR- right wrist, rec splint    PLAN:  Consider Head CT-pt to call s/p NADYA  Rec she keep ASA if occurs again to chew  Also to got to ED if sx persist >10-15'  Lab today  Continue present meds   Rx update  Call prn  RTC 6 mos      >46' minutes  was spent today on H/P, review of MR and MDM

## 2023-02-22 ENCOUNTER — OFFICE VISIT (OUTPATIENT)
Dept: INTERNAL MEDICINE | Facility: CLINIC | Age: 83
End: 2023-02-22
Payer: MEDICARE

## 2023-02-22 ENCOUNTER — LAB VISIT (OUTPATIENT)
Dept: LAB | Facility: HOSPITAL | Age: 83
End: 2023-02-22
Attending: INTERNAL MEDICINE
Payer: MEDICARE

## 2023-02-22 VITALS
RESPIRATION RATE: 18 BRPM | OXYGEN SATURATION: 96 % | BODY MASS INDEX: 30.66 KG/M2 | HEART RATE: 68 BPM | HEIGHT: 63 IN | WEIGHT: 173.06 LBS | TEMPERATURE: 99 F | SYSTOLIC BLOOD PRESSURE: 132 MMHG | DIASTOLIC BLOOD PRESSURE: 82 MMHG

## 2023-02-22 DIAGNOSIS — R93.1 AGATSTON CORONARY ARTERY CALCIUM SCORE GREATER THAN 400: ICD-10-CM

## 2023-02-22 DIAGNOSIS — R73.03 PRE-DIABETES: ICD-10-CM

## 2023-02-22 DIAGNOSIS — I10 ESSENTIAL HYPERTENSION: ICD-10-CM

## 2023-02-22 DIAGNOSIS — I48.0 PAROXYSMAL ATRIAL FIBRILLATION: ICD-10-CM

## 2023-02-22 DIAGNOSIS — I70.0 AORTIC ATHEROSCLEROSIS: ICD-10-CM

## 2023-02-22 DIAGNOSIS — E78.5 HYPERLIPIDEMIA, UNSPECIFIED HYPERLIPIDEMIA TYPE: ICD-10-CM

## 2023-02-22 DIAGNOSIS — M54.6 BACK PAIN OF THORACOLUMBAR REGION: ICD-10-CM

## 2023-02-22 DIAGNOSIS — I25.10 ATHEROSCLEROSIS OF NATIVE CORONARY ARTERY OF NATIVE HEART WITHOUT ANGINA PECTORIS: ICD-10-CM

## 2023-02-22 DIAGNOSIS — G56.01 CARPAL TUNNEL SYNDROME OF RIGHT WRIST: ICD-10-CM

## 2023-02-22 DIAGNOSIS — M54.50 BACK PAIN OF THORACOLUMBAR REGION: ICD-10-CM

## 2023-02-22 DIAGNOSIS — M19.049 HAND ARTHRITIS: ICD-10-CM

## 2023-02-22 DIAGNOSIS — R20.2 PARESTHESIA OF LEFT UPPER AND LOWER EXTREMITY: ICD-10-CM

## 2023-02-22 DIAGNOSIS — I27.9 CHRONIC PULMONARY HEART DISEASE: ICD-10-CM

## 2023-02-22 DIAGNOSIS — I65.23 CAROTID STENOSIS, BILATERAL: ICD-10-CM

## 2023-02-22 DIAGNOSIS — R07.89 LEFT-SIDED CHEST WALL PAIN: ICD-10-CM

## 2023-02-22 DIAGNOSIS — I10 ESSENTIAL HYPERTENSION: Primary | ICD-10-CM

## 2023-02-22 LAB
ALBUMIN SERPL BCP-MCNC: 4.4 G/DL (ref 3.5–5.2)
ALP SERPL-CCNC: 107 U/L (ref 55–135)
ALT SERPL W/O P-5'-P-CCNC: 16 U/L (ref 10–44)
ANION GAP SERPL CALC-SCNC: 13 MMOL/L (ref 8–16)
AST SERPL-CCNC: 30 U/L (ref 10–40)
BILIRUB SERPL-MCNC: 0.3 MG/DL (ref 0.1–1)
BUN SERPL-MCNC: 13 MG/DL (ref 8–23)
CALCIUM SERPL-MCNC: 10 MG/DL (ref 8.7–10.5)
CHLORIDE SERPL-SCNC: 100 MMOL/L (ref 95–110)
CO2 SERPL-SCNC: 25 MMOL/L (ref 23–29)
CREAT SERPL-MCNC: 0.8 MG/DL (ref 0.5–1.4)
EST. GFR  (NO RACE VARIABLE): >60 ML/MIN/1.73 M^2
ESTIMATED AVG GLUCOSE: 131 MG/DL (ref 68–131)
GLUCOSE SERPL-MCNC: 103 MG/DL (ref 70–110)
HBA1C MFR BLD: 6.2 % (ref 4–5.6)
MAGNESIUM SERPL-MCNC: 2.3 MG/DL (ref 1.6–2.6)
POTASSIUM SERPL-SCNC: 4.8 MMOL/L (ref 3.5–5.1)
PROT SERPL-MCNC: 7.3 G/DL (ref 6–8.4)
SODIUM SERPL-SCNC: 138 MMOL/L (ref 136–145)

## 2023-02-22 PROCEDURE — 1101F PR PT FALLS ASSESS DOC 0-1 FALLS W/OUT INJ PAST YR: ICD-10-PCS | Mod: HCNC,CPTII,S$GLB, | Performed by: INTERNAL MEDICINE

## 2023-02-22 PROCEDURE — 3288F PR FALLS RISK ASSESSMENT DOCUMENTED: ICD-10-PCS | Mod: HCNC,CPTII,S$GLB, | Performed by: INTERNAL MEDICINE

## 2023-02-22 PROCEDURE — 99999 PR PBB SHADOW E&M-EST. PATIENT-LVL V: CPT | Mod: PBBFAC,HCNC,, | Performed by: INTERNAL MEDICINE

## 2023-02-22 PROCEDURE — 80053 COMPREHEN METABOLIC PANEL: CPT | Mod: HCNC | Performed by: INTERNAL MEDICINE

## 2023-02-22 PROCEDURE — 1125F PR PAIN SEVERITY QUANTIFIED, PAIN PRESENT: ICD-10-PCS | Mod: HCNC,CPTII,S$GLB, | Performed by: INTERNAL MEDICINE

## 2023-02-22 PROCEDURE — 3079F PR MOST RECENT DIASTOLIC BLOOD PRESSURE 80-89 MM HG: ICD-10-PCS | Mod: HCNC,CPTII,S$GLB, | Performed by: INTERNAL MEDICINE

## 2023-02-22 PROCEDURE — 3079F DIAST BP 80-89 MM HG: CPT | Mod: HCNC,CPTII,S$GLB, | Performed by: INTERNAL MEDICINE

## 2023-02-22 PROCEDURE — 99215 PR OFFICE/OUTPT VISIT, EST, LEVL V, 40-54 MIN: ICD-10-PCS | Mod: HCNC,S$GLB,, | Performed by: INTERNAL MEDICINE

## 2023-02-22 PROCEDURE — 1159F PR MEDICATION LIST DOCUMENTED IN MEDICAL RECORD: ICD-10-PCS | Mod: HCNC,CPTII,S$GLB, | Performed by: INTERNAL MEDICINE

## 2023-02-22 PROCEDURE — 1101F PT FALLS ASSESS-DOCD LE1/YR: CPT | Mod: HCNC,CPTII,S$GLB, | Performed by: INTERNAL MEDICINE

## 2023-02-22 PROCEDURE — 3288F FALL RISK ASSESSMENT DOCD: CPT | Mod: HCNC,CPTII,S$GLB, | Performed by: INTERNAL MEDICINE

## 2023-02-22 PROCEDURE — 99215 OFFICE O/P EST HI 40 MIN: CPT | Mod: HCNC,S$GLB,, | Performed by: INTERNAL MEDICINE

## 2023-02-22 PROCEDURE — 36415 COLL VENOUS BLD VENIPUNCTURE: CPT | Mod: HCNC,PO | Performed by: INTERNAL MEDICINE

## 2023-02-22 PROCEDURE — 1159F MED LIST DOCD IN RCRD: CPT | Mod: HCNC,CPTII,S$GLB, | Performed by: INTERNAL MEDICINE

## 2023-02-22 PROCEDURE — 83036 HEMOGLOBIN GLYCOSYLATED A1C: CPT | Mod: HCNC | Performed by: INTERNAL MEDICINE

## 2023-02-22 PROCEDURE — 3075F PR MOST RECENT SYSTOLIC BLOOD PRESS GE 130-139MM HG: ICD-10-PCS | Mod: HCNC,CPTII,S$GLB, | Performed by: INTERNAL MEDICINE

## 2023-02-22 PROCEDURE — 83735 ASSAY OF MAGNESIUM: CPT | Mod: HCNC | Performed by: INTERNAL MEDICINE

## 2023-02-22 PROCEDURE — 99999 PR PBB SHADOW E&M-EST. PATIENT-LVL V: ICD-10-PCS | Mod: PBBFAC,HCNC,, | Performed by: INTERNAL MEDICINE

## 2023-02-22 PROCEDURE — 3075F SYST BP GE 130 - 139MM HG: CPT | Mod: HCNC,CPTII,S$GLB, | Performed by: INTERNAL MEDICINE

## 2023-02-22 PROCEDURE — 1125F AMNT PAIN NOTED PAIN PRSNT: CPT | Mod: HCNC,CPTII,S$GLB, | Performed by: INTERNAL MEDICINE

## 2023-02-22 RX ORDER — ESOMEPRAZOLE MAGNESIUM 40 MG/1
CAPSULE, DELAYED RELEASE ORAL
Qty: 90 CAPSULE | Refills: 3 | Status: SHIPPED | OUTPATIENT
Start: 2023-02-22 | End: 2024-02-20 | Stop reason: SDUPTHER

## 2023-02-22 RX ORDER — GABAPENTIN 300 MG/1
CAPSULE ORAL 3 TIMES DAILY
COMMUNITY
Start: 2023-01-26 | End: 2023-08-09 | Stop reason: SDUPTHER

## 2023-02-23 ENCOUNTER — TELEPHONE (OUTPATIENT)
Dept: INTERNAL MEDICINE | Facility: CLINIC | Age: 83
End: 2023-02-23
Payer: MEDICARE

## 2023-02-23 NOTE — TELEPHONE ENCOUNTER
----- Message from Ira Smith MD sent at 2/23/2023  3:51 PM CST -----  Your blood work has returned and your hemoglobin A1c has remained stable.  Your chemistries are all in the normal range, as well as her magnesium.  alvino

## 2023-04-10 ENCOUNTER — PATIENT MESSAGE (OUTPATIENT)
Dept: CARDIOLOGY | Facility: CLINIC | Age: 83
End: 2023-04-10
Payer: MEDICARE

## 2023-04-10 RX ORDER — METOPROLOL SUCCINATE 25 MG/1
TABLET, EXTENDED RELEASE ORAL
Qty: 90 TABLET | Refills: 3 | Status: SHIPPED | OUTPATIENT
Start: 2023-04-10 | End: 2024-03-13 | Stop reason: SDUPTHER

## 2023-04-19 ENCOUNTER — PATIENT MESSAGE (OUTPATIENT)
Dept: CARDIOLOGY | Facility: CLINIC | Age: 83
End: 2023-04-19
Payer: MEDICARE

## 2023-04-19 DIAGNOSIS — I10 ESSENTIAL HYPERTENSION: ICD-10-CM

## 2023-04-22 RX ORDER — LOSARTAN POTASSIUM 50 MG/1
50 TABLET ORAL DAILY
Qty: 90 TABLET | Refills: 3 | Status: SHIPPED | OUTPATIENT
Start: 2023-04-22 | End: 2024-04-21

## 2023-04-27 ENCOUNTER — PATIENT MESSAGE (OUTPATIENT)
Dept: CARDIOLOGY | Facility: CLINIC | Age: 83
End: 2023-04-27
Payer: MEDICARE

## 2023-05-08 ENCOUNTER — OFFICE VISIT (OUTPATIENT)
Dept: CARDIOLOGY | Facility: CLINIC | Age: 83
End: 2023-05-08
Payer: MEDICARE

## 2023-05-08 VITALS
HEART RATE: 56 BPM | SYSTOLIC BLOOD PRESSURE: 129 MMHG | BODY MASS INDEX: 30.51 KG/M2 | WEIGHT: 172.19 LBS | DIASTOLIC BLOOD PRESSURE: 58 MMHG | HEIGHT: 63 IN

## 2023-05-08 DIAGNOSIS — I70.0 AORTIC ATHEROSCLEROSIS: ICD-10-CM

## 2023-05-08 DIAGNOSIS — I27.20 PULMONARY HYPERTENSION: ICD-10-CM

## 2023-05-08 DIAGNOSIS — R53.83 FATIGUE DUE TO DEPRESSION: ICD-10-CM

## 2023-05-08 DIAGNOSIS — I10 ESSENTIAL HYPERTENSION: ICD-10-CM

## 2023-05-08 DIAGNOSIS — E78.00 HYPERCHOLESTEROLEMIA: ICD-10-CM

## 2023-05-08 DIAGNOSIS — R93.1 AGATSTON CORONARY ARTERY CALCIUM SCORE GREATER THAN 400: Primary | ICD-10-CM

## 2023-05-08 DIAGNOSIS — G47.33 OSA (OBSTRUCTIVE SLEEP APNEA): ICD-10-CM

## 2023-05-08 DIAGNOSIS — R06.02 SHORTNESS OF BREATH: ICD-10-CM

## 2023-05-08 DIAGNOSIS — I27.9 CHRONIC PULMONARY HEART DISEASE: ICD-10-CM

## 2023-05-08 DIAGNOSIS — M54.16 LUMBAR RADICULITIS: ICD-10-CM

## 2023-05-08 DIAGNOSIS — R00.2 HEART PALPITATIONS: ICD-10-CM

## 2023-05-08 DIAGNOSIS — I48.0 PAROXYSMAL ATRIAL FIBRILLATION: ICD-10-CM

## 2023-05-08 DIAGNOSIS — F32.A FATIGUE DUE TO DEPRESSION: ICD-10-CM

## 2023-05-08 DIAGNOSIS — I65.23 CAROTID STENOSIS, BILATERAL: ICD-10-CM

## 2023-05-08 DIAGNOSIS — I25.10 ATHEROSCLEROSIS OF NATIVE CORONARY ARTERY OF NATIVE HEART WITHOUT ANGINA PECTORIS: ICD-10-CM

## 2023-05-08 DIAGNOSIS — R93.1 AGATSTON CORONARY ARTERY CALCIUM SCORE GREATER THAN 400: ICD-10-CM

## 2023-05-08 PROCEDURE — 3078F PR MOST RECENT DIASTOLIC BLOOD PRESSURE < 80 MM HG: ICD-10-PCS | Mod: CPTII,S$GLB,, | Performed by: INTERNAL MEDICINE

## 2023-05-08 PROCEDURE — 99214 OFFICE O/P EST MOD 30 MIN: CPT | Mod: S$GLB,,, | Performed by: INTERNAL MEDICINE

## 2023-05-08 PROCEDURE — 3288F PR FALLS RISK ASSESSMENT DOCUMENTED: ICD-10-PCS | Mod: CPTII,S$GLB,, | Performed by: INTERNAL MEDICINE

## 2023-05-08 PROCEDURE — 1160F PR REVIEW ALL MEDS BY PRESCRIBER/CLIN PHARMACIST DOCUMENTED: ICD-10-PCS | Mod: CPTII,S$GLB,, | Performed by: INTERNAL MEDICINE

## 2023-05-08 PROCEDURE — 3288F FALL RISK ASSESSMENT DOCD: CPT | Mod: CPTII,S$GLB,, | Performed by: INTERNAL MEDICINE

## 2023-05-08 PROCEDURE — 99999 PR PBB SHADOW E&M-EST. PATIENT-LVL IV: CPT | Mod: PBBFAC,,, | Performed by: INTERNAL MEDICINE

## 2023-05-08 PROCEDURE — 1160F RVW MEDS BY RX/DR IN RCRD: CPT | Mod: CPTII,S$GLB,, | Performed by: INTERNAL MEDICINE

## 2023-05-08 PROCEDURE — 1159F MED LIST DOCD IN RCRD: CPT | Mod: CPTII,S$GLB,, | Performed by: INTERNAL MEDICINE

## 2023-05-08 PROCEDURE — 99214 PR OFFICE/OUTPT VISIT, EST, LEVL IV, 30-39 MIN: ICD-10-PCS | Mod: S$GLB,,, | Performed by: INTERNAL MEDICINE

## 2023-05-08 PROCEDURE — 1101F PR PT FALLS ASSESS DOC 0-1 FALLS W/OUT INJ PAST YR: ICD-10-PCS | Mod: CPTII,S$GLB,, | Performed by: INTERNAL MEDICINE

## 2023-05-08 PROCEDURE — 3074F PR MOST RECENT SYSTOLIC BLOOD PRESSURE < 130 MM HG: ICD-10-PCS | Mod: CPTII,S$GLB,, | Performed by: INTERNAL MEDICINE

## 2023-05-08 PROCEDURE — 1126F AMNT PAIN NOTED NONE PRSNT: CPT | Mod: CPTII,S$GLB,, | Performed by: INTERNAL MEDICINE

## 2023-05-08 PROCEDURE — 1126F PR PAIN SEVERITY QUANTIFIED, NO PAIN PRESENT: ICD-10-PCS | Mod: CPTII,S$GLB,, | Performed by: INTERNAL MEDICINE

## 2023-05-08 PROCEDURE — 99999 PR PBB SHADOW E&M-EST. PATIENT-LVL IV: ICD-10-PCS | Mod: PBBFAC,,, | Performed by: INTERNAL MEDICINE

## 2023-05-08 PROCEDURE — 3078F DIAST BP <80 MM HG: CPT | Mod: CPTII,S$GLB,, | Performed by: INTERNAL MEDICINE

## 2023-05-08 PROCEDURE — 1159F PR MEDICATION LIST DOCUMENTED IN MEDICAL RECORD: ICD-10-PCS | Mod: CPTII,S$GLB,, | Performed by: INTERNAL MEDICINE

## 2023-05-08 PROCEDURE — 1101F PT FALLS ASSESS-DOCD LE1/YR: CPT | Mod: CPTII,S$GLB,, | Performed by: INTERNAL MEDICINE

## 2023-05-08 PROCEDURE — 3074F SYST BP LT 130 MM HG: CPT | Mod: CPTII,S$GLB,, | Performed by: INTERNAL MEDICINE

## 2023-05-08 NOTE — PROGRESS NOTES
"Subjective:   Patient ID:  Maryann Sorenson is a 82 y.o. female who presents for follow-up of Shortness of Breath      HPI: Patient last seen in October 2022.  She continues to c/o dyspnea on exertion.  In the past we have done multiple test and evaluated patient for PH ( Dr. Early). Symptoms thought to be in part due to diastolic dysfunction and deconditioning ( CPX).  Patient ws not able to tolerate Aldactone ( high K level), but was started on Jardiance.     She was worked up for AL Amyloidosis and constriction.  Tests were negative.    Patient has history of carpal tunnel syndrome and PAF diagnosed in 2022. She has NOE and is on nightly C-PAP    Lab Results   Component Value Date     02/22/2023    K 4.8 02/22/2023     02/22/2023    CO2 25 02/22/2023    BUN 13 02/22/2023    CREATININE 0.8 02/22/2023     02/22/2023    HGBA1C 6.2 (H) 02/22/2023    MG 2.3 02/22/2023    AST 30 02/22/2023    ALT 16 02/22/2023    ALBUMIN 4.4 02/22/2023    PROT 7.3 02/22/2023    BILITOT 0.3 02/22/2023    WBC 4.00 04/26/2022    HGB 11.9 (L) 04/26/2022    HCT 37.8 04/26/2022    MCV 89 04/26/2022     04/26/2022    TSH 2.680 10/17/2022    CHOL 144 10/17/2022    HDL 62 10/17/2022    LDLCALC 68.8 10/17/2022    TRIG 66 10/17/2022       No results found in the last 24 hours.     Review of Systems   HENT:  Positive for hearing loss.    Cardiovascular:  Positive for dyspnea on exertion, irregular heartbeat and palpitations.   Gastrointestinal:  Positive for abdominal pain and heartburn.   Neurological:  Positive for excessive daytime sleepiness, dizziness, light-headedness, numbness and paresthesias.     Objective:   Physical Exam  Vitals and nursing note reviewed.   Constitutional:       Appearance: Normal appearance. She is well-developed.      Comments: BP (!) 129/58   Pulse (!) 56   Ht 5' 3" (1.6 m)   Wt 78.1 kg (172 lb 2.9 oz)   LMP  (LMP Unknown)   BMI 30.50 kg/m²      HENT:      Head: Normocephalic.   Eyes:    "   Pupils: Pupils are equal, round, and reactive to light.   Neck:      Thyroid: No thyromegaly.      Vascular: No carotid bruit.   Cardiovascular:      Rate and Rhythm: Normal rate and regular rhythm.      Pulses: Intact distal pulses.           Carotid pulses are 2+ on the right side and 2+ on the left side.       Radial pulses are 2+ on the right side and 2+ on the left side.        Femoral pulses are 2+ on the right side and 2+ on the left side.       Popliteal pulses are 2+ on the right side and 2+ on the left side.        Dorsalis pedis pulses are 2+ on the right side and 2+ on the left side.        Posterior tibial pulses are 2+ on the right side and 2+ on the left side.      Heart sounds: Normal heart sounds. No murmur heard.    No friction rub. No gallop.   Pulmonary:      Effort: Pulmonary effort is normal. No respiratory distress.      Breath sounds: Normal breath sounds. No wheezing or rales.   Chest:      Chest wall: No tenderness.   Abdominal:      Palpations: Abdomen is soft.   Musculoskeletal:         General: Normal range of motion.      Cervical back: Normal range of motion and neck supple.   Skin:     General: Skin is warm and dry.   Neurological:      Mental Status: She is alert and oriented to person, place, and time.         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 - Primary    Relevant Orders    Echo    Essential hypertension    Relevant Orders    Echo    Pulmonary hypertension    Relevant Orders    Echo    Atherosclerosis of native coronary artery of native heart without angina pectoris    Relevant Orders    Echo    Carotid stenosis, bilateral    Chronic pulmonary heart disease    Relevant Orders    PYP ATTR related Amyloidosis (Cupid Only)    Echo    Paroxysmal atrial fibrillation    Relevant Orders    PYP ATTR related Amyloidosis (Cupid Only)    Echo     Aortic atherosclerosis    Relevant Orders    Echo       Other    Shortness of breath    Relevant Orders    PYP ATTR related Amyloidosis (Cupid Only)    Echo    NOE (obstructive sleep apnea)    Relevant Orders    PYP ATTR related Amyloidosis (Cupid Only)    Fatigue    Lumbar radiculitis    Relevant Orders    Echo    Heart palpitations       Patient's Medications   New Prescriptions    No medications on file   Previous Medications    AMLODIPINE (NORVASC) 5 MG TABLET    Take 1 tablet (5 mg total) by mouth once daily.    APIXABAN (ELIQUIS) 5 MG TAB    Take 1 tablet (5 mg total) by mouth 2 (two) times daily.    ASCORBATE CALCIUM-BIOFLAVONOID (FRANCISCO-C WITH BIOFLAVONOIDS) 500-200 MG TAB    Take by mouth once daily.    CALCIUM CARBONATE-VITAMIN D3 600 MG (1,500 MG)-800 UNIT CHEW    Take 1 tablet by mouth once.    CHOLECALCIFEROL, VITAMIN D3, (VITAMIN D3) 50 MCG (2,000 UNIT) TAB    Take by mouth once daily.    COENZYME Q10 200 MG CAPSULE    Take 1 capsule by mouth Daily. 1 Capsule Oral Every day    DIAZEPAM (VALIUM) 2 MG TABLET    Take one tablet when needed for vertigo, may repeat in one hour if symptoms persist    EMPAGLIFLOZIN (JARDIANCE) 10 MG TABLET    Take 1 tablet (10 mg total) by mouth once daily.    ESOMEPRAZOLE (NEXIUM) 40 MG CAPSULE    1 Capsule, Delayed Release(E.C.) Oral Every day    FEXOFENADINE (ALLEGRA) 180 MG TABLET    Take 1 tablet by mouth Daily. 1 Tablet Oral Every day    FLUTICASONE PROPIONATE (FLONASE) 50 MCG/ACTUATION NASAL SPRAY    1 spray each nostril twice a day; total 48 g - please provide 3 months supply.    GABAPENTIN (NEURONTIN) 300 MG CAPSULE    Take by mouth 3 (three) times daily.    GUAIFENESIN (MUCINEX) 600 MG 12 HR TABLET    Take 1,200 mg by mouth as needed.     LACTOBACILLUS RHAMNOSUS GG (PROBIOTIC ORAL)    Take 1 capsule by mouth Daily. 1 Capsule Oral Every day    LOSARTAN (COZAAR) 50 MG TABLET    Take 1 tablet (50 mg total) by mouth once daily.    LUTEIN 20 MG CAP    Take 20 mg by  mouth every other day.    MAGNESIUM 250 MG TAB    Take 250 mg by mouth once daily.     METOPROLOL SUCCINATE (TOPROL-XL) 25 MG 24 HR TABLET    Pt to take 1/2 pill once a day per Dr. Gavin on 4/27/22.    MUPIROCIN (BACTROBAN) 2 % OINTMENT    Apply topically 3 (three) times daily.    PRAMIPEXOLE (MIRAPEX) 0.125 MG TABLET    Pt taking one pill daily.    ROSUVASTATIN (CRESTOR) 40 MG TAB    Take 1 tablet (40 mg total) by mouth once daily.    SILVER SULFADIAZINE 1% (SILVADENE) 1 % CREAM    Apply topically 2 (two) times daily.    ZINC ORAL    Take 50 mg by mouth once daily.    Modified Medications    No medications on file   Discontinued Medications    No medications on file     Repeat CFD to assess PA pressure and GLS.  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  No change in the present regimen.  Follow up in about 6 months (around 11/8/2023).

## 2023-05-26 ENCOUNTER — PATIENT MESSAGE (OUTPATIENT)
Dept: INTERNAL MEDICINE | Facility: CLINIC | Age: 83
End: 2023-05-26
Payer: MEDICARE

## 2023-06-03 ENCOUNTER — HOSPITAL ENCOUNTER (EMERGENCY)
Facility: HOSPITAL | Age: 83
Discharge: HOME | End: 2023-06-03
Attending: STUDENT IN AN ORGANIZED HEALTH CARE EDUCATION/TRAINING PROGRAM
Payer: MEDICARE

## 2023-06-03 VITALS
SYSTOLIC BLOOD PRESSURE: 135 MMHG | WEIGHT: 172 LBS | DIASTOLIC BLOOD PRESSURE: 78 MMHG | TEMPERATURE: 98 F | OXYGEN SATURATION: 96 % | RESPIRATION RATE: 18 BRPM | HEART RATE: 56 BPM | BODY MASS INDEX: 30.47 KG/M2

## 2023-06-03 DIAGNOSIS — R00.2 PALPITATIONS: ICD-10-CM

## 2023-06-03 DIAGNOSIS — I48.0 PAROXYSMAL ATRIAL FIBRILLATION: Primary | ICD-10-CM

## 2023-06-03 LAB
ALBUMIN SERPL BCP-MCNC: 3.8 G/DL (ref 3.5–5.2)
ALP SERPL-CCNC: 79 U/L (ref 55–135)
ALT SERPL W/O P-5'-P-CCNC: 17 U/L (ref 10–44)
ANION GAP SERPL CALC-SCNC: 13 MMOL/L (ref 8–16)
AST SERPL-CCNC: 26 U/L (ref 10–40)
BASOPHILS # BLD AUTO: 0.04 K/UL (ref 0–0.2)
BASOPHILS NFR BLD: 1 % (ref 0–1.9)
BILIRUB SERPL-MCNC: 0.3 MG/DL (ref 0.1–1)
BNP SERPL-MCNC: 54 PG/ML (ref 0–99)
BUN SERPL-MCNC: 10 MG/DL (ref 8–23)
CALCIUM SERPL-MCNC: 8.9 MG/DL (ref 8.7–10.5)
CHLORIDE SERPL-SCNC: 104 MMOL/L (ref 95–110)
CO2 SERPL-SCNC: 19 MMOL/L (ref 23–29)
CREAT SERPL-MCNC: 0.8 MG/DL (ref 0.5–1.4)
DIFFERENTIAL METHOD: ABNORMAL
EOSINOPHIL # BLD AUTO: 0.1 K/UL (ref 0–0.5)
EOSINOPHIL NFR BLD: 3.3 % (ref 0–8)
ERYTHROCYTE [DISTWIDTH] IN BLOOD BY AUTOMATED COUNT: 15 % (ref 11.5–14.5)
EST. GFR  (NO RACE VARIABLE): >60 ML/MIN/1.73 M^2
GLUCOSE SERPL-MCNC: 108 MG/DL (ref 70–110)
HCT VFR BLD AUTO: 34.3 % (ref 37–48.5)
HGB BLD-MCNC: 11.3 G/DL (ref 12–16)
IMM GRANULOCYTES # BLD AUTO: 0.01 K/UL (ref 0–0.04)
IMM GRANULOCYTES NFR BLD AUTO: 0.3 % (ref 0–0.5)
LYMPHOCYTES # BLD AUTO: 1 K/UL (ref 1–4.8)
LYMPHOCYTES NFR BLD: 25.8 % (ref 18–48)
MCH RBC QN AUTO: 28.7 PG (ref 27–31)
MCHC RBC AUTO-ENTMCNC: 32.9 G/DL (ref 32–36)
MCV RBC AUTO: 87 FL (ref 82–98)
MONOCYTES # BLD AUTO: 0.4 K/UL (ref 0.3–1)
MONOCYTES NFR BLD: 9.3 % (ref 4–15)
NEUTROPHILS # BLD AUTO: 2.4 K/UL (ref 1.8–7.7)
NEUTROPHILS NFR BLD: 60.3 % (ref 38–73)
NRBC BLD-RTO: 0 /100 WBC
PLATELET # BLD AUTO: 205 K/UL (ref 150–450)
PMV BLD AUTO: 10.4 FL (ref 9.2–12.9)
POTASSIUM SERPL-SCNC: 3.7 MMOL/L (ref 3.5–5.1)
PROT SERPL-MCNC: 6.3 G/DL (ref 6–8.4)
RBC # BLD AUTO: 3.94 M/UL (ref 4–5.4)
SODIUM SERPL-SCNC: 136 MMOL/L (ref 136–145)
TROPONIN I SERPL DL<=0.01 NG/ML-MCNC: <0.006 NG/ML (ref 0–0.03)
WBC # BLD AUTO: 3.96 K/UL (ref 3.9–12.7)

## 2023-06-03 PROCEDURE — 93010 EKG 12-LEAD: ICD-10-PCS | Mod: ,,, | Performed by: INTERNAL MEDICINE

## 2023-06-03 PROCEDURE — 84484 ASSAY OF TROPONIN QUANT: CPT | Performed by: STUDENT IN AN ORGANIZED HEALTH CARE EDUCATION/TRAINING PROGRAM

## 2023-06-03 PROCEDURE — 93010 ELECTROCARDIOGRAM REPORT: CPT | Mod: ,,, | Performed by: INTERNAL MEDICINE

## 2023-06-03 PROCEDURE — 99284 EMERGENCY DEPT VISIT MOD MDM: CPT

## 2023-06-03 PROCEDURE — 85025 COMPLETE CBC W/AUTO DIFF WBC: CPT | Performed by: STUDENT IN AN ORGANIZED HEALTH CARE EDUCATION/TRAINING PROGRAM

## 2023-06-03 PROCEDURE — 80053 COMPREHEN METABOLIC PANEL: CPT | Performed by: STUDENT IN AN ORGANIZED HEALTH CARE EDUCATION/TRAINING PROGRAM

## 2023-06-03 PROCEDURE — 93005 ELECTROCARDIOGRAM TRACING: CPT

## 2023-06-03 PROCEDURE — 83880 ASSAY OF NATRIURETIC PEPTIDE: CPT | Performed by: STUDENT IN AN ORGANIZED HEALTH CARE EDUCATION/TRAINING PROGRAM

## 2023-06-03 NOTE — ED PROVIDER NOTES
"ED Provider Note - 6/3/2023    History     Chief Complaint   Patient presents with    Irregular Heart Beat     Patient states she woke up around 1 am noticing she had an irregular heart beat. Reports history of Afib. Patient states she feels okay at this moment. Only having a slight headache that started as she came into ER. Metoprolol 25 mg taken at 2 am.       Maryann Sorenson is a 82 y.o. year old female with past medical and surgical history as seen below, presenting with chief complaint of palpitations.  Awoke her from sleep 2 hours prior to presentation.  Has they persisted took her home dose metoprolol.  Has and she would resolution of symptoms just prior to presentation to myself.  Denies chest pain, fever, chills.  States always has shortness of breath and this is unchanged from baseline.    Past Medical History:   Diagnosis Date    GERD (gastroesophageal reflux disease)     Pulmonary hypertension 2016    Syncope      Past Surgical History:   Procedure Laterality Date    broken wrist      "put plate in it"    DILATION AND CURETTAGE OF UTERUS      excision of lipoma Left 2009    near collar bone    tonsillectomy      TONSILLECTOMY           Family History   Problem Relation Age of Onset    Colon cancer Mother 95        d. 99 w/ brain mets    Hyperlipidemia Mother     Hypertension Mother     Heart disease Father     Kidney failure Father         d.89    Arthritis Sister         20+ surgeries- most 2/2 MSK problems- 80(2021)    Chronic back pain Sister         71(2021)    Other Brother         [place]36 [place]    Heart disease Daughter         d.7 yo in heart surgery; Tetrology of Fallot    No Known Problems Daughter         lives in [place]    Heart disease Son         congenital; Tetralogy of Fallot, surgery at 40yo    Other Son         cardiac arrest, in CCU for 2 wks-Vtach/fib    Depression Son         lives near pt- works in [employer]     Social History     Tobacco Use    Smoking status: Never    " Smokeless tobacco: Never   Substance Use Topics    Alcohol use: Not Currently    Drug use: No     Social Determinants of Health with Concerns     Alcohol Use: Not on file   Financial Resource Strain: Not on file   Food Insecurity: Not on file   Transportation Needs: Not on file   Physical Activity: Not on file   Stress: Not on file   Social Connections: Not on file   Housing Stability: Not on file      Review of patient's allergies indicates:   Allergen Reactions    Amoxicillin-pot clavulanate Diarrhea     Given march 2016    Sulfa (sulfonamide antibiotics) Rash    Codeine      Other reaction(s): Unknown, tolerates hydrocodone june 2014    Doxycycline Nausea Only    Lyrica [pregabalin] Other (See Comments)     Blurry vision    Macrobid [nitrofurantoin monohyd/m-cryst] Other (See Comments)      2019 caused cramps in legs. After stopped medication it cleared.     Prevnar [pneumococcal 7-beti conj vacc] Other (See Comments)     Arm sore, redness at injection site and muscle aches. Given shot 7/23/19    Relafen [nabumetone] Diarrhea       Review of Systems     A full Review of Systems (ROS) was performed and was negative unless otherwise stated in the HPI.      Physical Exam     Vitals:    06/03/23 0502 06/03/23 0533 06/03/23 0534 06/03/23 0536   BP: 129/63  135/78    Pulse: (!) 56 (!) 55 (!) 56    Resp:  18     Temp:    97.5 °F (36.4 °C)   TempSrc:    Oral   SpO2:  96%     Weight:            Physical Exam    Nursing note and vitals reviewed.  Constitutional: She appears well-developed and well-nourished. No distress.   HENT:   Head: Normocephalic and atraumatic.   Right Ear: External ear normal.   Left Ear: External ear normal.   Nose: Nose normal.   Mouth/Throat: Oropharynx is clear and moist.   Eyes: Conjunctivae and EOM are normal. Pupils are equal, round, and reactive to light.   Neck: Neck supple.   Normal range of motion.  Cardiovascular:  Normal rate, regular rhythm, normal heart sounds and intact distal pulses.            Pulmonary/Chest: Breath sounds normal. No stridor. No respiratory distress. She has no wheezes. She has no rhonchi. She has no rales.   Abdominal: Abdomen is soft. Bowel sounds are normal. There is no abdominal tenderness.   Musculoskeletal:         General: No tenderness or edema. Normal range of motion.      Cervical back: Normal range of motion and neck supple.     Neurological: She is alert and oriented to person, place, and time. She has normal strength. No cranial nerve deficit or sensory deficit.   Skin: Skin is warm and dry. No rash noted.   Psychiatric: She has a normal mood and affect. Thought content normal.       Lab Results- Independently reviewed by myself      Labs Reviewed   CBC W/ AUTO DIFFERENTIAL - Abnormal; Notable for the following components:       Result Value    RBC 3.94 (*)     Hemoglobin 11.3 (*)     Hematocrit 34.3 (*)     RDW 15.0 (*)     All other components within normal limits   COMPREHENSIVE METABOLIC PANEL - Abnormal; Notable for the following components:    CO2 19 (*)     All other components within normal limits   B-TYPE NATRIURETIC PEPTIDE   TROPONIN I           Imaging     Imaging Results    None               Procedures  Procedures       Orders  Orders Placed This Encounter   Procedures    Brain natriuretic peptide    CBC auto differential    Comprehensive metabolic panel    Troponin I    Cardiac Monitoring - Adult    EKG 12-lead    Insert Saline lock IV           ED Course     Independent Interpretation of EKG:  Rhythm:  Sinus rhythm with first-degree block  Rate:  67  Axis:  Normal  QTC:  401  No STEMI                  Medical Decision Making       The patient's list of active medical problems, social history, medications, and allergies as documented per RN staff has been reviewed.         Medical Decision Making:   History:   Old Medical Records: I decided to obtain old medical records.  Old Records Summarized: records from clinic visits and records from previous  admission(s).  Independently Interpreted Test(s):   I have ordered and independently interpreted EKG Reading(s) - see prior notes  Clinical Tests:   Lab Tests: Ordered and Reviewed  Medical Tests: Ordered and Reviewed  This patient presented with palpitations with no apparent emergent cause.  Most likely related to paroxysmal AFib, however patient in sinus rhythm at time of presentation.  Kept on cardiac monitor without event throughout stay.  Patient is afebrile with no infectious symptoms, no signs of hyperthyroidism in the history/exam and TSH unremarkable.     Considered PE but less likely (no chest pain, sob, DVT risk factors, leg swelling, and satting well), doubt ACS (no chest pain, non STEMI ekg, and neg trop), no anemia on CBC, patient denies any drug/alcohol intoxication or withdrawal, patient euvolemic on exam and does not appear dry so doubt orthostatic changes.    Discussed follow-up with Cardiology for further evaluation and consideration of Holter monitor.  Patient also advised to follow-up with their PCP.  Return to ED precautions were given for worsening or changing symptoms.           Prescriptions  Discharge Medication List as of 6/3/2023  5:20 AM         Discharge Medication List as of 6/3/2023  5:20 AM             Clinical Impression       Follow-up Information       Follow up With Specialties Details Why Contact Info    Ira Smith MD Internal Medicine Schedule an appointment as soon as possible for a visit in 3 days For follow-up on today's visit. [address] [facility] [address]  [phone]      [facility] Emergency Medicine Go to  As needed, If symptoms worsen [address] [facility] [address] [phone]            Referrals:  No orders of the defined types were placed in this encounter.      Disposition   ED Disposition Condition    Discharge Stable          Diagnosis    ICD-10-CM ICD-9-CM   1. Paroxysmal atrial fibrillation   I48.0 427.31   2. Palpitations  R00.2 785.1           Estevan Potter MD        06/03/2023  6:08 AM        DISCLAIMER: This note was prepared with [employer] voice recognition transcription software. Garbled syntax, mangled pronouns, and other bizarre constructions may be attributed to that software system.       Estevan Potter MD  06/03/23 0612

## 2023-06-03 NOTE — ED NOTES
Pt aaox4 from home/ pt states that she woke up this morning and felt palpitations going in to her neck. Pt states that she felt as though she was having an irregular heart beat. Pt has hx of afib. Pt states that that sensation has since resolved. Pt states that pta, she did take 25mg metoprolol tartrate for her symptoms. Pt also reports mild headache that started once she arrived to ER. Ccm/bp/o2 monitoring in place.pt NSR on ccm. Family present at bedside.

## 2023-06-05 ENCOUNTER — PATIENT OUTREACH (OUTPATIENT)
Dept: EMERGENCY MEDICINE | Facility: HOSPITAL | Age: 83
End: 2023-06-05
Payer: MEDICARE

## 2023-06-08 ENCOUNTER — TELEPHONE (OUTPATIENT)
Dept: CARDIOLOGY | Facility: HOSPITAL | Age: 83
End: 2023-06-08
Payer: MEDICARE

## 2023-06-08 ENCOUNTER — PATIENT MESSAGE (OUTPATIENT)
Dept: DERMATOLOGY | Facility: CLINIC | Age: 83
End: 2023-06-08
Payer: MEDICARE

## 2023-06-12 ENCOUNTER — HOSPITAL ENCOUNTER (OUTPATIENT)
Dept: CARDIOLOGY | Facility: HOSPITAL | Age: 83
Discharge: HOME | End: 2023-06-12
Attending: INTERNAL MEDICINE
Payer: MEDICARE

## 2023-06-12 DIAGNOSIS — I48.0 PAROXYSMAL ATRIAL FIBRILLATION: ICD-10-CM

## 2023-06-12 DIAGNOSIS — I27.9 CHRONIC PULMONARY HEART DISEASE: ICD-10-CM

## 2023-06-12 DIAGNOSIS — R06.02 SHORTNESS OF BREATH: ICD-10-CM

## 2023-06-12 DIAGNOSIS — G47.33 OSA (OBSTRUCTIVE SLEEP APNEA): ICD-10-CM

## 2023-06-12 PROCEDURE — 78803 RP LOCLZJ TUM SPECT 1 AREA: CPT | Mod: 26,,, | Performed by: INTERNAL MEDICINE

## 2023-06-12 PROCEDURE — 78803 CV PYP ATTR W SPECT (CUPID ONLY): ICD-10-PCS | Mod: 26,,, | Performed by: INTERNAL MEDICINE

## 2023-06-12 PROCEDURE — 78803 RP LOCLZJ TUM SPECT 1 AREA: CPT

## 2023-06-20 ENCOUNTER — PATIENT MESSAGE (OUTPATIENT)
Dept: CARDIOLOGY | Facility: CLINIC | Age: 83
End: 2023-06-20
Payer: MEDICARE

## 2023-06-20 RX ORDER — AMLODIPINE BESYLATE 5 MG/1
5 TABLET ORAL DAILY
Qty: 90 TABLET | Refills: 3 | Status: SHIPPED | OUTPATIENT
Start: 2023-06-20

## 2023-06-27 ENCOUNTER — HOSPITAL ENCOUNTER (OUTPATIENT)
Dept: CARDIOLOGY | Facility: HOSPITAL | Age: 83
Discharge: HOME | End: 2023-06-27
Attending: INTERNAL MEDICINE
Payer: MEDICARE

## 2023-06-27 VITALS
HEART RATE: 55 BPM | DIASTOLIC BLOOD PRESSURE: 60 MMHG | HEIGHT: 63 IN | SYSTOLIC BLOOD PRESSURE: 100 MMHG | WEIGHT: 172 LBS | BODY MASS INDEX: 30.48 KG/M2

## 2023-06-27 LAB
ASCENDING AORTA: 4.23 CM
AV INDEX (PROSTH): 0.61
AV MEAN GRADIENT: 7 MMHG
AV PEAK GRADIENT: 13 MMHG
AV VALVE AREA: 1.87 CM2
AV VELOCITY RATIO: 0.67
BSA FOR ECHO PROCEDURE: 1.86 M2
CV ECHO LV RWT: 0.3 CM
DOP CALC AO PEAK VEL: 1.77 M/S
DOP CALC AO VTI: 42.28 CM
DOP CALC LVOT AREA: 3 CM2
DOP CALC LVOT DIAMETER: 1.97 CM
DOP CALC LVOT PEAK VEL: 1.19 M/S
DOP CALC LVOT STROKE VOLUME: 79 CM3
DOP CALC MV VTI: 34.69 CM
DOP CALC RVOT PEAK VEL: 0.75 M/S
DOP CALC RVOT VTI: 18.25 CM
DOP CALCLVOT PEAK VEL VTI: 25.93 CM
E WAVE DECELERATION TIME: 185.51 MSEC
E/A RATIO: 1.12
E/E' RATIO: 16.73 M/S
ECHO LV POSTERIOR WALL: 0.61 CM (ref 0.6–1.1)
EJECTION FRACTION: 65 %
FRACTIONAL SHORTENING: 39 % (ref 28–44)
INTERVENTRICULAR SEPTUM: 0.64 CM (ref 0.6–1.1)
IVRT: 74.22 MSEC
LA MAJOR: 6.22 CM
LA MINOR: 6.24 CM
LA WIDTH: 3.56 CM
LEFT ATRIUM SIZE: 3.5 CM
LEFT ATRIUM VOLUME INDEX MOD: 36.7 ML/M2
LEFT ATRIUM VOLUME INDEX: 36.5 ML/M2
LEFT ATRIUM VOLUME MOD: 66.51 CM3
LEFT ATRIUM VOLUME: 65.98 CM3
LEFT INTERNAL DIMENSION IN SYSTOLE: 2.49 CM (ref 2.1–4)
LEFT VENTRICLE DIASTOLIC VOLUME INDEX: 40.36 ML/M2
LEFT VENTRICLE DIASTOLIC VOLUME: 73.06 ML
LEFT VENTRICLE MASS INDEX: 39 G/M2
LEFT VENTRICLE SYSTOLIC VOLUME INDEX: 12.2 ML/M2
LEFT VENTRICLE SYSTOLIC VOLUME: 22.13 ML
LEFT VENTRICULAR INTERNAL DIMENSION IN DIASTOLE: 4.07 CM (ref 3.5–6)
LEFT VENTRICULAR MASS: 69.78 G
LV LATERAL E/E' RATIO: 15.33 M/S
LV SEPTAL E/E' RATIO: 18.4 M/S
MV A" WAVE DURATION": 15.6 MSEC
MV MEAN GRADIENT: 1 MMHG
MV PEAK A VEL: 0.82 M/S
MV PEAK E VEL: 0.92 M/S
MV PEAK GRADIENT: 3 MMHG
MV STENOSIS PRESSURE HALF TIME: 53.8 MS
MV VALVE AREA BY CONTINUITY EQUATION: 2.28 CM2
MV VALVE AREA P 1/2 METHOD: 4.09 CM2
PISA TR MAX VEL: 2.78 M/S
PULM VEIN S/D RATIO: 1.28
PV MEAN GRADIENT: 1.3 MMHG
PV PEAK D VEL: 0.98 M/S
PV PEAK S VEL: 1.25 M/S
PV PEAK VELOCITY: 0.91 CM/S
RA MAJOR: 4.86 CM
RA PRESSURE: 3 MMHG
RA WIDTH: 3.07 CM
RIGHT VENTRICULAR END-DIASTOLIC DIMENSION: 2.26 CM
SINUS: 3.46 CM
STJ: 3.29 CM
TDI LATERAL: 0.06 M/S
TDI SEPTAL: 0.05 M/S
TDI: 0.06 M/S
TR MAX PG: 31 MMHG
TRICUSPID ANNULAR PLANE SYSTOLIC EXCURSION: 2.61 CM
TV REST PULMONARY ARTERY PRESSURE: 34 MMHG

## 2023-06-27 PROCEDURE — 93306 ECHO (CUPID ONLY): ICD-10-PCS | Mod: 26,,, | Performed by: INTERNAL MEDICINE

## 2023-06-27 PROCEDURE — 93356 MYOCRD STRAIN IMG SPCKL TRCK: CPT

## 2023-06-27 PROCEDURE — 93356 PR IMG, MYOCARDIAL STRAIN, SPECKLE TRACKING: ICD-10-PCS | Mod: ,,, | Performed by: INTERNAL MEDICINE

## 2023-06-27 PROCEDURE — 93356 MYOCRD STRAIN IMG SPCKL TRCK: CPT | Mod: ,,, | Performed by: INTERNAL MEDICINE

## 2023-06-27 PROCEDURE — 93306 TTE W/DOPPLER COMPLETE: CPT | Mod: 26,,, | Performed by: INTERNAL MEDICINE

## 2023-08-01 LAB — CRC RECOMMENDATION EXT: NORMAL

## 2023-08-09 ENCOUNTER — OFFICE VISIT (OUTPATIENT)
Dept: SLEEP MEDICINE | Facility: CLINIC | Age: 83
End: 2023-08-09
Payer: MEDICARE

## 2023-08-09 VITALS
DIASTOLIC BLOOD PRESSURE: 66 MMHG | BODY MASS INDEX: 31.1 KG/M2 | WEIGHT: 175.5 LBS | HEIGHT: 63 IN | SYSTOLIC BLOOD PRESSURE: 117 MMHG | HEART RATE: 50 BPM

## 2023-08-09 DIAGNOSIS — G25.81 RLS (RESTLESS LEGS SYNDROME): ICD-10-CM

## 2023-08-09 DIAGNOSIS — G47.33 OSA (OBSTRUCTIVE SLEEP APNEA): ICD-10-CM

## 2023-08-09 DIAGNOSIS — M54.10 RADICULOPATHY, UNSPECIFIED SPINAL REGION: Primary | ICD-10-CM

## 2023-08-09 PROCEDURE — 1159F PR MEDICATION LIST DOCUMENTED IN MEDICAL RECORD: ICD-10-PCS | Mod: HCNC,CPTII,S$GLB, | Performed by: PSYCHIATRY & NEUROLOGY

## 2023-08-09 PROCEDURE — 1101F PR PT FALLS ASSESS DOC 0-1 FALLS W/OUT INJ PAST YR: ICD-10-PCS | Mod: HCNC,CPTII,S$GLB, | Performed by: PSYCHIATRY & NEUROLOGY

## 2023-08-09 PROCEDURE — 1159F MED LIST DOCD IN RCRD: CPT | Mod: HCNC,CPTII,S$GLB, | Performed by: PSYCHIATRY & NEUROLOGY

## 2023-08-09 PROCEDURE — 99215 OFFICE O/P EST HI 40 MIN: CPT | Mod: S$GLB,,, | Performed by: PSYCHIATRY & NEUROLOGY

## 2023-08-09 PROCEDURE — 1101F PT FALLS ASSESS-DOCD LE1/YR: CPT | Mod: HCNC,CPTII,S$GLB, | Performed by: PSYCHIATRY & NEUROLOGY

## 2023-08-09 PROCEDURE — 1126F PR PAIN SEVERITY QUANTIFIED, NO PAIN PRESENT: ICD-10-PCS | Mod: HCNC,CPTII,S$GLB, | Performed by: PSYCHIATRY & NEUROLOGY

## 2023-08-09 PROCEDURE — 3078F PR MOST RECENT DIASTOLIC BLOOD PRESSURE < 80 MM HG: ICD-10-PCS | Mod: HCNC,CPTII,S$GLB, | Performed by: PSYCHIATRY & NEUROLOGY

## 2023-08-09 PROCEDURE — 3288F PR FALLS RISK ASSESSMENT DOCUMENTED: ICD-10-PCS | Mod: HCNC,CPTII,S$GLB, | Performed by: PSYCHIATRY & NEUROLOGY

## 2023-08-09 PROCEDURE — 3288F FALL RISK ASSESSMENT DOCD: CPT | Mod: HCNC,CPTII,S$GLB, | Performed by: PSYCHIATRY & NEUROLOGY

## 2023-08-09 PROCEDURE — 3078F DIAST BP <80 MM HG: CPT | Mod: HCNC,CPTII,S$GLB, | Performed by: PSYCHIATRY & NEUROLOGY

## 2023-08-09 PROCEDURE — 99999 PR PBB SHADOW E&M-EST. PATIENT-LVL IV: ICD-10-PCS | Mod: PBBFAC,HCNC,, | Performed by: PSYCHIATRY & NEUROLOGY

## 2023-08-09 PROCEDURE — 99999 PR PBB SHADOW E&M-EST. PATIENT-LVL IV: CPT | Mod: PBBFAC,HCNC,, | Performed by: PSYCHIATRY & NEUROLOGY

## 2023-08-09 PROCEDURE — 3074F PR MOST RECENT SYSTOLIC BLOOD PRESSURE < 130 MM HG: ICD-10-PCS | Mod: HCNC,CPTII,S$GLB, | Performed by: PSYCHIATRY & NEUROLOGY

## 2023-08-09 PROCEDURE — 1126F AMNT PAIN NOTED NONE PRSNT: CPT | Mod: HCNC,CPTII,S$GLB, | Performed by: PSYCHIATRY & NEUROLOGY

## 2023-08-09 PROCEDURE — 3074F SYST BP LT 130 MM HG: CPT | Mod: HCNC,CPTII,S$GLB, | Performed by: PSYCHIATRY & NEUROLOGY

## 2023-08-09 PROCEDURE — 99215 PR OFFICE/OUTPT VISIT, EST, LEVL V, 40-54 MIN: ICD-10-PCS | Mod: S$GLB,,, | Performed by: PSYCHIATRY & NEUROLOGY

## 2023-08-09 RX ORDER — GABAPENTIN 300 MG/1
CAPSULE ORAL
Qty: 270 CAPSULE | Refills: 3 | Status: SHIPPED | OUTPATIENT
Start: 2023-08-09

## 2023-08-09 NOTE — PATIENT INSTRUCTIONS
Insight timer phone ruma -> sleep section  - to listen to sleep meditation  Let me know if you jorge curious about CBti          Sleep Hygiene Practices     1. Try going to bed only when you are drowsy.  ?   2. If you are unable to fall asleep or stay asleep, leave your bedroom and engage in a quiet activity elsewhere. Do not permit yourself to fall asleep outside the bedroom. Return to bed when and only when you are sleepy. Repeat this process as often as necessary throughout night.   3. Maintain regular wake-up time, even on days off work & weekends   4. Use your bedroom for sleep and sex   5. Do not watch TV in bed  6. Avoid napping during the daytime. If daytime sleepiness becomes overwhelming, limit nap time to a single nap of less than 1hr, no later than 3pm.   7. Distract your mind. Avoid clock watching. Lying in bed unable to sleep and frustrated needs to be avoided. Try reading or watching a videotape or listening to books on tape. It may be necessary to go into another room to do these.   8. Avoid caffeine within 4-6hrs of bedtime   9. Avoid use of nicotine close to bedtime   10. do not drink alcoholic beverages within 4-6hrs of bedtime   11. While a light snack before bedtime can help promote sound sleep, avoid large meals.   12. Obtain regular exercise, but avoid strenuous exercise within 4hrs of bedtime   13. Minimize light, noise, and extremes in temperature in the bedroom.   14. Precautions: The patient was advised to abstain from driving should they feel sleepy or drowsy.

## 2023-08-09 NOTE — PROGRESS NOTES
"    Maryann Sorenson is a 82 y.o. female seen today for CPAP, insomnia and RLS follow up. Last seen on 10/5/2022. Mirapex 0.125 at bedtime.  Taking 300 mg TID Gqabapentin for RLS - after stopping Horizon that she took for 2 month helped the exacerbation.  Has worsening  pain in both legs due to spinal stenosis. Did 2 injections - not contemplating surgery.    Worse after standing up for a while.    Reports difficulty falling asleep - "hard to shut her mind off".     Going to bed"late" when she is ready   11-11:30; can fall asleep in 5 min, or it can take hours - gets out of bed then to watch TV.   Once she falls asleep she does not sleep through - usually waking up 1-2 times through the night - to go to the bathroom - usually can return to sleep. Gets up at 6:30-7:30    Overall good sleep hygiene. Avoids electronics when not sleep and no caffeine in the afternoon. No naps. Has not tried meditations in the sleep section of Insight timer yet.  Gets of bed when unable to sleep.       EPWORTH SLEEPINESS SCALE TOTAL SCORE  10/5/2022 10/26/2021 4/14/2021 9/15/2020 10/23/2019 7/9/2019 4/8/2019   Total score 4 5 6 8 6 6 6     Using DS2 - went bnack to Dreamwear mask (Wisp was "aggravating".     The patient reports improved sleep continuity and daytime sleepiness on PAP. ESS today is 8/24.  Denies break through snoring.  No  dry mouth.  No aerophagia or air hunger. Denies occasional mask leaks and discomfort. Using a under the nose Deramwear. mask       DME: MORE got machine in DS2 replaceed by Sims    Patient ID: [id number] Maryann Sorenson  Compliance Summary  7/9/2023 - 8/7/2023 (30 days)  Days with Device Usage 30 days  Days without Device Usage 0 days  Percent Days with Device Usage 100.0%  Cumulative Usage 9 days 5 hrs. 3 mins.  Maximum Usage (1 Day) 8 hrs. 3 mins. 25 secs.  Average Usage (All Days) 7 hrs. 22 mins. 6 secs.  Average Usage (Days Used) 7 hrs. 22 mins. 6 secs.  Minimum Usage (1 Day) 6 hrs. 12 mins. 4 " "secs.  Percent of Days with Usage >= 4 Hours 100.0%  Percent of Days with Usage < 4 Hours 0.0%  Date Range  Total Blower Time 9 days 5 hrs. 38 mins. 22 secs.  Average AHI 4.5  Auto-CPAP Summary  Auto-CPAP Mean Pressure 10.0 cmH2O  Auto-CPAP Peak Average Pressure 12.7 cmH2O  Device Pressure <= 90% of Time 11.6 cmH2O  Average Time in Large Leak Per Day 0 secs.  Device Settings as of 8/7/2023  AutoCPAP - A-Flex  Device Settings  Device Mode  Parameter Value  Min Pressure 9 cmH2O  Max Pressure 18 cmH2O  A-Flex Setting 3  Auto Off Off  Auto On On  Ramp+ Time 15 minutes  Ramp+ Start Pressure 9.0 cmH2O  Mask Resistance X1  Tubing Type 15  Opti-Start On  EZ-Start Disabled  Patient Controls Access On  Patient Data Access On  Tube Temperature Off  Humidifier 5  Printed By: Care  Version: 1.49.2 Page 12 of 1    \    Medications pertinent to sleep disorders: *Gabapentin and Mirapex  Previously tried medications:    DME: [facility]  SLEEP STUDIES:       PSG 8/25/16: Significant Obstructive sleep apnea (NOE) with AHI (apnea hypopnea Index) of 8.9 and SaO2 of 87% (weight  161 lbs).RDI 15.    DME: [facility]      Using [facility]:       ASSESSMENT:    1. NOE - moderate by RDI. Poor sleep efficiency on the nioght of the study could have led to underestimation of NOE severity.  The patient symptomatically has  excessive daytime sleepiness, snoring,  witnessed breathing pauses, excessive daytime fatigue, gasping for air in sleep and interrupted sleep  with exam findings of "a crowded oral airway and elevated body mass index. The patient has medical co-morbidities of CAD and hyperlipidemia,  which can be worsened by NOE. Benefiting from CPAP use in terms of sleep continuity and daytime sleepiness. Complaint.           2. Interrupted, nonrefreshing  sleep and residual fatigue despite compliant and effective CPAP use: RLS, neuropathic leg pain stemming for LS -radiculopathy may be playing a role. Worsening insomnia.     3. RLS -   " Using Gabapentin 300 mg total per day (divided in 3 doses) for neuropathic pain related to spinal stenosi  + Mirapex 0.125 mg with variable response. Stopped Horizant as she needed to restart Gabapentin  for worsening radiculopathy.       PLAN:    Increased  9-18 cm H2O to 10 starting pressure due to residual obstructive events with borderline AHI and interrupted sleep ; ramp set at 9 for 15 m, instructions and demonstration was provided.       Continue  Gabapentin 100 mg - increase the bedtime dose from 100 mg to 200 mg for RLS in addition to Mirapex 0.125 mg.    Will continue Mirapex 0.125 mg once at bedtime  - will refillStopped  Horizanat.     Continue Magnesium. 250 mg   Continue consuming iron-rich foods and MVT with iron.     ----------------------------------------------------      Education: During our discussion today, we talked about the etiology of obstructive sleep apnea as well as the potential ramifications of untreated sleep apnea, which could include daytime sleepiness, hypertension, heart disease and/or stroke.      We discussed potential treatment options, which could include weight loss, body positioning, continuous positive airway pressure (CPAP), or referral for surgical consideration. The patient preferred CPAP option.     Discussed purpose of PAP therapy, health benefits of CPAP, as well as the potential ramifications of untreated sleep apnea, which could include daytime sleepiness, hypertension, heart disease and/or stroke. An AHI of 15 is associated with increased risk CVD.     The patient should avoid ETOH and sedatives at night, as it tends to aggravate NOE. Regular replacement of CPAP mask, tubing and filter was recommended.    Precautions: The patient was advised to abstain from driving should he feel sleepy or drowsy.    Follow up: MD in 6 months    Thank you for allowing me the opportunity to participate in the care of your patient.    More than 50% of this 46 min visit were spent in  counseling and care coordination.       ESS ([place] Sleepiness Scale) and other sleep medicine related questionnaires were reviewed with the patient.

## 2023-08-16 ENCOUNTER — PATIENT OUTREACH (OUTPATIENT)
Dept: ADMINISTRATIVE | Facility: HOSPITAL | Age: 83
End: 2023-08-16
Payer: MEDICARE

## 2023-08-20 NOTE — PROGRESS NOTES
CC: Annual PE    82 y.o. female presents for PE   Non smoker   Social ETOH      HEALTH MAINTENANCE:  Cholesterol/labs: 4/2021  C-scope: N/A  EGD-+ hiatal hernia, s/p dilatation Dr. Pop  C-scope, s/p polyp, every 5 years  Scheduled 9/6/23---------------------------------------------  WWE: gyn  Mammo:gyn  DEXA: 2021-osteoporosis- did not take bisphosphonate  2/2 to esophageal disease   +vitamin D supplements and calcium  EYE exam: UTD  DDS exam: UTD- s/p root canal, left side    VACCINATIONS:  TD: 2014  Flu:yearly  Pneumovax:2009  Prevnar: 2019  Shingles: 2017-@ [facility]  Covid: x 3      MEDCARD: Reviewed  ROS:  No fever, chills, or night sweats  No visual disturbance or d/c  No ear or sinus pain or pressure  No dysphagia or early satiety  No chest pain or palpitations  No cough or wheezing  No PND or orthopnea  No GERD or abdominal pain; occ breakthrough reflux  No change in bowels or blood in stool  No hematuria or dysuria;  +  Nocturia  No vaginal bleeding or pelvic pain or bloating  No skin rashes or lesions  ++ LBP w/ B/L pain lateral thighs  S/p NADYA that were not very effective    No unusual HA or focal deficits  No cold or heat intolerance  No increased thirst or urination  No unusual bruising or bleeding  ADL's: 100% independent, driving more restricted w/ visual changes  AD's: + will,  unsure about AD, and M/POA ;  Pt would want all health care measures taken if hope of recovery to near baseline; would not want long term ventilator  Memory: Good, delayed recall  Mental health: spouse w/ significant health issues, back problems and memory declined s/p stroke 2014 and has progressed; wife handles all his care and finances, etc    Gait: no recent falls   Nutrition: healthy-+/- sweets, spouse DM but doesn't want to amend his diet, no improvement  Safety: Intact, but concerned about spouse being able to discern scams  Urinary incontinence: stress incontinence    Remainder of review negative except as  previously noted    PMHX:Reviewed  PSHX: Reviewed  SHX: Reviewed      PE:  VS: As noted  GENERAL: Well developed well nourished, alert and oriented in NAD  Conversant and co-operative,  Pleasant as always  EYES: Conjunctiva and lids normal, sclera anicteric  NECK: Supple w/o thyromegaly or lymphadenopathy    RESPIRATORY: Efforts are unlabored; lungs are CTAP  CARDIOVASCULAR: Heart RRR w/o mumur, gallop or rub; No carotid bruits noted  1+ pedal pulses; no LE edema noted  GASTROINTESTINAL: Bowel sounds are present, soft, nontender, nondistended  No hepatosplenomegaly noted.    MUSCULOSKELETAL: Gait normal. No clubbing, cyanosis, or edema noted.  NEUROLOGIC: STEVENS. No tremor noted  SKIN: Warm and dry    IMPRESSION/PLAN:  Annual PE  PAF, started after Covid dx , stable  -continue Eliquis  -continue BB  HTN, stable  -continue amlodipine 5 mg  -continue losartan 50 mg q.day  -continue metoprolol XL 25 mg q.day  -continue low salt diet  Pulmonary HTN, asx  HLD, stable  -continue rosuvastatin 40 mg q.day  -continue low-fat diet  Carotid stenosis,asx  Atherosclerosis CAD/Agatston +,asx  Hyperglycemia, stable  Pre-DM, stable  -continue Jardiance, Rx at [facility]  MICHELLE, on supplement  GERD/hiatal hernia, on PPI  Chronic back pain, lumbar radiculitis,   RLS, stable  -continue gabapentin     Vitamin D deficiency, on supplements  Osteoporosis, on supplements  HM  -reviewed covid vaccine  -Reviewed flu vaccine

## 2023-08-22 ENCOUNTER — OFFICE VISIT (OUTPATIENT)
Dept: INTERNAL MEDICINE | Facility: CLINIC | Age: 83
End: 2023-08-22
Payer: MEDICARE

## 2023-08-22 VITALS
OXYGEN SATURATION: 99 % | SYSTOLIC BLOOD PRESSURE: 118 MMHG | HEART RATE: 56 BPM | WEIGHT: 171.94 LBS | RESPIRATION RATE: 16 BRPM | TEMPERATURE: 98 F | HEIGHT: 63 IN | DIASTOLIC BLOOD PRESSURE: 76 MMHG | BODY MASS INDEX: 30.46 KG/M2

## 2023-08-22 DIAGNOSIS — H43.393 VITREOUS FLOATER, BILATERAL: ICD-10-CM

## 2023-08-22 DIAGNOSIS — I27.9 CHRONIC PULMONARY HEART DISEASE: ICD-10-CM

## 2023-08-22 DIAGNOSIS — I70.0 AORTIC ATHEROSCLEROSIS: ICD-10-CM

## 2023-08-22 DIAGNOSIS — R73.03 PRE-DIABETES: ICD-10-CM

## 2023-08-22 DIAGNOSIS — D50.9 IRON DEFICIENCY ANEMIA, UNSPECIFIED IRON DEFICIENCY ANEMIA TYPE: ICD-10-CM

## 2023-08-22 DIAGNOSIS — Z00.00 ANNUAL PHYSICAL EXAM: Primary | ICD-10-CM

## 2023-08-22 DIAGNOSIS — I65.23 CAROTID STENOSIS, BILATERAL: ICD-10-CM

## 2023-08-22 DIAGNOSIS — E78.5 HYPERLIPIDEMIA, UNSPECIFIED HYPERLIPIDEMIA TYPE: ICD-10-CM

## 2023-08-22 DIAGNOSIS — I10 ESSENTIAL HYPERTENSION: ICD-10-CM

## 2023-08-22 DIAGNOSIS — I48.0 PAROXYSMAL ATRIAL FIBRILLATION: ICD-10-CM

## 2023-08-22 DIAGNOSIS — R93.1 AGATSTON CORONARY ARTERY CALCIUM SCORE GREATER THAN 400: ICD-10-CM

## 2023-08-22 PROCEDURE — 3078F DIAST BP <80 MM HG: CPT | Mod: HCNC,CPTII,S$GLB, | Performed by: INTERNAL MEDICINE

## 2023-08-22 PROCEDURE — 1159F MED LIST DOCD IN RCRD: CPT | Mod: HCNC,CPTII,S$GLB, | Performed by: INTERNAL MEDICINE

## 2023-08-22 PROCEDURE — 1159F PR MEDICATION LIST DOCUMENTED IN MEDICAL RECORD: ICD-10-PCS | Mod: HCNC,CPTII,S$GLB, | Performed by: INTERNAL MEDICINE

## 2023-08-22 PROCEDURE — 3078F PR MOST RECENT DIASTOLIC BLOOD PRESSURE < 80 MM HG: ICD-10-PCS | Mod: HCNC,CPTII,S$GLB, | Performed by: INTERNAL MEDICINE

## 2023-08-22 PROCEDURE — 3074F SYST BP LT 130 MM HG: CPT | Mod: HCNC,CPTII,S$GLB, | Performed by: INTERNAL MEDICINE

## 2023-08-22 PROCEDURE — 3288F FALL RISK ASSESSMENT DOCD: CPT | Mod: HCNC,CPTII,S$GLB, | Performed by: INTERNAL MEDICINE

## 2023-08-22 PROCEDURE — 1101F PT FALLS ASSESS-DOCD LE1/YR: CPT | Mod: HCNC,CPTII,S$GLB, | Performed by: INTERNAL MEDICINE

## 2023-08-22 PROCEDURE — 1101F PR PT FALLS ASSESS DOC 0-1 FALLS W/OUT INJ PAST YR: ICD-10-PCS | Mod: HCNC,CPTII,S$GLB, | Performed by: INTERNAL MEDICINE

## 2023-08-22 PROCEDURE — 99397 PER PM REEVAL EST PAT 65+ YR: CPT | Mod: HCNC,S$GLB,, | Performed by: INTERNAL MEDICINE

## 2023-08-22 PROCEDURE — 1126F PR PAIN SEVERITY QUANTIFIED, NO PAIN PRESENT: ICD-10-PCS | Mod: HCNC,CPTII,S$GLB, | Performed by: INTERNAL MEDICINE

## 2023-08-22 PROCEDURE — 99999 PR PBB SHADOW E&M-EST. PATIENT-LVL V: CPT | Mod: PBBFAC,HCNC,, | Performed by: INTERNAL MEDICINE

## 2023-08-22 PROCEDURE — 99397 PR PREVENTIVE VISIT,EST,65 & OVER: ICD-10-PCS | Mod: HCNC,S$GLB,, | Performed by: INTERNAL MEDICINE

## 2023-08-22 PROCEDURE — 3288F PR FALLS RISK ASSESSMENT DOCUMENTED: ICD-10-PCS | Mod: HCNC,CPTII,S$GLB, | Performed by: INTERNAL MEDICINE

## 2023-08-22 PROCEDURE — 1160F RVW MEDS BY RX/DR IN RCRD: CPT | Mod: HCNC,CPTII,S$GLB, | Performed by: INTERNAL MEDICINE

## 2023-08-22 PROCEDURE — 1160F PR REVIEW ALL MEDS BY PRESCRIBER/CLIN PHARMACIST DOCUMENTED: ICD-10-PCS | Mod: HCNC,CPTII,S$GLB, | Performed by: INTERNAL MEDICINE

## 2023-08-22 PROCEDURE — 3074F PR MOST RECENT SYSTOLIC BLOOD PRESSURE < 130 MM HG: ICD-10-PCS | Mod: HCNC,CPTII,S$GLB, | Performed by: INTERNAL MEDICINE

## 2023-08-22 PROCEDURE — 99999 PR PBB SHADOW E&M-EST. PATIENT-LVL V: ICD-10-PCS | Mod: PBBFAC,HCNC,, | Performed by: INTERNAL MEDICINE

## 2023-08-22 PROCEDURE — 1126F AMNT PAIN NOTED NONE PRSNT: CPT | Mod: HCNC,CPTII,S$GLB, | Performed by: INTERNAL MEDICINE

## 2023-08-23 ENCOUNTER — LAB VISIT (OUTPATIENT)
Dept: LAB | Facility: HOSPITAL | Age: 83
End: 2023-08-23
Attending: INTERNAL MEDICINE
Payer: MEDICARE

## 2023-08-23 DIAGNOSIS — E78.5 HYPERLIPIDEMIA, UNSPECIFIED HYPERLIPIDEMIA TYPE: ICD-10-CM

## 2023-08-23 DIAGNOSIS — I10 ESSENTIAL HYPERTENSION: ICD-10-CM

## 2023-08-23 DIAGNOSIS — R73.03 PRE-DIABETES: ICD-10-CM

## 2023-08-23 DIAGNOSIS — D50.9 IRON DEFICIENCY ANEMIA, UNSPECIFIED IRON DEFICIENCY ANEMIA TYPE: ICD-10-CM

## 2023-08-23 LAB
ALBUMIN SERPL BCP-MCNC: 4.7 G/DL (ref 3.5–5.2)
ALP SERPL-CCNC: 85 U/L (ref 38–126)
ALT SERPL W/O P-5'-P-CCNC: 20 U/L (ref 10–44)
ANION GAP SERPL CALC-SCNC: 14 MMOL/L (ref 8–16)
AST SERPL-CCNC: 35 U/L (ref 15–46)
BASOPHILS # BLD AUTO: 0.03 K/UL (ref 0–0.2)
BASOPHILS NFR BLD: 0.8 % (ref 0–1.9)
BILIRUB SERPL-MCNC: 0.4 MG/DL (ref 0.1–1)
CALCIUM SERPL-MCNC: 9.5 MG/DL (ref 8.7–10.5)
CHLORIDE SERPL-SCNC: 102 MMOL/L (ref 95–110)
CHOLEST SERPL-MCNC: 151 MG/DL (ref 120–199)
CHOLEST/HDLC SERPL: 2.4 {RATIO} (ref 2–5)
CO2 SERPL-SCNC: 25 MMOL/L (ref 23–29)
CREAT SERPL-MCNC: 0.73 MG/DL (ref 0.5–1.4)
DIFFERENTIAL METHOD: ABNORMAL
EOSINOPHIL # BLD AUTO: 0.1 K/UL (ref 0–0.5)
EOSINOPHIL NFR BLD: 3.8 % (ref 0–8)
ERYTHROCYTE [DISTWIDTH] IN BLOOD BY AUTOMATED COUNT: 14.5 % (ref 11.5–14.5)
EST. GFR  (NO RACE VARIABLE): >60 ML/MIN/1.73 M^2
ESTIMATED AVG GLUCOSE: 131 MG/DL (ref 68–131)
GLUCOSE SERPL-MCNC: 105 MG/DL (ref 70–110)
HBA1C MFR BLD: 6.2 % (ref 4–5.6)
HCT VFR BLD AUTO: 40 % (ref 37–48.5)
HDLC SERPL-MCNC: 64 MG/DL (ref 40–75)
HDLC SERPL: 42.4 % (ref 20–50)
HGB BLD-MCNC: 12.7 G/DL (ref 12–16)
IMM GRANULOCYTES # BLD AUTO: 0 K/UL (ref 0–0.04)
IMM GRANULOCYTES NFR BLD AUTO: 0 % (ref 0–0.5)
IRON SERPL-MCNC: 71 UG/DL (ref 30–160)
LDLC SERPL CALC-MCNC: 72.2 MG/DL (ref 63–159)
LYMPHOCYTES # BLD AUTO: 1.3 K/UL (ref 1–4.8)
LYMPHOCYTES NFR BLD: 34.3 % (ref 18–48)
MCH RBC QN AUTO: 28.3 PG (ref 27–31)
MCHC RBC AUTO-ENTMCNC: 31.8 G/DL (ref 32–36)
MCV RBC AUTO: 89 FL (ref 82–98)
MONOCYTES # BLD AUTO: 0.4 K/UL (ref 0.3–1)
MONOCYTES NFR BLD: 11 % (ref 4–15)
NEUTROPHILS # BLD AUTO: 1.8 K/UL (ref 1.8–7.7)
NEUTROPHILS NFR BLD: 50.1 % (ref 38–73)
NONHDLC SERPL-MCNC: 87 MG/DL
NRBC BLD-RTO: 0 /100 WBC
PLATELET # BLD AUTO: 236 K/UL (ref 150–450)
PMV BLD AUTO: 11.4 FL (ref 9.2–12.9)
POTASSIUM SERPL-SCNC: 3.9 MMOL/L (ref 3.5–5.1)
PROT SERPL-MCNC: 7.6 G/DL (ref 6–8.4)
RBC # BLD AUTO: 4.49 M/UL (ref 4–5.4)
SATURATED IRON: 17 % (ref 20–50)
SODIUM SERPL-SCNC: 141 MMOL/L (ref 136–145)
TOTAL IRON BINDING CAPACITY: 407 UG/DL (ref 250–450)
TRANSFERRIN SERPL-MCNC: 275 MG/DL (ref 200–375)
TRIGL SERPL-MCNC: 74 MG/DL (ref 30–150)
TSH SERPL DL<=0.005 MIU/L-ACNC: 2.97 UIU/ML (ref 0.4–4)
UUN UR-MCNC: 15 MG/DL (ref 7–17)
WBC # BLD AUTO: 3.64 K/UL (ref 3.9–12.7)

## 2023-08-23 PROCEDURE — 83036 HEMOGLOBIN GLYCOSYLATED A1C: CPT | Mod: HCNC | Performed by: INTERNAL MEDICINE

## 2023-08-23 PROCEDURE — 80061 LIPID PANEL: CPT | Mod: HCNC | Performed by: INTERNAL MEDICINE

## 2023-08-23 PROCEDURE — 36415 COLL VENOUS BLD VENIPUNCTURE: CPT | Mod: HCNC,PO | Performed by: INTERNAL MEDICINE

## 2023-08-23 PROCEDURE — 85025 COMPLETE CBC W/AUTO DIFF WBC: CPT | Mod: HCNC,PO | Performed by: INTERNAL MEDICINE

## 2023-08-23 PROCEDURE — 83540 ASSAY OF IRON: CPT | Mod: HCNC,PO | Performed by: INTERNAL MEDICINE

## 2023-08-23 PROCEDURE — 84466 ASSAY OF TRANSFERRIN: CPT | Mod: HCNC,PO | Performed by: INTERNAL MEDICINE

## 2023-08-23 PROCEDURE — 84443 ASSAY THYROID STIM HORMONE: CPT | Mod: HCNC,PO | Performed by: INTERNAL MEDICINE

## 2023-08-23 PROCEDURE — 80053 COMPREHEN METABOLIC PANEL: CPT | Mod: HCNC,PO | Performed by: INTERNAL MEDICINE

## 2023-09-07 ENCOUNTER — HOSPITAL ENCOUNTER (EMERGENCY)
Facility: HOSPITAL | Age: 83
Discharge: HOME | End: 2023-09-08
Attending: EMERGENCY MEDICINE
Payer: MEDICARE

## 2023-09-07 ENCOUNTER — TELEPHONE (OUTPATIENT)
Dept: CARDIOLOGY | Facility: CLINIC | Age: 83
End: 2023-09-07
Payer: MEDICARE

## 2023-09-07 DIAGNOSIS — R06.02 SOB (SHORTNESS OF BREATH): ICD-10-CM

## 2023-09-07 DIAGNOSIS — I48.91 ATRIAL FIBRILLATION WITH RVR: Primary | ICD-10-CM

## 2023-09-07 PROCEDURE — 99285 EMERGENCY DEPT VISIT HI MDM: CPT | Mod: 25,HCNC

## 2023-09-07 NOTE — TELEPHONE ENCOUNTER
----- Message from Hanny Mckeon sent at 9/7/2023  2:07 PM CDT -----  Regarding: Concerns  Pt [phone] says she had a colonoscopy on yesterday at [facility] and during the procedure her heart rate dropped. She was sent to ER and was kept over night and was advised to wear a holter. She told them that she would like to speak with her cardiologist in ref to this first.    Thanks

## 2023-09-08 ENCOUNTER — PATIENT MESSAGE (OUTPATIENT)
Dept: CARDIOLOGY | Facility: CLINIC | Age: 83
End: 2023-09-08
Payer: MEDICARE

## 2023-09-08 VITALS
SYSTOLIC BLOOD PRESSURE: 107 MMHG | RESPIRATION RATE: 18 BRPM | TEMPERATURE: 98 F | OXYGEN SATURATION: 96 % | HEART RATE: 62 BPM | DIASTOLIC BLOOD PRESSURE: 69 MMHG

## 2023-09-08 LAB
ALBUMIN SERPL BCP-MCNC: 3.9 G/DL (ref 3.5–5.2)
ALP SERPL-CCNC: 84 U/L (ref 55–135)
ALT SERPL W/O P-5'-P-CCNC: 13 U/L (ref 10–44)
ANION GAP SERPL CALC-SCNC: 13 MMOL/L (ref 8–16)
AST SERPL-CCNC: 31 U/L (ref 10–40)
BASOPHILS # BLD AUTO: 0.04 K/UL (ref 0–0.2)
BASOPHILS NFR BLD: 0.6 % (ref 0–1.9)
BILIRUB SERPL-MCNC: 0.4 MG/DL (ref 0.1–1)
BNP SERPL-MCNC: 84 PG/ML (ref 0–99)
BUN SERPL-MCNC: 7 MG/DL (ref 8–23)
CALCIUM SERPL-MCNC: 9.3 MG/DL (ref 8.7–10.5)
CHLORIDE SERPL-SCNC: 104 MMOL/L (ref 95–110)
CO2 SERPL-SCNC: 20 MMOL/L (ref 23–29)
CREAT SERPL-MCNC: 0.7 MG/DL (ref 0.5–1.4)
DIFFERENTIAL METHOD: ABNORMAL
EOSINOPHIL # BLD AUTO: 0.1 K/UL (ref 0–0.5)
EOSINOPHIL NFR BLD: 1.4 % (ref 0–8)
ERYTHROCYTE [DISTWIDTH] IN BLOOD BY AUTOMATED COUNT: 14.3 % (ref 11.5–14.5)
EST. GFR  (NO RACE VARIABLE): >60 ML/MIN/1.73 M^2
GLUCOSE SERPL-MCNC: 125 MG/DL (ref 70–110)
HCT VFR BLD AUTO: 37.6 % (ref 37–48.5)
HGB BLD-MCNC: 12.3 G/DL (ref 12–16)
IMM GRANULOCYTES # BLD AUTO: 0.02 K/UL (ref 0–0.04)
IMM GRANULOCYTES NFR BLD AUTO: 0.3 % (ref 0–0.5)
LYMPHOCYTES # BLD AUTO: 1 K/UL (ref 1–4.8)
LYMPHOCYTES NFR BLD: 15.4 % (ref 18–48)
MAGNESIUM SERPL-MCNC: 2.2 MG/DL (ref 1.6–2.6)
MCH RBC QN AUTO: 28.1 PG (ref 27–31)
MCHC RBC AUTO-ENTMCNC: 32.7 G/DL (ref 32–36)
MCV RBC AUTO: 86 FL (ref 82–98)
MONOCYTES # BLD AUTO: 0.7 K/UL (ref 0.3–1)
MONOCYTES NFR BLD: 10.6 % (ref 4–15)
NEUTROPHILS # BLD AUTO: 4.5 K/UL (ref 1.8–7.7)
NEUTROPHILS NFR BLD: 71.7 % (ref 38–73)
NRBC BLD-RTO: 0 /100 WBC
PLATELET # BLD AUTO: 254 K/UL (ref 150–450)
PMV BLD AUTO: 10.6 FL (ref 9.2–12.9)
POTASSIUM SERPL-SCNC: 3.6 MMOL/L (ref 3.5–5.1)
PROT SERPL-MCNC: 6.9 G/DL (ref 6–8.4)
RBC # BLD AUTO: 4.38 M/UL (ref 4–5.4)
SODIUM SERPL-SCNC: 137 MMOL/L (ref 136–145)
TROPONIN I SERPL DL<=0.01 NG/ML-MCNC: <0.006 NG/ML (ref 0–0.03)
WBC # BLD AUTO: 6.23 K/UL (ref 3.9–12.7)

## 2023-09-08 PROCEDURE — 84484 ASSAY OF TROPONIN QUANT: CPT | Mod: HCNC | Performed by: EMERGENCY MEDICINE

## 2023-09-08 PROCEDURE — 93005 ELECTROCARDIOGRAM TRACING: CPT | Mod: HCNC

## 2023-09-08 PROCEDURE — 93010 ELECTROCARDIOGRAM REPORT: CPT | Mod: HCNC,,, | Performed by: INTERNAL MEDICINE

## 2023-09-08 PROCEDURE — 93010 EKG 12-LEAD: ICD-10-PCS | Mod: HCNC,,, | Performed by: INTERNAL MEDICINE

## 2023-09-08 PROCEDURE — 80053 COMPREHEN METABOLIC PANEL: CPT | Mod: HCNC | Performed by: EMERGENCY MEDICINE

## 2023-09-08 PROCEDURE — 83735 ASSAY OF MAGNESIUM: CPT | Mod: HCNC | Performed by: EMERGENCY MEDICINE

## 2023-09-08 PROCEDURE — 85025 COMPLETE CBC W/AUTO DIFF WBC: CPT | Mod: HCNC | Performed by: EMERGENCY MEDICINE

## 2023-09-08 PROCEDURE — 83880 ASSAY OF NATRIURETIC PEPTIDE: CPT | Mod: HCNC | Performed by: EMERGENCY MEDICINE

## 2023-09-08 RX ORDER — METOPROLOL TARTRATE 25 MG/1
25 TABLET, FILM COATED ORAL
COMMUNITY

## 2023-09-08 NOTE — ED TRIAGE NOTES
81 yo presents to the ED for evaluation of palpitations at home. Pt in afib RVR in triage, noted NS in 60s at this time. Denies SOB or CP. Reports HR at home in 140s per home BP monitor. Pt had colonoscopy yesterday, was discharged this morning. PMH Afib. HR, NiBP, and SpO2 monitoring. Resp even & unlabored, VSS, NAD. Bed locked & in the lowest position, rails up x2, call button in reach. Family at the bedside.

## 2023-09-08 NOTE — DISCHARGE INSTRUCTIONS
You were evaluated for your Afib with a fast heart rate. We turner some labs and hooked you up to Telemetry. You converted into a normal sinus rhythm spontaneously. We monitored you for a few hours and you stayed in a normal rhythm. Your blood work came back normal. We are discharging you home.     Please follow up with Dr. PHIL Gavin and schedule an appointment.     Please continue to your use your Metoprolol tartrate (lopressor) 25 as needed for palpitations.     Please continue to hold your Metoprolol succinate 12.5 (Toprol XL) until you speak to Dr. Gavin.     Please come back to the ED if you have new onset chest pain, increasing shortness of breath, or have Afib with a fast rate again.

## 2023-09-08 NOTE — ED PROVIDER NOTES
Encounter Date: 9/7/2023       History     Chief Complaint   Patient presents with    Palpitations     Patient states she's been in Afib in tachardi since 5pm, per her smart phone and health ruma     Patient is an 82-year-old woman with a history of paroxysmal AFib who presented to the ED with complaints of palpitations and rapid heart rate.  Patient states that she 1st felt the palpitations 30 p.m. this evening.  Patient states that at 5:45 a.m. this evening she took her metoprolol tartrate 25 mg.  Patient monitored her heart rate and blood pressure over the next couple of hours and said that 945 she was to a heart rate of over 130 and decided to come to the ER.  Patient has had a few runs of AFib with RVR in the last year patient was 1st diagnosed with paroxysmal AFib in April 2022 patient typically takes 12.5 mg of metoprolol succinate.  However she is no longer taking it as per [facility].     Patient had a colonoscopy yesterday and had a few episodes of sinus arrest/asystole during the procedure.  Anesthesia believe this was due to a vasovagal reaction secondary to gaseous distention of her colon.  Patient was evaluated in the ED and observed overnight.  Patient denies any acute complications from her overnight hospitalization.  Patient states that she was discharged and instructed not to continue her long-acting metoprolol.  Also notably patient withheld Eliquis prior to her colonoscopy.  Patient's last dose of Eliquis prior to her colonoscopy was Saturday.  Patient restarted her Eliquis this morning and has taken 2 today.      Patient is complaining of palpitations, shortness a breath but is unchanged from her baseline, denies any chest pain dizziness lightheadedness nausea vomiting or generalized weakness.  Patient has no other complaints at this time.        Review of patient's allergies indicates:   Allergen Reactions    Amoxicillin-pot clavulanate Diarrhea     Given march 2016    Sulfa  "(sulfonamide antibiotics) Rash    Codeine      Other reaction(s): Unknown, tolerates hydrocodone june 2014    Doxycycline Nausea Only    Lyrica [pregabalin] Other (See Comments)     Blurry vision    Macrobid [nitrofurantoin monohyd/m-cryst] Other (See Comments)      2019 caused cramps in legs. After stopped medication it cleared.     Prevnar [pneumococcal 7-beti conj vacc] Other (See Comments)     Arm sore, redness at injection site and muscle aches. Given shot 7/23/19    Relafen [nabumetone] Diarrhea     Past Medical History:   Diagnosis Date    GERD (gastroesophageal reflux disease)     Pulmonary hypertension 2016    Syncope      Past Surgical History:   Procedure Laterality Date    broken wrist      "put plate in it"    DILATION AND CURETTAGE OF UTERUS      excision of lipoma Left 2009    near collar bone    tonsillectomy      TONSILLECTOMY       Family History   Problem Relation Age of Onset    Colon cancer Mother 95        d. 99 w/ brain mets    Hyperlipidemia Mother     Hypertension Mother     Heart disease Father     Kidney failure Father         d.89    Arthritis Sister         20+ surgeries- most 2/2 MSK problems- 80(2021)    Chronic back pain Sister         71(2021)    Other Brother         [place]36 [place]    Heart disease Daughter         d.7 yo in heart surgery; Tetrology of Fallot    No Known Problems Daughter         lives in [place]    Heart disease Son         congenital; Tetralogy of Fallot, lives in , surgery at 38yo    Other Son         cardiac arrest,-Vtach/fib,s/p H. , recovered    Depression Son         lives near pt- works in , lives in [place], [place]     Social History     Tobacco Use    Smoking status: Never    Smokeless tobacco: Never   Substance Use Topics    Alcohol use: Not Currently    Drug use: No     Review of Systems    Physical Exam     Initial Vitals [09/07/23 2256]   BP Pulse Resp Temp SpO2   114/75 (!) 118 20 97.9 °F (36.6 °C) 95 %      MAP       --     "     Physical Exam    Vitals reviewed.  Constitutional: She appears well-developed and well-nourished. She is not diaphoretic. No distress.   HENT:   Head: Normocephalic and atraumatic.   Eyes: EOM are normal. Pupils are equal, round, and reactive to light.   Neck: Neck supple.   Normal range of motion.  Cardiovascular:  Intact distal pulses.           Irregular Rate and Rhythm. Mildly Tachycardic   Pulmonary/Chest: Breath sounds normal. No respiratory distress.   Abdominal: Abdomen is soft.   Musculoskeletal:      Cervical back: Normal range of motion and neck supple.     Neurological: She is alert and oriented to person, place, and time.   Skin: Skin is warm and dry. Capillary refill takes less than 2 seconds.   Psychiatric: She has a normal mood and affect. Her behavior is normal. Judgment and thought content normal.         ED Course   Procedures  Labs Reviewed   CBC W/ AUTO DIFFERENTIAL - Abnormal; Notable for the following components:       Result Value    Lymph % 15.4 (*)     All other components within normal limits   COMPREHENSIVE METABOLIC PANEL - Abnormal; Notable for the following components:    CO2 20 (*)     Glucose 125 (*)     BUN 7 (*)     All other components within normal limits   MAGNESIUM   TROPONIN I   B-TYPE NATRIURETIC PEPTIDE     EKG Readings: (Independently Interpreted)   11pm: Afib RVR w/ rate of 114. No ST changes  12.13am: NSR, 1st Degree AV block, No ST changes        Imaging Results              X-Ray Chest AP Portable (Final result)  Result time 09/08/23 00:45:58      Final result by Derian Montez MD (09/08/23 00:45:58)                   Impression:      As above.      Electronically signed by: Derian Montez MD  Date:    09/08/2023  Time:    00:45               Narrative:    EXAMINATION:  XR CHEST AP PORTABLE    CLINICAL HISTORY:  palpitations;    TECHNIQUE:  Single frontal view of the chest was performed.    COMPARISON:  04/01/2022    FINDINGS:  Cardiac monitoring leads overlie  the chest.  Cardiac silhouette is stable in size.  There is atherosclerotic calcification of the thoracic aorta.  Stable retrocardiac opacity noted which may relate to large hiatal hernia.  Lungs demonstrate mild chronic coarse interstitial attenuation without evidence of large confluent airspace consolidation.  No significant volume of pleural fluid or pneumothorax identified.  The visualized osseous structures demonstrate mild degenerative changes.                        Final result by Derian Montez MD (09/08/23 00:45:58)                   Impression:      As above.      Electronically signed by: Derian Montez MD  Date:    09/08/2023  Time:    00:45               Narrative:    EXAMINATION:  XR CHEST AP PORTABLE    CLINICAL HISTORY:  palpitations;    TECHNIQUE:  Single frontal view of the chest was performed.    COMPARISON:  04/01/2022    FINDINGS:  Cardiac monitoring leads overlie the chest.  Cardiac silhouette is stable in size.  There is atherosclerotic calcification of the thoracic aorta.  Stable retrocardiac opacity noted which may relate to large hiatal hernia.  Lungs demonstrate mild chronic coarse interstitial attenuation without evidence of large confluent airspace consolidation.  No significant volume of pleural fluid or pneumothorax identified.  The visualized osseous structures demonstrate mild degenerative changes.                                    X-Rays:   Independently Interpreted Readings:   Other Readings:  Unremarkable    Medications - No data to display  Medical Decision Making  Patient is an 82-year-old woman with paroxysmal AFib anticoagulated on Eliquis rate controlled on metoprolol with p.r.n. metoprolol succinate the palpitations who presented to the ED with complaints of palpitations and tachycardia.  Patient was found to be in AFib RVR with a rate in the 110s.  Patient spontaneously converted to a normal sinus rhythm with no acute intervention.    Workup CBC CMP troponin BNP  telemetry  No acute interventions where required or given.     Disposition: patient spontaneously converted to normal sinus rhythm.  Patient was monitored afterwards.  Patient was hemodynamically stable no acute complaints with a stable rhythm.  Patient labs and studies were grossly unremarkable.  Patient was judged to be stable enough to discharge home. Patient was instructed to follow up with her cardiologist.  Patient to continue Eliquis and p.r.n. metoprolol Patient instructed to continue holding metoprolol succinate as per [facility].    Amount and/or Complexity of Data Reviewed  Independent Historian: parent  Labs: ordered.  Radiology: ordered.  ECG/medicine tests: ordered and independent interpretation performed.                               Clinical Impression:   Final diagnoses:  [R06.02] SOB (shortness of breath)  [I48.91] Atrial fibrillation with RVR (Primary)        ED Disposition Condition    Discharge Stable          ED Prescriptions    None       Follow-up Information       Follow up With Specialties Details Why Contact Info    Heydi Gavin MD Cardiology Call in 1 day  [address] [facility]  [address]  [facility] [address]  [phone]               Stephen Portillo DO  Resident  09/08/23 0138

## 2023-09-12 ENCOUNTER — OFFICE VISIT (OUTPATIENT)
Dept: CARDIOLOGY | Facility: CLINIC | Age: 83
End: 2023-09-12
Payer: MEDICARE

## 2023-09-12 ENCOUNTER — HOSPITAL ENCOUNTER (OUTPATIENT)
Dept: CARDIOLOGY | Facility: HOSPITAL | Age: 83
Discharge: HOME | End: 2023-09-12
Attending: PHYSICIAN ASSISTANT
Payer: MEDICARE

## 2023-09-12 VITALS
HEART RATE: 65 BPM | DIASTOLIC BLOOD PRESSURE: 70 MMHG | HEIGHT: 63 IN | WEIGHT: 173.31 LBS | SYSTOLIC BLOOD PRESSURE: 110 MMHG | BODY MASS INDEX: 30.71 KG/M2

## 2023-09-12 DIAGNOSIS — Z79.01 ANTICOAGULATED: ICD-10-CM

## 2023-09-12 DIAGNOSIS — I48.0 PAROXYSMAL ATRIAL FIBRILLATION: Primary | ICD-10-CM

## 2023-09-12 DIAGNOSIS — I48.0 PAROXYSMAL ATRIAL FIBRILLATION: ICD-10-CM

## 2023-09-12 DIAGNOSIS — G47.33 OSA (OBSTRUCTIVE SLEEP APNEA): ICD-10-CM

## 2023-09-12 DIAGNOSIS — I10 ESSENTIAL HYPERTENSION: ICD-10-CM

## 2023-09-12 DIAGNOSIS — E78.2 MIXED HYPERLIPIDEMIA: ICD-10-CM

## 2023-09-12 PROCEDURE — 93227 XTRNL ECG REC<48 HR R&I: CPT | Mod: HCNC,,, | Performed by: INTERNAL MEDICINE

## 2023-09-12 PROCEDURE — 1159F MED LIST DOCD IN RCRD: CPT | Mod: HCNC,CPTII,S$GLB, | Performed by: PHYSICIAN ASSISTANT

## 2023-09-12 PROCEDURE — 1101F PR PT FALLS ASSESS DOC 0-1 FALLS W/OUT INJ PAST YR: ICD-10-PCS | Mod: HCNC,CPTII,S$GLB, | Performed by: PHYSICIAN ASSISTANT

## 2023-09-12 PROCEDURE — 99214 PR OFFICE/OUTPT VISIT, EST, LEVL IV, 30-39 MIN: ICD-10-PCS | Mod: HCNC,S$GLB,, | Performed by: PHYSICIAN ASSISTANT

## 2023-09-12 PROCEDURE — 93000 ELECTROCARDIOGRAM COMPLETE: CPT | Mod: HCNC,S$GLB,, | Performed by: INTERNAL MEDICINE

## 2023-09-12 PROCEDURE — 93225 XTRNL ECG REC<48 HRS REC: CPT | Mod: HCNC

## 2023-09-12 PROCEDURE — 3078F PR MOST RECENT DIASTOLIC BLOOD PRESSURE < 80 MM HG: ICD-10-PCS | Mod: HCNC,CPTII,S$GLB, | Performed by: PHYSICIAN ASSISTANT

## 2023-09-12 PROCEDURE — 3074F SYST BP LT 130 MM HG: CPT | Mod: HCNC,CPTII,S$GLB, | Performed by: PHYSICIAN ASSISTANT

## 2023-09-12 PROCEDURE — 99999 PR PBB SHADOW E&M-EST. PATIENT-LVL IV: ICD-10-PCS | Mod: PBBFAC,HCNC,, | Performed by: PHYSICIAN ASSISTANT

## 2023-09-12 PROCEDURE — 3288F PR FALLS RISK ASSESSMENT DOCUMENTED: ICD-10-PCS | Mod: HCNC,CPTII,S$GLB, | Performed by: PHYSICIAN ASSISTANT

## 2023-09-12 PROCEDURE — 1126F PR PAIN SEVERITY QUANTIFIED, NO PAIN PRESENT: ICD-10-PCS | Mod: HCNC,CPTII,S$GLB, | Performed by: PHYSICIAN ASSISTANT

## 2023-09-12 PROCEDURE — 1126F AMNT PAIN NOTED NONE PRSNT: CPT | Mod: HCNC,CPTII,S$GLB, | Performed by: PHYSICIAN ASSISTANT

## 2023-09-12 PROCEDURE — 93000 EKG 12-LEAD: ICD-10-PCS | Mod: HCNC,S$GLB,, | Performed by: INTERNAL MEDICINE

## 2023-09-12 PROCEDURE — 93227 HOLTER MONITOR - 48 HOUR (CUPID ONLY): ICD-10-PCS | Mod: HCNC,,, | Performed by: INTERNAL MEDICINE

## 2023-09-12 PROCEDURE — 3074F PR MOST RECENT SYSTOLIC BLOOD PRESSURE < 130 MM HG: ICD-10-PCS | Mod: HCNC,CPTII,S$GLB, | Performed by: PHYSICIAN ASSISTANT

## 2023-09-12 PROCEDURE — 3078F DIAST BP <80 MM HG: CPT | Mod: HCNC,CPTII,S$GLB, | Performed by: PHYSICIAN ASSISTANT

## 2023-09-12 PROCEDURE — 99999 PR PBB SHADOW E&M-EST. PATIENT-LVL IV: CPT | Mod: PBBFAC,HCNC,, | Performed by: PHYSICIAN ASSISTANT

## 2023-09-12 PROCEDURE — 1159F PR MEDICATION LIST DOCUMENTED IN MEDICAL RECORD: ICD-10-PCS | Mod: HCNC,CPTII,S$GLB, | Performed by: PHYSICIAN ASSISTANT

## 2023-09-12 PROCEDURE — 1101F PT FALLS ASSESS-DOCD LE1/YR: CPT | Mod: HCNC,CPTII,S$GLB, | Performed by: PHYSICIAN ASSISTANT

## 2023-09-12 PROCEDURE — 3288F FALL RISK ASSESSMENT DOCD: CPT | Mod: HCNC,CPTII,S$GLB, | Performed by: PHYSICIAN ASSISTANT

## 2023-09-12 PROCEDURE — 99214 OFFICE O/P EST MOD 30 MIN: CPT | Mod: HCNC,S$GLB,, | Performed by: PHYSICIAN ASSISTANT

## 2023-09-12 RX ORDER — ROSUVASTATIN CALCIUM 40 MG/1
40 TABLET, COATED ORAL DAILY
Qty: 90 TABLET | Refills: 3 | Status: SHIPPED | OUTPATIENT
Start: 2023-09-12

## 2023-09-12 NOTE — PROGRESS NOTES
"     Cardiology Clinic Note  Reason for Visit: ER follow up  LOV w/ Dr. Gavin: 5/8/2023    HPI:     PMHx:  Diastolic dysfunction   PAF (2022)  NOE, on CPAP      Maryann Sorenson is a 82 y.o. female who presents to clinic for ER follow-up.  On September 6 she had a colonoscopy and was noted to have bradycardia and episodes of asystole.  Leaving the colonoscopy she was recommended to hold metoprolol succinate 12.5 mg.  Then on September 7th she developed a rapid heart rate and presented to the ER.  She was found to be in AFib with RVR.  After 10 minutes she spontaneously converted to normal rhythm without any additional intervention.  Since then she has taken metoprolol succinate 12.5 mg on Saturday, and has taken p.r.n. metoprolol tartrate at least 2 or 3 times when she noted her heart rate going above 80.  Prior to her colonoscopy she denies any increase in baseline dizziness, she had not had any lightheadedness or near syncope.  She denies these symptoms over the past 48 hours as well.  She reports a history of vasovagal syncope that occurred once in the past with bowel movement.  She is been consistent with Eliquis.        ROS:    Pertinent ROS included in HPI and below.  PMH:     Past Medical History:   Diagnosis Date    GERD (gastroesophageal reflux disease)     Pulmonary hypertension 2016    Syncope      Past Surgical History:   Procedure Laterality Date    broken wrist      "put plate in it"    DILATION AND CURETTAGE OF UTERUS      excision of lipoma Left 2009    near collar bone    tonsillectomy      TONSILLECTOMY       Allergies:     Review of patient's allergies indicates:   Allergen Reactions    Amoxicillin-pot clavulanate Diarrhea     Given march 2016    Sulfa (sulfonamide antibiotics) Rash    Codeine      Other reaction(s): Unknown, tolerates hydrocodone june 2014    Doxycycline Nausea Only    Lyrica [pregabalin] Other (See Comments)     Blurry vision    Macrobid [nitrofurantoin monohyd/m-cryst] Other " (See Comments)      2019 caused cramps in legs. After stopped medication it cleared.     Prevnar [pneumococcal 7-beti conj vacc] Other (See Comments)     Arm sore, redness at injection site and muscle aches. Given shot 7/23/19    Relafen [nabumetone] Diarrhea     Medications:     Current Outpatient Medications on File Prior to Visit   Medication Sig Dispense Refill    amLODIPine (NORVASC) 5 MG tablet Take 1 tablet (5 mg total) by mouth once daily. 90 tablet 3    apixaban (ELIQUIS) 5 mg Tab Take 1 tablet (5 mg total) by mouth 2 (two) times daily. 90 tablet 3    ascorbate calcium-bioflavonoid (FRANCISCO-C WITH BIOFLAVONOIDS) 500-200 mg Tab Take by mouth once daily.      calcium carbonate-vitamin D3 600 mg (1,500 mg)-800 unit Chew Take 1 tablet by mouth once.      cholecalciferol, vitamin D3, (VITAMIN D3) 50 mcg (2,000 unit) Tab Take by mouth once daily.      coenzyme Q10 200 mg capsule Take 1 capsule by mouth Daily. 1 Capsule Oral Every day      empagliflozin (JARDIANCE) 10 mg tablet Take 1 tablet (10 mg total) by mouth once daily. 90 tablet 3    esomeprazole (NEXIUM) 40 MG capsule 1 Capsule, Delayed Release(E.C.) Oral Every day 90 capsule 3    fexofenadine (ALLEGRA) 180 MG tablet Take 1 tablet by mouth Daily. 1 Tablet Oral Every day      fluticasone propionate (FLONASE) 50 mcg/actuation nasal spray 1 spray each nostril twice a day; total 48 g - please provide 3 months supply. 48 g 3    gabapentin (NEURONTIN) 300 MG capsule 1 pill PO TID. 90 days supply 270 capsule 3    guaiFENesin (MUCINEX) 600 mg 12 hr tablet Take 1,200 mg by mouth as needed.       LACTOBACILLUS RHAMNOSUS GG (PROBIOTIC ORAL) Take 1 capsule by mouth Daily. 1 Capsule Oral Every day      losartan (COZAAR) 50 MG tablet Take 1 tablet (50 mg total) by mouth once daily. 90 tablet 3    lutein 20 mg Cap Take 20 mg by mouth every other day.      magnesium 250 mg Tab Take 250 mg by mouth once daily.       metoprolol succinate (TOPROL-XL) 25 MG 24 hr tablet Pt to  "take 1/2 pill once a day per Dr. Gavin on 4/27/22. 90 tablet 3    metoprolol tartrate (LOPRESSOR) 25 MG tablet Take 25 mg by mouth 2 (two) times daily.      mupirocin (BACTROBAN) 2 % ointment Apply topically 3 (three) times daily. 30 g 1    pramipexole (MIRAPEX) 0.125 MG tablet Pt taking one pill daily. 180 tablet 3    silver sulfADIAZINE 1% (SILVADENE) 1 % cream Apply topically 2 (two) times daily. 20 g 0    ZINC ORAL Take 50 mg by mouth once daily.       [DISCONTINUED] rosuvastatin (CRESTOR) 40 MG Tab Take 1 tablet (40 mg total) by mouth once daily. 90 tablet 3    diazePAM (VALIUM) 2 MG tablet Take one tablet when needed for vertigo, may repeat in one hour if symptoms persist (Patient not taking: Reported on 9/12/2023) 30 tablet 1     No current facility-administered medications on file prior to visit.     Social History:     Social History     Tobacco Use    Smoking status: Never    Smokeless tobacco: Never   Substance Use Topics    Alcohol use: Not Currently     Family History:     Family History   Problem Relation Age of Onset    Colon cancer Mother 95        d. 99 w/ brain mets    Hyperlipidemia Mother     Hypertension Mother     Heart disease Father     Kidney failure Father         d.89    Arthritis Sister         20+ surgeries- most 2/2 MSK problems- 80(2021)    Chronic back pain Sister         71(2021)    Other Brother         [place].36 [place]    Heart disease Daughter         d.7 yo in heart surgery; Tetrology of Fallot    No Known Problems Daughter         lives in [place]    Heart disease Son         congenital; Tetralogy of Fallot, lives in [place], surgery at 38yo    Other Son         cardiac arrest,-Vtach/fib,s/p H. , recovered    Depression Son         lives near pt- works in , lives in [place], [place]     Physical Exam:   /70   Pulse 65   Ht 5' 3" (1.6 m)   Wt 78.6 kg (173 lb 4.5 oz)   LMP  (LMP Unknown)   BMI 30.70 kg/m²      Physical Exam  Vitals and nursing note " reviewed.   Constitutional:       Appearance: Normal appearance.   HENT:      Head: Normocephalic and atraumatic.   Neck:      Vascular: No carotid bruit or hepatojugular reflux.   Cardiovascular:      Rate and Rhythm: Normal rate and regular rhythm.      Pulses:           Carotid pulses are 2+ on the right side and 2+ on the left side.       Radial pulses are 2+ on the right side and 2+ on the left side.      Heart sounds: S1 normal and S2 normal.   Pulmonary:      Effort: Pulmonary effort is normal.      Breath sounds: Normal breath sounds.   Abdominal:      General: Bowel sounds are normal.      Palpations: Abdomen is soft.      Tenderness: There is no abdominal tenderness.   Feet:      Right foot:      Skin integrity: Skin integrity normal.      Left foot:      Skin integrity: Skin integrity normal.   Neurological:      Mental Status: She is alert.   Psychiatric:         Behavior: Behavior is cooperative.          Labs:     Blood Tests:  Lab Results   Component Value Date    BNP 84 09/08/2023     09/08/2023    K 3.6 09/08/2023     09/08/2023    CO2 20 (L) 09/08/2023    BUN 7 (L) 09/08/2023    CREATININE 0.7 09/08/2023     (H) 09/08/2023    HGBA1C 6.2 (H) 08/23/2023    MG 2.2 09/08/2023    AST 31 09/08/2023    ALT 13 09/08/2023    ALBUMIN 3.9 09/08/2023    PROT 6.9 09/08/2023    BILITOT 0.4 09/08/2023    WBC 6.23 09/08/2023    HGB 12.3 09/08/2023    HCT 37.6 09/08/2023    MCV 86 09/08/2023     09/08/2023    TSH 1.77 09/06/2023    TSH 2.970 08/23/2023       Lab Results   Component Value Date    CHOL 151 08/23/2023    HDL 64 08/23/2023    TRIG 74 08/23/2023       Lab Results   Component Value Date    LDLCALC 72.2 08/23/2023         Imaging:     Echocardiogram  TTE 6/27/2023  The left ventricle is normal in size with normal systolic function.  The estimated ejection fraction is 65%.  The left ventricular global longitudinal strain is -22%. Pattern is normal.  Grade II left ventricular  diastolic dysfunction.  Normal right ventricular size with normal right ventricular systolic function.  Mild left atrial enlargement.  Mild aortic regurgitation.  Mild tricuspid regurgitation.  The estimated PA systolic pressure is 34 mmHg.  Normal central venous pressure (3 mmHg).  The ascending aorta is mildly dilated.    Stress testing  SPECT stress test 2022    Normal myocardial perfusion scan. There is no evidence of myocardial ischemia or infarction.    There is a mild intensity perfusion abnormality in the anteroseptal wall of the left ventricle, secondary to breast attenuation.    The visually estimated ejection fraction is normal at rest and normal during stress.    There is normal wall motion at rest and post stress.    LV cavity size is normal at rest and normal at stress.    The EKG portion of this study is negative for ischemia.    The patient reported chest pain during the stress test.    There were no arrhythmias during stress.    Cath Lab  None    Other  None    EK2023  Sinus bradycardia with 1st degree A-V block   Low voltage QRS   Abnormal R wave progression   Abnormal ECG   When compared with ECG of 08-SEP-2023 00:13,   No significant change was found     Assessment:     1. Paroxysmal atrial fibrillation    2. Anticoagulated    3. Essential hypertension    4. NOE (obstructive sleep apnea)        Plan:     Paroxysmal atrial fibrillation  -     Holter monitor - 48 hour; Future  -     IN OFFICE EKG 12-LEAD (to [place])  I have recommended that she restart metoprolol succinate 12.5 mg qAM  She can continue to use metoprolol tartrate 25 mg BID PRN  Proceed with holter monitor   The patient would like to be referred to EP if reasonable based on holter monitor results     Anticoagulated  Continue eliquis 5 mg BID    Essential hypertension  Stable, recommend monitoring at home  Contact clinic or go to the ER if feeling light headed    NOE (obstructive sleep apnea)  Encouraged CPAP compliance          Signed:  Cherie Mckeon PA-C  Cardiology     9/12/2023 10:11 AM    Follow-up:     Future Appointments   Date Time Provider Department Center   11/20/2023 11:00 AM Heydi Gavin MD [facility] CARDIO [place]   2/20/2024  9:30 AM Ira Smith MD MET IM [place]

## 2023-09-14 ENCOUNTER — PATIENT MESSAGE (OUTPATIENT)
Dept: INTERNAL MEDICINE | Facility: CLINIC | Age: 83
End: 2023-09-14
Payer: MEDICARE

## 2023-09-15 LAB
OHS CV EVENT MONITOR DAY: 0
OHS CV HOLTER LENGTH DECIMAL HOURS: 48
OHS CV HOLTER LENGTH HOURS: 48
OHS CV HOLTER LENGTH MINUTES: 0
OHS CV HOLTER SINUS AVERAGE HR: 63
OHS CV HOLTER SINUS MAX HR: 98
OHS CV HOLTER SINUS MIN HR: 41

## 2023-09-15 RX ORDER — ALPRAZOLAM 1 MG/1
1 TABLET ORAL ONCE
Qty: 1 TABLET | Refills: 0 | Status: SHIPPED | OUTPATIENT
Start: 2023-09-15 | End: 2023-11-20

## 2023-09-15 NOTE — TELEPHONE ENCOUNTER
LOV 8/22/23.    Pt has had alprazolam 1 mg prescribed once in 2018.  Please advise.  Pharmacy queued.

## 2023-09-19 ENCOUNTER — TELEPHONE (OUTPATIENT)
Dept: CARDIOLOGY | Facility: CLINIC | Age: 83
End: 2023-09-19
Payer: MEDICARE

## 2023-09-19 DIAGNOSIS — I48.0 PAROXYSMAL ATRIAL FIBRILLATION: Primary | ICD-10-CM

## 2023-09-19 NOTE — TELEPHONE ENCOUNTER
----- Message from Cherie Mckeon PA-C sent at 9/19/2023  2:29 PM CDT -----  Please let Mrs. Sorenson know that her 48 hour monitor did not show any episodes of afib. I'm going to recommend that she wear a 30 day monitor and refer her to EP. She should wait to see EP until the 30 day monitor is complete.     Thanks

## 2023-09-28 ENCOUNTER — PATIENT MESSAGE (OUTPATIENT)
Dept: CARDIOLOGY | Facility: CLINIC | Age: 83
End: 2023-09-28
Payer: MEDICARE

## 2023-10-09 DIAGNOSIS — G25.81 RLS (RESTLESS LEGS SYNDROME): ICD-10-CM

## 2023-10-09 RX ORDER — PRAMIPEXOLE DIHYDROCHLORIDE 0.12 MG/1
TABLET ORAL
Qty: 90 TABLET | Refills: 10 | Status: SHIPPED | OUTPATIENT
Start: 2023-10-09

## 2023-10-09 NOTE — TELEPHONE ENCOUNTER
Requested Prescriptions     Pending Prescriptions Disp Refills    pramipexole (MIRAPEX) 0.125 MG tablet [Pharmacy Med Name: PRAMIPEXOLE DIHYDROCHLORIDE 0.125 MG Tablet] 90 tablet 10     Sig: TAKE 1 TABLET EVERY DAY     LOV 8/9/2023

## 2023-10-10 ENCOUNTER — TELEPHONE (OUTPATIENT)
Dept: CARDIOLOGY | Facility: CLINIC | Age: 83
End: 2023-10-10
Payer: MEDICARE

## 2023-10-10 NOTE — TELEPHONE ENCOUNTER
----- Message from Heydi Gavin MD sent at 10/10/2023  2:14 PM CDT -----  Regarding: RE: Clearance  Yes. She should hold Eliquis for 72 hours prior ( 3 days) to the procedure and restart ASAP, usually 24 hours after the procedure, but this will be at the discretion of doctor performing the procedure.  ----- Message -----  From: Ashely Park MA  Sent: 10/10/2023   1:47 PM CDT  To: Heydi Gavin MD  Subject: Clearance                                        Pt wanted to see if she is cleared to get at lumbar puncture? (Not sure of the date) Please advise

## 2023-10-17 ENCOUNTER — PATIENT MESSAGE (OUTPATIENT)
Dept: CARDIOLOGY | Facility: CLINIC | Age: 83
End: 2023-10-17
Payer: MEDICARE

## 2023-10-23 ENCOUNTER — TELEPHONE (OUTPATIENT)
Dept: OBSTETRICS AND GYNECOLOGY | Facility: CLINIC | Age: 83
End: 2023-10-23
Payer: MEDICARE

## 2023-10-23 DIAGNOSIS — Z12.31 VISIT FOR SCREENING MAMMOGRAM: Primary | ICD-10-CM

## 2023-10-23 NOTE — TELEPHONE ENCOUNTER
----- Message from Katina Vieyra sent at 10/23/2023 10:35 AM CDT -----  Regarding: mammo  Contact: [phone]  Type:  Mammogram    Caller is requesting to schedule their annual mammogram appointment.  Order is not listed in EPIC.  Please enter order and contact patient to schedule.  Name of Caller: pt   Where would they like the mammogram performed? [facility]   Would the patient rather a call back or a response via [facility]?  Call   Best Call Back Number: [phone]  Additional Information:

## 2023-10-23 NOTE — TELEPHONE ENCOUNTER
----- Message from Katina Vieyra sent at 10/23/2023 10:35 AM CDT -----  Regarding: mammo  Contact: [phone]  Type:  Mammogram    Caller is requesting to schedule their annual mammogram appointment.  Order is not listed in EPIC.  Please enter order and contact patient to schedule.  Name of Caller: pt   Where would they like the mammogram performed? [facility]   Would the patient rather a call back or a response via [facility]?  Call   Best Call Back Number: [phone]  Additional Information:

## 2023-11-03 ENCOUNTER — PATIENT MESSAGE (OUTPATIENT)
Dept: CARDIOLOGY | Facility: CLINIC | Age: 83
End: 2023-11-03
Payer: MEDICARE

## 2023-11-03 DIAGNOSIS — I70.0 AORTIC ATHEROSCLEROSIS: Primary | ICD-10-CM

## 2023-11-08 ENCOUNTER — TELEPHONE (OUTPATIENT)
Dept: CARDIOLOGY | Facility: CLINIC | Age: 83
End: 2023-11-08
Payer: MEDICARE

## 2023-11-08 DIAGNOSIS — R73.03 PRE-DIABETES: Primary | ICD-10-CM

## 2023-11-08 DIAGNOSIS — E78.00 HYPERCHOLESTEROLEMIA: ICD-10-CM

## 2023-11-08 DIAGNOSIS — E78.2 MIXED HYPERLIPIDEMIA: ICD-10-CM

## 2023-11-10 ENCOUNTER — PATIENT MESSAGE (OUTPATIENT)
Dept: CARDIOLOGY | Facility: CLINIC | Age: 83
End: 2023-11-10
Payer: MEDICARE

## 2023-11-14 ENCOUNTER — LAB VISIT (OUTPATIENT)
Dept: LAB | Facility: HOSPITAL | Age: 83
End: 2023-11-14
Attending: INTERNAL MEDICINE
Payer: MEDICARE

## 2023-11-14 DIAGNOSIS — E78.2 MIXED HYPERLIPIDEMIA: ICD-10-CM

## 2023-11-14 DIAGNOSIS — R73.03 PRE-DIABETES: ICD-10-CM

## 2023-11-14 DIAGNOSIS — E78.00 HYPERCHOLESTEROLEMIA: ICD-10-CM

## 2023-11-14 LAB
ALBUMIN SERPL BCP-MCNC: 4.5 G/DL (ref 3.5–5.2)
ALP SERPL-CCNC: 77 U/L (ref 38–126)
ALT SERPL W/O P-5'-P-CCNC: 20 U/L (ref 10–44)
ANION GAP SERPL CALC-SCNC: 10 MMOL/L (ref 8–16)
AST SERPL-CCNC: 35 U/L (ref 15–46)
BILIRUB SERPL-MCNC: 0.5 MG/DL (ref 0.1–1)
CALCIUM SERPL-MCNC: 9.6 MG/DL (ref 8.7–10.5)
CHLORIDE SERPL-SCNC: 102 MMOL/L (ref 95–110)
CHOLEST SERPL-MCNC: 148 MG/DL (ref 120–199)
CHOLEST/HDLC SERPL: 2.4 {RATIO} (ref 2–5)
CO2 SERPL-SCNC: 28 MMOL/L (ref 23–29)
CREAT SERPL-MCNC: 0.62 MG/DL (ref 0.5–1.4)
EST. GFR  (NO RACE VARIABLE): >60 ML/MIN/1.73 M^2
ESTIMATED AVG GLUCOSE: 128 MG/DL (ref 68–131)
GLUCOSE SERPL-MCNC: 101 MG/DL (ref 70–110)
HBA1C MFR BLD: 6.1 % (ref 4–5.6)
HDLC SERPL-MCNC: 61 MG/DL (ref 40–75)
HDLC SERPL: 41.2 % (ref 20–50)
LDLC SERPL CALC-MCNC: 73.8 MG/DL (ref 63–159)
NONHDLC SERPL-MCNC: 87 MG/DL
POTASSIUM SERPL-SCNC: 4.4 MMOL/L (ref 3.5–5.1)
PROT SERPL-MCNC: 7.4 G/DL (ref 6–8.4)
SODIUM SERPL-SCNC: 140 MMOL/L (ref 136–145)
TRIGL SERPL-MCNC: 66 MG/DL (ref 30–150)
TSH SERPL DL<=0.005 MIU/L-ACNC: 2.77 UIU/ML (ref 0.4–4)
UUN UR-MCNC: 13 MG/DL (ref 7–17)

## 2023-11-14 PROCEDURE — 80053 COMPREHEN METABOLIC PANEL: CPT | Mod: HCNC,PN | Performed by: INTERNAL MEDICINE

## 2023-11-14 PROCEDURE — 36415 COLL VENOUS BLD VENIPUNCTURE: CPT | Mod: HCNC,PN | Performed by: INTERNAL MEDICINE

## 2023-11-14 PROCEDURE — 80061 LIPID PANEL: CPT | Mod: HCNC | Performed by: INTERNAL MEDICINE

## 2023-11-14 PROCEDURE — 83036 HEMOGLOBIN GLYCOSYLATED A1C: CPT | Mod: HCNC | Performed by: INTERNAL MEDICINE

## 2023-11-14 PROCEDURE — 84443 ASSAY THYROID STIM HORMONE: CPT | Mod: HCNC,PN | Performed by: INTERNAL MEDICINE

## 2023-11-15 ENCOUNTER — HOSPITAL ENCOUNTER (OUTPATIENT)
Dept: CARDIOLOGY | Facility: CLINIC | Age: 83
Discharge: HOME | End: 2023-11-15
Payer: MEDICARE

## 2023-11-15 ENCOUNTER — OFFICE VISIT (OUTPATIENT)
Dept: ELECTROPHYSIOLOGY | Facility: CLINIC | Age: 83
End: 2023-11-15
Payer: MEDICARE

## 2023-11-15 VITALS
BODY MASS INDEX: 30.51 KG/M2 | DIASTOLIC BLOOD PRESSURE: 65 MMHG | HEIGHT: 63 IN | SYSTOLIC BLOOD PRESSURE: 110 MMHG | WEIGHT: 172.19 LBS | HEART RATE: 61 BPM

## 2023-11-15 DIAGNOSIS — I48.0 PAROXYSMAL ATRIAL FIBRILLATION: ICD-10-CM

## 2023-11-15 DIAGNOSIS — G47.33 OSA (OBSTRUCTIVE SLEEP APNEA): ICD-10-CM

## 2023-11-15 DIAGNOSIS — I27.20 PULMONARY HYPERTENSION: ICD-10-CM

## 2023-11-15 DIAGNOSIS — I70.0 AORTIC ATHEROSCLEROSIS: ICD-10-CM

## 2023-11-15 DIAGNOSIS — I10 ESSENTIAL HYPERTENSION: ICD-10-CM

## 2023-11-15 DIAGNOSIS — R93.1 AGATSTON CORONARY ARTERY CALCIUM SCORE GREATER THAN 400: Primary | ICD-10-CM

## 2023-11-15 PROCEDURE — 1101F PT FALLS ASSESS-DOCD LE1/YR: CPT | Mod: HCNC,CPTII,S$GLB, | Performed by: INTERNAL MEDICINE

## 2023-11-15 PROCEDURE — 1126F PR PAIN SEVERITY QUANTIFIED, NO PAIN PRESENT: ICD-10-PCS | Mod: HCNC,CPTII,S$GLB, | Performed by: INTERNAL MEDICINE

## 2023-11-15 PROCEDURE — 99205 PR OFFICE/OUTPT VISIT, NEW, LEVL V, 60-74 MIN: ICD-10-PCS | Mod: HCNC,S$GLB,, | Performed by: INTERNAL MEDICINE

## 2023-11-15 PROCEDURE — 1101F PR PT FALLS ASSESS DOC 0-1 FALLS W/OUT INJ PAST YR: ICD-10-PCS | Mod: HCNC,CPTII,S$GLB, | Performed by: INTERNAL MEDICINE

## 2023-11-15 PROCEDURE — 93005 ELECTROCARDIOGRAM TRACING: CPT | Mod: HCNC,S$GLB,, | Performed by: INTERNAL MEDICINE

## 2023-11-15 PROCEDURE — 93005 RHYTHM STRIP: ICD-10-PCS | Mod: HCNC,S$GLB,, | Performed by: INTERNAL MEDICINE

## 2023-11-15 PROCEDURE — 3074F PR MOST RECENT SYSTOLIC BLOOD PRESSURE < 130 MM HG: ICD-10-PCS | Mod: HCNC,CPTII,S$GLB, | Performed by: INTERNAL MEDICINE

## 2023-11-15 PROCEDURE — 1159F PR MEDICATION LIST DOCUMENTED IN MEDICAL RECORD: ICD-10-PCS | Mod: HCNC,CPTII,S$GLB, | Performed by: INTERNAL MEDICINE

## 2023-11-15 PROCEDURE — 1159F MED LIST DOCD IN RCRD: CPT | Mod: HCNC,CPTII,S$GLB, | Performed by: INTERNAL MEDICINE

## 2023-11-15 PROCEDURE — 93010 ELECTROCARDIOGRAM REPORT: CPT | Mod: HCNC,S$GLB,, | Performed by: INTERNAL MEDICINE

## 2023-11-15 PROCEDURE — 99999 PR PBB SHADOW E&M-EST. PATIENT-LVL IV: CPT | Mod: PBBFAC,HCNC,, | Performed by: INTERNAL MEDICINE

## 2023-11-15 PROCEDURE — 3288F FALL RISK ASSESSMENT DOCD: CPT | Mod: HCNC,CPTII,S$GLB, | Performed by: INTERNAL MEDICINE

## 2023-11-15 PROCEDURE — 3074F SYST BP LT 130 MM HG: CPT | Mod: HCNC,CPTII,S$GLB, | Performed by: INTERNAL MEDICINE

## 2023-11-15 PROCEDURE — 1160F PR REVIEW ALL MEDS BY PRESCRIBER/CLIN PHARMACIST DOCUMENTED: ICD-10-PCS | Mod: HCNC,CPTII,S$GLB, | Performed by: INTERNAL MEDICINE

## 2023-11-15 PROCEDURE — 99205 OFFICE O/P NEW HI 60 MIN: CPT | Mod: HCNC,S$GLB,, | Performed by: INTERNAL MEDICINE

## 2023-11-15 PROCEDURE — 3078F DIAST BP <80 MM HG: CPT | Mod: HCNC,CPTII,S$GLB, | Performed by: INTERNAL MEDICINE

## 2023-11-15 PROCEDURE — 1160F RVW MEDS BY RX/DR IN RCRD: CPT | Mod: HCNC,CPTII,S$GLB, | Performed by: INTERNAL MEDICINE

## 2023-11-15 PROCEDURE — 1126F AMNT PAIN NOTED NONE PRSNT: CPT | Mod: HCNC,CPTII,S$GLB, | Performed by: INTERNAL MEDICINE

## 2023-11-15 PROCEDURE — 93010 RHYTHM STRIP: ICD-10-PCS | Mod: HCNC,S$GLB,, | Performed by: INTERNAL MEDICINE

## 2023-11-15 PROCEDURE — 3288F PR FALLS RISK ASSESSMENT DOCUMENTED: ICD-10-PCS | Mod: HCNC,CPTII,S$GLB, | Performed by: INTERNAL MEDICINE

## 2023-11-15 PROCEDURE — 99999 PR PBB SHADOW E&M-EST. PATIENT-LVL IV: ICD-10-PCS | Mod: PBBFAC,HCNC,, | Performed by: INTERNAL MEDICINE

## 2023-11-15 PROCEDURE — 3078F PR MOST RECENT DIASTOLIC BLOOD PRESSURE < 80 MM HG: ICD-10-PCS | Mod: HCNC,CPTII,S$GLB, | Performed by: INTERNAL MEDICINE

## 2023-11-15 RX ORDER — FLECAINIDE ACETATE 100 MG/1
100 TABLET ORAL EVERY 12 HOURS
Qty: 180 TABLET | Refills: 3 | Status: SHIPPED | OUTPATIENT
Start: 2023-11-15 | End: 2024-01-31 | Stop reason: SDUPTHER

## 2023-11-15 NOTE — PROGRESS NOTES
Subjective:   Patient ID:  Maryann Sorenson is a 82 y.o. female who presents for evaluation of AF    Primary CV: Dr ARIANA Gavin    HPI  82 F  PAF since 4/22. Eliquis.  HTN HL NOE(onCPAP)    AF since 4/2022. On eliquis since. Went to ER 9/7/23 with palpitations. Found in AF with RVR; sef-resolved. I reviewed this ECG; confirmed AF.  Has had episodes q months usually, but recently has been q week. Increasing in frequency and sx.    Holter 9/23: SR  TSH normal  echo 6/23: 65%    My interpretation of today's ECG is NSR    Review of Systems   Constitutional: Negative. Negative for malaise/fatigue.   HENT: Negative.  Negative for ear pain and tinnitus.    Eyes:  Negative for blurred vision.   Cardiovascular:  Positive for palpitations. Negative for chest pain, dyspnea on exertion, near-syncope and syncope.   Respiratory: Negative.  Negative for shortness of breath.    Endocrine: Negative.  Negative for polyuria.   Hematologic/Lymphatic: Does not bruise/bleed easily.   Skin: Negative.  Negative for rash.   Musculoskeletal: Negative.  Negative for joint pain and muscle weakness.   Gastrointestinal: Negative.  Negative for abdominal pain and change in bowel habit.   Genitourinary:  Negative for frequency.   Neurological: Negative.  Negative for dizziness and weakness.   Psychiatric/Behavioral: Negative.  Negative for depression. The patient is not nervous/anxious.    Allergic/Immunologic: Negative for environmental allergies.       Objective:   Physical Exam  Vitals and nursing note reviewed.   Constitutional:       Appearance: Normal appearance. She is well-developed.   HENT:      Head: Normocephalic and atraumatic.   Eyes:      Conjunctiva/sclera: Conjunctivae normal.   Neck:      Thyroid: No thyroid mass or thyromegaly.      Trachea: No tracheal deviation.   Cardiovascular:      Rate and Rhythm: Normal rate and regular rhythm.   Pulmonary:      Effort: Pulmonary effort is normal.   Abdominal:      General: There is no  distension.   Musculoskeletal:         General: Normal range of motion.      Cervical back: Normal range of motion. No edema.   Skin:     General: Skin is warm and dry.      Findings: No rash.   Neurological:      Mental Status: She is alert and oriented to person, place, and time.      Coordination: Coordination normal.   Psychiatric:         Speech: Speech normal.         Behavior: Behavior normal. Behavior is cooperative.         Thought Content: Thought content normal.         Assessment:      1. 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 Paroxysmal atrial fibrillation    3. Aortic atherosclerosis    4. Essential hypertension    5. Pulmonary hypertension    6. NOE (obstructive sleep apnea)        Plan:     Discussed AF and its basic pathophysiology, including its health implications and treatment options (rate vs. rhythm control, meds vs. procedural/device treatment) as appropriate for the patient.    Agree with Eliquis (CHADS-VASc=4).  Start flecainide.  Continue CPAP.    f/u 1 year, or earlier prn.

## 2023-11-20 ENCOUNTER — LAB VISIT (OUTPATIENT)
Dept: LAB | Facility: HOSPITAL | Age: 83
End: 2023-11-20
Attending: INTERNAL MEDICINE
Payer: MEDICARE

## 2023-11-20 ENCOUNTER — OFFICE VISIT (OUTPATIENT)
Dept: CARDIOLOGY | Facility: CLINIC | Age: 83
End: 2023-11-20
Payer: MEDICARE

## 2023-11-20 VITALS
HEART RATE: 59 BPM | HEIGHT: 63 IN | WEIGHT: 174.06 LBS | BODY MASS INDEX: 30.84 KG/M2 | SYSTOLIC BLOOD PRESSURE: 131 MMHG | DIASTOLIC BLOOD PRESSURE: 63 MMHG

## 2023-11-20 DIAGNOSIS — G47.33 OSA (OBSTRUCTIVE SLEEP APNEA): ICD-10-CM

## 2023-11-20 DIAGNOSIS — R00.2 HEART PALPITATIONS: ICD-10-CM

## 2023-11-20 DIAGNOSIS — E78.00 HYPERCHOLESTEROLEMIA: ICD-10-CM

## 2023-11-20 DIAGNOSIS — I42.8 INFILTRATIVE CARDIOMYOPATHY: Primary | ICD-10-CM

## 2023-11-20 DIAGNOSIS — R93.1 AGATSTON CORONARY ARTERY CALCIUM SCORE GREATER THAN 400: ICD-10-CM

## 2023-11-20 DIAGNOSIS — I65.23 CAROTID STENOSIS, BILATERAL: ICD-10-CM

## 2023-11-20 DIAGNOSIS — I48.0 PAROXYSMAL ATRIAL FIBRILLATION: ICD-10-CM

## 2023-11-20 DIAGNOSIS — I27.9 CHRONIC PULMONARY HEART DISEASE: ICD-10-CM

## 2023-11-20 DIAGNOSIS — I25.10 ATHEROSCLEROSIS OF NATIVE CORONARY ARTERY OF NATIVE HEART WITHOUT ANGINA PECTORIS: ICD-10-CM

## 2023-11-20 DIAGNOSIS — I42.8 INFILTRATIVE CARDIOMYOPATHY: ICD-10-CM

## 2023-11-20 DIAGNOSIS — I10 ESSENTIAL HYPERTENSION: ICD-10-CM

## 2023-11-20 DIAGNOSIS — I70.0 AORTIC ATHEROSCLEROSIS: ICD-10-CM

## 2023-11-20 PROCEDURE — 1101F PR PT FALLS ASSESS DOC 0-1 FALLS W/OUT INJ PAST YR: ICD-10-PCS | Mod: HCNC,CPTII,S$GLB, | Performed by: INTERNAL MEDICINE

## 2023-11-20 PROCEDURE — 99214 PR OFFICE/OUTPT VISIT, EST, LEVL IV, 30-39 MIN: ICD-10-PCS | Mod: HCNC,S$GLB,, | Performed by: INTERNAL MEDICINE

## 2023-11-20 PROCEDURE — 1126F AMNT PAIN NOTED NONE PRSNT: CPT | Mod: HCNC,CPTII,S$GLB, | Performed by: INTERNAL MEDICINE

## 2023-11-20 PROCEDURE — 99999 PR PBB SHADOW E&M-EST. PATIENT-LVL IV: CPT | Mod: PBBFAC,HCNC,, | Performed by: INTERNAL MEDICINE

## 2023-11-20 PROCEDURE — 1126F PR PAIN SEVERITY QUANTIFIED, NO PAIN PRESENT: ICD-10-PCS | Mod: HCNC,CPTII,S$GLB, | Performed by: INTERNAL MEDICINE

## 2023-11-20 PROCEDURE — 36415 COLL VENOUS BLD VENIPUNCTURE: CPT | Mod: HCNC,PO | Performed by: INTERNAL MEDICINE

## 2023-11-20 PROCEDURE — 84165 PROTEIN E-PHORESIS SERUM: CPT | Mod: HCNC | Performed by: INTERNAL MEDICINE

## 2023-11-20 PROCEDURE — 83521 IG LIGHT CHAINS FREE EACH: CPT | Mod: 59,HCNC | Performed by: INTERNAL MEDICINE

## 2023-11-20 PROCEDURE — 1159F PR MEDICATION LIST DOCUMENTED IN MEDICAL RECORD: ICD-10-PCS | Mod: HCNC,CPTII,S$GLB, | Performed by: INTERNAL MEDICINE

## 2023-11-20 PROCEDURE — 3075F PR MOST RECENT SYSTOLIC BLOOD PRESS GE 130-139MM HG: ICD-10-PCS | Mod: HCNC,CPTII,S$GLB, | Performed by: INTERNAL MEDICINE

## 2023-11-20 PROCEDURE — 1159F MED LIST DOCD IN RCRD: CPT | Mod: HCNC,CPTII,S$GLB, | Performed by: INTERNAL MEDICINE

## 2023-11-20 PROCEDURE — 99214 OFFICE O/P EST MOD 30 MIN: CPT | Mod: HCNC,S$GLB,, | Performed by: INTERNAL MEDICINE

## 2023-11-20 PROCEDURE — 3075F SYST BP GE 130 - 139MM HG: CPT | Mod: HCNC,CPTII,S$GLB, | Performed by: INTERNAL MEDICINE

## 2023-11-20 PROCEDURE — 3078F PR MOST RECENT DIASTOLIC BLOOD PRESSURE < 80 MM HG: ICD-10-PCS | Mod: HCNC,CPTII,S$GLB, | Performed by: INTERNAL MEDICINE

## 2023-11-20 PROCEDURE — 3288F FALL RISK ASSESSMENT DOCD: CPT | Mod: HCNC,CPTII,S$GLB, | Performed by: INTERNAL MEDICINE

## 2023-11-20 PROCEDURE — 3078F DIAST BP <80 MM HG: CPT | Mod: HCNC,CPTII,S$GLB, | Performed by: INTERNAL MEDICINE

## 2023-11-20 PROCEDURE — 99999 PR PBB SHADOW E&M-EST. PATIENT-LVL IV: ICD-10-PCS | Mod: PBBFAC,HCNC,, | Performed by: INTERNAL MEDICINE

## 2023-11-20 PROCEDURE — 1160F PR REVIEW ALL MEDS BY PRESCRIBER/CLIN PHARMACIST DOCUMENTED: ICD-10-PCS | Mod: HCNC,CPTII,S$GLB, | Performed by: INTERNAL MEDICINE

## 2023-11-20 PROCEDURE — 3288F PR FALLS RISK ASSESSMENT DOCUMENTED: ICD-10-PCS | Mod: HCNC,CPTII,S$GLB, | Performed by: INTERNAL MEDICINE

## 2023-11-20 PROCEDURE — 1101F PT FALLS ASSESS-DOCD LE1/YR: CPT | Mod: HCNC,CPTII,S$GLB, | Performed by: INTERNAL MEDICINE

## 2023-11-20 PROCEDURE — 1160F RVW MEDS BY RX/DR IN RCRD: CPT | Mod: HCNC,CPTII,S$GLB, | Performed by: INTERNAL MEDICINE

## 2023-11-20 PROCEDURE — 84165 PROTEIN E-PHORESIS SERUM: CPT | Mod: 26,HCNC,, | Performed by: PATHOLOGY

## 2023-11-20 PROCEDURE — 84165 PATHOLOGIST INTERPRETATION SPE: ICD-10-PCS | Mod: 26,HCNC,, | Performed by: PATHOLOGY

## 2023-11-20 NOTE — PROGRESS NOTES
Subjective:   Patient ID:  Maryann Sorenson is a 82 y.o. female who presents for follow-up of Hyperlipidemia      HPI: Patient was recently seen by Cherie Mckeon and Dr. Anglin for recurrent atrial fibrillation with uncontrolled ventricular rate.  She was started on Flecainide and is currently doing well in SR.  She is being evaluated by neurology and ophthalmology with visual symptoms.  She continues to experience fatigue and dyspnea on exertion.    In 2020 SPEP, light chain immunoelectrophoresis were normal and most recently CFD with GLS was normal and PYP aTTR Amyloidosis related nuclear scan was not c/w Amyloidosis.    Today BP and Pulse are stable. We reviewed her blood work and recent test and they seem to ok.  Patient is inquiring about GLP1 agonist. She is on SGLT2i      Independently reviewed  ECG - SR    Lab Results   Component Value Date     11/14/2023    K 4.4 11/14/2023     11/14/2023    CO2 28 11/14/2023    BUN 13 11/14/2023    CREATININE 0.62 11/14/2023     11/14/2023    HGBA1C 6.1 (H) 11/14/2023    MG 2.2 09/08/2023    AST 35 11/14/2023    ALT 20 11/14/2023    ALBUMIN 4.5 11/14/2023    PROT 7.4 11/14/2023    BILITOT 0.5 11/14/2023    WBC 6.23 09/08/2023    HGB 12.3 09/08/2023    HCT 37.6 09/08/2023    MCV 86 09/08/2023     09/08/2023    TSH 2.770 11/14/2023    CHOL 148 11/14/2023    HDL 61 11/14/2023    LDLCALC 73.8 11/14/2023    TRIG 66 11/14/2023       No results found in the last 24 hours.     Review of Systems   Constitutional: Negative.   HENT:  Positive for hearing loss.    Eyes:  Positive for visual disturbance.   Cardiovascular:  Positive for dyspnea on exertion, irregular heartbeat and palpitations. Negative for chest pain, claudication, leg swelling, near-syncope and syncope.   Respiratory: Negative.  Negative for cough, shortness of breath, snoring and wheezing.    Endocrine: Negative.  Negative for cold intolerance, heat intolerance, polydipsia, polyphagia and  "polyuria.   Skin: Negative.    Musculoskeletal:  Positive for arthritis and back pain.   Gastrointestinal:  Positive for heartburn.   Genitourinary: Negative.    Neurological:  Positive for excessive daytime sleepiness, dizziness, light-headedness and loss of balance.   Psychiatric/Behavioral: Negative.         Objective:   Physical Exam  Vitals and nursing note reviewed.   Constitutional:       Appearance: Normal appearance. She is well-developed.      Comments: /63   Pulse (!) 59   Ht 5' 3" (1.6 m)   Wt 78.9 kg (174 lb 0.9 oz)   LMP  (LMP Unknown)   BMI 30.83 kg/m²      HENT:      Head: Normocephalic.   Eyes:      Pupils: Pupils are equal, round, and reactive to light.   Neck:      Thyroid: No thyromegaly.      Vascular: No carotid bruit.   Cardiovascular:      Rate and Rhythm: Normal rate and regular rhythm.      Pulses: Intact distal pulses.           Carotid pulses are 2+ on the right side and 2+ on the left side.       Radial pulses are 2+ on the right side and 2+ on the left side.        Femoral pulses are 2+ on the right side and 2+ on the left side.       Popliteal pulses are 2+ on the right side and 2+ on the left side.        Dorsalis pedis pulses are 2+ on the right side and 2+ on the left side.        Posterior tibial pulses are 2+ on the right side and 2+ on the left side.      Heart sounds: Murmur heard.      Early systolic murmur is present with a grade of 1/6 at the upper right sternal border.      No friction rub. No gallop.   Pulmonary:      Effort: Pulmonary effort is normal. No respiratory distress.      Breath sounds: Normal breath sounds. No wheezing or rales.   Chest:      Chest wall: No tenderness.   Abdominal:      Palpations: Abdomen is soft.   Musculoskeletal:         General: Normal range of motion.      Cervical back: Normal range of motion and neck supple.   Skin:     General: Skin is warm and dry.   Neurological:      Mental Status: She is alert and oriented to person, " place, and time.           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    Essential hypertension    Atherosclerosis of native coronary artery of native heart without angina pectoris    Carotid stenosis, bilateral    Chronic pulmonary heart disease    Paroxysmal atrial fibrillation    Aortic atherosclerosis       Other    NOE (obstructive sleep apnea)    Heart palpitations     Other Visit Diagnoses       Infiltrative cardiomyopathy    -  Primary    Relevant Orders    PROTEIN ELECTROPHORESIS, SERUM    IMMUNOGLOBULIN FREE LT CHAINS BLOOD            Patient's Medications   New Prescriptions    No medications on file   Previous Medications    AMLODIPINE (NORVASC) 5 MG TABLET    Take 1 tablet (5 mg total) by mouth once daily.    APIXABAN (ELIQUIS) 5 MG TAB    Take 1 tablet (5 mg total) by mouth 2 (two) times daily.    ASCORBATE CALCIUM-BIOFLAVONOID (FRANCISCO-C WITH BIOFLAVONOIDS) 500-200 MG TAB    Take by mouth once daily.    CALCIUM CARBONATE-VITAMIN D3 600 MG (1,500 MG)-800 UNIT CHEW    Take 1 tablet by mouth once.    CHOLECALCIFEROL, VITAMIN D3, (VITAMIN D3) 50 MCG (2,000 UNIT) TAB    Take by mouth once daily.    COENZYME Q10 200 MG CAPSULE    Take 1 capsule by mouth Daily. 1 Capsule Oral Every day    EMPAGLIFLOZIN (JARDIANCE) 10 MG TABLET    Take 1 tablet (10 mg total) by mouth once daily.    ESOMEPRAZOLE (NEXIUM) 40 MG CAPSULE    1 Capsule, Delayed Release(E.C.) Oral Every day    FEXOFENADINE (ALLEGRA) 180 MG TABLET    Take 1 tablet by mouth Daily. 1 Tablet Oral Every day    FLECAINIDE (TAMBOCOR) 100 MG TAB    Take 1 tablet (100 mg total) by mouth every 12 (twelve) hours.    FLUTICASONE PROPIONATE (FLONASE) 50 MCG/ACTUATION NASAL SPRAY    1 spray each nostril twice a day; total 48 g - please provide 3 months supply.    GABAPENTIN (NEURONTIN) 300 MG CAPSULE    1 pill PO TID. 90 days  "supply    GUAIFENESIN (MUCINEX) 600 MG 12 HR TABLET    Take 1,200 mg by mouth as needed.     LACTOBACILLUS RHAMNOSUS GG (PROBIOTIC ORAL)    Take 1 capsule by mouth Daily. 1 Capsule Oral Every day    LOSARTAN (COZAAR) 50 MG TABLET    Take 1 tablet (50 mg total) by mouth once daily.    LUTEIN 20 MG CAP    Take 20 mg by mouth every other day.    MAGNESIUM 250 MG TAB    Take 250 mg by mouth once daily.     METOPROLOL SUCCINATE (TOPROL-XL) 25 MG 24 HR TABLET    Pt to take 1/2 pill once a day per Dr. Gavin on 4/27/22.    METOPROLOL TARTRATE (LOPRESSOR) 25 MG TABLET    Take 25 mg by mouth 2 (two) times daily.    MUPIROCIN (BACTROBAN) 2 % OINTMENT    Apply topically 3 (three) times daily.    PRAMIPEXOLE (MIRAPEX) 0.125 MG TABLET    TAKE 1 TABLET EVERY DAY    ROSUVASTATIN (CRESTOR) 40 MG TAB    Take 1 tablet (40 mg total) by mouth once daily.    SILVER SULFADIAZINE 1% (SILVADENE) 1 % CREAM    Apply topically 2 (two) times daily.    ZINC ORAL    Take 50 mg by mouth once daily.    Modified Medications    No medications on file   Discontinued Medications    ALPRAZOLAM (XANAX) 1 MG TABLET    Take 1 tablet (1 mg total) by mouth once. Take 1 tablet by mouth 30 minutes prior to MRI for 1 dose    DIAZEPAM (VALIUM) 2 MG TABLET    Take one tablet when needed for vertigo, may repeat in one hour if symptoms persist     Repeat SPEP and light chain immunoelectrophoresis.  Continue on Flecainide, Metoprolol and Eliquis.  Patient will discuss with Dr. Vila if an addition of GLP1 agonist would be off value. It has been reported that it is beneficial in "cardiac" patients, similarly to SGLT2i.     Follow up in about 6 months (around 5/20/2024).            "

## 2023-11-21 ENCOUNTER — TELEPHONE (OUTPATIENT)
Dept: CARDIOLOGY | Facility: CLINIC | Age: 83
End: 2023-11-21
Payer: MEDICARE

## 2023-11-21 LAB
ALBUMIN SERPL ELPH-MCNC: 4.49 G/DL (ref 3.35–5.55)
ALPHA1 GLOB SERPL ELPH-MCNC: 0.26 G/DL (ref 0.17–0.41)
ALPHA2 GLOB SERPL ELPH-MCNC: 0.86 G/DL (ref 0.43–0.99)
B-GLOBULIN SERPL ELPH-MCNC: 0.78 G/DL (ref 0.5–1.1)
GAMMA GLOB SERPL ELPH-MCNC: 0.7 G/DL (ref 0.67–1.58)
KAPPA LC SER QL IA: 1.82 MG/DL (ref 0.33–1.94)
KAPPA LC/LAMBDA SER IA: 1.8 (ref 0.26–1.65)
LAMBDA LC SER QL IA: 1.01 MG/DL (ref 0.57–2.63)
PATHOLOGIST INTERPRETATION SPE: NORMAL
PROT SERPL-MCNC: 7.1 G/DL (ref 6–8.4)

## 2023-11-21 NOTE — TELEPHONE ENCOUNTER
----- Message from Heydi aGvin MD sent at 11/21/2023  3:34 PM CST -----  Please inform the patient that the results look fine.  There is slightly elevation of kappa, shahab ratio, which may be secondary to general inflammatory process.  However, due to your symptoms and lack of definite diagnosis, I would like you to see rheumatology.

## 2023-11-21 NOTE — PROGRESS NOTES
Please inform the patient that the results look fine.  There is slightly elevation of kappa, shahab ratio, which may be secondary to general inflammatory process.  However, due to your symptoms and lack of definite diagnosis, I would like you to see rheumatology.

## 2023-11-27 ENCOUNTER — HOSPITAL ENCOUNTER (EMERGENCY)
Facility: HOSPITAL | Age: 83
Discharge: HOME | End: 2023-11-27
Attending: EMERGENCY MEDICINE
Payer: MEDICARE

## 2023-11-27 VITALS
HEIGHT: 63 IN | HEART RATE: 62 BPM | OXYGEN SATURATION: 98 % | RESPIRATION RATE: 19 BRPM | DIASTOLIC BLOOD PRESSURE: 78 MMHG | BODY MASS INDEX: 30.48 KG/M2 | TEMPERATURE: 98 F | SYSTOLIC BLOOD PRESSURE: 128 MMHG | WEIGHT: 172 LBS

## 2023-11-27 DIAGNOSIS — R00.2 PALPITATIONS: ICD-10-CM

## 2023-11-27 DIAGNOSIS — I48.91 ATRIAL FIBRILLATION WITH RVR: Primary | ICD-10-CM

## 2023-11-27 LAB
ALBUMIN SERPL BCP-MCNC: 4.7 G/DL (ref 3.5–5.2)
ALP SERPL-CCNC: 101 U/L (ref 38–126)
ALT SERPL W/O P-5'-P-CCNC: 22 U/L (ref 10–44)
ANION GAP SERPL CALC-SCNC: 12 MMOL/L (ref 8–16)
AST SERPL-CCNC: 38 U/L (ref 15–46)
BASOPHILS # BLD AUTO: 0.04 K/UL (ref 0–0.2)
BASOPHILS NFR BLD: 0.8 % (ref 0–1.9)
BILIRUB SERPL-MCNC: 0.4 MG/DL (ref 0.1–1)
CALCIUM SERPL-MCNC: 9.3 MG/DL (ref 8.7–10.5)
CHLORIDE SERPL-SCNC: 102 MMOL/L (ref 95–110)
CO2 SERPL-SCNC: 23 MMOL/L (ref 23–29)
CREAT SERPL-MCNC: 0.93 MG/DL (ref 0.5–1.4)
DIFFERENTIAL METHOD: ABNORMAL
EOSINOPHIL # BLD AUTO: 0.2 K/UL (ref 0–0.5)
EOSINOPHIL NFR BLD: 3.1 % (ref 0–8)
ERYTHROCYTE [DISTWIDTH] IN BLOOD BY AUTOMATED COUNT: 15.3 % (ref 11.5–14.5)
EST. GFR  (NO RACE VARIABLE): >60 ML/MIN/1.73 M^2
GLUCOSE SERPL-MCNC: 132 MG/DL (ref 70–110)
HCT VFR BLD AUTO: 37.2 % (ref 37–48.5)
HGB BLD-MCNC: 12.1 G/DL (ref 12–16)
IMM GRANULOCYTES # BLD AUTO: 0 K/UL (ref 0–0.04)
IMM GRANULOCYTES NFR BLD AUTO: 0 % (ref 0–0.5)
INFLUENZA A, MOLECULAR: NEGATIVE
INFLUENZA B, MOLECULAR: NEGATIVE
LYMPHOCYTES # BLD AUTO: 1.8 K/UL (ref 1–4.8)
LYMPHOCYTES NFR BLD: 37.5 % (ref 18–48)
MCH RBC QN AUTO: 28.1 PG (ref 27–31)
MCHC RBC AUTO-ENTMCNC: 32.5 G/DL (ref 32–36)
MCV RBC AUTO: 87 FL (ref 82–98)
MONOCYTES # BLD AUTO: 0.5 K/UL (ref 0.3–1)
MONOCYTES NFR BLD: 10.6 % (ref 4–15)
NEUTROPHILS # BLD AUTO: 2.4 K/UL (ref 1.8–7.7)
NEUTROPHILS NFR BLD: 48 % (ref 38–73)
NRBC BLD-RTO: 0 /100 WBC
PLATELET # BLD AUTO: 230 K/UL (ref 150–450)
PMV BLD AUTO: 11.1 FL (ref 9.2–12.9)
POTASSIUM SERPL-SCNC: 4.2 MMOL/L (ref 3.5–5.1)
PROT SERPL-MCNC: 7.6 G/DL (ref 6–8.4)
RBC # BLD AUTO: 4.3 M/UL (ref 4–5.4)
SARS-COV-2 RDRP RESP QL NAA+PROBE: NEGATIVE
SODIUM SERPL-SCNC: 137 MMOL/L (ref 136–145)
SPECIMEN SOURCE: NORMAL
TROPONIN I SERPL-MCNC: <0.012 NG/ML (ref 0.01–0.03)
TSH SERPL DL<=0.005 MIU/L-ACNC: 2.52 UIU/ML (ref 0.4–4)
UUN UR-MCNC: 18 MG/DL (ref 7–17)
WBC # BLD AUTO: 4.91 K/UL (ref 3.9–12.7)

## 2023-11-27 PROCEDURE — 93005 ELECTROCARDIOGRAM TRACING: CPT | Mod: HCNC,ER

## 2023-11-27 PROCEDURE — 93010 EKG 12-LEAD: ICD-10-PCS | Mod: HCNC,,, | Performed by: INTERNAL MEDICINE

## 2023-11-27 PROCEDURE — 25000003 PHARM REV CODE 250: Mod: HCNC,ER | Performed by: EMERGENCY MEDICINE

## 2023-11-27 PROCEDURE — 84484 ASSAY OF TROPONIN QUANT: CPT | Mod: HCNC,ER | Performed by: EMERGENCY MEDICINE

## 2023-11-27 PROCEDURE — 85025 COMPLETE CBC W/AUTO DIFF WBC: CPT | Mod: HCNC,ER | Performed by: EMERGENCY MEDICINE

## 2023-11-27 PROCEDURE — 80053 COMPREHEN METABOLIC PANEL: CPT | Mod: HCNC,ER | Performed by: EMERGENCY MEDICINE

## 2023-11-27 PROCEDURE — 87502 INFLUENZA DNA AMP PROBE: CPT | Mod: HCNC,ER | Performed by: EMERGENCY MEDICINE

## 2023-11-27 PROCEDURE — 84443 ASSAY THYROID STIM HORMONE: CPT | Mod: HCNC,ER | Performed by: EMERGENCY MEDICINE

## 2023-11-27 PROCEDURE — 99285 EMERGENCY DEPT VISIT HI MDM: CPT | Mod: 25,HCNC,ER

## 2023-11-27 PROCEDURE — U0002 COVID-19 LAB TEST NON-CDC: HCPCS | Mod: HCNC,ER | Performed by: EMERGENCY MEDICINE

## 2023-11-27 PROCEDURE — 96374 THER/PROPH/DIAG INJ IV PUSH: CPT | Mod: HCNC,ER

## 2023-11-27 PROCEDURE — 93010 ELECTROCARDIOGRAM REPORT: CPT | Mod: HCNC,,, | Performed by: INTERNAL MEDICINE

## 2023-11-27 RX ORDER — DILTIAZEM HYDROCHLORIDE 5 MG/ML
10 INJECTION INTRAVENOUS
Status: COMPLETED | OUTPATIENT
Start: 2023-11-27 | End: 2023-11-27

## 2023-11-27 RX ADMIN — DILTIAZEM HYDROCHLORIDE 10 MG: 5 INJECTION, SOLUTION INTRAVENOUS at 07:11

## 2023-11-28 ENCOUNTER — PATIENT OUTREACH (OUTPATIENT)
Dept: EMERGENCY MEDICINE | Facility: HOSPITAL | Age: 83
End: 2023-11-28

## 2023-11-28 NOTE — ED PROVIDER NOTES
"Encounter Date: 11/27/2023       History     Chief Complaint   Patient presents with    Palpitations     Patient states I believe I'm in A-Fib; took 1 metoprolol @ 0645 pm 25 mg tablet; states she also has SOB.        HPI  82 y.o.   Co palpitations x this evening  Some sob, no chest pain  H/o afib with RVR in 2022  On flecanide  No recent med changes  Sts had cup of tea this AM, usually doesn't have this  No leg pain nor swelling  No bleeding    Review of patient's allergies indicates:   Allergen Reactions    Amoxicillin-pot clavulanate Diarrhea     Given march 2016    Sulfa (sulfonamide antibiotics) Rash    Codeine      Other reaction(s): Unknown, tolerates hydrocodone june 2014    Doxycycline Nausea Only    Lyrica [pregabalin] Other (See Comments)     Blurry vision    Macrobid [nitrofurantoin monohyd/m-cryst] Other (See Comments)      2019 caused cramps in legs. After stopped medication it cleared.     Prevnar [pneumococcal 7-beti conj vacc] Other (See Comments)     Arm sore, redness at injection site and muscle aches. Given shot 7/23/19    Relafen [nabumetone] Diarrhea     Past Medical History:   Diagnosis Date    A-fib     GERD (gastroesophageal reflux disease)     Pulmonary hypertension 2016    Syncope      Past Surgical History:   Procedure Laterality Date    broken wrist      "put plate in it"    DILATION AND CURETTAGE OF UTERUS      excision of lipoma Left 2009    near collar bone    tonsillectomy      TONSILLECTOMY       Family History   Problem Relation Age of Onset    Colon cancer Mother 95        d. 99 w/ brain mets    Hyperlipidemia Mother     Hypertension Mother     Heart disease Father     Kidney failure Father         d.89    Arthritis Sister         20+ surgeries- most 2/2 MSK problems- 80(2021)    Chronic back pain Sister         71(2021)    Other Brother         [place]36 [place]    Heart disease Daughter         d.7 yo in heart surgery; Tetrology of Fallot    No Known Problems " Daughter         lives in [place]    Heart disease Son         congenital; Tetralogy of Fallot, lives in , surgery at 38yo    Other Son         cardiac arrest,-Vtach/fib,s/p H. Iad, recovered    Depression Son         lives near pt- works in , lives in [place], [place]     Social History     Tobacco Use    Smoking status: Never    Smokeless tobacco: Never   Substance Use Topics    Alcohol use: Not Currently    Drug use: No     Review of Systems  All systems were reviewed/examined and were negative except as noted in the HPI.    Physical Exam     Initial Vitals [11/27/23 1909]   BP Pulse Resp Temp SpO2   (!) 223/104 (!) 142 18 97.9 °F (36.6 °C) 97 %      MAP       --         Physical Exam    General: the patient is awake, alert, and in no apparent distress.  Head: normocephalic and atraumatic, sclera are clear  Neck: supple without meningismus  Chest: clear to auscultation bilaterally, no respiratory distress  Heart: tachy irr irr  ABD soft, nontender, nondistended, no peritoneal signs  Extremities: warm and well perfused    No calf t  Skin: warm and dry  Psych conversant  Neuro: awake, alert, moving all extremities    ED Course   Procedures  Labs Reviewed   CBC W/ AUTO DIFFERENTIAL - Abnormal; Notable for the following components:       Result Value    RDW 15.3 (*)     All other components within normal limits   COMPREHENSIVE METABOLIC PANEL - Abnormal; Notable for the following components:    Glucose 132 (*)     BUN 18 (*)     All other components within normal limits   INFLUENZA A & B BY MOLECULAR   TROPONIN I   TSH   SARS-COV-2 RNA AMPLIFICATION, QUAL    Narrative:     Is the patient symptomatic?->No          Imaging Results              X-Ray Chest AP Portable (Final result)  Result time 11/27/23 21:06:13      Final result by Roderick Krishnamurthy MD (11/27/23 21:06:13)                   Impression:      No acute abnormality.      Electronically signed by: Roderick Krishnamurthy  Date:    11/27/2023  Time:    21:06                Narrative:    EXAMINATION:  XR CHEST AP PORTABLE    CLINICAL HISTORY:  Palpitations    TECHNIQUE:  Single frontal view of the chest was performed.    COMPARISON:  None    FINDINGS:  The lungs are clear, with normal appearance of pulmonary vasculature and no pleural effusion or pneumothorax.    The cardiac silhouette is enlarged.  The hilar and mediastinal contours are unremarkable.  Lung    Bones are intact.                                       Medications   diltiaZEM injection 10 mg (10 mg Intravenous Given 11/27/23 1921)     Medical Decision Making  Amount and/or Complexity of Data Reviewed  Labs: ordered.  Radiology: ordered.    Risk  Prescription drug management.                     Medical Decision Making:    This is an emergent evaluation of a patient presenting to the ED.  Nursing notes were reviewed.  I personally reviewed, read, and interpreted the ECG and any monitoring strips.  ECG #1: atrial fibrillation, rate RVR Compared with prior if available.  Read and interpreted by me independently.      ECG #2: normal EKG, normal sinus rhythm, unchanged from previous tracings Compared with prior if available.  Read and interpreted by me independently.      I reviewed radiology images personally along with interpretations.  Imaging reviewed by me (chest x-ray), personally and independently, and prelims if available.  No acute/emergent pathologic findings noted on radiologic studies ordered.    I personally reviewed and interpreted the laboratory results.  CBC, trop, chem ok  I decided to obtain and review old medical records, which showed: h/o afib    Critical Care Time    Critical care time was provided for 30 minutes exclusive of other billable procedures and teaching time for the treatment of  afib RVR   where the potential for death, shock, or further decline was possible.  Critical care time can include documentation, discussion with consultants, developing a care plan, as well as direct patient  care.    Pierre Villareal MD      Improved  Wants to go home                 Clinical Impression:  Final diagnoses:  [R00.2] Palpitations  [I48.91] Atrial fibrillation with RVR (Primary)          ED Disposition Condition    Discharge Stable          ED Prescriptions    None       Follow-up Information       Follow up With Specialties Details Why Contact Info    Dean Anglin MD Electrophysiology, Cardiology Schedule an appointment as soon as possible for a visit   [address]  [address]  [facility] [id number] [phone]            Discharged to home in stable condition, return to ED warnings given, follow up and patient care instructions given.      Oliver Villareal MD, MAIA, FACEP  Department of Emergency Medicine       Pierre Villareal MD  11/28/23 [id number]

## 2023-11-29 ENCOUNTER — PATIENT MESSAGE (OUTPATIENT)
Dept: CARDIOLOGY | Facility: CLINIC | Age: 83
End: 2023-11-29
Payer: MEDICARE

## 2023-11-30 DIAGNOSIS — I48.0 PAROXYSMAL ATRIAL FIBRILLATION: Primary | ICD-10-CM

## 2023-11-30 NOTE — TELEPHONE ENCOUNTER
Thank you for letting us know.    Since these episodes are short lived and spontaneously resolving there is no indications for an earlier follow up appointment.  I would still recommend appointment with rheumatology as previously indicated.    I would recommend to increase Flecainide to 150 mg a day, but this is usually done by EP specialist. I have included Dr. Anglin in this note.

## 2023-12-05 ENCOUNTER — PATIENT MESSAGE (OUTPATIENT)
Dept: INTERNAL MEDICINE | Facility: CLINIC | Age: 83
End: 2023-12-05
Payer: MEDICARE

## 2023-12-05 DIAGNOSIS — R73.03 PRE-DIABETES: Primary | ICD-10-CM

## 2023-12-05 NOTE — TELEPHONE ENCOUNTER
Escripted the Jardiance 25mg to [facility] , need A1C in 3 mos ( order placed)    With the increased dose she will need to be proactive about eating on time and not letting her Blood sugar drop too low

## 2023-12-07 ENCOUNTER — HOSPITAL ENCOUNTER (OUTPATIENT)
Dept: RADIOLOGY | Facility: HOSPITAL | Age: 83
Discharge: HOME | End: 2023-12-07
Attending: OBSTETRICS & GYNECOLOGY
Payer: MEDICARE

## 2023-12-07 DIAGNOSIS — Z12.31 VISIT FOR SCREENING MAMMOGRAM: ICD-10-CM

## 2023-12-07 PROCEDURE — 77067 SCR MAMMO BI INCL CAD: CPT | Mod: 26,HCNC,, | Performed by: RADIOLOGY

## 2023-12-07 PROCEDURE — 77067 SCR MAMMO BI INCL CAD: CPT | Mod: TC,HCNC,PN

## 2023-12-07 PROCEDURE — 77063 BREAST TOMOSYNTHESIS BI: CPT | Mod: 26,HCNC,, | Performed by: RADIOLOGY

## 2023-12-07 PROCEDURE — 77063 MAMMO DIGITAL SCREENING BILAT WITH TOMO: ICD-10-PCS | Mod: 26,HCNC,, | Performed by: RADIOLOGY

## 2023-12-07 PROCEDURE — 77067 MAMMO DIGITAL SCREENING BILAT WITH TOMO: ICD-10-PCS | Mod: 26,HCNC,, | Performed by: RADIOLOGY

## 2023-12-08 ENCOUNTER — CLINICAL SUPPORT (OUTPATIENT)
Dept: CARDIOLOGY | Facility: HOSPITAL | Age: 83
End: 2023-12-08
Attending: INTERNAL MEDICINE
Payer: MEDICARE

## 2023-12-08 DIAGNOSIS — I48.0 PAROXYSMAL ATRIAL FIBRILLATION: ICD-10-CM

## 2023-12-08 PROCEDURE — 93246 EXT ECG>7D<15D RECORDING: CPT | Mod: HCNC

## 2023-12-08 PROCEDURE — 93248 CV CARDIAC MONITOR - 3-15 DAY ADULT (CUPID ONLY): ICD-10-PCS | Mod: HCNC,,, | Performed by: INTERNAL MEDICINE

## 2023-12-08 PROCEDURE — 93248 EXT ECG>7D<15D REV&INTERPJ: CPT | Mod: HCNC,,, | Performed by: INTERNAL MEDICINE

## 2023-12-11 ENCOUNTER — PATIENT MESSAGE (OUTPATIENT)
Dept: SLEEP MEDICINE | Facility: CLINIC | Age: 83
End: 2023-12-11
Payer: MEDICARE

## 2023-12-11 DIAGNOSIS — R09.81 NASAL CONGESTION: ICD-10-CM

## 2023-12-11 RX ORDER — FLUTICASONE PROPIONATE 50 MCG
SPRAY, SUSPENSION (ML) NASAL
Qty: 48 G | Refills: 3 | Status: SHIPPED | OUTPATIENT
Start: 2023-12-11

## 2024-01-04 ENCOUNTER — TELEMEDICINE (OUTPATIENT)
Dept: CARDIOLOGY | Facility: HOSPITAL | Age: 84
End: 2024-01-04
Payer: MEDICARE

## 2024-01-04 ENCOUNTER — TELEPHONE (OUTPATIENT)
Dept: ELECTROPHYSIOLOGY | Facility: CLINIC | Age: 84
End: 2024-01-04
Payer: MEDICARE

## 2024-01-04 ENCOUNTER — TELEPHONE (OUTPATIENT)
Dept: CARDIOLOGY | Facility: HOSPITAL | Age: 84
End: 2024-01-04
Payer: MEDICARE

## 2024-01-04 NOTE — TELEPHONE ENCOUNTER
Angel ambulatory monitor complete, alert called in from company for 12.4 sec daytime pause that occurred on 12/21/23.  Called to discuss with patient by phone  She stated she did have a symptomatic event correlating to that day/time.  Pt indicated she was sitting on the sofa and had severe abdominal cramps and experienced a vagal event.  She stated she passed out for a few seconds, woke still seated on the couch.    She stated similar events have occurred in the past.  Pt indicated there's been no recurrence since 12/21/23.  Will notify Dr. Anglin  Patient has a f/u appt in the EP clinic on 2/21/24

## 2024-01-04 NOTE — PROGRESS NOTES
Angel monitor showed SR -> SB with 12.4 s of CHB -> recovery to SR.  d/w pt via phone. She had considerable stomach cramping, felt LH, had LOC for few seconds (on couch; no injury) then recovered.  Last time this happened was 2017.    Dx: vasovagal episode with transient SB/CHB and syncope.    Discussed with patient orthostatic/vasovagal mediated hypotension and the resultant decreased level of consciousness that can result. Discussed counteractive measures including avoiding triggers, sitting/lying down when prodrome is felt.  Given syncope, instructed patient not to drive (or perform other activities that may be dangerous if loss of consciousness were to recur) for at least six months.    We'll arrange for a clinic visit in near future. Some options could include no change/no therapy, change of AAD (e.g. to amio or another AAD that doesn't require BB/CCB), or PPM (heron if recurrences).

## 2024-01-05 ENCOUNTER — OFFICE VISIT (OUTPATIENT)
Dept: ELECTROPHYSIOLOGY | Facility: CLINIC | Age: 84
End: 2024-01-05
Payer: MEDICARE

## 2024-01-05 ENCOUNTER — HOSPITAL ENCOUNTER (OUTPATIENT)
Dept: CARDIOLOGY | Facility: CLINIC | Age: 84
Discharge: HOME | End: 2024-01-05
Payer: MEDICARE

## 2024-01-05 VITALS
BODY MASS INDEX: 31.25 KG/M2 | SYSTOLIC BLOOD PRESSURE: 128 MMHG | DIASTOLIC BLOOD PRESSURE: 78 MMHG | WEIGHT: 176.38 LBS | HEIGHT: 63 IN | HEART RATE: 60 BPM

## 2024-01-05 DIAGNOSIS — I42.8 INFILTRATIVE CARDIOMYOPATHY: ICD-10-CM

## 2024-01-05 DIAGNOSIS — I10 ESSENTIAL HYPERTENSION: Primary | ICD-10-CM

## 2024-01-05 DIAGNOSIS — R06.02 SHORTNESS OF BREATH: ICD-10-CM

## 2024-01-05 DIAGNOSIS — R00.2 HEART PALPITATIONS: ICD-10-CM

## 2024-01-05 DIAGNOSIS — I48.91 ATRIAL FIBRILLATION WITH RVR: ICD-10-CM

## 2024-01-05 DIAGNOSIS — I48.0 PAROXYSMAL ATRIAL FIBRILLATION: ICD-10-CM

## 2024-01-05 DIAGNOSIS — R55 VASOVAGAL SYNCOPE: ICD-10-CM

## 2024-01-05 DIAGNOSIS — I27.20 PULMONARY HYPERTENSION: ICD-10-CM

## 2024-01-05 DIAGNOSIS — G47.33 OSA (OBSTRUCTIVE SLEEP APNEA): ICD-10-CM

## 2024-01-05 DIAGNOSIS — I70.0 AORTIC ATHEROSCLEROSIS: ICD-10-CM

## 2024-01-05 DIAGNOSIS — I27.9 CHRONIC PULMONARY HEART DISEASE: ICD-10-CM

## 2024-01-05 PROCEDURE — 3074F SYST BP LT 130 MM HG: CPT | Mod: HCNC,CPTII,S$GLB, | Performed by: INTERNAL MEDICINE

## 2024-01-05 PROCEDURE — 3078F DIAST BP <80 MM HG: CPT | Mod: HCNC,CPTII,S$GLB, | Performed by: INTERNAL MEDICINE

## 2024-01-05 PROCEDURE — 3288F FALL RISK ASSESSMENT DOCD: CPT | Mod: HCNC,CPTII,S$GLB, | Performed by: INTERNAL MEDICINE

## 2024-01-05 PROCEDURE — 1159F MED LIST DOCD IN RCRD: CPT | Mod: HCNC,CPTII,S$GLB, | Performed by: INTERNAL MEDICINE

## 2024-01-05 PROCEDURE — 93005 ELECTROCARDIOGRAM TRACING: CPT | Mod: HCNC,S$GLB,, | Performed by: INTERNAL MEDICINE

## 2024-01-05 PROCEDURE — 1125F AMNT PAIN NOTED PAIN PRSNT: CPT | Mod: HCNC,CPTII,S$GLB, | Performed by: INTERNAL MEDICINE

## 2024-01-05 PROCEDURE — 93010 ELECTROCARDIOGRAM REPORT: CPT | Mod: HCNC,S$GLB,, | Performed by: INTERNAL MEDICINE

## 2024-01-05 PROCEDURE — 99215 OFFICE O/P EST HI 40 MIN: CPT | Mod: HCNC,S$GLB,, | Performed by: INTERNAL MEDICINE

## 2024-01-05 PROCEDURE — 99999 PR PBB SHADOW E&M-EST. PATIENT-LVL IV: CPT | Mod: PBBFAC,HCNC,, | Performed by: INTERNAL MEDICINE

## 2024-01-05 PROCEDURE — 1101F PT FALLS ASSESS-DOCD LE1/YR: CPT | Mod: HCNC,CPTII,S$GLB, | Performed by: INTERNAL MEDICINE

## 2024-01-05 PROCEDURE — 1160F RVW MEDS BY RX/DR IN RCRD: CPT | Mod: HCNC,CPTII,S$GLB, | Performed by: INTERNAL MEDICINE

## 2024-01-05 RX ORDER — PREDNISONE 20 MG/1
10 TABLET ORAL DAILY
COMMUNITY
Start: 2023-12-08 | End: 2024-02-20

## 2024-01-05 NOTE — PROGRESS NOTES
Subjective:   Patient ID:  Maryann Sorenson is a 83 y.o. female who presents for evaluation of AF    Primary CV: Dr ARIANA Gavin    HPI  82 F  PAF since 4/22. Eliquis.  HTN HL NOE(onCPAP)    AF since 4/2022. On eliquis since. Went to ER 9/7/23 with palpitations. Found in AF with RVR; sef-resolved. I reviewed this ECG; confirmed AF.  Has had episodes q months usually, but recently has been q week. Increasing in frequency and sx.    Holter 9/23: SR  TSH normal  echo 6/23: 65%    Update 1/2024:  Due to an uptick in palpitations, a Bardy monitor was placed.  Bardy monitor showed SR -> SB with 12.4 s of CHB -> recovery to SR.  d/w pt via phone. She had considerable stomach cramping, felt LH, had LOC for few seconds (on couch; no injury) then recovered. Last time this happened was 2017.  Dx: vasovagal episode with transient SB/CHB and syncope.  She presents today to discuss management.    My interpretation of today's ECG is NSR with first degree AVB    Review of Systems   Constitutional: Negative. Negative for malaise/fatigue.   HENT: Negative.  Negative for ear pain and tinnitus.    Eyes:  Negative for blurred vision.   Cardiovascular:  Positive for palpitations and syncope. Negative for chest pain, dyspnea on exertion and near-syncope.   Respiratory: Negative.  Negative for shortness of breath.    Endocrine: Negative.  Negative for polyuria.   Hematologic/Lymphatic: Does not bruise/bleed easily.   Skin: Negative.  Negative for rash.   Musculoskeletal: Negative.  Negative for joint pain and muscle weakness.   Gastrointestinal: Negative.  Negative for abdominal pain and change in bowel habit.   Genitourinary:  Negative for frequency.   Neurological:  Negative for dizziness and weakness.   Psychiatric/Behavioral: Negative.  Negative for depression. The patient is not nervous/anxious.    Allergic/Immunologic: Negative for environmental allergies.       Objective:   Physical Exam  Vitals and nursing note reviewed.    Constitutional:       Appearance: Normal appearance. She is well-developed.   HENT:      Head: Normocephalic and atraumatic.   Eyes:      Conjunctiva/sclera: Conjunctivae normal.   Neck:      Thyroid: No thyroid mass or thyromegaly.      Trachea: No tracheal deviation.   Cardiovascular:      Rate and Rhythm: Normal rate and regular rhythm.   Pulmonary:      Effort: Pulmonary effort is normal. No respiratory distress.      Breath sounds: No wheezing.   Abdominal:      General: There is no distension.   Musculoskeletal:         General: Normal range of motion.      Cervical back: Normal range of motion. No edema.   Skin:     General: Skin is warm and dry.      Findings: No rash.   Neurological:      Mental Status: She is alert and oriented to person, place, and time.      Coordination: Coordination normal.   Psychiatric:         Speech: Speech normal.         Behavior: Behavior normal. Behavior is cooperative.         Thought Content: Thought content normal.         Assessment:      1. Essential hypertension    2. Chronic pulmonary heart disease    3. Atrial fibrillation with RVR    4. Infiltrative cardiomyopathy    5. Aortic atherosclerosis    6. Paroxysmal atrial fibrillation    7. NOE (obstructive sleep apnea)    8. Shortness of breath    9. Heart palpitations    10. Pulmonary hypertension    11. Vasovagal syncope        Plan:     Vasovagal event with a profound cardioinhibitory component (12.4 s of CHB), c/b syncope with minimal prodrome.    Discussed options: no change, change AAD (eg to amio), PPM.  After a thorough discussion of all options, their risks and benefits, she chooses PPM.    I spent about a half hour discussing the risks and benefits of PPM placement. Our discussion of risks included (but was not limited to) the possibility of infection, death, stroke, MI, pneumothorax, embolism, cardiac perforation, cardiac tamponade, renal injury, and bleeding.  I discussed with patient risks, indications,  benefits, and alternatives of the planned procedure. All questions were answered. Patient understands and wishes to proceed.

## 2024-01-09 ENCOUNTER — PATIENT MESSAGE (OUTPATIENT)
Dept: ELECTROPHYSIOLOGY | Facility: CLINIC | Age: 84
End: 2024-01-09
Payer: MEDICARE

## 2024-01-09 NOTE — TELEPHONE ENCOUNTER
I spoke with patient and scheduled her procedure for 2/9/24. Procedure details reviewed and instructions will be sent via portal as requested.

## 2024-01-11 DIAGNOSIS — Z00.00 ENCOUNTER FOR MEDICARE ANNUAL WELLNESS EXAM: ICD-10-CM

## 2024-01-12 ENCOUNTER — PATIENT MESSAGE (OUTPATIENT)
Dept: ELECTROPHYSIOLOGY | Facility: CLINIC | Age: 84
End: 2024-01-12
Payer: MEDICARE

## 2024-01-12 DIAGNOSIS — I49.9 CARDIAC ARRHYTHMIA, UNSPECIFIED CARDIAC ARRHYTHMIA TYPE: ICD-10-CM

## 2024-01-12 DIAGNOSIS — I44.2 CHB (COMPLETE HEART BLOCK): Primary | ICD-10-CM

## 2024-01-25 ENCOUNTER — HOSPITAL ENCOUNTER (EMERGENCY)
Facility: HOSPITAL | Age: 84
Discharge: HOME | End: 2024-01-26
Attending: EMERGENCY MEDICINE
Payer: MEDICARE

## 2024-01-25 DIAGNOSIS — I48.91 ATRIAL FIBRILLATION: ICD-10-CM

## 2024-01-25 DIAGNOSIS — R29.818 ACUTE FOCAL NEUROLOGICAL DEFICIT: ICD-10-CM

## 2024-01-25 DIAGNOSIS — R00.0 TACHYCARDIA: ICD-10-CM

## 2024-01-25 LAB
ALBUMIN SERPL BCP-MCNC: 3.7 G/DL (ref 3.5–5.2)
ALP SERPL-CCNC: 70 U/L (ref 55–135)
ALT SERPL W/O P-5'-P-CCNC: 20 U/L (ref 10–44)
ANION GAP SERPL CALC-SCNC: 10 MMOL/L (ref 8–16)
AST SERPL-CCNC: 24 U/L (ref 10–40)
BACTERIA #/AREA URNS AUTO: NORMAL /HPF
BASOPHILS # BLD AUTO: 0.03 K/UL (ref 0–0.2)
BASOPHILS NFR BLD: 0.5 % (ref 0–1.9)
BILIRUB SERPL-MCNC: 0.5 MG/DL (ref 0.1–1)
BILIRUB UR QL STRIP: NEGATIVE
BNP SERPL-MCNC: 55 PG/ML (ref 0–99)
BUN SERPL-MCNC: 16 MG/DL (ref 8–23)
CALCIUM SERPL-MCNC: 9.7 MG/DL (ref 8.7–10.5)
CHLORIDE SERPL-SCNC: 103 MMOL/L (ref 95–110)
CHOLEST SERPL-MCNC: 191 MG/DL (ref 120–199)
CHOLEST/HDLC SERPL: 2.6 {RATIO} (ref 2–5)
CLARITY UR REFRACT.AUTO: CLEAR
CO2 SERPL-SCNC: 24 MMOL/L (ref 23–29)
COLOR UR AUTO: COLORLESS
CREAT SERPL-MCNC: 0.5 MG/DL (ref 0.5–1.4)
CREAT SERPL-MCNC: 0.8 MG/DL (ref 0.5–1.4)
DIFFERENTIAL METHOD BLD: ABNORMAL
EOSINOPHIL # BLD AUTO: 0 K/UL (ref 0–0.5)
EOSINOPHIL NFR BLD: 0.7 % (ref 0–8)
ERYTHROCYTE [DISTWIDTH] IN BLOOD BY AUTOMATED COUNT: 16.3 % (ref 11.5–14.5)
EST. GFR  (NO RACE VARIABLE): >60 ML/MIN/1.73 M^2
GLUCOSE SERPL-MCNC: 134 MG/DL (ref 70–110)
GLUCOSE UR QL STRIP: ABNORMAL
HCT VFR BLD AUTO: 39.5 % (ref 37–48.5)
HCV AB SERPL QL IA: NORMAL
HDLC SERPL-MCNC: 74 MG/DL (ref 40–75)
HDLC SERPL: 38.7 % (ref 20–50)
HGB BLD-MCNC: 13.2 G/DL (ref 12–16)
HGB UR QL STRIP: NEGATIVE
HIV 1+2 AB+HIV1 P24 AG SERPL QL IA: NORMAL
IMM GRANULOCYTES # BLD AUTO: 0.01 K/UL (ref 0–0.04)
IMM GRANULOCYTES NFR BLD AUTO: 0.2 % (ref 0–0.5)
INR PPP: 1 (ref 0.8–1.2)
KETONES UR QL STRIP: NEGATIVE
LDLC SERPL CALC-MCNC: 85.8 MG/DL (ref 63–159)
LEUKOCYTE ESTERASE UR QL STRIP: NEGATIVE
LYMPHOCYTES # BLD AUTO: 1 K/UL (ref 1–4.8)
LYMPHOCYTES NFR BLD: 16.2 % (ref 18–48)
MAGNESIUM SERPL-MCNC: 2.4 MG/DL (ref 1.6–2.6)
MCH RBC QN AUTO: 29.3 PG (ref 27–31)
MCHC RBC AUTO-ENTMCNC: 33.4 G/DL (ref 32–36)
MCV RBC AUTO: 88 FL (ref 82–98)
MICROSCOPIC COMMENT: NORMAL
MONOCYTES # BLD AUTO: 0.6 K/UL (ref 0.3–1)
MONOCYTES NFR BLD: 9.7 % (ref 4–15)
NEUTROPHILS # BLD AUTO: 4.4 K/UL (ref 1.8–7.7)
NEUTROPHILS NFR BLD: 72.7 % (ref 38–73)
NITRITE UR QL STRIP: NEGATIVE
NONHDLC SERPL-MCNC: 117 MG/DL
NRBC BLD-RTO: 0 /100 WBC
PH UR STRIP: 7 [PH] (ref 5–8)
PLATELET # BLD AUTO: 240 K/UL (ref 150–450)
PMV BLD AUTO: 11.4 FL (ref 9.2–12.9)
POC PTINR: 1.4 (ref 0.9–1.2)
POC PTWBT: 16 SEC (ref 9.7–14.3)
POCT GLUCOSE: 105 MG/DL (ref 70–110)
POTASSIUM SERPL-SCNC: 4 MMOL/L (ref 3.5–5.1)
PROT SERPL-MCNC: 7.1 G/DL (ref 6–8.4)
PROT UR QL STRIP: NEGATIVE
PROTHROMBIN TIME: 10.7 SEC (ref 9–12.5)
RBC # BLD AUTO: 4.5 M/UL (ref 4–5.4)
RBC #/AREA URNS AUTO: 3 /HPF (ref 0–4)
SAMPLE: ABNORMAL
SAMPLE: NORMAL
SODIUM SERPL-SCNC: 137 MMOL/L (ref 136–145)
SP GR UR STRIP: 1 (ref 1–1.03)
SQUAMOUS #/AREA URNS AUTO: 0 /HPF
TRIGL SERPL-MCNC: 156 MG/DL (ref 30–150)
TROPONIN I SERPL DL<=0.01 NG/ML-MCNC: <0.006 NG/ML (ref 0–0.03)
TSH SERPL DL<=0.005 MIU/L-ACNC: 1.26 UIU/ML (ref 0.4–4)
URN SPEC COLLECT METH UR: ABNORMAL
WBC # BLD AUTO: 5.98 K/UL (ref 3.9–12.7)
WBC #/AREA URNS AUTO: 0 /HPF (ref 0–5)
YEAST UR QL AUTO: NORMAL

## 2024-01-25 PROCEDURE — 85610 PROTHROMBIN TIME: CPT

## 2024-01-25 PROCEDURE — 99291 CRITICAL CARE FIRST HOUR: CPT

## 2024-01-25 PROCEDURE — 96374 THER/PROPH/DIAG INJ IV PUSH: CPT | Mod: 59

## 2024-01-25 PROCEDURE — 87389 HIV-1 AG W/HIV-1&-2 AB AG IA: CPT | Performed by: PHYSICIAN ASSISTANT

## 2024-01-25 PROCEDURE — 99900035 HC TECH TIME PER 15 MIN (STAT)

## 2024-01-25 PROCEDURE — 93010 ELECTROCARDIOGRAM REPORT: CPT | Mod: 76,,, | Performed by: INTERNAL MEDICINE

## 2024-01-25 PROCEDURE — 93005 ELECTROCARDIOGRAM TRACING: CPT

## 2024-01-25 PROCEDURE — 83735 ASSAY OF MAGNESIUM: CPT

## 2024-01-25 PROCEDURE — 86803 HEPATITIS C AB TEST: CPT | Performed by: PHYSICIAN ASSISTANT

## 2024-01-25 PROCEDURE — 96375 TX/PRO/DX INJ NEW DRUG ADDON: CPT | Mod: 59

## 2024-01-25 PROCEDURE — 85610 PROTHROMBIN TIME: CPT | Mod: 91

## 2024-01-25 PROCEDURE — 63600175 PHARM REV CODE 636 W HCPCS

## 2024-01-25 PROCEDURE — 25000003 PHARM REV CODE 250

## 2024-01-25 PROCEDURE — 80053 COMPREHEN METABOLIC PANEL: CPT

## 2024-01-25 PROCEDURE — 93010 ELECTROCARDIOGRAM REPORT: CPT | Mod: ,,, | Performed by: INTERNAL MEDICINE

## 2024-01-25 PROCEDURE — 82565 ASSAY OF CREATININE: CPT | Mod: 91

## 2024-01-25 PROCEDURE — 85025 COMPLETE CBC W/AUTO DIFF WBC: CPT

## 2024-01-25 PROCEDURE — 96361 HYDRATE IV INFUSION ADD-ON: CPT

## 2024-01-25 PROCEDURE — 83880 ASSAY OF NATRIURETIC PEPTIDE: CPT

## 2024-01-25 PROCEDURE — 84443 ASSAY THYROID STIM HORMONE: CPT

## 2024-01-25 PROCEDURE — 81001 URINALYSIS AUTO W/SCOPE: CPT

## 2024-01-25 PROCEDURE — 25500020 PHARM REV CODE 255: Performed by: EMERGENCY MEDICINE

## 2024-01-25 PROCEDURE — 84484 ASSAY OF TROPONIN QUANT: CPT

## 2024-01-25 PROCEDURE — 80061 LIPID PANEL: CPT

## 2024-01-25 PROCEDURE — 82962 GLUCOSE BLOOD TEST: CPT

## 2024-01-25 RX ORDER — DILTIAZEM HYDROCHLORIDE 5 MG/ML
INJECTION INTRAVENOUS
Status: DISCONTINUED
Start: 2024-01-25 | End: 2024-01-26 | Stop reason: HOSPADM

## 2024-01-25 RX ORDER — ADENOSINE 3 MG/ML
6 INJECTION, SOLUTION INTRAVENOUS
Status: DISCONTINUED | OUTPATIENT
Start: 2024-01-25 | End: 2024-01-25

## 2024-01-25 RX ORDER — ADENOSINE 3 MG/ML
12 INJECTION, SOLUTION INTRAVENOUS
Status: DISCONTINUED | OUTPATIENT
Start: 2024-01-25 | End: 2024-01-25

## 2024-01-25 RX ORDER — DILTIAZEM HYDROCHLORIDE 5 MG/ML
10 INJECTION INTRAVENOUS
Status: DISCONTINUED | OUTPATIENT
Start: 2024-01-25 | End: 2024-01-25

## 2024-01-25 RX ORDER — METOPROLOL TARTRATE 1 MG/ML
INJECTION, SOLUTION INTRAVENOUS
Status: COMPLETED
Start: 2024-01-25 | End: 2024-01-25

## 2024-01-25 RX ORDER — MIDAZOLAM HYDROCHLORIDE 1 MG/ML
1 INJECTION INTRAMUSCULAR; INTRAVENOUS
Status: COMPLETED | OUTPATIENT
Start: 2024-01-25 | End: 2024-01-25

## 2024-01-25 RX ORDER — METOPROLOL TARTRATE 1 MG/ML
5 INJECTION, SOLUTION INTRAVENOUS EVERY 5 MIN PRN
Status: DISCONTINUED | OUTPATIENT
Start: 2024-01-25 | End: 2024-01-26 | Stop reason: HOSPADM

## 2024-01-25 RX ADMIN — IOHEXOL 100 ML: 350 INJECTION, SOLUTION INTRAVENOUS at 08:01

## 2024-01-25 RX ADMIN — METOROPROLOL TARTRATE 5 MG: 5 INJECTION, SOLUTION INTRAVENOUS at 05:01

## 2024-01-25 RX ADMIN — SODIUM CHLORIDE 500 ML: 9 INJECTION, SOLUTION INTRAVENOUS at 11:01

## 2024-01-25 RX ADMIN — SODIUM CHLORIDE 1000 ML: 9 INJECTION, SOLUTION INTRAVENOUS at 05:01

## 2024-01-25 RX ADMIN — MIDAZOLAM HYDROCHLORIDE 1 MG: 2 INJECTION, SOLUTION INTRAMUSCULAR; INTRAVENOUS at 09:01

## 2024-01-25 NOTE — ED TRIAGE NOTES
Patient presents to the ED today with reports of tachycardia. Patient states that she woke up this morning at 0600 she felt like her L leg was feeling numb and states later in the day her L arm felt numb. LKN 11pm last night. Patient states about 3pm she started to experience palpitations.  on arrival to ED. Hx of AFIB

## 2024-01-25 NOTE — ED PROVIDER NOTES
"Encounter Date: 1/25/2024       History     Chief Complaint   Patient presents with    Irregular Heart Beat     Pt reports "rapid" heart beat that started around ~315 pm today. Hx afib. Pacemaker sx scheduled 2/9. Took a Metoprolol prior to arrival.     Pt is an 83 year old female with PMHx significant for atrial fibrillation who presents for evaluation of multiple complaints. Pt reports she awoke this morning at 6 AM with left leg numbness and weakness. She went back to sleep believing it would resolve spontaneously. When she awoke again she noticed left facial numbness and left arm numbness/weakness. Around 3 pm she began experiencing palpitations. She took metoprolol without relief which prompted presentation to the ED. No fever, chills, chest pain, shortness of breath, nausea, or vomiting     The history is provided by the patient.     Review of patient's allergies indicates:   Allergen Reactions    Amoxicillin-pot clavulanate Diarrhea     Given march 2016    Sulfa (sulfonamide antibiotics) Rash    Codeine      Other reaction(s): Unknown, tolerates hydrocodone june 2014    Doxycycline Nausea Only    Lyrica [pregabalin] Other (See Comments)     Blurry vision    Macrobid [nitrofurantoin monohyd/m-cryst] Other (See Comments)      2019 caused cramps in legs. After stopped medication it cleared.     Prevnar [pneumococcal 7-beti conj vacc] Other (See Comments)     Arm sore, redness at injection site and muscle aches. Given shot 7/23/19    Relafen [nabumetone] Diarrhea     Past Medical History:   Diagnosis Date    A-fib     GERD (gastroesophageal reflux disease)     Pulmonary hypertension 2016    Syncope      Past Surgical History:   Procedure Laterality Date    broken wrist      "put plate in it"    DILATION AND CURETTAGE OF UTERUS      excision of lipoma Left 2009    near collar bone    tonsillectomy      TONSILLECTOMY       Family History   Problem Relation Age of Onset    Colon cancer Mother 95        d. 99 w/ brain " mets    Hyperlipidemia Mother     Hypertension Mother     Heart disease Father     Kidney failure Father         d.89    Arthritis Sister         20+ surgeries- most 2/2 MSK problems- 80(2021)    Chronic back pain Sister         71(2021)    Other Brother         [place]36 [place]    Heart disease Daughter         d.5 yo in heart surgery; Tetrology of Fallot    No Known Problems Daughter         lives in [place]    Heart disease Son         congenital; Tetralogy of Fallot, lives in , surgery at 38yo    Other Son         cardiac arrest,-Vtach/fib,s/p H. , recovered    Depression Son         lives near pt- works in , lives in [place], [place]     Social History     Tobacco Use    Smoking status: Never    Smokeless tobacco: Never   Substance Use Topics    Alcohol use: Not Currently    Drug use: No     Review of Systems    Physical Exam     Initial Vitals [01/25/24 1645]   BP Pulse Resp Temp SpO2   122/78 (!) 160 18 98 °F (36.7 °C) 96 %      MAP       --         Physical Exam    Nursing note and vitals reviewed.  Constitutional: She appears well-developed and well-nourished. No distress.   HENT:   Head: Normocephalic and atraumatic.   Eyes: EOM are normal. Pupils are equal, round, and reactive to light.   Neck: Neck supple. No tracheal deviation present.   Normal range of motion.  Cardiovascular:  An irregularly irregular rhythm present.   Tachycardia present.         Pulmonary/Chest: Breath sounds normal. No stridor. No respiratory distress. She has no wheezes. She has no rales.   Abdominal: Abdomen is soft. She exhibits no distension. There is no abdominal tenderness.   Musculoskeletal:         General: No edema. Normal range of motion.      Cervical back: Normal range of motion and neck supple.     Neurological: She is alert and oriented to person, place, and time.   LUE 4/5 strength. LLE 4/5 strength    Skin: Skin is warm and dry. Capillary refill takes less than 2 seconds.         ED Course    Procedures  Labs Reviewed   CBC W/ AUTO DIFFERENTIAL - Abnormal; Notable for the following components:       Result Value    RDW 16.3 (*)     Lymph % 16.2 (*)     All other components within normal limits    Narrative:     Add on lipid per rn long Epic order [id number]  01/25/2024  17:41    COMPREHENSIVE METABOLIC PANEL - Abnormal; Notable for the following components:    Glucose 134 (*)     All other components within normal limits    Narrative:     Add on lipid per rn long Epic order [id number]  01/25/2024  17:41    URINALYSIS, REFLEX TO URINE CULTURE - Abnormal; Notable for the following components:    Color, UA Colorless (*)     Glucose, UA 4+ (*)     All other components within normal limits    Narrative:     Specimen Source->Urine   LIPID PANEL - Abnormal; Notable for the following components:    Triglycerides 156 (*)     All other components within normal limits    Narrative:     Add on lipid per rn long Epic order [id number]  01/25/2024  17:41    ISTAT PROCEDURE - Abnormal; Notable for the following components:    POC PTWBT 16.0 (*)     POC PTINR 1.4 (*)     All other components within normal limits   HIV 1 / 2 ANTIBODY    Narrative:     Release to patient->Immediate   HEPATITIS C ANTIBODY    Narrative:     Release to patient->Immediate   B-TYPE NATRIURETIC PEPTIDE    Narrative:     Add on lipid per rn long Epic order [id number]  01/25/2024  17:41    TROPONIN I    Narrative:     Add on lipid per rn long Epic order [id number]  01/25/2024  17:41    TSH    Narrative:     Add on lipid per rn long Epic order [id number]  01/25/2024  17:41    MAGNESIUM    Narrative:     Add on lipid per rn long Epic order [id number]  01/25/2024  17:41    PROTIME-INR   URINALYSIS MICROSCOPIC    Narrative:     Specimen Source->Urine   POCT GLUCOSE   ISTAT CREATININE          Imaging Results               CTA STROKE MULTI-PHASE (Final result)  Result time 01/25/24 20:30:23      Final result by Tavo Mejia MD (01/25/24 20:30:23)                    Impression:      1. No acute intracranial process.  2. High-grade atherosclerotic narrowing of the proximal left subclavian artery just distal to the aorta.  Recommend vascular follow-up.  3. Fusiform ascending thoracic aortic aneurysm incompletely visualized measuring approximately 4.4 cm.  Recommend follow-up.  4. Mild involutional changes and chronic microvascular ischemic changes.  Small remote lacunar infarct of the right lateral basal ganglia.  5. This report was flagged in Epic as abnormal.      Electronically signed by: Tavo Riley  Date:    01/25/2024  Time:    20:30               Narrative:    EXAMINATION:  CTA STROKE MULTI-PHASE    CLINICAL HISTORY:  Neuro deficit, acute, stroke suspected; left arm, facial numbness    TECHNIQUE:  Non contrast low dose axial images were obtained thought the head. CT angiogram was performed from the level of the roshni to the top of the head following the IV administration of 100mL of Omnipaque 350.   Sagittal and coronal reconstructions and maximum intensity projection reconstructions were performed. Arterial stenosis percentages are based on NASCET measurement criteria.  Two additional phases of immediate post-contrast CTA images were performed through the head alone.    COMPARISON:  06/18/2017    FINDINGS:  Intracranial Compartment:    Ventricles and sulci are normal in size for age without evidence of hydrocephalus. No extra-axial blood or fluid collections.    Mild involutional changes and chronic microvascular ischemic changes in the periventricular.  Small remote lacunar infarct of the right lateral basal ganglia.  No parenchymal mass, hemorrhage, edema, or major vascular distribution infarct.    Skull/Extracranial Contents (limited evaluation): No fracture. Mastoid air cells and paranasal sinuses are essentially clear.    Non-Vascular Structures of the Neck/Thoracic Inlet (limited evaluation): Normal.    Aorta: 2 vessel arch with common origin  of the right brachiocephalic and left common carotid artery.    High-grade narrowing of the proximal left subclavian artery near the aorta.  This can be associated with subclavian steal phenomena.  Follow-up recommended.    Aneurysmal dilation of the visualized distal ascending thoracic aorta measuring approximately 4.4 cm in the visualized portion.  Follow-up recommended.    Extracranial carotid circulation: No hemodynamically significant stenosis, aneurysmal dilatation, or dissection.  Mild atherosclerotic narrowing the proximal right internal carotid artery.    Extracranial vertebral circulation: No hemodynamically significant stenosis, aneurysmal dilatation, or dissection.    Intracranial Arteries: No focal high-grade stenosis, occlusion, or aneurysm.  The A1 segment of the left anterior cerebral artery is hypoplastic.  Patent anterior communicating artery.  A2 segments appear within normal limits.  Middle cerebral arteries appear adequately maintained.  Posterior cerebral arteries appear adequately maintained.    Venous structures (limited evaluation): Normal.                                       X-Ray Chest AP Portable (Final result)  Result time 01/25/24 17:59:52      Final result by Fabricio Vieyra DO (01/25/24 17:59:52)                   Impression:      Large hiatal hernia.  Clear lungs.      Electronically signed by: Fabricio Vieyra  Date:    01/25/2024  Time:    17:59               Narrative:    EXAMINATION:  XR CHEST AP PORTABLE    CLINICAL HISTORY:  tachycardia;    TECHNIQUE:  Single frontal view of the chest was performed.    COMPARISON:  11/27/2023.    FINDINGS:  There is a large hiatal hernia.  The lungs are well expanded and clear.  No focal opacities are seen.  The pleural spaces are clear.  The cardiac silhouette is enlarged.  There are calcifications of the aortic arch.  Osseous structures demonstrate degenerative changes.                                       Medications   metoprolol  injection 5 mg (5 mg Intravenous Given 1/25/24 1728)   midazolam (VERSED) 1 mg/mL injection 1 mg (has no administration in time range)   sodium chloride 0.9% bolus 1,000 mL 1,000 mL (0 mLs Intravenous Stopped 1/25/24 1823)   iohexoL (OMNIPAQUE 350) injection 100 mL (100 mLs Intravenous Given 1/25/24 2000)   sodium chloride 0.9% bolus 500 mL 500 mL (500 mLs Intravenous New Bag 1/25/24 2314)     Medical Decision Making  Pt presents for palpitations and left sided weakness. Initial presentation concerning for a-fib with RVR. Received metoprolol x2 with conversion to normal sinus and rate control. Labs without leukocytosis or anemia. Trop and BNP negative. Pt hemodynamically stable following conversion. CTA obtained negative for intracranial bleed, but does demonstrate fusiform ascending thoracic aortic aneurysm without evidence of dissection. Pt updated on these findings. Pt signed out pending MRI brain    Amount and/or Complexity of Data Reviewed  Labs: ordered.  Radiology: ordered.    Risk  Prescription drug management.  Decision regarding hospitalization.              Attending Attestation:   Physician Attestation Statement for Resident:  As the supervising MD   Physician Attestation Statement: I have personally seen and examined this patient.   I agree with the above history.  -: 83-year-old woman with history of complete heart block scheduled for PPM, AFib on apixaban, metoprolol, amlodipine, presents with palpitations since 3:15 p.m..  Radiating to neck.  She notes consistent with previous AFib.  She also reports paresthesias to left arm, leg, face.  First noticed them at 6:00 a.m. while waking up.  Went to bed at 11:00 p.m. last night feeling normal.  On exam she is symmetric  strength.  Some weakness of left upper extremity compared to right but 5- out of 5.  An EKG obtained at 5:02 p.m. was concerning for SVT but on monitor she appeared to be in atrial fibrillation with rapid ventricular rate.  She  received metoprolol x2 with cardioversion.  Will investigate etiologies of dysrhythmia as well as possible CVA.   As the supervising MD I agree with the above PE.     As the supervising MD I agree with the above treatment, course, plan, and disposition.            Attending Critical Care:   Critical Care Times:   ==============================================================  Total Critical Care Time - exclusive of procedural time: 30 minutes.  ==============================================================  Critical care was necessary to treat or prevent imminent or life-threatening deterioration of the following conditions: cardiac arrhythmia.           ED Course as of 01/26/24 0015   Thu Jan 25, 2024   1652 EKG 12-lead  No STEMI. [LP]      ED Course User Index  [LP] Tereso Lin III, MD                           Clinical Impression:  Final diagnoses:  [I48.91] Atrial fibrillation  [R00.0] Tachycardia  [R29.818] Acute focal neurological deficit          ED Disposition Condition    Admit Stable                Jordi Finn Jr., MD  Resident  01/26/24 0015

## 2024-01-26 ENCOUNTER — PATIENT MESSAGE (OUTPATIENT)
Dept: CARDIOLOGY | Facility: CLINIC | Age: 84
End: 2024-01-26
Payer: MEDICARE

## 2024-01-26 ENCOUNTER — PATIENT MESSAGE (OUTPATIENT)
Dept: ELECTROPHYSIOLOGY | Facility: CLINIC | Age: 84
End: 2024-01-26
Payer: MEDICARE

## 2024-01-26 VITALS
RESPIRATION RATE: 14 BRPM | SYSTOLIC BLOOD PRESSURE: 134 MMHG | DIASTOLIC BLOOD PRESSURE: 65 MMHG | BODY MASS INDEX: 31.18 KG/M2 | WEIGHT: 176 LBS | OXYGEN SATURATION: 95 % | HEART RATE: 56 BPM | TEMPERATURE: 98 F

## 2024-01-26 NOTE — ED NOTES
Bed: 16  Expected date: 1/25/24  Expected time: 10:34 PM  Means of arrival:   Comments:  Brian Madrid

## 2024-01-26 NOTE — DISCHARGE INSTRUCTIONS
Although your neurologic exam upon discharge was good here, if you experience any new symptoms or worsening neurologic function, please return to the emergency department for further evaluation.  Please follow up with your primary care doctor as needed for further symptom management.

## 2024-01-26 NOTE — PROVIDER PROGRESS NOTES - EMERGENCY DEPT.
Encounter Date: 1/25/2024    ED Physician Progress Notes          Physician Note:   Signout Note  I received signout from the previous providers.      Chief complaint:  Left leg numbness/weakness.     Per sign out and chart review:  Maryann Sorenson is a 83 y.o. female , presenting with complaints of weakness in her left leg that started acutely.    During ED stay patient was initially found to be in AFib with RVR that was well controlled with 2 doses of metoprolol.  Throughout her stay in the ED the patient continued to remain in normal sinus rhythm.     Pt signed out to me pending:  Brain MRI.     Update/ Disposition:  The patient's brain MRI was found to be unremarkable for any sort of ischemic event.  Upon my reassessment of the patient, she was neurovascularly intact, and strength/of both lower extremities was maintained. I had a discussion with the patient regarding going home with strict return precautions should she experience any new neurologic symptoms, to which she is amenable.  Patient is stable for discharge at this time.     Patient, caregiver and/or family understands the plan and verbalized agreement. All questions answered.      Diagnostic Impression:    Transient left leg numbness/weakness.

## 2024-01-26 NOTE — ED NOTES
Received bedside report from Yola RN  Pt AAOx4, resting comfortably in bed, NAD, respirations E/UL, updated on POC, wheels locked and in low position, call bell with in reach, Comfort positioning and restroom needs were addressed. Necessary items were placed with in reach and was advised when a reassessment would take place.

## 2024-01-27 ENCOUNTER — HOSPITAL ENCOUNTER (EMERGENCY)
Facility: HOSPITAL | Age: 84
Discharge: HOME | End: 2024-01-27
Attending: EMERGENCY MEDICINE
Payer: MEDICARE

## 2024-01-27 VITALS
BODY MASS INDEX: 31.01 KG/M2 | DIASTOLIC BLOOD PRESSURE: 72 MMHG | SYSTOLIC BLOOD PRESSURE: 120 MMHG | OXYGEN SATURATION: 96 % | HEART RATE: 66 BPM | HEIGHT: 63 IN | TEMPERATURE: 98 F | RESPIRATION RATE: 19 BRPM | WEIGHT: 175 LBS

## 2024-01-27 DIAGNOSIS — I48.91 ATRIAL FIBRILLATION WITH RAPID VENTRICULAR RESPONSE: Primary | ICD-10-CM

## 2024-01-27 DIAGNOSIS — R00.2 PALPITATIONS: ICD-10-CM

## 2024-01-27 LAB
ALBUMIN SERPL BCP-MCNC: 3.9 G/DL (ref 3.5–5.2)
ALP SERPL-CCNC: 91 U/L (ref 38–126)
ALT SERPL W/O P-5'-P-CCNC: 20 U/L (ref 10–44)
ANION GAP SERPL CALC-SCNC: 11 MMOL/L (ref 8–16)
AST SERPL-CCNC: 31 U/L (ref 15–46)
BASOPHILS # BLD AUTO: 0.03 K/UL (ref 0–0.2)
BASOPHILS NFR BLD: 0.8 % (ref 0–1.9)
BILIRUB SERPL-MCNC: 0.3 MG/DL (ref 0.1–1)
CALCIUM SERPL-MCNC: 8.8 MG/DL (ref 8.7–10.5)
CHLORIDE SERPL-SCNC: 106 MMOL/L (ref 95–110)
CO2 SERPL-SCNC: 20 MMOL/L (ref 23–29)
CREAT SERPL-MCNC: 0.69 MG/DL (ref 0.5–1.4)
DIFFERENTIAL METHOD BLD: ABNORMAL
EOSINOPHIL # BLD AUTO: 0.1 K/UL (ref 0–0.5)
EOSINOPHIL NFR BLD: 1.6 % (ref 0–8)
ERYTHROCYTE [DISTWIDTH] IN BLOOD BY AUTOMATED COUNT: 16.1 % (ref 11.5–14.5)
EST. GFR  (NO RACE VARIABLE): >60 ML/MIN/1.73 M^2
GLUCOSE SERPL-MCNC: 156 MG/DL (ref 70–110)
HCT VFR BLD AUTO: 36.4 % (ref 37–48.5)
HGB BLD-MCNC: 11.8 G/DL (ref 12–16)
IMM GRANULOCYTES # BLD AUTO: 0.01 K/UL (ref 0–0.04)
IMM GRANULOCYTES NFR BLD AUTO: 0.3 % (ref 0–0.5)
INFLUENZA A, MOLECULAR: NEGATIVE
INFLUENZA B, MOLECULAR: NEGATIVE
LYMPHOCYTES # BLD AUTO: 0.7 K/UL (ref 1–4.8)
LYMPHOCYTES NFR BLD: 17.7 % (ref 18–48)
MCH RBC QN AUTO: 28.6 PG (ref 27–31)
MCHC RBC AUTO-ENTMCNC: 32.4 G/DL (ref 32–36)
MCV RBC AUTO: 88 FL (ref 82–98)
MONOCYTES # BLD AUTO: 0.4 K/UL (ref 0.3–1)
MONOCYTES NFR BLD: 9.9 % (ref 4–15)
NEUTROPHILS # BLD AUTO: 2.7 K/UL (ref 1.8–7.7)
NEUTROPHILS NFR BLD: 69.7 % (ref 38–73)
NRBC BLD-RTO: 0 /100 WBC
NT-PROBNP SERPL-MCNC: 254 PG/ML (ref 5–1800)
PLATELET # BLD AUTO: 206 K/UL (ref 150–450)
PMV BLD AUTO: 10.6 FL (ref 9.2–12.9)
POTASSIUM SERPL-SCNC: 3.6 MMOL/L (ref 3.5–5.1)
PROT SERPL-MCNC: 6.6 G/DL (ref 6–8.4)
RBC # BLD AUTO: 4.12 M/UL (ref 4–5.4)
SARS-COV-2 RDRP RESP QL NAA+PROBE: NEGATIVE
SODIUM SERPL-SCNC: 137 MMOL/L (ref 136–145)
SPECIMEN SOURCE: NORMAL
TROPONIN I SERPL-MCNC: <0.012 NG/ML (ref 0.01–0.03)
UUN UR-MCNC: 15 MG/DL (ref 7–17)
WBC # BLD AUTO: 3.84 K/UL (ref 3.9–12.7)

## 2024-01-27 PROCEDURE — 93005 ELECTROCARDIOGRAM TRACING: CPT | Mod: ER

## 2024-01-27 PROCEDURE — 85025 COMPLETE CBC W/AUTO DIFF WBC: CPT | Mod: ER | Performed by: EMERGENCY MEDICINE

## 2024-01-27 PROCEDURE — 93010 ELECTROCARDIOGRAM REPORT: CPT | Mod: ,,, | Performed by: INTERNAL MEDICINE

## 2024-01-27 PROCEDURE — 80053 COMPREHEN METABOLIC PANEL: CPT | Mod: ER | Performed by: EMERGENCY MEDICINE

## 2024-01-27 PROCEDURE — 87502 INFLUENZA DNA AMP PROBE: CPT | Mod: ER | Performed by: EMERGENCY MEDICINE

## 2024-01-27 PROCEDURE — 83880 ASSAY OF NATRIURETIC PEPTIDE: CPT | Mod: ER | Performed by: EMERGENCY MEDICINE

## 2024-01-27 PROCEDURE — U0002 COVID-19 LAB TEST NON-CDC: HCPCS | Mod: ER | Performed by: EMERGENCY MEDICINE

## 2024-01-27 PROCEDURE — 99900035 HC TECH TIME PER 15 MIN (STAT): Mod: ER

## 2024-01-27 PROCEDURE — 84484 ASSAY OF TROPONIN QUANT: CPT | Mod: ER | Performed by: EMERGENCY MEDICINE

## 2024-01-27 PROCEDURE — 99291 CRITICAL CARE FIRST HOUR: CPT | Mod: ER

## 2024-01-27 PROCEDURE — 94760 N-INVAS EAR/PLS OXIMETRY 1: CPT | Mod: ER

## 2024-01-27 PROCEDURE — 96374 THER/PROPH/DIAG INJ IV PUSH: CPT | Mod: ER

## 2024-01-27 PROCEDURE — 25000003 PHARM REV CODE 250: Mod: ER | Performed by: EMERGENCY MEDICINE

## 2024-01-27 RX ORDER — METOPROLOL TARTRATE 1 MG/ML
5 INJECTION, SOLUTION INTRAVENOUS
Status: COMPLETED | OUTPATIENT
Start: 2024-01-27 | End: 2024-01-27

## 2024-01-27 RX ADMIN — METOPROLOL TARTRATE 5 MG: 1 INJECTION, SOLUTION INTRAVENOUS at 10:01

## 2024-01-28 NOTE — ED PROVIDER NOTES
"Encounter Date: 1/27/2024       History     Chief Complaint   Patient presents with    Shortness of Breath     Pt c/o shortness worsening today and "afib" starting at 8pm reports taking metoprolol 25 mg had some relief and started again at approx 9pm , denies cp at this time       HPI  83 y.o.   Pt seen in ED 2 days ago, had large w/u increading CT-a and MRI for extremity weakness, also in afib, which she has a h/o    Co palpitations tonight at 8pm, took metoprolol, felt better, then recurred at 9pm  No cp    Hx from patient and chart review  Review of patient's allergies indicates:   Allergen Reactions    Amoxicillin-pot clavulanate Diarrhea     Given march 2016    Sulfa (sulfonamide antibiotics) Rash    Codeine      Other reaction(s): Unknown, tolerates hydrocodone june 2014    Doxycycline Nausea Only    Lyrica [pregabalin] Other (See Comments)     Blurry vision    Macrobid [nitrofurantoin monohyd/m-cryst] Other (See Comments)      2019 caused cramps in legs. After stopped medication it cleared.     Prevnar [pneumococcal 7-beti conj vacc] Other (See Comments)     Arm sore, redness at injection site and muscle aches. Given shot 7/23/19    Relafen [nabumetone] Diarrhea     Past Medical History:   Diagnosis Date    A-fib     GERD (gastroesophageal reflux disease)     Pulmonary hypertension 2016    Syncope      Past Surgical History:   Procedure Laterality Date    broken wrist      "put plate in it"    DILATION AND CURETTAGE OF UTERUS      excision of lipoma Left 2009    near collar bone    tonsillectomy      TONSILLECTOMY       Family History   Problem Relation Age of Onset    Colon cancer Mother 95        d. 99 w/ brain mets    Hyperlipidemia Mother     Hypertension Mother     Heart disease Father     Kidney failure Father         d.89    Arthritis Sister         20+ surgeries- most 2/2 MSK problems- 80(2021)    Chronic back pain Sister         71(2021)    Other Brother         [place]36 [place]    " Heart disease Daughter         d.5 yo in heart surgery; Tetrology of Fallot    No Known Problems Daughter         lives in [place]    Heart disease Son         congenital; Tetralogy of Fallot, lives in , surgery at 40yo    Other Son         cardiac arrest,-Vtach/fib,s/p H. , recovered    Depression Son         lives near pt- works in , lives in [place], [place]     Social History     Tobacco Use    Smoking status: Never    Smokeless tobacco: Never   Substance Use Topics    Alcohol use: Not Currently    Drug use: No     Review of Systems  All systems were reviewed/examined and were negative except as noted in the HPI.    Physical Exam     Initial Vitals [01/27/24 2150]   BP Pulse Resp Temp SpO2   (!) 133/92 (!) 137 20 98.4 °F (36.9 °C) 98 %      MAP       --         Physical Exam    General: the patient is awake, alert, and in no apparent distress.  Head: normocephalic and atraumatic, sclera are clear  Neck: supple without meningismus  Chest: clear to auscultation bilaterally, no respiratory distress  Heart: regular rate and rhythm  ABD soft, nontender, nondistended, no peritoneal signs  No calf t no edema  Extremities: warm and well perfused  Skin: warm and dry  Psych conversant  Neuro: awake, alert, moving all extremities    ED Course   Procedures  Labs Reviewed   CBC W/ AUTO DIFFERENTIAL - Abnormal; Notable for the following components:       Result Value    WBC 3.84 (*)     Hemoglobin 11.8 (*)     Hematocrit 36.4 (*)     RDW 16.1 (*)     Lymph # 0.7 (*)     Lymph % 17.7 (*)     All other components within normal limits   COMPREHENSIVE METABOLIC PANEL - Abnormal; Notable for the following components:    CO2 20 (*)     Glucose 156 (*)     All other components within normal limits   INFLUENZA A & B BY MOLECULAR   TROPONIN I   NT-PRO NATRIURETIC PEPTIDE   SARS-COV-2 RNA AMPLIFICATION, QUAL    Narrative:     Is the patient symptomatic?->No          Imaging Results              X-Ray Chest 1 View (Final result)   Result time 01/27/24 22:34:57   Procedure changed from X-Ray Chest PA And Lateral     Final result by Roderick Krishnamurthy MD (01/27/24 22:34:57)                   Impression:      As above.      Electronically signed by: Roderick Krishnamurthy  Date:    01/27/2024  Time:    22:34               Narrative:    EXAMINATION:  XR CHEST 1 VIEW    CLINICAL HISTORY:  Palpitations, no other cardiac signs/symptoms;palpitations;    TECHNIQUE:  Single frontal view of the chest was performed.    COMPARISON:  Multiple priors.    FINDINGS:  Large hiatal hernia.    Heart at the upper limit of normal.  Lungs are clear.  No pleural fluid or pneumothorax.                                       Medications   metoprolol injection 5 mg (5 mg Intravenous Given 1/27/24 2208)     Medical Decision Making  Amount and/or Complexity of Data Reviewed  Labs: ordered.  Radiology: ordered.    Risk  Prescription drug management.       Medical Decision Making:    This is an emergent evaluation of a patient presenting to the ED.  Nursing notes were reviewed.  I personally reviewed, read, and interpreted the ECG and any monitoring strips.  Afib RVR  #1  Repeat with improved rate control  Repeat with NSR  I reviewed radiology images personally along with interpretations.  I personally reviewed and interpreted the laboratory results.  Imaging reviewed by me (chest x-ray), personally and independently, and prelims if available.  No acute/emergent pathologic findings noted on radiologic studies ordered.  CBC, CMP trop non critical  Communicated with another physician regarding patient's care: msg to EP  I decided to obtain and review old medical records, which showed: plan for pacer in early feb    Critical Care Time    Critical care time was provided for 30 minutes exclusive of other billable procedures and teaching time for the treatment of  afib RVR   where the potential for death, shock, or further decline was possible.  Critical care time can include  documentation, discussion with consultants, developing a care plan, as well as direct patient care.    Pierre Villareal MD      Conversion to NSR, offered obs, wants to go home                                 Clinical Impression:  Final diagnoses:  [R00.2] Palpitations  [I48.91] Atrial fibrillation with rapid ventricular response (Primary)          ED Disposition Condition    Discharge Stable          ED Prescriptions    None       Follow-up Information       Follow up With Specialties Details Why Contact Info    Dean Anglin MD Electrophysiology, Cardiology Schedule an appointment as soon as possible for a visit   [address] [place] [id number]  [phone]            Discharged to home in stable condition, return to ED warnings given, follow up and patient care instructions given.      Oliver Villareal MD, MAIA, FACEP  Department of Emergency Medicine       Pierre Villareal MD  01/28/24 0575

## 2024-01-31 ENCOUNTER — TELEPHONE (OUTPATIENT)
Dept: CARDIOLOGY | Facility: CLINIC | Age: 84
End: 2024-01-31
Payer: MEDICARE

## 2024-01-31 DIAGNOSIS — I71.21 ANEURYSM OF ASCENDING AORTA WITHOUT RUPTURE: Primary | ICD-10-CM

## 2024-01-31 RX ORDER — FLECAINIDE ACETATE 100 MG/1
100 TABLET ORAL EVERY 12 HOURS
Qty: 180 TABLET | Refills: 3 | Status: SHIPPED | OUTPATIENT
Start: 2024-01-31 | End: 2025-01-30

## 2024-01-31 NOTE — TELEPHONE ENCOUNTER
Spoke w pt. Advised she take a full tab of metoprolol prn if she has atrial fib and continue flecainide. Also follow any instructions from Dr. Anglin's office for the atrial fib.  I reviewed the CT head and neck images. It shows an aneurysm of the ascending aorta. Only the distal part of the ascending aorta was visible. The proximal ascending aorta was not within the field of view. Echo last year showed a normal aortic sinus and does not need to be repeated. Will obtain a noncon CT chest to look at the prox ascending aorta.

## 2024-02-01 ENCOUNTER — HOSPITAL ENCOUNTER (OUTPATIENT)
Dept: RADIOLOGY | Facility: HOSPITAL | Age: 84
Discharge: HOME | End: 2024-02-01
Attending: INTERNAL MEDICINE
Payer: MEDICARE

## 2024-02-01 DIAGNOSIS — I71.21 ANEURYSM OF ASCENDING AORTA WITHOUT RUPTURE: ICD-10-CM

## 2024-02-01 PROCEDURE — 71250 CT THORAX DX C-: CPT | Mod: 26,,, | Performed by: RADIOLOGY

## 2024-02-01 PROCEDURE — 71250 CT THORAX DX C-: CPT | Mod: TC

## 2024-02-02 ENCOUNTER — LAB VISIT (OUTPATIENT)
Dept: LAB | Facility: HOSPITAL | Age: 84
End: 2024-02-02
Attending: INTERNAL MEDICINE
Payer: MEDICARE

## 2024-02-02 DIAGNOSIS — I49.9 CARDIAC ARRHYTHMIA, UNSPECIFIED CARDIAC ARRHYTHMIA TYPE: ICD-10-CM

## 2024-02-02 DIAGNOSIS — I44.2 CHB (COMPLETE HEART BLOCK): ICD-10-CM

## 2024-02-02 LAB
ANION GAP SERPL CALC-SCNC: 6 MMOL/L (ref 8–16)
APTT PPP: 30.8 SEC (ref 21–32)
CALCIUM SERPL-MCNC: 9 MG/DL (ref 8.7–10.5)
CHLORIDE SERPL-SCNC: 103 MMOL/L (ref 95–110)
CO2 SERPL-SCNC: 30 MMOL/L (ref 23–29)
CREAT SERPL-MCNC: 0.75 MG/DL (ref 0.5–1.4)
ERYTHROCYTE [DISTWIDTH] IN BLOOD BY AUTOMATED COUNT: 16.1 % (ref 11.5–14.5)
EST. GFR  (NO RACE VARIABLE): >60 ML/MIN/1.73 M^2
GLUCOSE SERPL-MCNC: 100 MG/DL (ref 70–110)
HCT VFR BLD AUTO: 35.8 % (ref 37–48.5)
HGB BLD-MCNC: 11.4 G/DL (ref 12–16)
INR PPP: 1 (ref 0.8–1.2)
MCH RBC QN AUTO: 29.1 PG (ref 27–31)
MCHC RBC AUTO-ENTMCNC: 31.8 G/DL (ref 32–36)
MCV RBC AUTO: 91 FL (ref 82–98)
PLATELET # BLD AUTO: 254 K/UL (ref 150–450)
PMV BLD AUTO: 11.1 FL (ref 9.2–12.9)
POTASSIUM SERPL-SCNC: 4.7 MMOL/L (ref 3.5–5.1)
PROTHROMBIN TIME: 11.2 SEC (ref 9–12.5)
RBC # BLD AUTO: 3.92 M/UL (ref 4–5.4)
SODIUM SERPL-SCNC: 139 MMOL/L (ref 136–145)
UUN UR-MCNC: 17 MG/DL (ref 7–17)
WBC # BLD AUTO: 4.24 K/UL (ref 3.9–12.7)

## 2024-02-02 PROCEDURE — 80048 BASIC METABOLIC PNL TOTAL CA: CPT | Mod: PN | Performed by: INTERNAL MEDICINE

## 2024-02-02 PROCEDURE — 85610 PROTHROMBIN TIME: CPT | Mod: PN | Performed by: INTERNAL MEDICINE

## 2024-02-02 PROCEDURE — 85027 COMPLETE CBC AUTOMATED: CPT | Mod: PN | Performed by: INTERNAL MEDICINE

## 2024-02-02 PROCEDURE — 85730 THROMBOPLASTIN TIME PARTIAL: CPT | Mod: PN | Performed by: INTERNAL MEDICINE

## 2024-02-02 PROCEDURE — 36415 COLL VENOUS BLD VENIPUNCTURE: CPT | Mod: PN | Performed by: INTERNAL MEDICINE

## 2024-02-06 ENCOUNTER — PATIENT MESSAGE (OUTPATIENT)
Dept: ELECTROPHYSIOLOGY | Facility: CLINIC | Age: 84
End: 2024-02-06
Payer: MEDICARE

## 2024-02-06 ENCOUNTER — PATIENT MESSAGE (OUTPATIENT)
Dept: INTERNAL MEDICINE | Facility: CLINIC | Age: 84
End: 2024-02-06
Payer: MEDICARE

## 2024-02-06 NOTE — TELEPHONE ENCOUNTER
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 Pt stated her eye  Eye specialist requested

## 2024-02-06 NOTE — TELEPHONE ENCOUNTER
I am not familiar with that test,  however you can have your ophthalmologist write the order on a prescription pad and you can bring it for your lab visit and if we have the capability of processing it   they can order it under his name

## 2024-02-08 ENCOUNTER — TELEPHONE (OUTPATIENT)
Dept: ELECTROPHYSIOLOGY | Facility: CLINIC | Age: 84
End: 2024-02-08
Payer: MEDICARE

## 2024-02-08 ENCOUNTER — ANESTHESIA EVENT (OUTPATIENT)
Dept: MEDSURG UNIT | Facility: HOSPITAL | Age: 84
End: 2024-02-08
Payer: MEDICARE

## 2024-02-08 NOTE — TELEPHONE ENCOUNTER
Spoke to patient     CONFIRMED procedure arrival time of 11am    Reiterated instructions including:  -Directions to check in desk  -NPO after midnight night prior to procedure  -High importance of HOLDING eliquis-last dose was 2/6 and reinstructed no jardiance tomorrow morning  -Pre-procedure LABS reviewed no alerts noted   -Confirmed absence or presence of implanted device/stimulator none  -Confirmed no fever, cough, or shortness of breath in the past 30 days  -Bathe night prior and morning prior to procedure with Hibiclens solution or an antibacterial soap  -Reviewed current visitor policy    Patient verbalized understanding of above and appreciated the call.

## 2024-02-08 NOTE — ANESTHESIA PREPROCEDURE EVALUATION
02/08/2024  Maryann Sorenson is a 83 y.o., female.      Pre-op Assessment    I have reviewed the Patient Summary Reports.       I have reviewed the Medications.     Review of Systems  Anesthesia Hx:  No problems with previous Anesthesia               Denies Personal Hx of Anesthesia complications.                    Cardiovascular:     Hypertension   CAD                     Shortness of Breath    Coronary Artery Disease:                            Hypertension     Atrial Fibrillation     Pulmonary:    Asthma  Shortness of breath  Sleep Apnea   Asthma:    Obstructive Sleep Apnea (NOE).           Hepatic/GI:     GERD      Gerd          Neurological:    Neuromuscular Disease,                                 Neuromuscular Disease       Physical Exam    Airway:  No airway management difficulties anticipated  Dental:  No active dental issues noted  Chest/Lungs:  Clear to auscultation    Heart:  Rate: Normal  Rhythm: Regular Rhythm  Sounds: Normal        Anesthesia Plan  Type of Anesthesia, risks & benefits discussed:    Anesthesia Type: Gen Natural Airway  Informed Consent: Informed consent signed with the Patient and all parties understand the risks and agree with anesthesia plan.  All questions answered.   ASA Score: 3  Anesthesia Plan Notes: Chart reviewed. Patient seen and examined. Anesthesia plan discussed and questions answered. E-consent signed. Bruce Phillips MD    Ready For Surgery From Anesthesia Perspective.     .

## 2024-02-09 ENCOUNTER — ANESTHESIA (OUTPATIENT)
Dept: MEDSURG UNIT | Facility: HOSPITAL | Age: 84
End: 2024-02-09
Payer: MEDICARE

## 2024-02-09 ENCOUNTER — TELEPHONE (OUTPATIENT)
Dept: INTERNAL MEDICINE | Facility: CLINIC | Age: 84
End: 2024-02-09
Payer: MEDICARE

## 2024-02-09 ENCOUNTER — HOSPITAL ENCOUNTER (OUTPATIENT)
Facility: HOSPITAL | Age: 84
Discharge: HOME | End: 2024-02-09
Attending: INTERNAL MEDICINE | Admitting: INTERNAL MEDICINE
Payer: MEDICARE

## 2024-02-09 ENCOUNTER — TELEPHONE (OUTPATIENT)
Dept: CARDIOLOGY | Facility: CLINIC | Age: 84
End: 2024-02-09
Payer: MEDICARE

## 2024-02-09 VITALS
DIASTOLIC BLOOD PRESSURE: 83 MMHG | RESPIRATION RATE: 16 BRPM | TEMPERATURE: 97 F | WEIGHT: 175 LBS | SYSTOLIC BLOOD PRESSURE: 131 MMHG | HEART RATE: 87 BPM | HEIGHT: 63 IN | OXYGEN SATURATION: 97 % | BODY MASS INDEX: 31.01 KG/M2

## 2024-02-09 DIAGNOSIS — I49.9 ARRHYTHMIA: ICD-10-CM

## 2024-02-09 DIAGNOSIS — R55 VASOVAGAL SYNCOPE: Primary | ICD-10-CM

## 2024-02-09 DIAGNOSIS — R91.1 SOLITARY PULMONARY NODULE: Primary | ICD-10-CM

## 2024-02-09 DIAGNOSIS — I48.0 PAF (PAROXYSMAL ATRIAL FIBRILLATION): ICD-10-CM

## 2024-02-09 DIAGNOSIS — Z95.9 CARDIAC DEVICE IN SITU: ICD-10-CM

## 2024-02-09 DIAGNOSIS — I49.9 IRREGULAR CARDIAC RHYTHM: ICD-10-CM

## 2024-02-09 DIAGNOSIS — I44.2 CHB (COMPLETE HEART BLOCK): ICD-10-CM

## 2024-02-09 LAB
OHS QRS DURATION: 90 MS
OHS QTC CALCULATION: 414 MS
POCT GLUCOSE: 103 MG/DL (ref 70–110)

## 2024-02-09 PROCEDURE — C1898 LEAD, PMKR, OTHER THAN TRANS: HCPCS | Performed by: INTERNAL MEDICINE

## 2024-02-09 PROCEDURE — 63600175 PHARM REV CODE 636 W HCPCS: Performed by: NURSE PRACTITIONER

## 2024-02-09 PROCEDURE — 99499 UNLISTED E&M SERVICE: CPT | Mod: ,,, | Performed by: INTERNAL MEDICINE

## 2024-02-09 PROCEDURE — 25000003 PHARM REV CODE 250: Performed by: STUDENT IN AN ORGANIZED HEALTH CARE EDUCATION/TRAINING PROGRAM

## 2024-02-09 PROCEDURE — 93005 ELECTROCARDIOGRAM TRACING: CPT

## 2024-02-09 PROCEDURE — 37000009 HC ANESTHESIA EA ADD 15 MINS: Performed by: INTERNAL MEDICINE

## 2024-02-09 PROCEDURE — 93010 ELECTROCARDIOGRAM REPORT: CPT | Mod: ,,, | Performed by: INTERNAL MEDICINE

## 2024-02-09 PROCEDURE — 63600175 PHARM REV CODE 636 W HCPCS: Performed by: NURSE ANESTHETIST, CERTIFIED REGISTERED

## 2024-02-09 PROCEDURE — C1894 INTRO/SHEATH, NON-LASER: HCPCS | Performed by: INTERNAL MEDICINE

## 2024-02-09 PROCEDURE — 25000003 PHARM REV CODE 250: Performed by: NURSE ANESTHETIST, CERTIFIED REGISTERED

## 2024-02-09 PROCEDURE — D9220A PRA ANESTHESIA: Mod: CRNA,,, | Performed by: NURSE ANESTHETIST, CERTIFIED REGISTERED

## 2024-02-09 PROCEDURE — C1785 PMKR, DUAL, RATE-RESP: HCPCS | Performed by: INTERNAL MEDICINE

## 2024-02-09 PROCEDURE — 25000003 PHARM REV CODE 250: Performed by: NURSE PRACTITIONER

## 2024-02-09 PROCEDURE — 33208 INSRT HEART PM ATRIAL & VENT: CPT | Mod: KX,,, | Performed by: INTERNAL MEDICINE

## 2024-02-09 PROCEDURE — 33208 INSRT HEART PM ATRIAL & VENT: CPT | Mod: KX | Performed by: INTERNAL MEDICINE

## 2024-02-09 PROCEDURE — 37000008 HC ANESTHESIA 1ST 15 MINUTES: Performed by: INTERNAL MEDICINE

## 2024-02-09 PROCEDURE — 25000003 PHARM REV CODE 250: Performed by: INTERNAL MEDICINE

## 2024-02-09 PROCEDURE — D9220A PRA ANESTHESIA: Mod: ANES,,, | Performed by: ANESTHESIOLOGY

## 2024-02-09 PROCEDURE — 63600175 PHARM REV CODE 636 W HCPCS: Performed by: INTERNAL MEDICINE

## 2024-02-09 DEVICE — IMPLANTABLE DEVICE
Type: IMPLANTABLE DEVICE | Site: HEART | Status: FUNCTIONAL
Brand: SOLIA

## 2024-02-09 DEVICE — IMPLANTABLE DEVICE
Type: IMPLANTABLE DEVICE | Site: CHEST | Status: FUNCTIONAL
Brand: EDORA 8 DR-T

## 2024-02-09 RX ORDER — ACETAMINOPHEN 325 MG/1
650 TABLET ORAL EVERY 4 HOURS PRN
Status: DISCONTINUED | OUTPATIENT
Start: 2024-02-09 | End: 2024-02-09 | Stop reason: HOSPADM

## 2024-02-09 RX ORDER — PHENYLEPHRINE HYDROCHLORIDE 10 MG/ML
INJECTION INTRAVENOUS
Status: DISCONTINUED | OUTPATIENT
Start: 2024-02-09 | End: 2024-02-09

## 2024-02-09 RX ORDER — VANCOMYCIN HYDROCHLORIDE 1 G/20ML
INJECTION, POWDER, LYOPHILIZED, FOR SOLUTION INTRAVENOUS
Status: DISCONTINUED | OUTPATIENT
Start: 2024-02-09 | End: 2024-02-09 | Stop reason: HOSPADM

## 2024-02-09 RX ORDER — DIPHENHYDRAMINE HYDROCHLORIDE 50 MG/ML
25 INJECTION INTRAMUSCULAR; INTRAVENOUS EVERY 6 HOURS PRN
Status: DISCONTINUED | OUTPATIENT
Start: 2024-02-09 | End: 2024-02-09 | Stop reason: HOSPADM

## 2024-02-09 RX ORDER — ONDANSETRON HYDROCHLORIDE 2 MG/ML
4 INJECTION, SOLUTION INTRAVENOUS ONCE AS NEEDED
Status: DISCONTINUED | OUTPATIENT
Start: 2024-02-09 | End: 2024-02-09 | Stop reason: HOSPADM

## 2024-02-09 RX ORDER — FENTANYL CITRATE 50 UG/ML
25 INJECTION, SOLUTION INTRAMUSCULAR; INTRAVENOUS EVERY 5 MIN PRN
Status: DISCONTINUED | OUTPATIENT
Start: 2024-02-09 | End: 2024-02-09 | Stop reason: HOSPADM

## 2024-02-09 RX ORDER — SODIUM CHLORIDE 0.9 G/100ML
IRRIGANT IRRIGATION
Status: DISCONTINUED | OUTPATIENT
Start: 2024-02-09 | End: 2024-02-09 | Stop reason: HOSPADM

## 2024-02-09 RX ORDER — CLARITHROMYCIN 500 MG/1
500 TABLET, FILM COATED ORAL 2 TIMES DAILY
Qty: 10 TABLET | Refills: 0 | Status: SHIPPED | OUTPATIENT
Start: 2024-02-09 | End: 2024-02-14

## 2024-02-09 RX ORDER — LIDOCAINE HYDROCHLORIDE 20 MG/ML
INJECTION INTRAVENOUS
Status: DISCONTINUED | OUTPATIENT
Start: 2024-02-09 | End: 2024-02-09

## 2024-02-09 RX ORDER — EPHEDRINE SULFATE 50 MG/ML
INJECTION, SOLUTION INTRAVENOUS
Status: DISCONTINUED | OUTPATIENT
Start: 2024-02-09 | End: 2024-02-09

## 2024-02-09 RX ORDER — BUPIVACAINE HYDROCHLORIDE 2.5 MG/ML
INJECTION, SOLUTION EPIDURAL; INFILTRATION; INTRACAUDAL
Status: DISCONTINUED | OUTPATIENT
Start: 2024-02-09 | End: 2024-02-09 | Stop reason: HOSPADM

## 2024-02-09 RX ORDER — FENTANYL CITRATE 50 UG/ML
INJECTION, SOLUTION INTRAMUSCULAR; INTRAVENOUS
Status: DISCONTINUED | OUTPATIENT
Start: 2024-02-09 | End: 2024-02-09

## 2024-02-09 RX ORDER — DIPHENHYDRAMINE HYDROCHLORIDE 50 MG/ML
INJECTION INTRAMUSCULAR; INTRAVENOUS
Status: DISCONTINUED | OUTPATIENT
Start: 2024-02-09 | End: 2024-02-09

## 2024-02-09 RX ORDER — PROPOFOL 10 MG/ML
VIAL (ML) INTRAVENOUS
Status: DISCONTINUED | OUTPATIENT
Start: 2024-02-09 | End: 2024-02-09

## 2024-02-09 RX ORDER — ONDANSETRON HYDROCHLORIDE 2 MG/ML
INJECTION, SOLUTION INTRAVENOUS
Status: DISCONTINUED | OUTPATIENT
Start: 2024-02-09 | End: 2024-02-09

## 2024-02-09 RX ORDER — HYDROMORPHONE HYDROCHLORIDE 1 MG/ML
0.2 INJECTION, SOLUTION INTRAMUSCULAR; INTRAVENOUS; SUBCUTANEOUS EVERY 5 MIN PRN
Status: DISCONTINUED | OUTPATIENT
Start: 2024-02-09 | End: 2024-02-09 | Stop reason: HOSPADM

## 2024-02-09 RX ORDER — LIDOCAINE HYDROCHLORIDE 20 MG/ML
INJECTION, SOLUTION INFILTRATION; PERINEURAL
Status: DISCONTINUED | OUTPATIENT
Start: 2024-02-09 | End: 2024-02-09 | Stop reason: HOSPADM

## 2024-02-09 RX ADMIN — LIDOCAINE HYDROCHLORIDE 100 MG: 20 INJECTION INTRAVENOUS at 02:02

## 2024-02-09 RX ADMIN — SODIUM CHLORIDE: 0.9 INJECTION, SOLUTION INTRAVENOUS at 01:02

## 2024-02-09 RX ADMIN — FENTANYL CITRATE 25 MCG: 50 INJECTION, SOLUTION INTRAMUSCULAR; INTRAVENOUS at 02:02

## 2024-02-09 RX ADMIN — ACETAMINOPHEN 650 MG: 325 TABLET ORAL at 05:02

## 2024-02-09 RX ADMIN — DIPHENHYDRAMINE HYDROCHLORIDE 12.5 MG: 50 INJECTION, SOLUTION INTRAMUSCULAR; INTRAVENOUS at 02:02

## 2024-02-09 RX ADMIN — ONDANSETRON 4 MG: 2 INJECTION INTRAMUSCULAR; INTRAVENOUS at 03:02

## 2024-02-09 RX ADMIN — SODIUM CHLORIDE 0.25 MCG/KG/MIN: 9 INJECTION, SOLUTION INTRAVENOUS at 02:02

## 2024-02-09 RX ADMIN — PROPOFOL 40 MG: 10 INJECTION, EMULSION INTRAVENOUS at 02:02

## 2024-02-09 RX ADMIN — EPHEDRINE SULFATE 10 MG: 50 INJECTION INTRAVENOUS at 02:02

## 2024-02-09 RX ADMIN — VANCOMYCIN HYDROCHLORIDE 1000 MG: 1 INJECTION, POWDER, LYOPHILIZED, FOR SOLUTION INTRAVENOUS at 01:02

## 2024-02-09 RX ADMIN — PHENYLEPHRINE HYDROCHLORIDE 100 MCG: 10 INJECTION INTRAVENOUS at 02:02

## 2024-02-09 RX ADMIN — FENTANYL CITRATE 25 MCG: 50 INJECTION, SOLUTION INTRAMUSCULAR; INTRAVENOUS at 03:02

## 2024-02-09 RX ADMIN — EPHEDRINE SULFATE 15 MG: 50 INJECTION INTRAVENOUS at 02:02

## 2024-02-09 NOTE — HOSPITAL COURSE
Patient underwent successful implantation of dual chamber PPM for CHB without evidence of complications intra- and post-procedure. CXR shows appropriate lead placement without acute cardiopulmonary process. ECG without evidence of device malfunction.     EP medications at discharge:  Initiation of doxycyline 100 mg BID x 5 days.  Patient was instructed to hold their anticoagulation for 5 days (when they complete their antibiotics)     Patient given activity restrictions and warning signs/symptoms to monitor for, including chest pain, shortness of breath, fevers, or evidence of complications at the generator site [swelling, drainage, redness, tenderness, or warmth]. They were instructed to seek immediate medical attention if these occur and to contract the EP department. Follow up with device clinic in 1 week. No CP, SOB, or complications at the generator site on discharge. All questions and concerns answered prior to discharge.     --------------------------------------------------------------------------------

## 2024-02-09 NOTE — Clinical Note
A dressing was applied to the incision. 30 min after Dermabond applied.Pressure dressing to be applied

## 2024-02-09 NOTE — TELEPHONE ENCOUNTER
Received a call from Dr. Rodriguez as he was covering Cardiology   Patient had a chest CT for her aortic aneurysm and it also revealed a 1cm lung lesion, rec 3 mo f/up chest CT

## 2024-02-09 NOTE — TRANSFER OF CARE
"Anesthesia Transfer of Care Note    Patient: Maryann Sorenson    Procedure(s) Performed: Procedure(s) (LRB):  INSERTION, CARDIAC PACEMAKER, DUAL CHAMBER (N/A)    Patient location: PACU    Anesthesia Type: general    Transport from OR: Transported from OR on 6-10 L/min O2 by face mask with adequate spontaneous ventilation    Post pain: adequate analgesia    Post assessment: no apparent anesthetic complications    Post vital signs: stable    Level of consciousness: awake    Nausea/Vomiting: no nausea/vomiting    Complications: none    Transfer of care protocol was followed      Last vitals: Visit Vitals  BP (!) 101/57 (BP Location: Right arm, Patient Position: Lying)   Pulse 61   Temp 36.4 °C (97.5 °F) (Temporal)   Resp 16   Ht 5' 3" (1.6 m)   Wt 79.4 kg (175 lb)   LMP  (LMP Unknown)   SpO2 99%   Breastfeeding No   BMI 31.00 kg/m²     "

## 2024-02-09 NOTE — H&P
"[facility] - Short Stay Cardiac Unit  Cardiac Electrophysiology  History and Physical     Admission Date: 2/9/2024  Code Status: No Order   Attending Provider: Dean Anglin MD   Principal Problem:Vasovagal syncope    Subjective:     Chief Complaint:  Syncope + CHB    HPI:  82 F with PAF since 4/22. Eliquis. HTN HL NOE(onCPAP)     AF since 4/2022. On eliquis since. Went to ER 9/7/23 with palpitations. Found in AF with RVR; sef-resolved. Increasing in frequency and sx.     Holter 9/23: SR  TSH normal  echo 6/23: 65%     Update 1/2024:  Due to an increase in palpitations, a Bardy monitor was placed.  Bardy monitor showed SR -> SB with 12.4 s of CHB -> recovery to SR.  d/w pt via phone. She had considerable stomach cramping, felt LH, had LOC for few seconds (on couch; no injury) then recovered. Last time this happened was 2017.  Dx: vasovagal episode with transient SB/CHB and syncope.     My interpretation of today's ECG is NSR with first degree AVB    Past Medical History:   Diagnosis Date    A-fib     GERD (gastroesophageal reflux disease)     Pulmonary hypertension 2016    Syncope        Past Surgical History:   Procedure Laterality Date    broken wrist      "put plate in it"    DILATION AND CURETTAGE OF UTERUS      excision of lipoma Left 2009    near collar bone    tonsillectomy      TONSILLECTOMY         Review of patient's allergies indicates:   Allergen Reactions    Amoxicillin-pot clavulanate Diarrhea     Given march 2016    Sulfa (sulfonamide antibiotics) Rash    Codeine      Other reaction(s): Unknown, tolerates hydrocodone june 2014    Doxycycline Nausea Only    Lyrica [pregabalin] Other (See Comments)     Blurry vision    Macrobid [nitrofurantoin monohyd/m-cryst] Other (See Comments)      2019 caused cramps in legs. After stopped medication it cleared.     Prevnar [pneumococcal 7-beti conj vacc] Other (See Comments)     Arm sore, redness at injection site and muscle aches. Given shot 7/23/19    Relafen " [nabumetone] Diarrhea       No current facility-administered medications on file prior to encounter.     Current Outpatient Medications on File Prior to Encounter   Medication Sig    amLODIPine (NORVASC) 5 MG tablet Take 1 tablet (5 mg total) by mouth once daily.    apixaban (ELIQUIS) 5 mg Tab Take 1 tablet (5 mg total) by mouth 2 (two) times daily.    ascorbate calcium-bioflavonoid (FRANCISCO-C WITH BIOFLAVONOIDS) 500-200 mg Tab Take by mouth once daily.    calcium carbonate-vitamin D3 600 mg (1,500 mg)-800 unit Chew Take 1 tablet by mouth once.    cholecalciferol, vitamin D3, (VITAMIN D3) 50 mcg (2,000 unit) Tab Take by mouth once daily.    coenzyme Q10 200 mg capsule Take 1 capsule by mouth Daily. 1 Capsule Oral Every day    empagliflozin (JARDIANCE) 25 mg tablet Take 1 tablet (25 mg total) by mouth once daily.    esomeprazole (NEXIUM) 40 MG capsule 1 Capsule, Delayed Release(E.C.) Oral Every day    fexofenadine (ALLEGRA) 180 MG tablet Take 1 tablet by mouth Daily. 1 Tablet Oral Every day    fluticasone propionate (FLONASE) 50 mcg/actuation nasal spray 1 spray each nostril twice a day; total 48 g - please provide 3 months supply.    gabapentin (NEURONTIN) 300 MG capsule 1 pill PO TID. 90 days supply    guaiFENesin (MUCINEX) 600 mg 12 hr tablet Take 1,200 mg by mouth as needed.     LACTOBACILLUS RHAMNOSUS GG (PROBIOTIC ORAL) Take 1 capsule by mouth Daily. 1 Capsule Oral Every day    losartan (COZAAR) 50 MG tablet Take 1 tablet (50 mg total) by mouth once daily.    lutein 20 mg Cap Take 20 mg by mouth every other day.    magnesium 250 mg Tab Take 250 mg by mouth once daily.     metoprolol succinate (TOPROL-XL) 25 MG 24 hr tablet Pt to take 1/2 pill once a day per Dr. Gavin on 4/27/22.    metoprolol tartrate (LOPRESSOR) 25 MG tablet Take 25 mg by mouth 2 (two) times daily.    mupirocin (BACTROBAN) 2 % ointment Apply topically 3 (three) times daily.    pramipexole (MIRAPEX) 0.125 MG tablet TAKE 1 TABLET EVERY DAY     predniSONE (DELTASONE) 20 MG tablet Take 10 mg by mouth once daily.    rosuvastatin (CRESTOR) 40 MG Tab Take 1 tablet (40 mg total) by mouth once daily.    silver sulfADIAZINE 1% (SILVADENE) 1 % cream Apply topically 2 (two) times daily.    ZINC ORAL Take 50 mg by mouth once daily.      Family History       Problem Relation (Age of Onset)    Arthritis Sister    Chronic back pain Sister    Colon cancer Mother (95)    Depression Son    Heart disease Father, Daughter, Son    Hyperlipidemia Mother    Hypertension Mother    Kidney failure Father    No Known Problems Daughter    Other Brother, Son          Tobacco Use    Smoking status: Never    Smokeless tobacco: Never   Substance and Sexual Activity    Alcohol use: Not Currently    Drug use: No    Sexual activity: Not Currently     Review of Systems   All other systems reviewed and are negative.    Objective:     Vital Signs (Most Recent):    Vital Signs (24h Range):  BP: ()/()   Arterial Line BP: ()/()           There is no height or weight on file to calculate BMI.             Physical Exam  Vitals and nursing note reviewed.   Constitutional:       Appearance: Normal appearance.   Cardiovascular:      Rate and Rhythm: Normal rate and regular rhythm.      Pulses: Normal pulses.      Heart sounds: Normal heart sounds.   Pulmonary:      Effort: Pulmonary effort is normal.   Musculoskeletal:      Right lower leg: No edema.      Left lower leg: No edema.   Skin:     General: Skin is warm.   Neurological:      General: No focal deficit present.      Mental Status: She is alert.            Significant Labs: All pertinent lab results from the last 24 hours have been reviewed.    Assessment and Plan:     * Vasovagal syncope  In summary, Ms Sorenson is an 84 yo female with a vasovagal event with a profound cardioinhibitory component (12.4 s of CHB), c/b syncope with minimal prodrome.     Discussed options: no change, change AAD (eg to amio), PPM.  After a thorough discussion of  all options, their risks and benefits, she chooses PPM.     We discussed the risks and benefits of PPM placement. Our discussion of risks included (but was not limited to) the possibility of infection, death, stroke, MI, pneumothorax, embolism, cardiac perforation, cardiac tamponade, renal injury, and bleeding.  I discussed with patient risks, indications, benefits, and alternatives of the planned procedure. All questions were answered. Patient understands and wishes to proceed.        Johnna Delgado MD  Cardiac Electrophysiology  [facility] - Short Stay Cardiac Unit

## 2024-02-09 NOTE — TELEPHONE ENCOUNTER
----- Message from Balaji Rodriguez MD sent at 2/9/2024  8:00 AM CST -----  Please let patient know that the CT scan showed an enlarged aorta - its is a small aneurysm. It is not large enough to require surgery and hopefully will not increase in size, but we should repeat the CT scan in about 6 mo. There are a few nodules in the lungs for which a repeat CT scan should also be performed. I will speak to Dr. Vila to arrange follow up for that. These are too small to see on a chest xray so she will need a CT.  Pl let Dr. Gavin know about this patient in case she decides on an alternate follow up plan

## 2024-02-09 NOTE — PROGRESS NOTES
Please let patient know that the CT scan showed an enlarged aorta - its is a small aneurysm. It is not large enough to require surgery and hopefully will not increase in size, but we should repeat the CT scan in about 6 mo. There are a few nodules in the lungs for which a repeat CT scan should also be performed. I will speak to Dr. Vila to arrange follow up for that. These are too small to see on a chest xray so she will need a CT.  Pl let Dr. Gavin know about this patient in case she decides on an alternate follow up plan

## 2024-02-09 NOTE — SUBJECTIVE & OBJECTIVE
"Past Medical History:   Diagnosis Date    A-fib     GERD (gastroesophageal reflux disease)     Pulmonary hypertension 2016    Syncope        Past Surgical History:   Procedure Laterality Date    broken wrist      "put plate in it"    DILATION AND CURETTAGE OF UTERUS      excision of lipoma Left 2009    near collar bone    tonsillectomy      TONSILLECTOMY         Review of patient's allergies indicates:   Allergen Reactions    Amoxicillin-pot clavulanate Diarrhea     Given march 2016    Sulfa (sulfonamide antibiotics) Rash    Codeine      Other reaction(s): Unknown, tolerates hydrocodone june 2014    Doxycycline Nausea Only    Lyrica [pregabalin] Other (See Comments)     Blurry vision    Macrobid [nitrofurantoin monohyd/m-cryst] Other (See Comments)      2019 caused cramps in legs. After stopped medication it cleared.     Prevnar [pneumococcal 7-beti conj vacc] Other (See Comments)     Arm sore, redness at injection site and muscle aches. Given shot 7/23/19    Relafen [nabumetone] Diarrhea       No current facility-administered medications on file prior to encounter.     Current Outpatient Medications on File Prior to Encounter   Medication Sig    amLODIPine (NORVASC) 5 MG tablet Take 1 tablet (5 mg total) by mouth once daily.    apixaban (ELIQUIS) 5 mg Tab Take 1 tablet (5 mg total) by mouth 2 (two) times daily.    ascorbate calcium-bioflavonoid (FRANCISCO-C WITH BIOFLAVONOIDS) 500-200 mg Tab Take by mouth once daily.    calcium carbonate-vitamin D3 600 mg (1,500 mg)-800 unit Chew Take 1 tablet by mouth once.    cholecalciferol, vitamin D3, (VITAMIN D3) 50 mcg (2,000 unit) Tab Take by mouth once daily.    coenzyme Q10 200 mg capsule Take 1 capsule by mouth Daily. 1 Capsule Oral Every day    empagliflozin (JARDIANCE) 25 mg tablet Take 1 tablet (25 mg total) by mouth once daily.    esomeprazole (NEXIUM) 40 MG capsule 1 Capsule, Delayed Release(E.C.) Oral Every day    fexofenadine (ALLEGRA) 180 MG tablet Take 1 tablet by " mouth Daily. 1 Tablet Oral Every day    fluticasone propionate (FLONASE) 50 mcg/actuation nasal spray 1 spray each nostril twice a day; total 48 g - please provide 3 months supply.    gabapentin (NEURONTIN) 300 MG capsule 1 pill PO TID. 90 days supply    guaiFENesin (MUCINEX) 600 mg 12 hr tablet Take 1,200 mg by mouth as needed.     LACTOBACILLUS RHAMNOSUS GG (PROBIOTIC ORAL) Take 1 capsule by mouth Daily. 1 Capsule Oral Every day    losartan (COZAAR) 50 MG tablet Take 1 tablet (50 mg total) by mouth once daily.    lutein 20 mg Cap Take 20 mg by mouth every other day.    magnesium 250 mg Tab Take 250 mg by mouth once daily.     metoprolol succinate (TOPROL-XL) 25 MG 24 hr tablet Pt to take 1/2 pill once a day per Dr. Gavin on 4/27/22.    metoprolol tartrate (LOPRESSOR) 25 MG tablet Take 25 mg by mouth 2 (two) times daily.    mupirocin (BACTROBAN) 2 % ointment Apply topically 3 (three) times daily.    pramipexole (MIRAPEX) 0.125 MG tablet TAKE 1 TABLET EVERY DAY    predniSONE (DELTASONE) 20 MG tablet Take 10 mg by mouth once daily.    rosuvastatin (CRESTOR) 40 MG Tab Take 1 tablet (40 mg total) by mouth once daily.    silver sulfADIAZINE 1% (SILVADENE) 1 % cream Apply topically 2 (two) times daily.    ZINC ORAL Take 50 mg by mouth once daily.      Family History       Problem Relation (Age of Onset)    Arthritis Sister    Chronic back pain Sister    Colon cancer Mother (95)    Depression Son    Heart disease Father, Daughter, Son    Hyperlipidemia Mother    Hypertension Mother    Kidney failure Father    No Known Problems Daughter    Other Brother, Son          Tobacco Use    Smoking status: Never    Smokeless tobacco: Never   Substance and Sexual Activity    Alcohol use: Not Currently    Drug use: No    Sexual activity: Not Currently     Review of Systems   All other systems reviewed and are negative.    Objective:     Vital Signs (Most Recent):    Vital Signs (24h Range):  BP: ()/()   Arterial Line BP: ()/()            There is no height or weight on file to calculate BMI.             Physical Exam  Vitals and nursing note reviewed.   Constitutional:       Appearance: Normal appearance.   Cardiovascular:      Rate and Rhythm: Normal rate and regular rhythm.      Pulses: Normal pulses.      Heart sounds: Normal heart sounds.   Pulmonary:      Effort: Pulmonary effort is normal.   Musculoskeletal:      Right lower leg: No edema.      Left lower leg: No edema.   Skin:     General: Skin is warm.   Neurological:      General: No focal deficit present.      Mental Status: She is alert.            Significant Labs: All pertinent lab results from the last 24 hours have been reviewed.

## 2024-02-09 NOTE — ASSESSMENT & PLAN NOTE
In summary, Ms Sorenson is an 82 yo female with a vasovagal event with a profound cardioinhibitory component (12.4 s of CHB), c/b syncope with minimal prodrome.     Discussed options: no change, change AAD (eg to amio), PPM.  After a thorough discussion of all options, their risks and benefits, she chooses PPM.     We discussed the risks and benefits of PPM placement. Our discussion of risks included (but was not limited to) the possibility of infection, death, stroke, MI, pneumothorax, embolism, cardiac perforation, cardiac tamponade, renal injury, and bleeding.  I discussed with patient risks, indications, benefits, and alternatives of the planned procedure. All questions were answered. Patient understands and wishes to proceed.

## 2024-02-09 NOTE — HPI
82 F with PAF since 4/22. Eliquis. HTN HL NOE(onCPAP)     AF since 4/2022. On eliquis since. Went to ER 9/7/23 with palpitations. Found in AF with RVR; sef-resolved. Increasing in frequency and sx.     Holter 9/23: SR  TSH normal  echo 6/23: 65%     Update 1/2024:  Due to an increase in palpitations, a Bardy monitor was placed.  Bardy monitor showed SR -> SB with 12.4 s of CHB -> recovery to SR.  d/w pt via phone. She had considerable stomach cramping, felt LH, had LOC for few seconds (on couch; no injury) then recovered. Last time this happened was 2017.  Dx: vasovagal episode with transient SB/CHB and syncope.     My interpretation of today's ECG is NSR with first degree AVB

## 2024-02-09 NOTE — PROGRESS NOTES
Patient admitted to recovery see [facility] for complete assessmentpacu bcg's maintained safety measures verified patient instructed on pain scale and patient verbalized understanding. Called for chest xray and EKG both were done and EKG in chart. Also called alo's sister and updated on patient location. 1630 dr. Anglin here speaking with patient.

## 2024-02-09 NOTE — TELEPHONE ENCOUNTER
Received call from Cardiology.    Requesting a call From PCP directly to   Dr. Rodriguez,  Regarding Test results from Pt's cardiolgy visit on 2/9.

## 2024-02-09 NOTE — NURSING TRANSFER
Nursing Transfer Note      2/9/2024   6:00 PM    Nurse giving handoff:jesus jones   Nurse receiving handoff:otf jones     Reason patient is being transferred: d/c critieria met     Transfer To: [facility]    Transfer via stretcher    Transfer with cardiac monitoring and registered box and verified patient able to be seen on monitor     Transported by jesus rn     Transfer Vital Signs:  Blood Pressure:see epic   Heart Rate:see epic   O2:see epic   Temperature:see epic   Respirations:see epic     Telemetry: yes   Order for Tele Monitor? Yes     Additional Lines: none     4eyes on Skin: yes in recovery     Medicines sent: none     Any special needs or follow-up needed: to room no complaints no distress noted     Patient belongings transferred with patient: n/a     Chart send with patient: yes     Notified: reported to otf jones     Patient reassessed at: see epic  (date, time)  1  Upon arrival to floor: to room no complaints no distress noted.

## 2024-02-10 ENCOUNTER — DOCUMENTATION ONLY (OUTPATIENT)
Dept: CARDIOLOGY | Facility: CLINIC | Age: 84
End: 2024-02-10
Payer: MEDICARE

## 2024-02-10 LAB
OHS QRS DURATION: 184 MS
OHS QRS DURATION: 96 MS
OHS QTC CALCULATION: 434 MS
OHS QTC CALCULATION: 506 MS

## 2024-02-10 NOTE — PROGRESS NOTES
Discussed w Dr. Vila. I had originally recommended obtaining a CT of the chest in 6 mo for fup of both the pulm nodule and TAA. Dr. Vila prefers to obtain a chest CT for fup of pulm nodules in 3 months. In that case Dr. Gavin could choose to review the images for the TAA and repeat the study 6 months later.

## 2024-02-10 NOTE — NURSING
Pt brought to room 9 on SSCU post recovery in EP PACU. EP recovery RNCaridad at bedside for handoff. Pt is AAOx4 and free from s/s of intolerable pain and distress. Pt's vs taken and wnl. Pt's left upper chest incision site is free from s/s of bleeding. Pressure tape and aquacell dressing intact. Safety measures in place. Family called to the bedside.

## 2024-02-10 NOTE — NURSING
Pt drank, ate, walked, and used restroom. Pt stated after walking that she felt fluttering in here chest. VS taken and wnl. Dr. Roberts notified. 12 lead EKG ordered and MD and came to the beside. MD educated the patient on what pacing can feel like/what to expect. MD said pt was ok to go and that EKG was normal. D/C paperwork reviewed with pt and pt's friends. All questions and concerns addressed. Pt verbalized understanding of d/c info. Pt's iv and  tele removed. Pt's left upper chest pressure tape removed. Pt's left chest aquacell dressing, sling, and immobilizer  is intact. Site is free from s/s of bleeding. Pt waiting to leave in wheelchair.

## 2024-02-12 NOTE — TELEPHONE ENCOUNTER
Please call patient and advise her that a call was received from Cardiology when they reviewed her chest CT.    She had nodules in the lungs with a recommendation from the radiologist to repeat the testing in 3-6 months.    If she is asymptomatic a repeat of the chest CT in 3 months would be recommended, and an order has been placed.    If she is having symptoms such as cough, mucus production, wheezing or other lung symptom a consult with the pulmonologist would be recommended.

## 2024-02-12 NOTE — TELEPHONE ENCOUNTER
Called and spoke to pt in regards to referral ordered and to repeat CT chest Xray. Pt Vu and stated she would discuss Further with  at her upcoming visit.

## 2024-02-12 NOTE — ANESTHESIA POSTPROCEDURE EVALUATION
Anesthesia Post Evaluation    Patient: Maryann Sorenson    Procedure(s) Performed: Procedure(s) (LRB):  INSERTION, CARDIAC PACEMAKER, DUAL CHAMBER (N/A)    Final Anesthesia Type: general      Patient location during evaluation: PACU  Patient participation: Yes- Able to Participate  Level of consciousness: awake  Post-procedure vital signs: reviewed and stable  Pain management: adequate  Airway patency: patent    PONV status at discharge: No PONV  Anesthetic complications: no      Cardiovascular status: blood pressure returned to baseline  Respiratory status: unassisted  Hydration status: euvolemic  Follow-up not needed.              Vitals Value Taken Time   /83 02/09/24 2000   Temp 36.2 °C (97.2 °F) 02/09/24 2000   Pulse 87 02/09/24 2000   Resp 16 02/09/24 1900   SpO2 97 % 02/09/24 2000         No case tracking events are documented in the log.      Pain/Helena Score: No data recorded

## 2024-02-14 ENCOUNTER — TELEPHONE (OUTPATIENT)
Dept: INTERNAL MEDICINE | Facility: CLINIC | Age: 84
End: 2024-02-14
Payer: MEDICARE

## 2024-02-14 ENCOUNTER — TELEPHONE (OUTPATIENT)
Dept: CARDIOLOGY | Facility: CLINIC | Age: 84
End: 2024-02-14
Payer: MEDICARE

## 2024-02-14 ENCOUNTER — LAB VISIT (OUTPATIENT)
Dept: LAB | Facility: HOSPITAL | Age: 84
End: 2024-02-14
Attending: INTERNAL MEDICINE
Payer: MEDICARE

## 2024-02-14 DIAGNOSIS — R73.03 PRE-DIABETES: ICD-10-CM

## 2024-02-14 LAB
ESTIMATED AVG GLUCOSE: 143 MG/DL (ref 68–131)
HBA1C MFR BLD: 6.6 % (ref 4–5.6)

## 2024-02-14 PROCEDURE — 83036 HEMOGLOBIN GLYCOSYLATED A1C: CPT | Performed by: INTERNAL MEDICINE

## 2024-02-14 PROCEDURE — 36415 COLL VENOUS BLD VENIPUNCTURE: CPT | Mod: PN | Performed by: INTERNAL MEDICINE

## 2024-02-14 NOTE — TELEPHONE ENCOUNTER
----- Message from Heydi Gavin MD sent at 2/14/2024 12:21 PM CST -----  Please let the patient know that I reviewed her tests and agree with Dr. Rodriguez that it is a small dilatation ( aneurysm) of the ascending aorta and does not require any intervention, just a follow up. We have been monitoring it since at least 2012.  There is a very small progression in size in the past 13 years. If Dr. Vila is going to repeat CT of chest in 3 months we can also look at the aorta again.   It is very important to keep BP under control.  ----- Message -----  From: Ashely Park MA  Sent: 2/14/2024  10:19 AM CST  To: Heydi Gavin MD    [facility]  ----- Message -----  From: Balaji Rodriguez MD  Sent: 2/9/2024   8:00 AM CST  To: Ashely Park MA    Please let patient know that the CT scan showed an enlarged aorta - its is a small aneurysm. It is not large enough to require surgery and hopefully will not increase in size, but we should repeat the CT scan in about 6 mo. There are a few nodules in the lungs for which a repeat CT scan should also be performed. I will speak to Dr. Vila to arrange follow up for that. These are too small to see on a chest xray so she will need a CT.  Pl let Dr. Gavin know about this patient in case she decides on an alternate follow up plan

## 2024-02-14 NOTE — TELEPHONE ENCOUNTER
----- Message from Ira Smith MD sent at 2/14/2024  1:02 PM CST -----  Please review your lab work and we will discuss at your pending office visit.   alvino

## 2024-02-15 NOTE — PROGRESS NOTES
83 y.o. femalepresents in f/u to pre-diabetes, hypertension, dyslipidemia, and PAF    Pre-DM tx w/Jardiance 25mg  Denied increased thirst, urination, or hypoglycemia episodes  A1C ==>    Lab Results   Component Value Date    HGBA1C 6.6 (H) 02/14/2024     HTN, tx , amlodipine 5mg qd, losartan 50mg and metoprolol XL 25  Pulmonary HTN  Denied HA or dizziness  BP today==>110/72    Dyslipidemia tx w/rosuvastatin 40mg  Carotid, B/L  + Agatston 400-577  Aortic atherosclerosis  Denied unusual muscle pain or chest pain  LDL==>  Lab Results   Component Value Date    CHOL 191 01/25/2024    CHOL 148 11/14/2023    CHOL 151 08/23/2023     Lab Results   Component Value Date    HDL 74 01/25/2024    HDL 61 11/14/2023    HDL 64 08/23/2023     Lab Results   Component Value Date    LDLCALC 85.8 01/25/2024    LDLCALC 73.8 11/14/2023    LDLCALC 72.2 08/23/2023     Lab Results   Component Value Date    TRIG 156 (H) 01/25/2024    TRIG 66 11/14/2023    TRIG 74 08/23/2023     Lab Results   Component Value Date    CHOLHDL 38.7 01/25/2024    CHOLHDL 41.2 11/14/2023    CHOLHDL 42.4 08/23/2023     PAF, tx: flecainide 100mg BID,  BB and apixaban 5mg BID  S/p pacemaker  Denied dizziness or focal deficits  Pulse==>63    ROS:  No fever, chills, or night sweats  No blurry vision or visual disturbance  No dysphagia or early satiety  No palpitations or tachycardia  No cough or wheezing  No GERD or abdominal pain  No CN deficits, no HA, no confusion  Tx: rest and resolved w/I minutes    Remainder of review negative except as previously noted    PAST MEDICAL HX: Reviewed  PAST SURGICAL HX: Reviewed  SOCIAL HX: Reviewed  FAMILY HX: Reviewed    PHYSICAL EXAM:  VS: As noted  GENERAL: WDWN, A&O, NAD, conversant and co-operative  EYES: Conj/lids unremarkable, sclera anicteric  ENT:  Canal without cerumen TMs unremarkable  NECK: Supple w/o lymphadenopathy or thyromegaly  RESPIRATORY: Efforts are unlabored.   Lungs CTAP  CARDIOVASCULAR: Heart RRR. No carotid  bruits noted.   1+ pedal pulses. trace LE edema  GASTROINTESTINAL: BS+, soft , NT/ND, no HSM noted  + umbilical hernia noted when sitting up and standing, easily reduced  MUSCULOSKELETAL: Gait, measured No CCE    NEUROLOGIC: STEVENS. No tremor noted  SKIN: Warm and dry.    IMPRESSION/PLAN:  HTN, stable   -continue amlodipine 5mg,  - Metoprolol XL 25 qd,    -losartan 50mg qd  -continue low salt diet  Pulmonary HTN/ chronic pulmonary heart ds, stable  HLD, stable  -continue rosuvastatin 40 qd  Carotid AA ds, asx  CAD-Agatston 400-577, asx  Aortic atherosclerosis, noted on chest CT -10/2010  Pre-DM, A1C stable - elevated BS 2/2 to prednisone burst for eyes  -continue  Jardiance 25mg ( also for heart)  PAF, stable   - continue apixaban   - continue BB  Pulmonary nodule, 1cm  -f/up Chest CT 3mos  Back pain, chronic exacerbation at times  Umbilical hernia, easily reduced today  -rec weight loss  -caution lifting  -consider General Surgery    -reviewed vaccine recs  -RTC 6 mos EPP

## 2024-02-16 ENCOUNTER — CLINICAL SUPPORT (OUTPATIENT)
Dept: CARDIOLOGY | Facility: HOSPITAL | Age: 84
End: 2024-02-16
Attending: INTERNAL MEDICINE
Payer: MEDICARE

## 2024-02-16 DIAGNOSIS — I44.2 CHB (COMPLETE HEART BLOCK): ICD-10-CM

## 2024-02-16 PROCEDURE — 93280 PM DEVICE PROGR EVAL DUAL: CPT | Mod: HCNC

## 2024-02-16 PROCEDURE — 93280 PM DEVICE PROGR EVAL DUAL: CPT | Mod: 26,HCNC,, | Performed by: INTERNAL MEDICINE

## 2024-02-17 LAB — OHS CV DC REMOTE DEVICE TYPE: NORMAL

## 2024-02-20 ENCOUNTER — LAB VISIT (OUTPATIENT)
Dept: LAB | Facility: HOSPITAL | Age: 84
End: 2024-02-20
Payer: MEDICARE

## 2024-02-20 ENCOUNTER — OFFICE VISIT (OUTPATIENT)
Dept: INTERNAL MEDICINE | Facility: CLINIC | Age: 84
End: 2024-02-20
Payer: MEDICARE

## 2024-02-20 VITALS
WEIGHT: 174.81 LBS | TEMPERATURE: 97 F | BODY MASS INDEX: 30.97 KG/M2 | RESPIRATION RATE: 18 BRPM | HEIGHT: 63 IN | DIASTOLIC BLOOD PRESSURE: 72 MMHG | HEART RATE: 63 BPM | SYSTOLIC BLOOD PRESSURE: 110 MMHG | OXYGEN SATURATION: 98 %

## 2024-02-20 DIAGNOSIS — R91.1 SOLITARY PULMONARY NODULE: ICD-10-CM

## 2024-02-20 DIAGNOSIS — I48.0 PAROXYSMAL ATRIAL FIBRILLATION: ICD-10-CM

## 2024-02-20 DIAGNOSIS — I65.23 CAROTID STENOSIS, BILATERAL: ICD-10-CM

## 2024-02-20 DIAGNOSIS — I10 ESSENTIAL HYPERTENSION: Primary | ICD-10-CM

## 2024-02-20 DIAGNOSIS — K42.9 UMBILICAL HERNIA WITHOUT OBSTRUCTION AND WITHOUT GANGRENE: ICD-10-CM

## 2024-02-20 DIAGNOSIS — H30.93 BILATERAL POSTERIOR UVEITIS: ICD-10-CM

## 2024-02-20 DIAGNOSIS — R73.03 PRE-DIABETES: ICD-10-CM

## 2024-02-20 DIAGNOSIS — I70.0 AORTIC ATHEROSCLEROSIS: ICD-10-CM

## 2024-02-20 DIAGNOSIS — M54.6 BACK PAIN OF THORACOLUMBAR REGION: ICD-10-CM

## 2024-02-20 DIAGNOSIS — I27.9 CHRONIC PULMONARY HEART DISEASE: ICD-10-CM

## 2024-02-20 DIAGNOSIS — I49.8 OTHER SPECIFIED CARDIAC ARRHYTHMIAS: Primary | ICD-10-CM

## 2024-02-20 DIAGNOSIS — R93.1 AGATSTON CORONARY ARTERY CALCIUM SCORE GREATER THAN 400: ICD-10-CM

## 2024-02-20 DIAGNOSIS — M54.50 BACK PAIN OF THORACOLUMBAR REGION: ICD-10-CM

## 2024-02-20 DIAGNOSIS — E78.5 HYPERLIPIDEMIA, UNSPECIFIED HYPERLIPIDEMIA TYPE: ICD-10-CM

## 2024-02-20 PROCEDURE — 81372 HLA I TYPING COMPLETE LR: CPT | Mod: HCNC,PO | Performed by: OPHTHALMOLOGY

## 2024-02-20 PROCEDURE — 1126F AMNT PAIN NOTED NONE PRSNT: CPT | Mod: HCNC,CPTII,S$GLB, | Performed by: INTERNAL MEDICINE

## 2024-02-20 PROCEDURE — 3078F DIAST BP <80 MM HG: CPT | Mod: HCNC,CPTII,S$GLB, | Performed by: INTERNAL MEDICINE

## 2024-02-20 PROCEDURE — 1101F PT FALLS ASSESS-DOCD LE1/YR: CPT | Mod: HCNC,CPTII,S$GLB, | Performed by: INTERNAL MEDICINE

## 2024-02-20 PROCEDURE — 3288F FALL RISK ASSESSMENT DOCD: CPT | Mod: HCNC,CPTII,S$GLB, | Performed by: INTERNAL MEDICINE

## 2024-02-20 PROCEDURE — 3074F SYST BP LT 130 MM HG: CPT | Mod: HCNC,CPTII,S$GLB, | Performed by: INTERNAL MEDICINE

## 2024-02-20 PROCEDURE — 1159F MED LIST DOCD IN RCRD: CPT | Mod: HCNC,CPTII,S$GLB, | Performed by: INTERNAL MEDICINE

## 2024-02-20 PROCEDURE — 99214 OFFICE O/P EST MOD 30 MIN: CPT | Mod: HCNC,S$GLB,, | Performed by: INTERNAL MEDICINE

## 2024-02-20 PROCEDURE — 99999 PR PBB SHADOW E&M-EST. PATIENT-LVL V: CPT | Mod: PBBFAC,HCNC,, | Performed by: INTERNAL MEDICINE

## 2024-02-20 RX ORDER — ESOMEPRAZOLE MAGNESIUM 40 MG/1
CAPSULE, DELAYED RELEASE ORAL
Qty: 90 CAPSULE | Refills: 3 | Status: SHIPPED | OUTPATIENT
Start: 2024-02-20

## 2024-02-22 NOTE — DISCHARGE SUMMARY
[facility] - Short Stay Cardiac Unit  Cardiac Electrophysiology  Discharge Summary      Patient Name: Maryann Sorenson  MRN: [id number]  Admission Date: 2/9/2024  Hospital Length of Stay: 0 days  Discharge Date and Time:  02/22/2024 11:01 AM  Attending Physician: No att. providers found    Discharging Provider: Johnna Delgado MD  Primary Care Physician: Ira Smith MD    HPI:   82 F with PAF since 4/22. Eliquis. HTN HL NOE(onCPAP)     AF since 4/2022. On eliquis since. Went to ER 9/7/23 with palpitations. Found in AF with RVR; sef-resolved. Increasing in frequency and sx.     Holter 9/23: SR  TSH normal  echo 6/23: 65%     Update 1/2024:  Due to an increase in palpitations, a Bardy monitor was placed.  Bardy monitor showed SR -> SB with 12.4 s of CHB -> recovery to SR.  d/w pt via phone. She had considerable stomach cramping, felt LH, had LOC for few seconds (on couch; no injury) then recovered. Last time this happened was 2017.  Dx: vasovagal episode with transient SB/CHB and syncope.     My interpretation of today's ECG is NSR with first degree AVB    Procedure(s) (LRB):  INSERTION, CARDIAC PACEMAKER, DUAL CHAMBER (N/A)     Indwelling Lines/Drains at time of discharge:  Lines/Drains/Airways       None                   Hospital Course:  Patient underwent successful implantation of dual chamber PPM for CHB without evidence of complications intra- and post-procedure. CXR shows appropriate lead placement without acute cardiopulmonary process. ECG without evidence of device malfunction.     EP medications at discharge:  Initiation of doxycyline 100 mg BID x 5 days.  Patient was instructed to hold their anticoagulation for 5 days (when they complete their antibiotics)     Patient given activity restrictions and warning signs/symptoms to monitor for, including chest pain, shortness of breath, fevers, or evidence of complications at the generator site [swelling, drainage, redness, tenderness, or warmth].  They were instructed to seek immediate medical attention if these occur and to contract the EP department. Follow up with device clinic in 1 week. No CP, SOB, or complications at the generator site on discharge. All questions and concerns answered prior to discharge.     --------------------------------------------------------------------------------    Goals of Care Treatment Preferences:  Code Status: Full Code      Consults:     Significant Diagnostic Studies: N/A    Pending Diagnostic Studies:       None            Final Active Diagnoses:    Diagnosis Date Noted POA    PRINCIPAL PROBLEM:  Vasovagal syncope [R55] 06/29/2017 Yes    CHB (complete heart block) [I44.2] 02/09/2024 Yes    PAF (paroxysmal atrial fibrillation) [I48.0] 02/09/2024 Yes      Problems Resolved During this Admission:     No new Assessment & Plan notes have been filed under this hospital service since the last note was generated.  Service: Arrhythmia      Discharged Condition: stable    Disposition: Home or Self Care    Follow Up:   Follow-up Information       [facility] Follow up in 1 week(s).               Betsy Pierce NP Follow up in 4 month(s).    Specialty: Cardiology  Contact information:  [address] [place] [id number]  [phone]                           Patient Instructions:   No discharge procedures on file.  Medications:  Reconciled Home Medications:      Medication List        ASK your doctor about these medications      amLODIPine 5 MG tablet  Commonly known as: NORVASC  Take 1 tablet (5 mg total) by mouth once daily.     apixaban 5 mg Tab  Commonly known as: ELIQUIS  Take 1 tablet (5 mg total) by mouth 2 (two) times daily.     calcium carbonate-vitamin D3 600 mg-20 mcg (800 unit) Chew  Take 1 tablet by mouth once.     cholecalciferol (vitamin D3) 50 mcg (2,000 unit) Tab  Commonly known as: VITAMIN D3  Take by mouth once daily.     clarithromycin 500 MG tablet  Commonly known as: BIAXIN  Take 1 tablet  (500 mg total) by mouth 2 (two) times daily. for 5 days  Ask about: Should I take this medication?     coenzyme Q10 200 mg capsule  Take 1 capsule by mouth Daily. 1 Capsule Oral Every day     empagliflozin 25 mg tablet  Commonly known as: JARDIANCE  Take 1 tablet (25 mg total) by mouth once daily.     FRANCISCO-C WITH BIOFLAVONOIDS 500-200 mg Tab  Generic drug: ascorbate calcium-bioflavonoid  Take by mouth once daily.     fexofenadine 180 MG tablet  Commonly known as: ALLEGRA  Take 1 tablet by mouth Daily. 1 Tablet Oral Every day     flecainide 100 MG Tab  Commonly known as: TAMBOCOR  Take 1 tablet (100 mg total) by mouth every 12 (twelve) hours.     fluticasone propionate 50 mcg/actuation nasal spray  Commonly known as: FLONASE  1 spray each nostril twice a day; total 48 g - please provide 3 months supply.     gabapentin 300 MG capsule  Commonly known as: NEURONTIN  1 pill PO TID. 90 days supply     guaiFENesin 600 mg 12 hr tablet  Commonly known as: MUCINEX  Take 1,200 mg by mouth as needed.     losartan 50 MG tablet  Commonly known as: COZAAR  Take 1 tablet (50 mg total) by mouth once daily.     lutein 20 mg Cap  Take 20 mg by mouth every other day.     magnesium 250 mg Tab  Take 250 mg by mouth once daily.     metoprolol succinate 25 MG 24 hr tablet  Commonly known as: TOPROL-XL  Pt to take 1/2 pill once a day per Dr. Gavin on 4/27/22.     metoprolol tartrate 25 MG tablet  Commonly known as: LOPRESSOR  Take 25 mg by mouth 2 (two) times daily.     pramipexole 0.125 MG tablet  Commonly known as: MIRAPEX  TAKE 1 TABLET EVERY DAY     PROBIOTIC ORAL  Take 1 capsule by mouth Daily. 1 Capsule Oral Every day     rosuvastatin 40 MG Tab  Commonly known as: CRESTOR  Take 1 tablet (40 mg total) by mouth once daily.     silver sulfADIAZINE 1% 1 % cream  Commonly known as: SILVADENE  Apply topically 2 (two) times daily.     ZINC ORAL  Take 30 mg by mouth once daily.              Time spent on the discharge of patient: 45  minutes    Johnna Delgado MD  Cardiac Electrophysiology  [facility] - Short Stay Cardiac [facility]

## 2024-02-27 ENCOUNTER — PATIENT MESSAGE (OUTPATIENT)
Dept: ELECTROPHYSIOLOGY | Facility: CLINIC | Age: 84
End: 2024-02-27
Payer: MEDICARE

## 2024-03-01 ENCOUNTER — PATIENT MESSAGE (OUTPATIENT)
Dept: CARDIOLOGY | Facility: CLINIC | Age: 84
End: 2024-03-01
Payer: MEDICARE

## 2024-03-01 ENCOUNTER — TELEPHONE (OUTPATIENT)
Dept: INTERNAL MEDICINE | Facility: CLINIC | Age: 84
End: 2024-03-01
Payer: MEDICARE

## 2024-03-01 ENCOUNTER — TELEPHONE (OUTPATIENT)
Dept: CARDIOLOGY | Facility: CLINIC | Age: 84
End: 2024-03-01
Payer: MEDICARE

## 2024-03-01 DIAGNOSIS — I70.0 AORTIC ATHEROSCLEROSIS: ICD-10-CM

## 2024-03-01 DIAGNOSIS — E78.00 HYPERCHOLESTEROLEMIA: Primary | ICD-10-CM

## 2024-03-01 NOTE — TELEPHONE ENCOUNTER
"----- Message from Morena Sanches sent at 3/1/2024  9:51 AM CST -----  Contact: [phone]  type: Lab    Caller is requesting to schedule their Lab appointment prior to annual appointment.  Order is not listed in EPIC.  Please enter order and contact patient to schedule.    Name of Caller:Maryann Sorenson Date and Time of Labs:09/10/24 - 730 am     Date of Annual Physical Appointment:09/16/24    Where would they like the lab performed?[facility]    Would the patient rather a call back or a response via [facility]? phone    Best Call Back Number:[phone]    Additional Information:    "Do not send messages requesting lab orders prior to Physical appt on these providers: Katy Bnoilla, Susy Morel,  Angeles Millan and Tramaine."        "

## 2024-03-06 ENCOUNTER — LAB VISIT (OUTPATIENT)
Dept: LAB | Facility: HOSPITAL | Age: 84
End: 2024-03-06
Attending: INTERNAL MEDICINE
Payer: MEDICARE

## 2024-03-06 DIAGNOSIS — I70.0 AORTIC ATHEROSCLEROSIS: ICD-10-CM

## 2024-03-06 DIAGNOSIS — E78.00 HYPERCHOLESTEROLEMIA: ICD-10-CM

## 2024-03-06 LAB
ALBUMIN SERPL BCP-MCNC: 4.4 G/DL (ref 3.5–5.2)
ALP SERPL-CCNC: 82 U/L (ref 38–126)
ALT SERPL W/O P-5'-P-CCNC: 16 U/L (ref 10–44)
ANION GAP SERPL CALC-SCNC: 8 MMOL/L (ref 8–16)
AST SERPL-CCNC: 34 U/L (ref 15–46)
BILIRUB SERPL-MCNC: 0.4 MG/DL (ref 0.1–1)
CALCIUM SERPL-MCNC: 9.4 MG/DL (ref 8.7–10.5)
CHLORIDE SERPL-SCNC: 105 MMOL/L (ref 95–110)
CHOLEST SERPL-MCNC: 150 MG/DL (ref 120–199)
CHOLEST/HDLC SERPL: 2.5 {RATIO} (ref 2–5)
CO2 SERPL-SCNC: 28 MMOL/L (ref 23–29)
CREAT SERPL-MCNC: 0.76 MG/DL (ref 0.5–1.4)
EST. GFR  (NO RACE VARIABLE): >60 ML/MIN/1.73 M^2
GLUCOSE SERPL-MCNC: 99 MG/DL (ref 70–110)
HDLC SERPL-MCNC: 61 MG/DL (ref 40–75)
HDLC SERPL: 40.7 % (ref 20–50)
LDLC SERPL CALC-MCNC: 74 MG/DL (ref 63–159)
NONHDLC SERPL-MCNC: 89 MG/DL
POTASSIUM SERPL-SCNC: 4.5 MMOL/L (ref 3.5–5.1)
PROT SERPL-MCNC: 7.2 G/DL (ref 6–8.4)
SODIUM SERPL-SCNC: 141 MMOL/L (ref 136–145)
TRIGL SERPL-MCNC: 75 MG/DL (ref 30–150)
TSH SERPL DL<=0.005 MIU/L-ACNC: 2.72 UIU/ML (ref 0.4–4)
UUN UR-MCNC: 14 MG/DL (ref 7–17)

## 2024-03-06 PROCEDURE — 80053 COMPREHEN METABOLIC PANEL: CPT | Mod: HCNC,PN | Performed by: INTERNAL MEDICINE

## 2024-03-06 PROCEDURE — 36415 COLL VENOUS BLD VENIPUNCTURE: CPT | Mod: HCNC,PN | Performed by: INTERNAL MEDICINE

## 2024-03-06 PROCEDURE — 80061 LIPID PANEL: CPT | Mod: HCNC | Performed by: INTERNAL MEDICINE

## 2024-03-06 PROCEDURE — 84443 ASSAY THYROID STIM HORMONE: CPT | Mod: HCNC,PN | Performed by: INTERNAL MEDICINE

## 2024-03-13 ENCOUNTER — OFFICE VISIT (OUTPATIENT)
Dept: CARDIOLOGY | Facility: CLINIC | Age: 84
End: 2024-03-13
Payer: MEDICARE

## 2024-03-13 VITALS
HEART RATE: 64 BPM | HEIGHT: 63 IN | WEIGHT: 178.56 LBS | DIASTOLIC BLOOD PRESSURE: 70 MMHG | BODY MASS INDEX: 31.64 KG/M2 | SYSTOLIC BLOOD PRESSURE: 108 MMHG

## 2024-03-13 DIAGNOSIS — R00.2 HEART PALPITATIONS: ICD-10-CM

## 2024-03-13 DIAGNOSIS — I44.2 CHB (COMPLETE HEART BLOCK): ICD-10-CM

## 2024-03-13 DIAGNOSIS — G47.33 OSA (OBSTRUCTIVE SLEEP APNEA): ICD-10-CM

## 2024-03-13 DIAGNOSIS — I27.9 CHRONIC PULMONARY HEART DISEASE: ICD-10-CM

## 2024-03-13 DIAGNOSIS — I48.0 PAF (PAROXYSMAL ATRIAL FIBRILLATION): ICD-10-CM

## 2024-03-13 DIAGNOSIS — I65.23 CAROTID STENOSIS, BILATERAL: ICD-10-CM

## 2024-03-13 DIAGNOSIS — R93.1 AGATSTON CORONARY ARTERY CALCIUM SCORE GREATER THAN 400: ICD-10-CM

## 2024-03-13 DIAGNOSIS — I10 ESSENTIAL HYPERTENSION: ICD-10-CM

## 2024-03-13 DIAGNOSIS — R73.03 PRE-DIABETES: ICD-10-CM

## 2024-03-13 DIAGNOSIS — R06.02 SHORTNESS OF BREATH: ICD-10-CM

## 2024-03-13 DIAGNOSIS — I70.0 AORTIC ATHEROSCLEROSIS: ICD-10-CM

## 2024-03-13 DIAGNOSIS — E78.00 HYPERCHOLESTEROLEMIA: ICD-10-CM

## 2024-03-13 DIAGNOSIS — I25.10 ATHEROSCLEROSIS OF NATIVE CORONARY ARTERY OF NATIVE HEART WITHOUT ANGINA PECTORIS: Primary | ICD-10-CM

## 2024-03-13 DIAGNOSIS — R55 VASOVAGAL SYNCOPE: ICD-10-CM

## 2024-03-13 PROCEDURE — 1160F RVW MEDS BY RX/DR IN RCRD: CPT | Mod: HCNC,CPTII,S$GLB, | Performed by: INTERNAL MEDICINE

## 2024-03-13 PROCEDURE — 1159F MED LIST DOCD IN RCRD: CPT | Mod: HCNC,CPTII,S$GLB, | Performed by: INTERNAL MEDICINE

## 2024-03-13 PROCEDURE — 99214 OFFICE O/P EST MOD 30 MIN: CPT | Mod: HCNC,S$GLB,, | Performed by: INTERNAL MEDICINE

## 2024-03-13 PROCEDURE — 1125F AMNT PAIN NOTED PAIN PRSNT: CPT | Mod: HCNC,CPTII,S$GLB, | Performed by: INTERNAL MEDICINE

## 2024-03-13 PROCEDURE — 3074F SYST BP LT 130 MM HG: CPT | Mod: HCNC,CPTII,S$GLB, | Performed by: INTERNAL MEDICINE

## 2024-03-13 PROCEDURE — 3078F DIAST BP <80 MM HG: CPT | Mod: HCNC,CPTII,S$GLB, | Performed by: INTERNAL MEDICINE

## 2024-03-13 PROCEDURE — 1101F PT FALLS ASSESS-DOCD LE1/YR: CPT | Mod: HCNC,CPTII,S$GLB, | Performed by: INTERNAL MEDICINE

## 2024-03-13 PROCEDURE — 3288F FALL RISK ASSESSMENT DOCD: CPT | Mod: HCNC,CPTII,S$GLB, | Performed by: INTERNAL MEDICINE

## 2024-03-13 PROCEDURE — 99999 PR PBB SHADOW E&M-EST. PATIENT-LVL IV: CPT | Mod: PBBFAC,HCNC,, | Performed by: INTERNAL MEDICINE

## 2024-03-13 RX ORDER — METOPROLOL SUCCINATE 25 MG/1
25 TABLET, EXTENDED RELEASE ORAL DAILY
Qty: 60 TABLET | Refills: 6 | Status: SHIPPED | OUTPATIENT
Start: 2024-03-13 | End: 2024-03-14 | Stop reason: SDUPTHER

## 2024-03-13 RX ORDER — HYDROCHLOROTHIAZIDE 12.5 MG/1
12.5 CAPSULE ORAL DAILY
COMMUNITY
End: 2024-03-13 | Stop reason: SDUPTHER

## 2024-03-13 RX ORDER — HYDROCHLOROTHIAZIDE 12.5 MG/1
12.5 CAPSULE ORAL DAILY
Qty: 30 CAPSULE | Refills: 6 | Status: SHIPPED | OUTPATIENT
Start: 2024-03-13 | End: 2024-03-14 | Stop reason: SDUPTHER

## 2024-03-13 NOTE — PROGRESS NOTES
Subjective:   Patient ID:  Maryann Sorenson is a 83 y.o. female who presents for follow-up of Hyperlipidemia      HPI: Recently patient underwent PPM due to vagally mediated CHB and 12 seconds pause not on long term monitoring. Since the discharge  she has occasional episodes of chest fluttering, but otherwise is doing well.      She is treated for optic neuritis and until recently has been on high dose of steroids. We have worked patient up twice for infiltrative cardiomyopathy and so far no significant abnormalities were revealed. Minor abnormalities on light chain immunoelectrophoresis. Patient was recommended to see rheumatology.  This is yet to be done.    She has chronic AUGUSTE and cardiac echo indicates presence of diastolic dysfunction with elevated filling pressures. In the past patient did not tolerate Aldactone.    Lab Results   Component Value Date     03/06/2024    K 4.5 03/06/2024     03/06/2024    CO2 28 03/06/2024    BUN 14 03/06/2024    CREATININE 0.76 03/06/2024    GLU 99 03/06/2024    HGBA1C 6.6 (H) 02/14/2024    MG 2.4 01/25/2024    AST 34 03/06/2024    ALT 16 03/06/2024    ALBUMIN 4.4 03/06/2024    PROT 7.2 03/06/2024    BILITOT 0.4 03/06/2024    WBC 4.24 02/02/2024    HGB 11.4 (L) 02/02/2024    HCT 35.8 (L) 02/02/2024    MCV 91 02/02/2024     02/02/2024    INR 1.0 02/02/2024    INR 1.4 (H) 01/25/2024    TSH 2.720 03/06/2024    CHOL 150 03/06/2024    HDL 61 03/06/2024    LDLCALC 74.0 03/06/2024    TRIG 75 03/06/2024       No results found in the last 24 hours.     Review of Systems   Constitutional: Negative.   HENT:  Positive for hearing loss.    Eyes: Negative.    Cardiovascular:  Positive for dyspnea on exertion, irregular heartbeat and palpitations. Negative for chest pain, claudication, leg swelling, near-syncope and syncope.   Respiratory:  Positive for cough and sputum production. Negative for shortness of breath, snoring and wheezing.    Endocrine: Negative.  Negative for  "cold intolerance, heat intolerance, polydipsia, polyphagia and polyuria.   Skin: Negative.    Musculoskeletal:  Positive for arthritis and back pain.   Gastrointestinal:  Positive for heartburn.   Genitourinary: Negative.    Neurological:  Positive for excessive daytime sleepiness and loss of balance.   Psychiatric/Behavioral: Negative.         Objective:   Physical Exam  Vitals and nursing note reviewed.   Constitutional:       Appearance: Normal appearance. She is well-developed. She is obese.      Comments: /70   Pulse 64   Ht 5' 3" (1.6 m)   Wt 81 kg (178 lb 9.2 oz)   LMP  (LMP Unknown)   BMI 31.63 kg/m²      HENT:      Head: Normocephalic.   Eyes:      Pupils: Pupils are equal, round, and reactive to light.   Neck:      Thyroid: No thyromegaly.      Vascular: No carotid bruit.   Cardiovascular:      Rate and Rhythm: Normal rate and regular rhythm.      Pulses: Intact distal pulses.           Carotid pulses are 2+ on the right side and 2+ on the left side.       Radial pulses are 2+ on the right side and 2+ on the left side.        Femoral pulses are 2+ on the right side and 2+ on the left side.       Popliteal pulses are 2+ on the right side and 2+ on the left side.        Dorsalis pedis pulses are 2+ on the right side and 2+ on the left side.        Posterior tibial pulses are 2+ on the right side and 2+ on the left side.      Heart sounds: Normal heart sounds. No murmur heard.     No friction rub. No gallop.   Pulmonary:      Effort: Pulmonary effort is normal. No respiratory distress.      Breath sounds: Normal breath sounds. No wheezing or rales.   Chest:      Chest wall: No tenderness.   Abdominal:      Palpations: Abdomen is soft.   Musculoskeletal:         General: Normal range of motion.      Cervical back: Normal range of motion and neck supple.   Skin:     General: Skin is warm and dry.   Neurological:      Mental Status: She is alert and oriented to person, place, and time. "           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    Essential hypertension    Atherosclerosis of native coronary artery of native heart without angina pectoris - Primary    Relevant Orders    PET Sarcoid test    Carotid stenosis, bilateral    Chronic pulmonary heart disease    Relevant Orders    PET Sarcoid test    Aortic atherosclerosis    CHB (complete heart block)    PAF (paroxysmal atrial fibrillation)       Other    Shortness of breath    Relevant Orders    PET Sarcoid test    NOE (obstructive sleep apnea)    Vasovagal syncope    Heart palpitations    Pre-diabetes       Patient's Medications   New Prescriptions    No medications on file   Previous Medications    AMLODIPINE (NORVASC) 5 MG TABLET    Take 1 tablet (5 mg total) by mouth once daily.    APIXABAN (ELIQUIS) 5 MG TAB    Take 1 tablet (5 mg total) by mouth 2 (two) times daily.    ASCORBATE CALCIUM-BIOFLAVONOID (FRANCISCO-C WITH BIOFLAVONOIDS) 500-200 MG TAB    Take 1 tablet by mouth 2 (two) times a day.    CALCIUM CARBONATE-VITAMIN D3 600 MG (1,500 MG)-800 UNIT CHEW    Take 1 tablet by mouth once daily.    CHOLECALCIFEROL, VITAMIN D3, (VITAMIN D3) 50 MCG (2,000 UNIT) TAB    Take by mouth once daily.    COENZYME Q10 200 MG CAPSULE    Take 1 capsule by mouth Daily. 1 Capsule Oral Every day    EMPAGLIFLOZIN (JARDIANCE) 25 MG TABLET    Take 1 tablet (25 mg total) by mouth once daily.    ESOMEPRAZOLE (NEXIUM) 40 MG CAPSULE    1 Capsule, Delayed Release(E.C.) Oral Every day    FEXOFENADINE (ALLEGRA) 180 MG TABLET    Take 1 tablet by mouth Daily. 1 Tablet Oral Every day    FLECAINIDE (TAMBOCOR) 100 MG TAB    Take 1 tablet (100 mg total) by mouth every 12 (twelve) hours.    FLUTICASONE PROPIONATE (FLONASE) 50 MCG/ACTUATION NASAL SPRAY    1 spray each nostril twice a day; total 48 g - please provide 3 months supply.     GABAPENTIN (NEURONTIN) 300 MG CAPSULE    1 pill PO TID. 90 days supply    GUAIFENESIN (MUCINEX) 600 MG 12 HR TABLET    Take 1,200 mg by mouth as needed.     LACTOBACILLUS RHAMNOSUS GG (PROBIOTIC ORAL)    Take 1 capsule by mouth Daily. 1 Capsule Oral Every day    LOSARTAN (COZAAR) 50 MG TABLET    Take 1 tablet (50 mg total) by mouth once daily.    LUTEIN 20 MG CAP    Take 20 mg by mouth Daily.    MAGNESIUM 250 MG TAB    Take 250 mg by mouth once daily.     METOPROLOL TARTRATE (LOPRESSOR) 25 MG TABLET    Take 25 mg by mouth as needed.    PRAMIPEXOLE (MIRAPEX) 0.125 MG TABLET    TAKE 1 TABLET EVERY DAY    ROSUVASTATIN (CRESTOR) 40 MG TAB    Take 1 tablet (40 mg total) by mouth once daily.    SILVER SULFADIAZINE 1% (SILVADENE) 1 % CREAM    Apply topically 2 (two) times daily.    ZINC ORAL    Take 30 mg by mouth once daily.   Modified Medications    Modified Medication Previous Medication    HYDROCHLOROTHIAZIDE (MICROZIDE) 12.5 MG CAPSULE hydroCHLOROthiazide (MICROZIDE) 12.5 mg capsule       Take 1 capsule (12.5 mg total) by mouth once daily.    Take 12.5 mg by mouth once daily.    METOPROLOL SUCCINATE (TOPROL-XL) 25 MG 24 HR TABLET metoprolol succinate (TOPROL-XL) 25 MG 24 hr tablet       Take 1 tablet (25 mg total) by mouth once daily.    Pt to take 1/2 pill once a day per Dr. Gavin on 4/27/22.   Discontinued Medications    No medications on file     Will initiates HCTz 12.5mg daily and check BMP in 1-2 weeks.  It is possible that presence of chronic shortness of breath and presence of multiple pulmonary nodules along with optic neuritis could be associated with Sarcoidosis  I will schedule PET sarcoid test.  In the meantime encouraged to follow up with rheumatology and pulmonary service. Defer to Dr. Vila.

## 2024-03-14 ENCOUNTER — PATIENT MESSAGE (OUTPATIENT)
Dept: CARDIOLOGY | Facility: CLINIC | Age: 84
End: 2024-03-14
Payer: MEDICARE

## 2024-03-14 DIAGNOSIS — I25.10 ATHEROSCLEROSIS OF NATIVE CORONARY ARTERY OF NATIVE HEART WITHOUT ANGINA PECTORIS: ICD-10-CM

## 2024-03-14 DIAGNOSIS — I27.20 PULMONARY HYPERTENSION: Primary | ICD-10-CM

## 2024-03-14 DIAGNOSIS — I42.8 INFILTRATIVE CARDIOMYOPATHY: ICD-10-CM

## 2024-03-14 RX ORDER — METOPROLOL SUCCINATE 25 MG/1
25 TABLET, EXTENDED RELEASE ORAL DAILY
Qty: 90 TABLET | Refills: 3 | Status: SHIPPED | OUTPATIENT
Start: 2024-03-14

## 2024-03-14 RX ORDER — HYDROCHLOROTHIAZIDE 12.5 MG/1
12.5 CAPSULE ORAL DAILY
Qty: 90 CAPSULE | Refills: 3 | Status: SHIPPED | OUTPATIENT
Start: 2024-03-14

## 2024-03-25 ENCOUNTER — TELEPHONE (OUTPATIENT)
Dept: ELECTROPHYSIOLOGY | Facility: CLINIC | Age: 84
End: 2024-03-25
Payer: MEDICARE

## 2024-03-25 NOTE — TELEPHONE ENCOUNTER
"Pt called the Device Clinic on this am with c/o "HR irregular" on Sat (3/23/24) lasting ~ 9 hrs; HR in the 60's and a couple of time in was in the low 70's.  Pt wanted to know if she was having AF as to she has a hx of pAF.  Informed the pt as per the most recent remote tx which was @ 1:41 this am there have been no arrhythmias recorded since date of implant and lead measurements all WNL.  Understanding was verbalized.  Pt appreciated the assistance.   "

## 2024-04-01 ENCOUNTER — PATIENT MESSAGE (OUTPATIENT)
Dept: CARDIOLOGY | Facility: CLINIC | Age: 84
End: 2024-04-01
Payer: MEDICARE

## 2024-04-01 ENCOUNTER — LAB VISIT (OUTPATIENT)
Dept: LAB | Facility: HOSPITAL | Age: 84
End: 2024-04-01
Attending: INTERNAL MEDICINE
Payer: MEDICARE

## 2024-04-01 DIAGNOSIS — I25.10 ATHEROSCLEROSIS OF NATIVE CORONARY ARTERY OF NATIVE HEART WITHOUT ANGINA PECTORIS: ICD-10-CM

## 2024-04-01 DIAGNOSIS — I27.20 PULMONARY HYPERTENSION: ICD-10-CM

## 2024-04-01 DIAGNOSIS — I42.8 INFILTRATIVE CARDIOMYOPATHY: ICD-10-CM

## 2024-04-01 LAB
ANION GAP SERPL CALC-SCNC: 8 MMOL/L (ref 8–16)
CALCIUM SERPL-MCNC: 8.8 MG/DL (ref 8.7–10.5)
CHLORIDE SERPL-SCNC: 98 MMOL/L (ref 95–110)
CO2 SERPL-SCNC: 26 MMOL/L (ref 23–29)
CREAT SERPL-MCNC: 0.66 MG/DL (ref 0.5–1.4)
EST. GFR  (NO RACE VARIABLE): >60 ML/MIN/1.73 M^2
GLUCOSE SERPL-MCNC: 96 MG/DL (ref 70–110)
POTASSIUM SERPL-SCNC: 4.2 MMOL/L (ref 3.5–5.1)
SODIUM SERPL-SCNC: 132 MMOL/L (ref 136–145)
UUN UR-MCNC: 19 MG/DL (ref 7–17)

## 2024-04-01 PROCEDURE — 80048 BASIC METABOLIC PNL TOTAL CA: CPT | Mod: HCNC,PN | Performed by: INTERNAL MEDICINE

## 2024-04-01 PROCEDURE — 36415 COLL VENOUS BLD VENIPUNCTURE: CPT | Mod: HCNC,PN | Performed by: INTERNAL MEDICINE

## 2024-04-02 LAB
C1DA TESTING DATE: NORMAL
C1DAQ INTERPRETATION: NORMAL
C1DAQ TESTING DATE: NORMAL
HLA-A 1 SERO. EQUIV: 1
HLA-A 1: NORMAL
HLA-A 2 SERO. EQUIV: 29
HLA-A 2: NORMAL
HLA-B 1 SERO. EQUIV: 13
HLA-B 1: NORMAL
HLA-B 2 SERO. EQUIV: 44
HLA-B 2: NORMAL
HLA-BW 1 SERO. EQUIV: 4
HLA-BW 2 SERO. EQUIV: NORMAL
HLA-C 1: NORMAL
HLA-C 2: NORMAL
HLA-CW 1 SERO. EQUIV: 6
HLA-CW 2 SERO. EQUIV: 16

## 2024-04-11 ENCOUNTER — PATIENT MESSAGE (OUTPATIENT)
Dept: CARDIOLOGY | Facility: CLINIC | Age: 84
End: 2024-04-11
Payer: MEDICARE

## 2024-04-11 DIAGNOSIS — I27.20 PULMONARY HYPERTENSION: Primary | ICD-10-CM

## 2024-04-12 ENCOUNTER — LAB VISIT (OUTPATIENT)
Dept: LAB | Facility: HOSPITAL | Age: 84
End: 2024-04-12
Attending: INTERNAL MEDICINE
Payer: MEDICARE

## 2024-04-12 DIAGNOSIS — I27.20 PULMONARY HYPERTENSION: ICD-10-CM

## 2024-04-12 LAB
ANION GAP SERPL CALC-SCNC: 9 MMOL/L (ref 8–16)
CALCIUM SERPL-MCNC: 9.3 MG/DL (ref 8.7–10.5)
CHLORIDE SERPL-SCNC: 102 MMOL/L (ref 95–110)
CO2 SERPL-SCNC: 26 MMOL/L (ref 23–29)
CREAT SERPL-MCNC: 0.67 MG/DL (ref 0.5–1.4)
EST. GFR  (NO RACE VARIABLE): >60 ML/MIN/1.73 M^2
GLUCOSE SERPL-MCNC: 95 MG/DL (ref 70–110)
POTASSIUM SERPL-SCNC: 4.5 MMOL/L (ref 3.5–5.1)
SODIUM SERPL-SCNC: 137 MMOL/L (ref 136–145)
UUN UR-MCNC: 18 MG/DL (ref 7–17)

## 2024-04-12 PROCEDURE — 80048 BASIC METABOLIC PNL TOTAL CA: CPT | Mod: PN | Performed by: INTERNAL MEDICINE

## 2024-04-12 PROCEDURE — 36415 COLL VENOUS BLD VENIPUNCTURE: CPT | Mod: PN | Performed by: INTERNAL MEDICINE

## 2024-04-16 ENCOUNTER — PATIENT MESSAGE (OUTPATIENT)
Dept: CARDIOLOGY | Facility: CLINIC | Age: 84
End: 2024-04-16
Payer: MEDICARE

## 2024-04-16 DIAGNOSIS — I10 ESSENTIAL HYPERTENSION: ICD-10-CM

## 2024-04-16 RX ORDER — LOSARTAN POTASSIUM 50 MG/1
50 TABLET ORAL DAILY
Qty: 90 TABLET | Refills: 3 | Status: SHIPPED | OUTPATIENT
Start: 2024-04-16 | End: 2025-04-16

## 2024-04-24 ENCOUNTER — OFFICE VISIT (OUTPATIENT)
Dept: SLEEP MEDICINE | Facility: CLINIC | Age: 84
End: 2024-04-24
Attending: PSYCHIATRY & NEUROLOGY
Payer: MEDICARE

## 2024-04-24 VITALS
SYSTOLIC BLOOD PRESSURE: 109 MMHG | BODY MASS INDEX: 31.16 KG/M2 | WEIGHT: 175.88 LBS | HEART RATE: 62 BPM | HEIGHT: 63 IN | DIASTOLIC BLOOD PRESSURE: 74 MMHG

## 2024-04-24 DIAGNOSIS — G47.33 OSA (OBSTRUCTIVE SLEEP APNEA): Primary | ICD-10-CM

## 2024-04-24 PROCEDURE — 3078F DIAST BP <80 MM HG: CPT | Mod: CPTII,S$GLB,, | Performed by: PSYCHIATRY & NEUROLOGY

## 2024-04-24 PROCEDURE — 99999 PR PBB SHADOW E&M-EST. PATIENT-LVL II: CPT | Mod: PBBFAC,HCNC,, | Performed by: PSYCHIATRY & NEUROLOGY

## 2024-04-24 PROCEDURE — 1126F AMNT PAIN NOTED NONE PRSNT: CPT | Mod: CPTII,S$GLB,, | Performed by: PSYCHIATRY & NEUROLOGY

## 2024-04-24 PROCEDURE — 3074F SYST BP LT 130 MM HG: CPT | Mod: CPTII,S$GLB,, | Performed by: PSYCHIATRY & NEUROLOGY

## 2024-04-24 PROCEDURE — 1101F PT FALLS ASSESS-DOCD LE1/YR: CPT | Mod: CPTII,S$GLB,, | Performed by: PSYCHIATRY & NEUROLOGY

## 2024-04-24 PROCEDURE — 3288F FALL RISK ASSESSMENT DOCD: CPT | Mod: CPTII,S$GLB,, | Performed by: PSYCHIATRY & NEUROLOGY

## 2024-04-24 PROCEDURE — 99214 OFFICE O/P EST MOD 30 MIN: CPT | Mod: S$GLB,,, | Performed by: PSYCHIATRY & NEUROLOGY

## 2024-04-24 NOTE — PROGRESS NOTES
"        4/24/2024     9:50 AM 10/5/2022     9:52 AM 10/26/2021     1:31 PM 4/14/2021    10:00 AM 9/15/2020     1:24 PM 10/23/2019    10:22 AM 7/9/2019     3:24 PM   EPWORTH SLEEPINESS SCALE TOTAL SCORE    Total score 5 4 5 6 8 6 6       Maryann Sorenson is a 83 y.o. female seen today for NOE and RLS  follow up. Last seen on 8/9/2023  Had pacemaker since last vsiti for vasovagal episodes; had to sleep in a ling for 6 weeks on her back.  Got used to DS2 APAP - using compliantly.  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    RLS  control is better since she moved Gabapentin from mid day to 1 hr before bed and at bedtime  (with Mirapex)- and still 1 dose in the morning for LS radiculopathy.      Reports difficulty falling asleep - "hard to shut her mind off".     Going to bed"late" when she is ready   11-11:30; can fall asleep in 5 min, or it can take hours - gets out of bed then to watch TV.   Once she falls asleep she does not sleep through - usually waking up 1-2 times through the night - to go to the bathroom - usually can return to sleep. Gets up at 6:30-7:30    Overall good sleep hygiene. Avoids electronics when not sleep and no caffeine in the afternoon. No naps. Has not tried meditations in the sleep section of Insight timer yet.  Gets of bed when unable to sleep.     DS2: likes her Dresmwear1 mask (did not like Wisp)  Patient ID: [id number] Maryann Sorenson Compliance Summary 1/24/2024 - 4/22/2024 (90 days) Days with Device Usage 90 days Days without Device Usage 0 days Percent Days with Device Usage 100.0% Cumulative Usage 28 days 13 hrs. 36 mins. 24 secs. Maximum Usage (1 Day) 9 hrs. 15 mins. 53 secs. Average Usage (All Days) 7 hrs. 37 mins. 4 secs. Average Usage (Days Used) 7 hrs. 37 mins. 4 secs. Minimum Usage (1 Day) 4 hrs. 1 mins. 43 secs. Percent of Days with Usage >= 4 Hours 100.0% Percent of Days with Usage < 4 Hours 0.0% Date Range Total Blower Time 28 " "days 14 hrs. 4 mins. 2 secs. Average AHI 8.9 Auto-CPAP Summary Auto-CPAP Mean Pressure 11.2 cmH2O Auto-CPAP Peak Average Pressure 16.7 cmH2O Device Pressure <= 90% of Time 13.9 cmH2O Average Time in Large Leak Per Day 23 secs. Device Settings as of 4/22/2024 AutoCPAP - A-Flex Device Settings Device Mode Parameter Value Min Pressure 10 cmH2O Max Pressure 18 cmH2O A-Flex Setting 3 Auto Off Off Auto On On Ramp+ Time 15 minutes Ramp+ Start Pressure 9.0 cmH2O Mask Resistance X1 Tubing Type 15 Opti-Start On EZ-Start Disabled Patient Controls Access On Patient Data Access On Tube Temperature Off Humidifier 5 Printed By: Care  Version: 1.51.0 Page 13 of 13 This report is only one of several elements to consider when evaluating therapy effectiveness and is not a substitute for diagnostic     \    Medications pertinent to sleep disorders: *Gabapentin AM, 1 hr before bed and when in bed; and Mirapex  Previously tried medications:    DME: [facility]  SLEEP STUDIES:       PSG 8/25/16: Significant Obstructive sleep apnea (NOE) with AHI (apnea hypopnea Index) of 8.9 and SaO2 of 87% (weight  161 lbs).RDI 15.    DME: MORE      Using [facility]:       ASSESSMENT:    1. NOE - moderate by RDI. Poor sleep efficiency on the nioght of the study could have led to underestimation of NOE severity.  The patient symptomatically has  excessive daytime sleepiness, snoring,  witnessed breathing pauses, excessive daytime fatigue, gasping for air in sleep and interrupted sleep  with exam findings of "a crowded oral airway and elevated body mass index. The patient has medical co-morbidities of CAD and hyperlipidemia,  which can be worsened by NOE. Benefiting from CPAP use in terms of sleep continuity and daytime sleepiness. Complaint.           2. Interrupted, nonrefreshing  sleep and residual fatigue             3. RLS -   Using Gabapentin  (1 pill in AM, 1 pill 1 hr befopre bed and one at bedtime) for neuropathic pain related to spinal " stenosi  + Mirapex 0.125 mg in 2 doses with significant improvement, since she has switch to this regimen.  No longer taking Horizant.     PLAN:    Continue APAP at current settings - although AHI was elevated in February, now normalized; could be related to prior positional changes due to pacemaker placement in February; currently can sleep again in her normal position    Continue  Gabapentin with Mirapex at bedtime and 1 hour prior to bed.      Continue Magnesium. 250 mg   Continue consuming iron-rich foods and MVT with iron (for RLS)      ----------------------------------------------------      Education: During our discussion today, we talked about the etiology of obstructive sleep apnea as well as the potential ramifications of untreated sleep apnea, which could include daytime sleepiness, hypertension, heart disease and/or stroke.      We discussed potential treatment options, which could include weight loss, body positioning, continuous positive airway pressure (CPAP), or referral for surgical consideration. The patient preferred CPAP option.     Discussed purpose of PAP therapy, health benefits of CPAP, as well as the potential ramifications of untreated sleep apnea, which could include daytime sleepiness, hypertension, heart disease and/or stroke. An AHI of 15 is associated with increased risk CVD.     The patient should avoid ETOH and sedatives at night, as it tends to aggravate NOE. Regular replacement of CPAP mask, tubing and filter was recommended.    Precautions: The patient was advised to abstain from driving should he feel sleepy or drowsy.    Follow up: MD in 6 months    Thank you for allowing me the opportunity to participate in the care of your patient.    More than 50% of this 46 min visit were spent in counseling and care coordination.       ESS (Marianna Sleepiness Scale) and other sleep medicine related questionnaires were reviewed with the patient.

## 2024-04-26 ENCOUNTER — TELEPHONE (OUTPATIENT)
Dept: ELECTROPHYSIOLOGY | Facility: CLINIC | Age: 84
End: 2024-04-26
Payer: MEDICARE

## 2024-04-28 ENCOUNTER — PATIENT MESSAGE (OUTPATIENT)
Dept: CARDIOLOGY | Facility: CLINIC | Age: 84
End: 2024-04-28
Payer: MEDICARE

## 2024-05-08 ENCOUNTER — TELEPHONE (OUTPATIENT)
Dept: CARDIOLOGY | Facility: HOSPITAL | Age: 84
End: 2024-05-08
Payer: MEDICARE

## 2024-05-10 ENCOUNTER — HOSPITAL ENCOUNTER (OUTPATIENT)
Dept: CARDIOLOGY | Facility: HOSPITAL | Age: 84
Discharge: HOME | End: 2024-05-10
Attending: INTERNAL MEDICINE
Payer: MEDICARE

## 2024-05-10 ENCOUNTER — CLINICAL SUPPORT (OUTPATIENT)
Dept: CARDIOLOGY | Facility: HOSPITAL | Age: 84
End: 2024-05-10
Payer: MEDICARE

## 2024-05-10 ENCOUNTER — CLINICAL SUPPORT (OUTPATIENT)
Dept: CARDIOLOGY | Facility: HOSPITAL | Age: 84
End: 2024-05-10
Attending: INTERNAL MEDICINE
Payer: MEDICARE

## 2024-05-10 VITALS — WEIGHT: 175 LBS | HEART RATE: 53 BPM | HEIGHT: 63 IN | BODY MASS INDEX: 31.01 KG/M2

## 2024-05-10 DIAGNOSIS — R55 SYNCOPE AND COLLAPSE: ICD-10-CM

## 2024-05-10 DIAGNOSIS — I27.9 CHRONIC PULMONARY HEART DISEASE: ICD-10-CM

## 2024-05-10 DIAGNOSIS — R06.02 SHORTNESS OF BREATH: ICD-10-CM

## 2024-05-10 DIAGNOSIS — I25.10 ATHEROSCLEROSIS OF NATIVE CORONARY ARTERY OF NATIVE HEART WITHOUT ANGINA PECTORIS: ICD-10-CM

## 2024-05-10 LAB
EJECTION FRACTION- HIGH: 59 %
END DIASTOLIC INDEX-HIGH: 155 ML/M2
END DIASTOLIC INDEX-LOW: 91 ML/M2
END SYSTOLIC INDEX-HIGH: 78 ML/M2
END SYSTOLIC INDEX-LOW: 40 ML/M2
NUC REST DIASTOLIC VOLUME INDEX: 59
NUC REST EJECTION FRACTION: 77
NUC REST SYSTOLIC VOLUME INDEX: 13
RETIRED EF AND QEF - SEE NOTES: 47 %

## 2024-05-10 PROCEDURE — 93294 REM INTERROG EVL PM/LDLS PM: CPT | Mod: HCNC,,, | Performed by: INTERNAL MEDICINE

## 2024-05-10 PROCEDURE — 93296 REM INTERROG EVL PM/IDS: CPT | Mod: HCNC | Performed by: INTERNAL MEDICINE

## 2024-05-10 PROCEDURE — 78433 MYOCRD IMG PET 2RTRACER CT: CPT | Mod: HCNC

## 2024-05-10 PROCEDURE — 78433 MYOCRD IMG PET 2RTRACER CT: CPT | Mod: 26,HCNC,, | Performed by: INTERNAL MEDICINE

## 2024-05-10 PROCEDURE — A9552 F18 FDG: HCPCS | Mod: HCNC | Performed by: INTERNAL MEDICINE

## 2024-05-10 PROCEDURE — A9555 RB82 RUBIDIUM: HCPCS | Mod: HCNC | Performed by: INTERNAL MEDICINE

## 2024-05-10 RX ORDER — FLUDEOXYGLUCOSE F18 500 MCI/ML
19 INJECTION INTRAVENOUS
Status: COMPLETED | OUTPATIENT
Start: 2024-05-10 | End: 2024-05-10

## 2024-05-10 RX ADMIN — RUBIDIUM CHLORIDE RB-82 24.3 MILLICURIE: 150 INJECTION, SOLUTION INTRAVENOUS at 11:05

## 2024-05-10 RX ADMIN — FLUDEOXYGLUCOSE F-18 19 MILLICURIE: 500 INJECTION INTRAVENOUS at 11:05

## 2024-05-17 LAB
OHS CV AF BURDEN PERCENT: < 1
OHS CV DC REMOTE DEVICE TYPE: NORMAL
OHS CV RV PACING PERCENT: 29 %

## 2024-05-20 ENCOUNTER — TELEPHONE (OUTPATIENT)
Dept: INTERNAL MEDICINE | Facility: CLINIC | Age: 84
End: 2024-05-20
Payer: MEDICARE

## 2024-05-20 ENCOUNTER — HOSPITAL ENCOUNTER (OUTPATIENT)
Dept: RADIOLOGY | Facility: HOSPITAL | Age: 84
Discharge: HOME | End: 2024-05-20
Attending: INTERNAL MEDICINE
Payer: MEDICARE

## 2024-05-20 DIAGNOSIS — R91.1 SOLITARY PULMONARY NODULE: ICD-10-CM

## 2024-05-20 PROCEDURE — 71250 CT THORAX DX C-: CPT | Mod: 26,HCNC,, | Performed by: RADIOLOGY

## 2024-05-20 PROCEDURE — 71250 CT THORAX DX C-: CPT | Mod: TC,HCNC,PN

## 2024-05-20 NOTE — TELEPHONE ENCOUNTER
----- Message from Ira Smith MD sent at 5/20/2024  2:52 PM CDT -----  Please note that your abnormal CT findings are stable.  We can review in more detail at your pending appointment.   alvino

## 2024-05-28 ENCOUNTER — PATIENT MESSAGE (OUTPATIENT)
Dept: INTERNAL MEDICINE | Facility: CLINIC | Age: 84
End: 2024-05-28
Payer: MEDICARE

## 2024-05-28 ENCOUNTER — TELEPHONE (OUTPATIENT)
Dept: PULMONOLOGY | Facility: CLINIC | Age: 84
End: 2024-05-28
Payer: MEDICARE

## 2024-05-28 DIAGNOSIS — R91.1 PULMONARY NODULE: Primary | ICD-10-CM

## 2024-05-28 DIAGNOSIS — L98.9 SKIN LESION: ICD-10-CM

## 2024-05-28 NOTE — TELEPHONE ENCOUNTER
Spoke with patient, informed her that I have received her message. I advised pt that I can schedule her appointment on 6/6/24 for 11:00 am with Dr Jarquin. Pt verbalized that she understand and accepted the appointment. Pt verbalized that she understand and accepted appointment.

## 2024-05-28 NOTE — TELEPHONE ENCOUNTER
Consult order placed -Pulmonary- for pulmonary nodules noted on CT and request by Retina specialist to consider Sarcoidosis      Consult order placed -derm-skin lesions- also requested by Retina specialist

## 2024-05-28 NOTE — TELEPHONE ENCOUNTER
----- Message from Claudine Kirby sent at 5/28/2024  3:28 PM CDT -----  Regarding: Appt  Contact: [phone]  CONSULT/ADVISORY    Name of Caller:DARWIN CULVRE [[id number]]    Contact Preference:  [phone]    Nature of Call:  Pt has referral in for R91.1 (ICD-10-CM) - Pulmonary nodule.  Requesting an appt.  Please call.

## 2024-05-29 ENCOUNTER — PATIENT MESSAGE (OUTPATIENT)
Dept: INTERNAL MEDICINE | Facility: CLINIC | Age: 84
End: 2024-05-29
Payer: MEDICARE
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
## 2024-06-05 ENCOUNTER — LAB VISIT (OUTPATIENT)
Dept: LAB | Facility: HOSPITAL | Age: 84
End: 2024-06-05
Payer: MEDICARE

## 2024-06-05 ENCOUNTER — OFFICE VISIT (OUTPATIENT)
Dept: INTERNAL MEDICINE | Facility: CLINIC | Age: 84
End: 2024-06-05
Payer: MEDICARE

## 2024-06-05 VITALS
HEIGHT: 63 IN | SYSTOLIC BLOOD PRESSURE: 108 MMHG | WEIGHT: 174 LBS | HEART RATE: 60 BPM | BODY MASS INDEX: 30.83 KG/M2 | DIASTOLIC BLOOD PRESSURE: 78 MMHG | TEMPERATURE: 98 F | RESPIRATION RATE: 16 BRPM

## 2024-06-05 DIAGNOSIS — I70.0 AORTIC ATHEROSCLEROSIS: ICD-10-CM

## 2024-06-05 DIAGNOSIS — I48.0 PAROXYSMAL ATRIAL FIBRILLATION: ICD-10-CM

## 2024-06-05 DIAGNOSIS — Z78.0 POSTMENOPAUSAL ESTROGEN DEFICIENCY: ICD-10-CM

## 2024-06-05 DIAGNOSIS — I27.9 CHRONIC PULMONARY HEART DISEASE: ICD-10-CM

## 2024-06-05 DIAGNOSIS — L98.9 SKIN LESION: ICD-10-CM

## 2024-06-05 DIAGNOSIS — R73.03 PRE-DIABETES: ICD-10-CM

## 2024-06-05 DIAGNOSIS — R93.1 AGATSTON CORONARY ARTERY CALCIUM SCORE GREATER THAN 400: ICD-10-CM

## 2024-06-05 DIAGNOSIS — E78.5 HYPERLIPIDEMIA, UNSPECIFIED HYPERLIPIDEMIA TYPE: ICD-10-CM

## 2024-06-05 DIAGNOSIS — I65.23 CAROTID STENOSIS, BILATERAL: ICD-10-CM

## 2024-06-05 DIAGNOSIS — I10 ESSENTIAL HYPERTENSION: Primary | ICD-10-CM

## 2024-06-05 DIAGNOSIS — R91.8 PULMONARY NODULES: ICD-10-CM

## 2024-06-05 LAB
ANION GAP SERPL CALC-SCNC: 11 MMOL/L (ref 8–16)
BUN SERPL-MCNC: 29 MG/DL (ref 8–23)
CALCIUM SERPL-MCNC: 9.9 MG/DL (ref 8.7–10.5)
CHLORIDE SERPL-SCNC: 101 MMOL/L (ref 95–110)
CO2 SERPL-SCNC: 26 MMOL/L (ref 23–29)
CREAT SERPL-MCNC: 1.1 MG/DL (ref 0.5–1.4)
EST. GFR  (NO RACE VARIABLE): 50.5 ML/MIN/1.73 M^2
ESTIMATED AVG GLUCOSE: 134 MG/DL (ref 68–131)
GLUCOSE SERPL-MCNC: 103 MG/DL (ref 70–110)
HBA1C MFR BLD: 6.3 % (ref 4–5.6)
POTASSIUM SERPL-SCNC: 4.3 MMOL/L (ref 3.5–5.1)
SODIUM SERPL-SCNC: 138 MMOL/L (ref 136–145)

## 2024-06-05 PROCEDURE — 83036 HEMOGLOBIN GLYCOSYLATED A1C: CPT | Mod: HCNC | Performed by: INTERNAL MEDICINE

## 2024-06-05 PROCEDURE — 36415 COLL VENOUS BLD VENIPUNCTURE: CPT | Mod: HCNC,PO | Performed by: INTERNAL MEDICINE

## 2024-06-05 PROCEDURE — 1101F PT FALLS ASSESS-DOCD LE1/YR: CPT | Mod: HCNC,CPTII,S$GLB, | Performed by: INTERNAL MEDICINE

## 2024-06-05 PROCEDURE — 3078F DIAST BP <80 MM HG: CPT | Mod: HCNC,CPTII,S$GLB, | Performed by: INTERNAL MEDICINE

## 2024-06-05 PROCEDURE — 80048 BASIC METABOLIC PNL TOTAL CA: CPT | Mod: HCNC | Performed by: INTERNAL MEDICINE

## 2024-06-05 PROCEDURE — 3288F FALL RISK ASSESSMENT DOCD: CPT | Mod: HCNC,CPTII,S$GLB, | Performed by: INTERNAL MEDICINE

## 2024-06-05 PROCEDURE — 1125F AMNT PAIN NOTED PAIN PRSNT: CPT | Mod: HCNC,CPTII,S$GLB, | Performed by: INTERNAL MEDICINE

## 2024-06-05 PROCEDURE — 99214 OFFICE O/P EST MOD 30 MIN: CPT | Mod: HCNC,S$GLB,, | Performed by: INTERNAL MEDICINE

## 2024-06-05 PROCEDURE — 99999 PR PBB SHADOW E&M-EST. PATIENT-LVL IV: CPT | Mod: PBBFAC,HCNC,, | Performed by: INTERNAL MEDICINE

## 2024-06-05 PROCEDURE — 1159F MED LIST DOCD IN RCRD: CPT | Mod: HCNC,CPTII,S$GLB, | Performed by: INTERNAL MEDICINE

## 2024-06-05 PROCEDURE — 3074F SYST BP LT 130 MM HG: CPT | Mod: HCNC,CPTII,S$GLB, | Performed by: INTERNAL MEDICINE

## 2024-06-06 ENCOUNTER — OFFICE VISIT (OUTPATIENT)
Dept: PULMONOLOGY | Facility: CLINIC | Age: 84
End: 2024-06-06
Payer: MEDICARE

## 2024-06-06 ENCOUNTER — PATIENT MESSAGE (OUTPATIENT)
Dept: INTERNAL MEDICINE | Facility: CLINIC | Age: 84
End: 2024-06-06
Payer: MEDICARE

## 2024-06-06 VITALS
DIASTOLIC BLOOD PRESSURE: 82 MMHG | BODY MASS INDEX: 30.94 KG/M2 | OXYGEN SATURATION: 95 % | HEART RATE: 64 BPM | HEIGHT: 63 IN | WEIGHT: 174.63 LBS | SYSTOLIC BLOOD PRESSURE: 132 MMHG

## 2024-06-06 DIAGNOSIS — H20.9 UVEITIS OF BOTH EYES: ICD-10-CM

## 2024-06-06 DIAGNOSIS — R91.1 PULMONARY NODULE: Primary | ICD-10-CM

## 2024-06-06 PROCEDURE — 1160F RVW MEDS BY RX/DR IN RCRD: CPT | Mod: HCNC,CPTII,S$GLB, | Performed by: INTERNAL MEDICINE

## 2024-06-06 PROCEDURE — 1126F AMNT PAIN NOTED NONE PRSNT: CPT | Mod: HCNC,CPTII,S$GLB, | Performed by: INTERNAL MEDICINE

## 2024-06-06 PROCEDURE — 99204 OFFICE O/P NEW MOD 45 MIN: CPT | Mod: HCNC,S$GLB,, | Performed by: INTERNAL MEDICINE

## 2024-06-06 PROCEDURE — 1159F MED LIST DOCD IN RCRD: CPT | Mod: HCNC,CPTII,S$GLB, | Performed by: INTERNAL MEDICINE

## 2024-06-06 PROCEDURE — 99999 PR PBB SHADOW E&M-EST. PATIENT-LVL V: CPT | Mod: PBBFAC,HCNC,, | Performed by: INTERNAL MEDICINE

## 2024-06-06 PROCEDURE — 3075F SYST BP GE 130 - 139MM HG: CPT | Mod: HCNC,CPTII,S$GLB, | Performed by: INTERNAL MEDICINE

## 2024-06-06 PROCEDURE — 3079F DIAST BP 80-89 MM HG: CPT | Mod: HCNC,CPTII,S$GLB, | Performed by: INTERNAL MEDICINE

## 2024-06-06 PROCEDURE — 1101F PT FALLS ASSESS-DOCD LE1/YR: CPT | Mod: HCNC,CPTII,S$GLB, | Performed by: INTERNAL MEDICINE

## 2024-06-06 PROCEDURE — 3288F FALL RISK ASSESSMENT DOCD: CPT | Mod: HCNC,CPTII,S$GLB, | Performed by: INTERNAL MEDICINE
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
## 2024-06-06 NOTE — TELEPHONE ENCOUNTER
Pt is asking in regards to her A1C if it would be ok to take Western cinnamon in addition to Jardiance.

## 2024-06-06 NOTE — TELEPHONE ENCOUNTER
The clinical trials of testing cinnamon in diabetic patients did not reveal significant improvement in the A1c    Therefore I can not recommend it as a treatment option

## 2024-06-07 ENCOUNTER — LAB VISIT (OUTPATIENT)
Dept: LAB | Facility: HOSPITAL | Age: 84
End: 2024-06-07
Attending: INTERNAL MEDICINE
Payer: MEDICARE

## 2024-06-07 DIAGNOSIS — R91.1 PULMONARY NODULE: ICD-10-CM

## 2024-06-07 PROCEDURE — 85549 MURAMIDASE: CPT | Mod: HCNC,PN | Performed by: INTERNAL MEDICINE

## 2024-06-07 PROCEDURE — 82164 ANGIOTENSIN I ENZYME TEST: CPT | Mod: HCNC,PN | Performed by: INTERNAL MEDICINE

## 2024-06-07 PROCEDURE — 36415 COLL VENOUS BLD VENIPUNCTURE: CPT | Mod: HCNC,PN | Performed by: INTERNAL MEDICINE

## 2024-06-09 LAB — ACE SERPL-CCNC: 71 U/L (ref 16–85)

## 2024-06-10 LAB — LYSOZYME SERPL-MCNC: 6.2 MCG/ML (ref 2.6–6)

## 2024-06-11 ENCOUNTER — OFFICE VISIT (OUTPATIENT)
Dept: DERMATOLOGY | Facility: CLINIC | Age: 84
End: 2024-06-11
Payer: MEDICARE

## 2024-06-11 DIAGNOSIS — L98.9 SKIN LESION: ICD-10-CM

## 2024-06-11 DIAGNOSIS — D22.9 MULTIPLE BENIGN NEVI: ICD-10-CM

## 2024-06-11 DIAGNOSIS — D23.9 DERMATOFIBROMA: ICD-10-CM

## 2024-06-11 DIAGNOSIS — D18.01 CHERRY ANGIOMA: ICD-10-CM

## 2024-06-11 DIAGNOSIS — L82.1 SEBORRHEIC KERATOSES: ICD-10-CM

## 2024-06-11 DIAGNOSIS — Z12.83 SCREENING EXAM FOR SKIN CANCER: ICD-10-CM

## 2024-06-11 DIAGNOSIS — D48.5 NEOPLASM OF UNCERTAIN BEHAVIOR OF SKIN: Primary | ICD-10-CM

## 2024-06-11 PROCEDURE — 1125F AMNT PAIN NOTED PAIN PRSNT: CPT | Mod: HCNC,CPTII,S$GLB, | Performed by: STUDENT IN AN ORGANIZED HEALTH CARE EDUCATION/TRAINING PROGRAM

## 2024-06-11 PROCEDURE — 11103 TANGNTL BX SKIN EA SEP/ADDL: CPT | Mod: HCNC,S$GLB,, | Performed by: STUDENT IN AN ORGANIZED HEALTH CARE EDUCATION/TRAINING PROGRAM

## 2024-06-11 PROCEDURE — 1160F RVW MEDS BY RX/DR IN RCRD: CPT | Mod: HCNC,CPTII,S$GLB, | Performed by: STUDENT IN AN ORGANIZED HEALTH CARE EDUCATION/TRAINING PROGRAM

## 2024-06-11 PROCEDURE — 99999 PR PBB SHADOW E&M-EST. PATIENT-LVL IV: CPT | Mod: PBBFAC,HCNC,, | Performed by: STUDENT IN AN ORGANIZED HEALTH CARE EDUCATION/TRAINING PROGRAM

## 2024-06-11 PROCEDURE — 88305 TISSUE EXAM BY PATHOLOGIST: CPT | Mod: 26,HCNC,, | Performed by: PATHOLOGY

## 2024-06-11 PROCEDURE — 99203 OFFICE O/P NEW LOW 30 MIN: CPT | Mod: 25,HCNC,S$GLB, | Performed by: STUDENT IN AN ORGANIZED HEALTH CARE EDUCATION/TRAINING PROGRAM

## 2024-06-11 PROCEDURE — 88305 TISSUE EXAM BY PATHOLOGIST: CPT | Mod: 59,HCNC | Performed by: PATHOLOGY

## 2024-06-11 PROCEDURE — 1159F MED LIST DOCD IN RCRD: CPT | Mod: HCNC,CPTII,S$GLB, | Performed by: STUDENT IN AN ORGANIZED HEALTH CARE EDUCATION/TRAINING PROGRAM

## 2024-06-11 PROCEDURE — 11102 TANGNTL BX SKIN SINGLE LES: CPT | Mod: HCNC,S$GLB,, | Performed by: STUDENT IN AN ORGANIZED HEALTH CARE EDUCATION/TRAINING PROGRAM

## 2024-06-11 PROCEDURE — 1101F PT FALLS ASSESS-DOCD LE1/YR: CPT | Mod: HCNC,CPTII,S$GLB, | Performed by: STUDENT IN AN ORGANIZED HEALTH CARE EDUCATION/TRAINING PROGRAM

## 2024-06-11 PROCEDURE — 3288F FALL RISK ASSESSMENT DOCD: CPT | Mod: HCNC,CPTII,S$GLB, | Performed by: STUDENT IN AN ORGANIZED HEALTH CARE EDUCATION/TRAINING PROGRAM
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
## 2024-06-12 ENCOUNTER — PATIENT MESSAGE (OUTPATIENT)
Dept: PULMONOLOGY | Facility: CLINIC | Age: 84
End: 2024-06-12
Payer: MEDICARE

## 2024-06-12 ENCOUNTER — HOSPITAL ENCOUNTER (OUTPATIENT)
Dept: RADIOLOGY | Facility: HOSPITAL | Age: 84
Discharge: HOME | End: 2024-06-12
Attending: INTERNAL MEDICINE
Payer: MEDICARE

## 2024-06-12 DIAGNOSIS — Z78.0 POSTMENOPAUSAL ESTROGEN DEFICIENCY: ICD-10-CM

## 2024-06-12 PROCEDURE — 77080 DXA BONE DENSITY AXIAL: CPT | Mod: 26,HCNC,, | Performed by: RADIOLOGY

## 2024-06-12 PROCEDURE — 77080 DXA BONE DENSITY AXIAL: CPT | Mod: TC,HCNC,PN

## 2024-06-14 ENCOUNTER — PATIENT MESSAGE (OUTPATIENT)
Dept: DERMATOLOGY | Facility: CLINIC | Age: 84
End: 2024-06-14
Payer: MEDICARE

## 2024-06-14 LAB
FINAL PATHOLOGIC DIAGNOSIS: NORMAL
GROSS: NORMAL
Lab: NORMAL
MICROSCOPIC EXAM: NORMAL

## 2024-06-16 ENCOUNTER — PATIENT MESSAGE (OUTPATIENT)
Dept: INTERNAL MEDICINE | Facility: CLINIC | Age: 84
End: 2024-06-16
Payer: MEDICARE

## 2024-06-16 DIAGNOSIS — M81.0 AGE-RELATED OSTEOPOROSIS WITHOUT CURRENT PATHOLOGICAL FRACTURE: Primary | ICD-10-CM

## 2024-06-17 RX ORDER — HYDROCORTISONE 25 MG/G
OINTMENT TOPICAL
Qty: 30 G | Refills: 1 | Status: SHIPPED | OUTPATIENT
Start: 2024-06-17

## 2024-06-17 NOTE — TELEPHONE ENCOUNTER
Pt states because of her digestive issues, she would prefer the injection for the results of her bone density .

## 2024-06-17 NOTE — TELEPHONE ENCOUNTER
Please advise pt will refer to Endocrinology to review other tx options for her osteoporosis      Consult order placed -Endocrine -osteoporosis w/ upper GI ds, declined oral Rx

## 2024-06-19 ENCOUNTER — PATIENT MESSAGE (OUTPATIENT)
Dept: CARDIOLOGY | Facility: CLINIC | Age: 84
End: 2024-06-19
Payer: MEDICARE

## 2024-06-19 RX ORDER — AMLODIPINE BESYLATE 5 MG/1
5 TABLET ORAL DAILY
Qty: 90 TABLET | Refills: 3 | Status: SHIPPED | OUTPATIENT
Start: 2024-06-19

## 2024-06-21 ENCOUNTER — PATIENT MESSAGE (OUTPATIENT)
Dept: INTERNAL MEDICINE | Facility: CLINIC | Age: 84
End: 2024-06-21
Payer: MEDICARE

## 2024-06-21 NOTE — TELEPHONE ENCOUNTER
Pt bmp is schedule on 6/24/24 at [facility] as requested at 11:40 am . Pt was notified that was first available appointment and can go in earlier if she would like.   Lab linked to appointment.

## 2024-06-25 ENCOUNTER — LAB VISIT (OUTPATIENT)
Dept: LAB | Facility: HOSPITAL | Age: 84
End: 2024-06-25
Attending: INTERNAL MEDICINE
Payer: MEDICARE

## 2024-06-25 DIAGNOSIS — I10 ESSENTIAL HYPERTENSION: ICD-10-CM

## 2024-06-25 LAB
ANION GAP SERPL CALC-SCNC: 9 MMOL/L (ref 8–16)
CALCIUM SERPL-MCNC: 9.3 MG/DL (ref 8.7–10.5)
CHLORIDE SERPL-SCNC: 102 MMOL/L (ref 95–110)
CO2 SERPL-SCNC: 26 MMOL/L (ref 23–29)
CREAT SERPL-MCNC: 0.72 MG/DL (ref 0.5–1.4)
EST. GFR  (NO RACE VARIABLE): >60 ML/MIN/1.73 M^2
GLUCOSE SERPL-MCNC: 102 MG/DL (ref 70–110)
POTASSIUM SERPL-SCNC: 3.9 MMOL/L (ref 3.5–5.1)
SODIUM SERPL-SCNC: 137 MMOL/L (ref 136–145)
UUN UR-MCNC: 20 MG/DL (ref 7–17)

## 2024-06-25 PROCEDURE — 36415 COLL VENOUS BLD VENIPUNCTURE: CPT | Mod: HCNC,PN | Performed by: INTERNAL MEDICINE

## 2024-06-25 PROCEDURE — 80048 BASIC METABOLIC PNL TOTAL CA: CPT | Mod: HCNC,PN | Performed by: INTERNAL MEDICINE

## 2024-06-26 ENCOUNTER — TELEPHONE (OUTPATIENT)
Dept: INTERNAL MEDICINE | Facility: CLINIC | Age: 84
End: 2024-06-26
Payer: MEDICARE

## 2024-06-26 NOTE — TELEPHONE ENCOUNTER
Noted pt seen result response    Your lab has resulted in your kidney function has improved.   alvino

## 2024-06-26 NOTE — TELEPHONE ENCOUNTER
----- Message from Ira Smith MD sent at 6/26/2024  1:53 PM CDT -----  Your lab has resulted in your kidney function has improved.  alvino

## 2024-07-02 ENCOUNTER — TELEPHONE (OUTPATIENT)
Dept: ENDOCRINOLOGY | Facility: CLINIC | Age: 84
End: 2024-07-02
Payer: MEDICARE

## 2024-07-02 NOTE — TELEPHONE ENCOUNTER
Called and spoke to patient, patient informed no sooner appointments available.  Patient was added to wait list.    ----- Message from Rosana Cates sent at 7/2/2024  1:24 PM CDT -----  Regarding: Pt called wld like to get an earlier appt if possible  Contact: [phone]  Name of Who is Calling:DARWIN CULVER [[id number]]        What is the request in detail:Pt called wld like to get an earlier appt if possible. Please advise         Can the clinic reply by [facility]:No        What Number to Call Back if not in [facility]: Telephone Information:  Mobile          [phone]

## 2024-07-17 ENCOUNTER — TELEPHONE (OUTPATIENT)
Dept: ELECTROPHYSIOLOGY | Facility: CLINIC | Age: 84
End: 2024-07-17
Payer: MEDICARE

## 2024-07-18 ENCOUNTER — OFFICE VISIT (OUTPATIENT)
Dept: CARDIOLOGY | Facility: CLINIC | Age: 84
End: 2024-07-18
Payer: MEDICARE

## 2024-07-18 VITALS
BODY MASS INDEX: 30.86 KG/M2 | HEIGHT: 63 IN | SYSTOLIC BLOOD PRESSURE: 102 MMHG | WEIGHT: 174.19 LBS | DIASTOLIC BLOOD PRESSURE: 69 MMHG | HEART RATE: 57 BPM

## 2024-07-18 DIAGNOSIS — I48.0 PAROXYSMAL ATRIAL FIBRILLATION: ICD-10-CM

## 2024-07-18 DIAGNOSIS — G47.33 OSA (OBSTRUCTIVE SLEEP APNEA): ICD-10-CM

## 2024-07-18 DIAGNOSIS — R00.2 HEART PALPITATIONS: ICD-10-CM

## 2024-07-18 DIAGNOSIS — I44.2 CHB (COMPLETE HEART BLOCK): Primary | ICD-10-CM

## 2024-07-18 DIAGNOSIS — E78.00 HYPERCHOLESTEROLEMIA: ICD-10-CM

## 2024-07-18 DIAGNOSIS — I49.8 OTHER SPECIFIED CARDIAC ARRHYTHMIAS: ICD-10-CM

## 2024-07-18 DIAGNOSIS — I10 ESSENTIAL HYPERTENSION: ICD-10-CM

## 2024-07-18 PROCEDURE — 93010 ELECTROCARDIOGRAM REPORT: CPT | Mod: HCNC,S$GLB,, | Performed by: STUDENT IN AN ORGANIZED HEALTH CARE EDUCATION/TRAINING PROGRAM

## 2024-07-18 PROCEDURE — 1159F MED LIST DOCD IN RCRD: CPT | Mod: HCNC,CPTII,S$GLB, | Performed by: INTERNAL MEDICINE

## 2024-07-18 PROCEDURE — 1160F RVW MEDS BY RX/DR IN RCRD: CPT | Mod: HCNC,CPTII,S$GLB, | Performed by: INTERNAL MEDICINE

## 2024-07-18 PROCEDURE — 3288F FALL RISK ASSESSMENT DOCD: CPT | Mod: HCNC,CPTII,S$GLB, | Performed by: INTERNAL MEDICINE

## 2024-07-18 PROCEDURE — 99215 OFFICE O/P EST HI 40 MIN: CPT | Mod: HCNC,S$GLB,, | Performed by: INTERNAL MEDICINE

## 2024-07-18 PROCEDURE — 3074F SYST BP LT 130 MM HG: CPT | Mod: HCNC,CPTII,S$GLB, | Performed by: INTERNAL MEDICINE

## 2024-07-18 PROCEDURE — 3078F DIAST BP <80 MM HG: CPT | Mod: HCNC,CPTII,S$GLB, | Performed by: INTERNAL MEDICINE

## 2024-07-18 PROCEDURE — 1126F AMNT PAIN NOTED NONE PRSNT: CPT | Mod: HCNC,CPTII,S$GLB, | Performed by: INTERNAL MEDICINE

## 2024-07-18 PROCEDURE — 1101F PT FALLS ASSESS-DOCD LE1/YR: CPT | Mod: HCNC,CPTII,S$GLB, | Performed by: INTERNAL MEDICINE

## 2024-07-18 PROCEDURE — 99999 PR PBB SHADOW E&M-EST. PATIENT-LVL IV: CPT | Mod: PBBFAC,HCNC,, | Performed by: INTERNAL MEDICINE

## 2024-07-18 RX ORDER — METHOTREXATE 2.5 MG/1
2.5 TABLET ORAL
COMMUNITY
Start: 2024-06-22

## 2024-07-18 RX ORDER — FOLIC ACID 1 MG/1
1 TABLET ORAL DAILY
COMMUNITY
Start: 2024-06-22 | End: 2025-06-22

## 2024-07-18 NOTE — PROGRESS NOTES
Subjective:   Patient ID:  Maryann Sorenson is a 83 y.o. female who presents for evaluation of AF    Primary CV: Dr ARIANA Gavin    HPI  82 F  PAF since 4/22. Eliquis.  HTN HL NOE(onCPAP)    AF since 4/2022. On eliquis since. Went to ER 9/7/23 with palpitations. Found in AF with RVR; sef-resolved. I reviewed this ECG; confirmed AF.  Has had episodes q months usually, but recently has been q week. Increasing in frequency and sx.    Holter 9/23: SR  TSH normal  echo 6/23: 65%    Update 1/2024:  Due to an uptick in palpitations, a Bardy monitor was placed.  Bardy monitor showed SR -> SB with 12.4 s of CHB -> recovery to SR.  Dx: vasovagal episode with transient SB/CHB and syncope.    7/2024: PPM placed 2/24. Well healed.   She c/o some palps; AV timing optimized today.  SHe's paced 52% in RA, 37% in RV.    My interpretation of today's ECG is ASVP    Review of Systems   Constitutional: Negative. Negative for malaise/fatigue.   HENT: Negative.  Negative for ear pain and tinnitus.    Eyes:  Negative for blurred vision.   Cardiovascular:  Positive for palpitations and syncope. Negative for chest pain, dyspnea on exertion and near-syncope.   Respiratory: Negative.  Negative for shortness of breath.    Endocrine: Negative.  Negative for polyuria.   Hematologic/Lymphatic: Does not bruise/bleed easily.   Skin: Negative.  Negative for rash.   Musculoskeletal: Negative.  Negative for joint pain and muscle weakness.   Gastrointestinal: Negative.  Negative for abdominal pain and change in bowel habit.   Genitourinary:  Negative for frequency.   Neurological:  Negative for dizziness and weakness.   Psychiatric/Behavioral: Negative.  Negative for depression. The patient is not nervous/anxious.    Allergic/Immunologic: Negative for environmental allergies.       Objective:   Physical Exam  Vitals and nursing note reviewed.   Constitutional:       Appearance: Normal appearance. She is well-developed.   HENT:      Head: Normocephalic and  atraumatic.   Eyes:      Conjunctiva/sclera: Conjunctivae normal.   Neck:      Thyroid: No thyroid mass or thyromegaly.      Trachea: No tracheal deviation.   Cardiovascular:      Rate and Rhythm: Normal rate and regular rhythm.   Pulmonary:      Effort: Pulmonary effort is normal. No respiratory distress.      Breath sounds: No wheezing.   Abdominal:      General: There is no distension.   Musculoskeletal:         General: Normal range of motion.      Cervical back: Normal range of motion. No edema.   Skin:     General: Skin is warm and dry.      Findings: No rash.   Neurological:      Mental Status: She is alert and oriented to person, place, and time.      Coordination: Coordination normal.   Psychiatric:         Speech: Speech normal.         Behavior: Behavior normal. Behavior is cooperative.         Thought Content: Thought content normal.         Assessment:      1. CHB (complete heart block)    2. Essential hypertension    3. Hypercholesterolemia    4. Heart palpitations    5. Paroxysmal atrial fibrillation    6. NOE (obstructive sleep apnea)        Plan:     Vasovagal event with a profound cardioinhibitory component (12.4 s of CHB), c/b syncope with minimal prodrome.  s/p PPM.     Follow PPM in device clinic  Return in 1 year with echo, or earlier prn.

## 2024-07-22 LAB
OHS QRS DURATION: 158 MS
OHS QTC CALCULATION: 447 MS

## 2024-08-09 ENCOUNTER — CLINICAL SUPPORT (OUTPATIENT)
Dept: CARDIOLOGY | Facility: HOSPITAL | Age: 84
End: 2024-08-09
Attending: INTERNAL MEDICINE
Payer: MEDICARE

## 2024-08-09 ENCOUNTER — CLINICAL SUPPORT (OUTPATIENT)
Dept: CARDIOLOGY | Facility: HOSPITAL | Age: 84
End: 2024-08-09
Payer: MEDICARE

## 2024-08-09 DIAGNOSIS — R55 SYNCOPE AND COLLAPSE: ICD-10-CM

## 2024-08-09 PROCEDURE — 93294 REM INTERROG EVL PM/LDLS PM: CPT | Mod: HCNC,,, | Performed by: INTERNAL MEDICINE

## 2024-08-09 PROCEDURE — 93296 REM INTERROG EVL PM/IDS: CPT | Mod: HCNC | Performed by: INTERNAL MEDICINE

## 2024-08-12 ENCOUNTER — PATIENT MESSAGE (OUTPATIENT)
Dept: SLEEP MEDICINE | Facility: CLINIC | Age: 84
End: 2024-08-12
Payer: MEDICARE

## 2024-08-13 DIAGNOSIS — M54.10 RADICULOPATHY, UNSPECIFIED SPINAL REGION: ICD-10-CM

## 2024-08-13 DIAGNOSIS — G25.81 RLS (RESTLESS LEGS SYNDROME): ICD-10-CM

## 2024-08-13 RX ORDER — GABAPENTIN 300 MG/1
CAPSULE ORAL
Qty: 270 CAPSULE | Refills: 3 | Status: SHIPPED | OUTPATIENT
Start: 2024-08-13

## 2024-08-13 NOTE — PROGRESS NOTES
t: Mon August 12, 2024  1:30 PM   To: Katja Recinos MD   Gabapentin 300 mg  (Newest Message First)  View All Conversations on this Encounter  Iris Spaulding MA routed conversation to You20 hours ago (1:30 PM)     Maryann Hightower Staff (supporting You)20 hours ago (1:19 PM)           Hello,  My prescription for Gabapentin 300 mg 3 times daily has no refills remaining. Please send a new prescription to [facility] mail order for 90 days with 3 refills.   Thank you,  Maryann Sorenson

## 2024-08-17 LAB
OHS CV AF BURDEN PERCENT: < 1
OHS CV DC REMOTE DEVICE TYPE: NORMAL
OHS CV RV PACING PERCENT: 75 %

## 2024-08-27 ENCOUNTER — OFFICE VISIT (OUTPATIENT)
Dept: OBSTETRICS AND GYNECOLOGY | Facility: CLINIC | Age: 84
End: 2024-08-27
Payer: MEDICARE

## 2024-08-27 VITALS
HEART RATE: 63 BPM | BODY MASS INDEX: 31.28 KG/M2 | DIASTOLIC BLOOD PRESSURE: 61 MMHG | WEIGHT: 176.56 LBS | SYSTOLIC BLOOD PRESSURE: 138 MMHG

## 2024-08-27 DIAGNOSIS — Z01.419 WELL WOMAN EXAM WITH ROUTINE GYNECOLOGICAL EXAM: Primary | ICD-10-CM

## 2024-08-27 PROCEDURE — G0101 CA SCREEN;PELVIC/BREAST EXAM: HCPCS | Mod: HCNC,S$GLB,, | Performed by: OBSTETRICS & GYNECOLOGY

## 2024-08-27 PROCEDURE — 1126F AMNT PAIN NOTED NONE PRSNT: CPT | Mod: HCNC,CPTII,S$GLB, | Performed by: OBSTETRICS & GYNECOLOGY

## 2024-08-27 PROCEDURE — 3075F SYST BP GE 130 - 139MM HG: CPT | Mod: HCNC,CPTII,S$GLB, | Performed by: OBSTETRICS & GYNECOLOGY

## 2024-08-27 PROCEDURE — 99999 PR PBB SHADOW E&M-EST. PATIENT-LVL II: CPT | Mod: PBBFAC,HCNC,, | Performed by: OBSTETRICS & GYNECOLOGY

## 2024-08-27 PROCEDURE — 88175 CYTOPATH C/V AUTO FLUID REDO: CPT | Mod: HCNC | Performed by: OBSTETRICS & GYNECOLOGY

## 2024-08-27 PROCEDURE — 1159F MED LIST DOCD IN RCRD: CPT | Mod: HCNC,CPTII,S$GLB, | Performed by: OBSTETRICS & GYNECOLOGY

## 2024-08-27 PROCEDURE — 1160F RVW MEDS BY RX/DR IN RCRD: CPT | Mod: HCNC,CPTII,S$GLB, | Performed by: OBSTETRICS & GYNECOLOGY

## 2024-08-27 PROCEDURE — 3078F DIAST BP <80 MM HG: CPT | Mod: HCNC,CPTII,S$GLB, | Performed by: OBSTETRICS & GYNECOLOGY

## 2024-08-27 NOTE — PROGRESS NOTES
HPI:   83 y.o.   OB History          6    Para   5    Term   5            AB   1    Living             SAB        IAB        Ectopic        Multiple        Live Births                  No LMP recorded (lmp unknown). Patient is postmenopausal.    Patient is  here for her annual gynecologic exam.  She has no complaints.     ROS:  GENERAL: No fever, chills, fatigability or weight loss.  SKIN: No rashes, itching or changes in color or texture of skin.  HEAD: No headaches or recent head trauma.  EYES: Visual acuity fine. No photophobia, ocular pain or diplopia.  EARS: Denies ear pain, discharge or vertigo.  NOSE: No loss of smell, no epistaxis or postnasal drip.  MOUTH & THROAT: No hoarseness or change in voice. No excessive gum bleeding.  NODES: Denies swollen glands.  CHEST: Denies AUGUSTE, cyanosis, wheezing, cough and sputum production.  CARDIOVASCULAR: Denies chest pain, PND, orthopnea or reduced exercise tolerance.  ABDOMEN: Appetite fine. No weight loss. Denies diarrhea, abdominal pain, hematemesis or blood in stool.  URINARY: No flank pain, dysuria or hematuria.  PERIPHERAL VASCULAR: No claudication or cyanosis.  MUSCULOSKELETAL: No joint stiffness or swelling. Denies back pain.  NEUROLOGIC: No history of seizures, paralysis, alteration of gait or coordination.    PE:   /61   Pulse 63   Wt 80.1 kg (176 lb 9.4 oz)   LMP  (LMP Unknown)   BMI 31.28 kg/m²   APPEARANCE: Well nourished, well developed, in no acute distress.  NECK: Neck symmetric without masses or thyromegaly.  BREASTS: Symmetrical, no skin changes or visible lesions. No palpable masses, nipple discharge or adenopathy bilaterally.  ABDOMEN: Flat. Soft. No tenderness or masses. No hepatosplenomegaly. No hernias. No CVA tenderness.  VULVA: No lesions. Normal female genitalia.  URETHRAL MEATUS: Normal size and location, no lesions, no prolapse.  URETHRA: No masses, tenderness, prolapse or scarring.  VAGINA: Moist and well rugated, no  discharge, no significant cystocele or rectocele.  CERVIX: No lesions and discharge. PAP done.  UTERUS: Normal size, regular shape, mobile, non-tender, bladder base nontender.  ADNEXA: No masses, tenderness or CDS nodularity.  ANUS PERINEUM: Normal.    PROCEDURES:  Pap smear    Assessment:  Normal Gynecologic Exam    Plan:  Mammogram and Colonoscopy if indicated by current recommendations.  Return to clinic in one year or for any problems or complaints.  No gyn co  Mainly eye issues  Keep appt with endocrine about bone

## 2024-08-29 LAB
CLINICAL INFO: NORMAL
DATE OF PREVIOUS PAP: NORMAL
DATE PREVIOUS BX: NO
LMP START DATE: NORMAL
SPECIMEN SOURCE CVX/VAG CYTO: NORMAL

## 2024-08-30 ENCOUNTER — PATIENT MESSAGE (OUTPATIENT)
Dept: CARDIOLOGY | Facility: CLINIC | Age: 84
End: 2024-08-30
Payer: MEDICARE

## 2024-08-30 DIAGNOSIS — E78.2 MIXED HYPERLIPIDEMIA: ICD-10-CM

## 2024-09-02 RX ORDER — ROSUVASTATIN CALCIUM 40 MG/1
40 TABLET, COATED ORAL DAILY
Qty: 90 TABLET | Refills: 3 | Status: SHIPPED | OUTPATIENT
Start: 2024-09-02

## 2024-09-10 ENCOUNTER — LAB VISIT (OUTPATIENT)
Dept: LAB | Facility: HOSPITAL | Age: 84
End: 2024-09-10
Attending: INTERNAL MEDICINE
Payer: MEDICARE

## 2024-09-10 DIAGNOSIS — I10 ESSENTIAL HYPERTENSION: ICD-10-CM

## 2024-09-10 DIAGNOSIS — E78.5 HYPERLIPIDEMIA, UNSPECIFIED HYPERLIPIDEMIA TYPE: ICD-10-CM

## 2024-09-10 DIAGNOSIS — R73.03 PRE-DIABETES: ICD-10-CM

## 2024-09-10 LAB
ALBUMIN SERPL BCP-MCNC: 4.5 G/DL (ref 3.5–5.2)
ALP SERPL-CCNC: 93 U/L (ref 38–126)
ALT SERPL W/O P-5'-P-CCNC: 18 U/L (ref 10–44)
ANION GAP SERPL CALC-SCNC: 8 MMOL/L (ref 8–16)
AST SERPL-CCNC: 34 U/L (ref 15–46)
BASOPHILS # BLD AUTO: 0.04 K/UL (ref 0–0.2)
BASOPHILS NFR BLD: 1 % (ref 0–1.9)
BILIRUB SERPL-MCNC: 0.4 MG/DL (ref 0.1–1)
CALCIUM SERPL-MCNC: 9.5 MG/DL (ref 8.7–10.5)
CHLORIDE SERPL-SCNC: 100 MMOL/L (ref 95–110)
CHOLEST SERPL-MCNC: 150 MG/DL (ref 120–199)
CHOLEST/HDLC SERPL: 2.5 {RATIO} (ref 2–5)
CO2 SERPL-SCNC: 28 MMOL/L (ref 23–29)
CREAT SERPL-MCNC: 0.9 MG/DL (ref 0.5–1.4)
DIFFERENTIAL METHOD BLD: ABNORMAL
EOSINOPHIL # BLD AUTO: 0.2 K/UL (ref 0–0.5)
EOSINOPHIL NFR BLD: 3.9 % (ref 0–8)
ERYTHROCYTE [DISTWIDTH] IN BLOOD BY AUTOMATED COUNT: 15.6 % (ref 11.5–14.5)
EST. GFR  (NO RACE VARIABLE): >60 ML/MIN/1.73 M^2
ESTIMATED AVG GLUCOSE: 126 MG/DL (ref 68–131)
GLUCOSE SERPL-MCNC: 97 MG/DL (ref 70–110)
HBA1C MFR BLD: 6 % (ref 4–5.6)
HCT VFR BLD AUTO: 37.3 % (ref 37–48.5)
HDLC SERPL-MCNC: 59 MG/DL (ref 40–75)
HDLC SERPL: 39.3 % (ref 20–50)
HGB BLD-MCNC: 12.1 G/DL (ref 12–16)
IMM GRANULOCYTES # BLD AUTO: 0.01 K/UL (ref 0–0.04)
IMM GRANULOCYTES NFR BLD AUTO: 0.3 % (ref 0–0.5)
LDLC SERPL CALC-MCNC: 73.4 MG/DL (ref 63–159)
LYMPHOCYTES # BLD AUTO: 1.2 K/UL (ref 1–4.8)
LYMPHOCYTES NFR BLD: 32 % (ref 18–48)
MCH RBC QN AUTO: 29.3 PG (ref 27–31)
MCHC RBC AUTO-ENTMCNC: 32.4 G/DL (ref 32–36)
MCV RBC AUTO: 90 FL (ref 82–98)
MONOCYTES # BLD AUTO: 0.5 K/UL (ref 0.3–1)
MONOCYTES NFR BLD: 12.1 % (ref 4–15)
NEUTROPHILS # BLD AUTO: 1.9 K/UL (ref 1.8–7.7)
NEUTROPHILS NFR BLD: 50.7 % (ref 38–73)
NONHDLC SERPL-MCNC: 91 MG/DL
NRBC BLD-RTO: 0 /100 WBC
PLATELET # BLD AUTO: 229 K/UL (ref 150–450)
PMV BLD AUTO: 11.3 FL (ref 9.2–12.9)
POTASSIUM SERPL-SCNC: 4 MMOL/L (ref 3.5–5.1)
PROT SERPL-MCNC: 7.1 G/DL (ref 6–8.4)
RBC # BLD AUTO: 4.13 M/UL (ref 4–5.4)
SODIUM SERPL-SCNC: 136 MMOL/L (ref 136–145)
TRIGL SERPL-MCNC: 88 MG/DL (ref 30–150)
TSH SERPL DL<=0.005 MIU/L-ACNC: 3.41 UIU/ML (ref 0.4–4)
UUN UR-MCNC: 23 MG/DL (ref 7–17)
WBC # BLD AUTO: 3.81 K/UL (ref 3.9–12.7)

## 2024-09-10 PROCEDURE — 80061 LIPID PANEL: CPT | Mod: HCNC | Performed by: INTERNAL MEDICINE

## 2024-09-10 PROCEDURE — 36415 COLL VENOUS BLD VENIPUNCTURE: CPT | Mod: HCNC,PN | Performed by: INTERNAL MEDICINE

## 2024-09-10 PROCEDURE — 85025 COMPLETE CBC W/AUTO DIFF WBC: CPT | Mod: HCNC,PN | Performed by: INTERNAL MEDICINE

## 2024-09-10 PROCEDURE — 80053 COMPREHEN METABOLIC PANEL: CPT | Mod: HCNC,PN | Performed by: INTERNAL MEDICINE

## 2024-09-10 PROCEDURE — 83036 HEMOGLOBIN GLYCOSYLATED A1C: CPT | Mod: HCNC | Performed by: INTERNAL MEDICINE

## 2024-09-10 PROCEDURE — 84443 ASSAY THYROID STIM HORMONE: CPT | Mod: HCNC,PN | Performed by: INTERNAL MEDICINE

## 2024-09-11 ENCOUNTER — TELEPHONE (OUTPATIENT)
Dept: INTERNAL MEDICINE | Facility: CLINIC | Age: 84
End: 2024-09-11
Payer: MEDICARE

## 2024-11-08 ENCOUNTER — PATIENT MESSAGE (OUTPATIENT)
Dept: CARDIOLOGY | Facility: CLINIC | Age: 84
End: 2024-11-08
Payer: MEDICARE

## 2024-11-08 ENCOUNTER — CLINICAL SUPPORT (OUTPATIENT)
Dept: CARDIOLOGY | Facility: HOSPITAL | Age: 84
End: 2024-11-08
Attending: STUDENT IN AN ORGANIZED HEALTH CARE EDUCATION/TRAINING PROGRAM
Payer: MEDICARE

## 2024-11-08 ENCOUNTER — CLINICAL SUPPORT (OUTPATIENT)
Dept: CARDIOLOGY | Facility: HOSPITAL | Age: 84
End: 2024-11-08
Payer: MEDICARE

## 2024-11-08 DIAGNOSIS — R55 SYNCOPE AND COLLAPSE: ICD-10-CM

## 2024-11-08 PROCEDURE — 93294 REM INTERROG EVL PM/LDLS PM: CPT | Mod: HCNC,,, | Performed by: STUDENT IN AN ORGANIZED HEALTH CARE EDUCATION/TRAINING PROGRAM

## 2024-11-08 PROCEDURE — 93296 REM INTERROG EVL PM/IDS: CPT | Mod: HCNC | Performed by: STUDENT IN AN ORGANIZED HEALTH CARE EDUCATION/TRAINING PROGRAM

## 2024-11-15 LAB
OHS CV AF BURDEN PERCENT: < 1
OHS CV DC REMOTE DEVICE TYPE: NORMAL
OHS CV RV PACING PERCENT: 78 %

## 2024-11-21 ENCOUNTER — TELEPHONE (OUTPATIENT)
Dept: OBSTETRICS AND GYNECOLOGY | Facility: CLINIC | Age: 84
End: 2024-11-21
Payer: MEDICARE

## 2024-11-21 DIAGNOSIS — Z12.31 VISIT FOR SCREENING MAMMOGRAM: Primary | ICD-10-CM

## 2024-11-21 NOTE — TELEPHONE ENCOUNTER
----- Message from Indira sent at 11/21/2024 11:13 AM CST -----  Type:  Orders     Who Called:pt     Does the patient know what this is regarding?:Mammo Orders   Would the patient rather a call back or a response via [facility]? Call   Best Call Back Number: [phone]  Additional Information:

## 2024-11-25 ENCOUNTER — OFFICE VISIT (OUTPATIENT)
Dept: CARDIOLOGY | Facility: CLINIC | Age: 84
End: 2024-11-25
Payer: MEDICARE

## 2024-11-25 VITALS
SYSTOLIC BLOOD PRESSURE: 127 MMHG | HEIGHT: 63 IN | WEIGHT: 176.38 LBS | HEART RATE: 56 BPM | DIASTOLIC BLOOD PRESSURE: 80 MMHG | BODY MASS INDEX: 31.25 KG/M2

## 2024-11-25 DIAGNOSIS — I71.21 ANEURYSM OF ASCENDING AORTA WITHOUT RUPTURE: ICD-10-CM

## 2024-11-25 DIAGNOSIS — I77.1 STENOSIS OF LEFT SUBCLAVIAN ARTERY: ICD-10-CM

## 2024-11-25 DIAGNOSIS — R07.2 PRECORDIAL CHEST PAIN: Primary | ICD-10-CM

## 2024-11-25 DIAGNOSIS — I20.89 ANGINAL EQUIVALENT: ICD-10-CM

## 2024-11-25 DIAGNOSIS — I65.23 CAROTID STENOSIS, BILATERAL: ICD-10-CM

## 2024-11-25 DIAGNOSIS — E78.00 HYPERCHOLESTEROLEMIA: ICD-10-CM

## 2024-11-25 DIAGNOSIS — I10 ESSENTIAL HYPERTENSION: ICD-10-CM

## 2024-11-25 PROCEDURE — 3288F FALL RISK ASSESSMENT DOCD: CPT | Mod: CPTII,S$GLB,, | Performed by: INTERNAL MEDICINE

## 2024-11-25 PROCEDURE — 1101F PT FALLS ASSESS-DOCD LE1/YR: CPT | Mod: CPTII,S$GLB,, | Performed by: INTERNAL MEDICINE

## 2024-11-25 PROCEDURE — 3074F SYST BP LT 130 MM HG: CPT | Mod: CPTII,S$GLB,, | Performed by: INTERNAL MEDICINE

## 2024-11-25 PROCEDURE — 3079F DIAST BP 80-89 MM HG: CPT | Mod: CPTII,S$GLB,, | Performed by: INTERNAL MEDICINE

## 2024-11-25 PROCEDURE — 1159F MED LIST DOCD IN RCRD: CPT | Mod: CPTII,S$GLB,, | Performed by: INTERNAL MEDICINE

## 2024-11-25 PROCEDURE — 1125F AMNT PAIN NOTED PAIN PRSNT: CPT | Mod: CPTII,S$GLB,, | Performed by: INTERNAL MEDICINE

## 2024-11-25 PROCEDURE — 99999 PR PBB SHADOW E&M-EST. PATIENT-LVL V: CPT | Mod: PBBFAC,,, | Performed by: INTERNAL MEDICINE

## 2024-11-25 PROCEDURE — 99214 OFFICE O/P EST MOD 30 MIN: CPT | Mod: S$GLB,,, | Performed by: INTERNAL MEDICINE

## 2024-11-25 RX ORDER — EZETIMIBE 10 MG/1
10 TABLET ORAL DAILY
Qty: 90 TABLET | Refills: 3 | Status: SHIPPED | OUTPATIENT
Start: 2024-11-25 | End: 2025-11-25

## 2024-11-25 NOTE — PROGRESS NOTES
Subjective:   11/25/2024     Patient ID:  Maryann Sorenson is a 83 y.o. female who presents for evaulation of Shortness of Breath, Pain (Not frequent), and history of afibb      Comes in for follow-up.  She has a history of diastolic dysfunction, she does come in now with complaints of shortness of breath, this can occur with activity or, even moving in bed at night.  She has back pain, it radiates from the back to the front.  This has not associated with shortness of breath.      She has a history of an elevated coronary calcium score, she has been on high-dose statin therapy, her residual LDL was 73, certainly should be less than 70, will add ezetimibe.      She has a history of paroxysmal atrial fibrillation, currently on antianginal therapy, anticoagulated with Eliquis.    Hypertension is present, blood pressure appears well controlled at present.      She has a history of complete heart block, status post pacemaker therapy, current EKG shows heart rate of 60 beats per minute, AV paced rhythm.        Prior note reviewed:  Patient with history of diastolic dysfunction, coronary calcification on calcium score, negative nuclear stress test last year, presents for follow-up after an episode of atrial fibrillation with a rapid ventricular response.  She had COVID 2 weeks ago.  Three days ago, she developed rapid palpitations and was seen in the [facility] Emergency Room.  There she was found have atrial fibrillation with a rapid response.  She was treated with intravenous diltiazem and oral metoprolol.  She was discharged off of anticoagulation.  Aspirin only.  Her CHADS2 Vasc score was noted to be too, but I calculate that is 5.     She has not had recurrent palpitations since the emergency room visit.  She has noted bradycardia and has only been taking metoprolol tartrate 25 mg once daily.  Her blood pressure was noted to be elevated yesterday.    Electrocardiogram shows sinus bradycardia heart rate 53 beats per  minute, first-degree AV block      Assessment and Plan:    Paroxysmal atrial fibrillation  Comments:  Chads 2 Vasc score is 5, discontinue aspirin, begin Eliquis 5 mg twice a day  Orders:  -     Discontinue: apixaban (ELIQUIS) 5 mg Tab; Take 1 tablet (5 mg total) by mouth 2 (two) times daily.  Dispense: 60 tablet; Refill: 11  -     metoprolol tartrate (LOPRESSOR) 25 MG tablet; Take 0.5 tablets (12.5 mg total) by mouth 2 (two) times daily as needed (Take 1/2 tablet twice a day, but hold for pulse less than 60 beats per minute).  Dispense: 60 tablet; Refill: 0  -     apixaban (ELIQUIS) 5 mg Tab; Take 1 tablet (5 mg total) by mouth 2 (two) times daily.  Dispense: 60 tablet; Refill: 11    Hypercholesterolemia  Comments:  On high-dose statin therapy    Agatston coronary artery calcium score greater than 400 - 577  Comments:  Asymptomatic    Essential hypertension  -     amLODIPine (NORVASC) 5 MG tablet; Take 0.5 tablets (2.5 mg total) by mouth once daily.  Dispense: 90 tablet; Refill: 3    Carotid stenosis, bilateral    Atherosclerosis of native coronary artery of native heart without angina pectoris    Agatston coronary artery calcium score greater than 400 - 577    Shortness of breath    NOE (obstructive sleep apnea)     Patient with an episode of atrial fibrillation with rapid ventricular response.  Currently bradycardic.  She is to continue to take metoprolol tartrate 25 mg tabs, take 1/2 tablet twice a day, holding for pulse less than 60 beats per minute.    Blood pressure is elevated, she will resume amlodipine, but 1/2 tablet daily.    She does have elevated CHADS2 Vasc score, aspirin will be discontinued, replaced with Eliquis 5 mg twice a day.    Follow-up with Dr. Gavin, echocardiography to be obtained                Past Medical History:   Diagnosis Date    A-fib     Anticoagulant long-term use     Arthritis     Coronary artery disease     GERD (gastroesophageal reflux disease)     Hypertension      Pulmonary hypertension 2016    Sleep apnea     Syncope        Review of patient's allergies indicates:   Allergen Reactions    Amoxicillin-pot clavulanate Diarrhea     Given march 2016    Sulfa (sulfonamide antibiotics) Rash    Codeine      Other reaction(s): Unknown, tolerates hydrocodone june 2014    Doxycycline Nausea Only    Lyrica [pregabalin] Other (See Comments)     Blurry vision    Macrobid [nitrofurantoin monohyd/m-cryst] Other (See Comments)      2019 caused cramps in legs. After stopped medication it cleared.     Prevnar [pneumococcal 7-beti conj vacc] Other (See Comments)     Arm sore, redness at injection site and muscle aches. Given shot 7/23/19    Relafen [nabumetone] Diarrhea         Current Outpatient Medications:     amLODIPine (NORVASC) 5 MG tablet, Take 1 tablet (5 mg total) by mouth once daily., Disp: 90 tablet, Rfl: 3    apixaban (ELIQUIS) 5 mg Tab, Take 1 tablet (5 mg total) by mouth 2 (two) times daily., Disp: 180 tablet, Rfl: 3    ascorbate calcium-bioflavonoid (FRANCISCO-C WITH BIOFLAVONOIDS) 500-200 mg Tab, Take 1 tablet by mouth 2 (two) times a day., Disp: , Rfl:     calcium carbonate-vitamin D3 600 mg (1,500 mg)-800 unit Chew, Take 1 tablet by mouth once daily., Disp: , Rfl:     cholecalciferol, vitamin D3, (VITAMIN D3) 50 mcg (2,000 unit) Tab, Take by mouth once daily., Disp: , Rfl:     coenzyme Q10 200 mg capsule, Take 1 capsule by mouth Daily. 1 Capsule Oral Every day, Disp: , Rfl:     empagliflozin (JARDIANCE) 25 mg tablet, Take 1 tablet (25 mg total) by mouth once daily., Disp: 90 tablet, Rfl: 1    esomeprazole (NEXIUM) 40 MG capsule, 1 Capsule, Delayed Release(E.C.) Oral Every day, Disp: 90 capsule, Rfl: 3    fexofenadine (ALLEGRA) 180 MG tablet, Take 1 tablet by mouth Daily. 1 Tablet Oral Every day, Disp: , Rfl:     flecainide (TAMBOCOR) 100 MG Tab, Take 1 tablet (100 mg total) by mouth every 12 (twelve) hours., Disp: 180 tablet, Rfl: 3    fluticasone propionate (FLONASE) 50  mcg/actuation nasal spray, 1 spray each nostril twice a day; total 48 g - please provide 3 months supply., Disp: 48 g, Rfl: 3    folic acid (FOLVITE) 1 MG tablet, Take 1 tablet by mouth once daily., Disp: , Rfl:     gabapentin (NEURONTIN) 300 MG capsule, 1 pill PO TID. 90 days supply, Disp: 270 capsule, Rfl: 3    guaiFENesin (MUCINEX) 600 mg 12 hr tablet, Take 1,200 mg by mouth as needed. , Disp: , Rfl:     hydroCHLOROthiazide (MICROZIDE) 12.5 mg capsule, Take 1 capsule (12.5 mg total) by mouth once daily., Disp: 90 capsule, Rfl: 3    hydrocortisone 2.5 % ointment, Apply twice daily to affected area, max 2 weeks per month, Disp: 30 g, Rfl: 1    LACTOBACILLUS RHAMNOSUS GG (PROBIOTIC ORAL), Take 1 capsule by mouth Daily. 1 Capsule Oral Every day, Disp: , Rfl:     losartan (COZAAR) 50 MG tablet, Take 1 tablet (50 mg total) by mouth once daily., Disp: 90 tablet, Rfl: 3    lutein 20 mg Cap, Take 20 mg by mouth Daily., Disp: , Rfl:     magnesium 250 mg Tab, Take 250 mg by mouth once daily. , Disp: , Rfl:     methotrexate 2.5 MG Tab, Take 2.5 mg by mouth every 7 days. 4 tablets, Disp: , Rfl:     metoprolol succinate (TOPROL-XL) 25 MG 24 hr tablet, Take 1 tablet (25 mg total) by mouth once daily., Disp: 90 tablet, Rfl: 3    pramipexole (MIRAPEX) 0.125 MG tablet, TAKE 1 TABLET EVERY DAY, Disp: 90 tablet, Rfl: 10    rosuvastatin (CRESTOR) 40 MG Tab, Take 1 tablet (40 mg total) by mouth once daily., Disp: 90 tablet, Rfl: 3    silver sulfADIAZINE 1% (SILVADENE) 1 % cream, Apply topically 2 (two) times daily. (Patient taking differently: Apply topically as needed.), Disp: 20 g, Rfl: 0    ZINC ORAL, Take 30 mg by mouth once daily., Disp: , Rfl:     ezetimibe (ZETIA) 10 mg tablet, Take 1 tablet (10 mg total) by mouth once daily., Disp: 90 tablet, Rfl: 3  No current facility-administered medications for this visit.    Facility-Administered Medications Ordered in Other Visits:     vancomycin (VANCOCIN) 1,000 mg in dextrose 5 % (D5W)  250 mL IVPB (Vial-Mate), 1,000 mg, Intravenous, On Call Procedure, Lili Weir, NP, Last Rate: 166.7 mL/hr at 02/09/24 1329, 1,000 mg at 02/09/24 1329     Objective:   Review of Systems   Cardiovascular:  Positive for chest pain and dyspnea on exertion. Negative for claudication, cyanosis, irregular heartbeat, leg swelling, near-syncope, orthopnea, palpitations, paroxysmal nocturnal dyspnea and syncope.         Vitals:    11/25/24 1131   BP: 127/80   Pulse: (!) 56       Wt Readings from Last 3 Encounters:   11/25/24 80 kg (176 lb 5.9 oz)   08/27/24 80.1 kg (176 lb 9.4 oz)   07/18/24 79 kg (174 lb 2.6 oz)     Temp Readings from Last 3 Encounters:   06/05/24 97.5 °F (36.4 °C) (Skin)   02/20/24 97.4 °F (36.3 °C) (Temporal)   02/09/24 97.2 °F (36.2 °C) (Temporal)     BP Readings from Last 3 Encounters:   11/25/24 127/80   08/27/24 138/61   07/18/24 102/69     Pulse Readings from Last 3 Encounters:   11/25/24 (!) 56   08/27/24 63   07/18/24 (!) 57             Physical Exam  Vitals reviewed.   Constitutional:       General: She is not in acute distress.     Appearance: She is well-developed.   HENT:      Head: Normocephalic and atraumatic.      Nose: Nose normal.   Eyes:      Conjunctiva/sclera: Conjunctivae normal.      Pupils: Pupils are equal, round, and reactive to light.   Neck:      Vascular: No carotid bruit or JVD.   Cardiovascular:      Rate and Rhythm: Normal rate and regular rhythm.      Pulses: Normal pulses and intact distal pulses.      Heart sounds: Normal heart sounds. No murmur heard.     No friction rub. No gallop.   Pulmonary:      Effort: Pulmonary effort is normal. No respiratory distress.      Breath sounds: Normal breath sounds. No wheezing or rales.   Chest:      Chest wall: No tenderness.   Abdominal:      General: Bowel sounds are normal. There is no distension.      Palpations: Abdomen is soft.      Tenderness: There is no abdominal tenderness.   Musculoskeletal:         General: No  tenderness or deformity. Normal range of motion.      Cervical back: Normal range of motion and neck supple.      Right lower leg: No edema.      Left lower leg: No edema.   Skin:     General: Skin is warm and dry.      Findings: No erythema or rash.   Neurological:      Mental Status: She is alert and oriented to person, place, and time.      Cranial Nerves: No cranial nerve deficit.      Motor: No abnormal muscle tone.      Coordination: Coordination normal.   Psychiatric:         Behavior: Behavior normal.         Thought Content: Thought content normal.         Judgment: Judgment normal.           Lab Results   Component Value Date    CHOL 150 09/10/2024    CHOL 150 03/06/2024    CHOL 191 01/25/2024     Lab Results   Component Value Date    HDL 59 09/10/2024    HDL 61 03/06/2024    HDL 74 01/25/2024     Lab Results   Component Value Date    LDLCALC 73.4 09/10/2024    LDLCALC 74.0 03/06/2024    LDLCALC 85.8 01/25/2024     Lab Results   Component Value Date    ALT 18 09/10/2024    AST 34 09/10/2024    AST 34 03/06/2024    AST 31 01/27/2024     Lab Results   Component Value Date    CREATININE 0.90 09/10/2024    BUN 23 (H) 09/10/2024     09/10/2024    K 4.0 09/10/2024    CO2 28 09/10/2024    CO2 26 06/25/2024    CO2 26 06/05/2024     Lab Results   Component Value Date    HGB 12.1 09/10/2024    HCT 37.3 09/10/2024    HCT 36.9 08/02/2024    HCT 35.8 (L) 02/02/2024         Component      Latest Ref Rng & Units 4/1/2022   WBC      3.90 - 12.70 K/uL 6.56   RBC      4.00 - 5.40 M/uL 4.34   Hemoglobin      12.0 - 16.0 g/dL 12.3   Hematocrit      37.0 - 48.5 % 37.7   MCV      82 - 98 fL 87   MCH      27.0 - 31.0 pg 28.3   MCHC      32.0 - 36.0 g/dL 32.6   RDW      11.5 - 14.5 % 14.3   Platelets      150 - 450 K/uL 279   MPV      9.2 - 12.9 fL 10.6   Immature Granulocytes      0.0 - 0.5 % 0.5   Gran # (ANC)      1.8 - 7.7 K/uL 4.4   Immature Grans (Abs)      0.00 - 0.04 K/uL 0.03   Lymph #      1.0 - 4.8 K/uL 1.5    Mono #      0.3 - 1.0 K/uL 0.5   Eos #      0.0 - 0.5 K/uL 0.1   Baso #      0.00 - 0.20 K/uL 0.03   nRBC      0 /100 WBC 0   Gran %      38.0 - 73.0 % 66.5   Lymph %      18.0 - 48.0 % 22.9   Mono %      4.0 - 15.0 % 7.5   Eosinophil %      0.0 - 8.0 % 2.1   Basophil %      0.0 - 1.9 % 0.5   Differential Method       Automated   Sodium      136 - 145 mmol/L 139   Potassium      3.5 - 5.1 mmol/L 3.7   Chloride      95 - 110 mmol/L 103   CO2      23 - 29 mmol/L 24   Glucose      70 - 110 mg/dL 122 (H)   BUN      7 - 17 mg/dL 16   Creatinine      0.50 - 1.40 mg/dL 0.63   Calcium      8.7 - 10.5 mg/dL 8.6 (L)   PROTEIN TOTAL      6.0 - 8.4 g/dL 7.1   Albumin      3.5 - 5.2 g/dL 4.3   BILIRUBIN TOTAL      0.1 - 1.0 mg/dL 0.4   Alkaline Phosphatase      38 - 126 U/L 108   AST      15 - 46 U/L 29   ALT      10 - 44 U/L 22   Anion Gap      8 - 16 mmol/L 12   eGFR if African American      >60 mL/min/1.73 m:2 >60.0   eGFR if non African American      >60 mL/min/1.73 m:2 >60.0   WBC, UA      0 - 5 /hpf 2   Microscopic Comment       SEE COMMENT   NT-proBNP      5 - 1800 pg/mL 47   Troponin I      0.012 - 0.034 ng/mL <0.012   TSH      0.400 - 4.000 uIU/mL 0.830                   Assessment and Plan:     Precordial chest pain  Comments:  Nuclear stress test to be obtained  Orders:  -     Cardiac PET Scan Stress; Future; Expected date: 11/26/2024    Anginal equivalent  -     Cardiac PET Scan Stress; Future; Expected date: 11/26/2024    Hypercholesterolemia  Comments:  Add ezetimibe to high-dose rosuvastatin, LDL goal less than 70  Orders:  -     IN OFFICE EKG 12-LEAD (to Muse)  -     ezetimibe (ZETIA) 10 mg tablet; Take 1 tablet (10 mg total) by mouth once daily.  Dispense: 90 tablet; Refill: 3    Essential hypertension  Comments:  Currently well controlled  Orders:  -     IN OFFICE EKG 12-LEAD (to Muse)    Carotid stenosis, bilateral  Comments:  Mild on recent CT of the neck with contrast    Stenosis of left subclavian  artery  Comments:  Does not appear to have symptoms related to steal    Aneurysm of ascending aorta without rupture  Comments:  Noted be stable on recent CT

## 2024-11-26 ENCOUNTER — PATIENT MESSAGE (OUTPATIENT)
Dept: INTERNAL MEDICINE | Facility: CLINIC | Age: 84
End: 2024-11-26
Payer: MEDICARE

## 2024-11-30 NOTE — PROGRESS NOTES
CC: Annual PE    83 y.o. female presents for PE   Non smoker   Social ETOH      HEALTH MAINTENANCE:  Cholesterol/labs: 4/2021  C-scope: N/A  EGD-+ hiatal hernia, s/p dilatation Dr. Pop  C-scope, s/p polyp, no further recs  9/2023- s/p event  WWE: gyn  Mammo:gyn  DEXA: 2024-osteoporosis- did not take bisphosphonate  2/2 to esophageal disease - Appt pending ENDOCRINE  +vitamin D supplements and calcium  EYE exam: UTD- Birdshot choreoretinitis  DDS exam: UTD- s/p root canal, left side    VACCINATIONS:advised to not vaccines (except flu) take while on MTX by Ophth  TD: 2014  Flu:yearly  Pneumovax:2009  Prevnar: 2019  Shingles: Zoster- 2017-@ [facility] in [facility]; consider Shingrix  Covid: x 3  RSV: consider      MEDCARD: Reviewed  ROS:  No fever, chills, or night sweats  No visual disturbance or d/c=++ not driving w/ eye condition  No ear or sinus pain or pressure  No dysphagia or early satiety  No chest pain or palpitations  No cough or wheezing  No PND or orthopnea  No GERD or abdominal pain; occ breakthrough reflux  No change in bowels or blood in stool  No hematuria or dysuria;  +  Nocturia  No vaginal bleeding or pelvic pain or bloating  No skin rashes or lesions  ++ LBP w/ B/L pain lateral thighs  S/p NADYA that were not very effective  ++ main rate limiting factor for her daily health she feels is her MSK pain  No unusual HA or focal deficits  No cold or heat intolerance  No increased thirst or urination  No unusual bruising or bleeding  Left elbow w/ very small nodules, pt felt incidentally, no pain /itchin  ADL's: 100% independent, driving very restricted w/ visual changes  AD's: + will,  unsure about AD, and M/POA ;  Pt would want all health care measures taken if hope of recovery to near baseline; would not want long term ventilator  Memory: Good, delayed recall  Mental health: spouse w/ significant health issues, back problems and memory declined s/p stroke 2014 and has progressed; wife handles all his care and  finances, etc    Gait: no recent falls   Nutrition: healthy-+/- sweets  Safety: Intact, but concerned about spouse being able to discern scams  Urinary incontinence: stress incontinence    Remainder of review negative except as previously noted    PMHX:Reviewed  PSHX: Reviewed  SHX: Reviewed      PE:  VS: As noted  GENERAL: Well developed well nourished, alert and oriented in NAD  Conversant and co-operative,  Pleasant as always  EYES: Conjunctiva and lids normal, sclera anicteric  NECK: Supple w/o thyromegaly or lymphadenopathy    RESPIRATORY: Efforts are unlabored; lungs are CTAP  CARDIOVASCULAR: Heart RRR w/o mumur, gallop or rub; No carotid bruits noted  1+ pedal pulses; no LE edema noted  GASTROINTESTINAL: Bowel sounds are present, soft, nontender, nondistended  No hepatosplenomegaly noted.    MUSCULOSKELETAL: Gait impaired, ++ cane No clubbing, cyanosis, or edema noted.  ++ DJD hands, c/w synovitis digits  NEUROLOGIC: STEVENS. No tremor noted  SKIN: Warm and dry, left elbow w/ dermatitis and 4 fleshy 4-5mm nodules, NT, w/o erythema or warmth,     IMPRESSION/PLAN:  Annual PE  PAF, started after Covid dx , stable, s/p pacemaker  -continue Eliquis 5 BID  -continue metoprolol XL 24  -continue flecainide 100mg BID  HTN, stable  -continue amlodipine 5 mg  -continue losartan 50 mg q.day  -continue metoprolol XL 25 mg q.day  -HCTZ 12.5 qd  -continue low salt diet  Chronic Pulmonary heart ds/HTN,stable  -continue Jardiance 25mg   HLD, stable  -continue Zetia 10mg  -continue low-fat diet  Carotid stenosis,asx  Atherosclerosis CAD/Agatston >400+,asx  Hyperglycemia, stable  Pre-DM, stable  -continue Jardiance 25, Rx at rec of Cards  MICHELLE, on supplement  GERD/hiatal hernia, stable  -continue esomeprazole 40mg  MSK, joint pain, hands /other sites  -unable to take Tylenol per Ophth while on MTX  -unable to take NSAIDS 2/2 DOAC  -will inquire if Celebrex an option from CV   Chronic back pain, lumbar radiculitis,   -continue  gabapentin 300mg tid  RLS, stable  -continue pramipexole 0.125    Vitamin D deficiency, on supplements  Osteoporosis, on supplements  -under care Endo  Birdshot choreo retinopathy  -under care of Ophth  -on MTX, but not effective considering a change  Dermatitis, psoriasis/eczema appearance  HM  -reviewed covid vaccine  -Reviewed flu vaccine    Review outside MR  TTE 6/2023   · The left ventricle is normal in size with normal systolic function.   · The estimated ejection fraction is 65%.   · The left ventricular global longitudinal strain is -22%. Pattern is   normal.   · Grade II left ventricular diastolic dysfunction.   · Normal right ventricular size with normal right ventricular systolic   function.   · Mild left atrial enlargement.   · Mild aortic regurgitation.   · Mild tricuspid regurgitation.   · The estimated PA systolic pressure is 34 mmHg.   · Normal central venous pressure (3 mmHg).   · The ascending aorta is mildly dilated.      Carotid US 11/2022   There is 0-19% right Internal Carotid Stenosis.   · There is 20-39% left Internal Carotid Stenosis.       Addendum, reviewed Celebrex w/ Cards and okay to try while on DOAC  LVMCP

## 2024-12-03 ENCOUNTER — LAB VISIT (OUTPATIENT)
Dept: LAB | Facility: HOSPITAL | Age: 84
End: 2024-12-03
Attending: INTERNAL MEDICINE
Payer: MEDICARE

## 2024-12-03 ENCOUNTER — PATIENT MESSAGE (OUTPATIENT)
Dept: INTERNAL MEDICINE | Facility: CLINIC | Age: 84
End: 2024-12-03

## 2024-12-03 ENCOUNTER — OFFICE VISIT (OUTPATIENT)
Dept: INTERNAL MEDICINE | Facility: CLINIC | Age: 84
End: 2024-12-03
Payer: MEDICARE

## 2024-12-03 VITALS
HEIGHT: 63 IN | DIASTOLIC BLOOD PRESSURE: 78 MMHG | WEIGHT: 176.56 LBS | RESPIRATION RATE: 20 BRPM | OXYGEN SATURATION: 97 % | SYSTOLIC BLOOD PRESSURE: 120 MMHG | TEMPERATURE: 97 F | HEART RATE: 81 BPM | BODY MASS INDEX: 31.29 KG/M2

## 2024-12-03 DIAGNOSIS — Z00.00 ANNUAL PHYSICAL EXAM: Primary | ICD-10-CM

## 2024-12-03 DIAGNOSIS — K21.9 GASTROESOPHAGEAL REFLUX DISEASE, UNSPECIFIED WHETHER ESOPHAGITIS PRESENT: ICD-10-CM

## 2024-12-03 DIAGNOSIS — I65.23 CAROTID STENOSIS, BILATERAL: ICD-10-CM

## 2024-12-03 DIAGNOSIS — I10 ESSENTIAL HYPERTENSION: ICD-10-CM

## 2024-12-03 DIAGNOSIS — I70.0 AORTIC ATHEROSCLEROSIS: ICD-10-CM

## 2024-12-03 DIAGNOSIS — I27.9 CHRONIC PULMONARY HEART DISEASE: ICD-10-CM

## 2024-12-03 DIAGNOSIS — R73.03 PRE-DIABETES: ICD-10-CM

## 2024-12-03 DIAGNOSIS — G25.81 RESTLESS LEGS: ICD-10-CM

## 2024-12-03 DIAGNOSIS — M81.0 AGE-RELATED OSTEOPOROSIS WITHOUT CURRENT PATHOLOGICAL FRACTURE: ICD-10-CM

## 2024-12-03 DIAGNOSIS — E78.5 HYPERLIPIDEMIA, UNSPECIFIED HYPERLIPIDEMIA TYPE: ICD-10-CM

## 2024-12-03 DIAGNOSIS — I48.0 PAROXYSMAL ATRIAL FIBRILLATION: ICD-10-CM

## 2024-12-03 DIAGNOSIS — E55.9 VITAMIN D DEFICIENCY: ICD-10-CM

## 2024-12-03 DIAGNOSIS — M79.641 BILATERAL HAND PAIN: ICD-10-CM

## 2024-12-03 DIAGNOSIS — R93.1 AGATSTON CORONARY ARTERY CALCIUM SCORE GREATER THAN 400: ICD-10-CM

## 2024-12-03 DIAGNOSIS — D50.9 IRON DEFICIENCY ANEMIA, UNSPECIFIED IRON DEFICIENCY ANEMIA TYPE: ICD-10-CM

## 2024-12-03 DIAGNOSIS — H30.893 BIRDSHOT CHOROIDOPATHY OF BOTH EYES: ICD-10-CM

## 2024-12-03 DIAGNOSIS — M79.642 BILATERAL HAND PAIN: ICD-10-CM

## 2024-12-03 LAB
ALBUMIN SERPL BCP-MCNC: 4.2 G/DL (ref 3.5–5.2)
ALP SERPL-CCNC: 89 U/L (ref 40–150)
ALT SERPL W/O P-5'-P-CCNC: 18 U/L (ref 10–44)
ANION GAP SERPL CALC-SCNC: 11 MMOL/L (ref 8–16)
AST SERPL-CCNC: 32 U/L (ref 10–40)
BASOPHILS # BLD AUTO: 0.03 K/UL (ref 0–0.2)
BASOPHILS NFR BLD: 0.7 % (ref 0–1.9)
BILIRUB SERPL-MCNC: 0.5 MG/DL (ref 0.1–1)
BUN SERPL-MCNC: 23 MG/DL (ref 8–23)
CALCIUM SERPL-MCNC: 10 MG/DL (ref 8.7–10.5)
CHLORIDE SERPL-SCNC: 102 MMOL/L (ref 95–110)
CO2 SERPL-SCNC: 24 MMOL/L (ref 23–29)
CREAT SERPL-MCNC: 0.9 MG/DL (ref 0.5–1.4)
DIFFERENTIAL METHOD BLD: ABNORMAL
EOSINOPHIL # BLD AUTO: 0.2 K/UL (ref 0–0.5)
EOSINOPHIL NFR BLD: 3.7 % (ref 0–8)
ERYTHROCYTE [DISTWIDTH] IN BLOOD BY AUTOMATED COUNT: 15.5 % (ref 11.5–14.5)
EST. GFR  (NO RACE VARIABLE): >60 ML/MIN/1.73 M^2
GLUCOSE SERPL-MCNC: 101 MG/DL (ref 70–110)
HCT VFR BLD AUTO: 37.8 % (ref 37–48.5)
HGB BLD-MCNC: 11.6 G/DL (ref 12–16)
IMM GRANULOCYTES # BLD AUTO: 0.01 K/UL (ref 0–0.04)
IMM GRANULOCYTES NFR BLD AUTO: 0.2 % (ref 0–0.5)
LYMPHOCYTES # BLD AUTO: 1 K/UL (ref 1–4.8)
LYMPHOCYTES NFR BLD: 25.4 % (ref 18–48)
MCH RBC QN AUTO: 29.4 PG (ref 27–31)
MCHC RBC AUTO-ENTMCNC: 30.7 G/DL (ref 32–36)
MCV RBC AUTO: 96 FL (ref 82–98)
MONOCYTES # BLD AUTO: 0.4 K/UL (ref 0.3–1)
MONOCYTES NFR BLD: 9 % (ref 4–15)
NEUTROPHILS # BLD AUTO: 2.5 K/UL (ref 1.8–7.7)
NEUTROPHILS NFR BLD: 61 % (ref 38–73)
NRBC BLD-RTO: 0 /100 WBC
PLATELET # BLD AUTO: 226 K/UL (ref 150–450)
PMV BLD AUTO: 12.3 FL (ref 9.2–12.9)
POTASSIUM SERPL-SCNC: 4.5 MMOL/L (ref 3.5–5.1)
PROT SERPL-MCNC: 7 G/DL (ref 6–8.4)
RBC # BLD AUTO: 3.94 M/UL (ref 4–5.4)
SODIUM SERPL-SCNC: 137 MMOL/L (ref 136–145)
WBC # BLD AUTO: 4.09 K/UL (ref 3.9–12.7)

## 2024-12-03 PROCEDURE — 1126F AMNT PAIN NOTED NONE PRSNT: CPT | Mod: CPTII,S$GLB,, | Performed by: INTERNAL MEDICINE

## 2024-12-03 PROCEDURE — 1101F PT FALLS ASSESS-DOCD LE1/YR: CPT | Mod: CPTII,S$GLB,, | Performed by: INTERNAL MEDICINE

## 2024-12-03 PROCEDURE — 80053 COMPREHEN METABOLIC PANEL: CPT | Performed by: INTERNAL MEDICINE

## 2024-12-03 PROCEDURE — 3288F FALL RISK ASSESSMENT DOCD: CPT | Mod: CPTII,S$GLB,, | Performed by: INTERNAL MEDICINE

## 2024-12-03 PROCEDURE — 1159F MED LIST DOCD IN RCRD: CPT | Mod: CPTII,S$GLB,, | Performed by: INTERNAL MEDICINE

## 2024-12-03 PROCEDURE — 36415 COLL VENOUS BLD VENIPUNCTURE: CPT | Mod: PO | Performed by: INTERNAL MEDICINE

## 2024-12-03 PROCEDURE — 3078F DIAST BP <80 MM HG: CPT | Mod: CPTII,S$GLB,, | Performed by: INTERNAL MEDICINE

## 2024-12-03 PROCEDURE — 85025 COMPLETE CBC W/AUTO DIFF WBC: CPT | Performed by: INTERNAL MEDICINE

## 2024-12-03 PROCEDURE — 99999 PR PBB SHADOW E&M-EST. PATIENT-LVL V: CPT | Mod: PBBFAC,,, | Performed by: INTERNAL MEDICINE

## 2024-12-03 PROCEDURE — 99397 PER PM REEVAL EST PAT 65+ YR: CPT | Mod: S$GLB,,, | Performed by: INTERNAL MEDICINE

## 2024-12-03 PROCEDURE — 3074F SYST BP LT 130 MM HG: CPT | Mod: CPTII,S$GLB,, | Performed by: INTERNAL MEDICINE

## 2024-12-04 ENCOUNTER — HOSPITAL ENCOUNTER (OUTPATIENT)
Dept: RADIOLOGY | Facility: HOSPITAL | Age: 84
Discharge: HOME | End: 2024-12-04
Attending: INTERNAL MEDICINE
Payer: MEDICARE

## 2024-12-04 DIAGNOSIS — M79.641 BILATERAL HAND PAIN: ICD-10-CM

## 2024-12-04 DIAGNOSIS — M79.642 BILATERAL HAND PAIN: ICD-10-CM

## 2024-12-04 PROCEDURE — 73130 X-RAY EXAM OF HAND: CPT | Mod: 26,50,, | Performed by: RADIOLOGY

## 2024-12-04 PROCEDURE — 73130 X-RAY EXAM OF HAND: CPT | Mod: TC,50,FY,PN

## 2024-12-05 ENCOUNTER — TELEPHONE (OUTPATIENT)
Dept: INTERNAL MEDICINE | Facility: CLINIC | Age: 84
End: 2024-12-05
Payer: MEDICARE

## 2024-12-05 NOTE — TELEPHONE ENCOUNTER
----- Message from Ira Smith MD sent at 12/5/2024  1:36 PM CST -----  Your hand xray revealed moderate arthritis, please advise if you would like to see an Orthopedic hand specialist.  alvino

## 2024-12-06 ENCOUNTER — PATIENT MESSAGE (OUTPATIENT)
Dept: SLEEP MEDICINE | Facility: CLINIC | Age: 84
End: 2024-12-06
Payer: MEDICARE

## 2024-12-06 RX ORDER — CELECOXIB 100 MG/1
100 CAPSULE ORAL DAILY
Qty: 90 CAPSULE | Refills: 1 | Status: SHIPPED | OUTPATIENT
Start: 2024-12-06

## 2024-12-09 ENCOUNTER — OFFICE VISIT (OUTPATIENT)
Dept: SLEEP MEDICINE | Facility: CLINIC | Age: 84
End: 2024-12-09
Attending: PSYCHIATRY & NEUROLOGY
Payer: MEDICARE

## 2024-12-09 ENCOUNTER — HOSPITAL ENCOUNTER (OUTPATIENT)
Dept: RADIOLOGY | Facility: HOSPITAL | Age: 84
Discharge: HOME | End: 2024-12-09
Attending: OBSTETRICS & GYNECOLOGY
Payer: MEDICARE

## 2024-12-09 VITALS
WEIGHT: 177.5 LBS | HEART RATE: 70 BPM | SYSTOLIC BLOOD PRESSURE: 130 MMHG | BODY MASS INDEX: 31.44 KG/M2 | DIASTOLIC BLOOD PRESSURE: 80 MMHG

## 2024-12-09 DIAGNOSIS — Z12.31 VISIT FOR SCREENING MAMMOGRAM: ICD-10-CM

## 2024-12-09 DIAGNOSIS — G47.33 OSA (OBSTRUCTIVE SLEEP APNEA): Primary | ICD-10-CM

## 2024-12-09 DIAGNOSIS — R09.81 NASAL CONGESTION: ICD-10-CM

## 2024-12-09 PROCEDURE — 77067 SCR MAMMO BI INCL CAD: CPT | Mod: 26,HCNC,, | Performed by: RADIOLOGY

## 2024-12-09 PROCEDURE — 1159F MED LIST DOCD IN RCRD: CPT | Mod: CPTII,S$GLB,, | Performed by: PSYCHIATRY & NEUROLOGY

## 2024-12-09 PROCEDURE — 77063 BREAST TOMOSYNTHESIS BI: CPT | Mod: 26,HCNC,, | Performed by: RADIOLOGY

## 2024-12-09 PROCEDURE — 99999 PR PBB SHADOW E&M-EST. PATIENT-LVL III: CPT | Mod: PBBFAC,HCNC,, | Performed by: PSYCHIATRY & NEUROLOGY

## 2024-12-09 PROCEDURE — 3075F SYST BP GE 130 - 139MM HG: CPT | Mod: CPTII,S$GLB,, | Performed by: PSYCHIATRY & NEUROLOGY

## 2024-12-09 PROCEDURE — 1126F AMNT PAIN NOTED NONE PRSNT: CPT | Mod: CPTII,S$GLB,, | Performed by: PSYCHIATRY & NEUROLOGY

## 2024-12-09 PROCEDURE — 99214 OFFICE O/P EST MOD 30 MIN: CPT | Mod: S$GLB,,, | Performed by: PSYCHIATRY & NEUROLOGY

## 2024-12-09 PROCEDURE — 3079F DIAST BP 80-89 MM HG: CPT | Mod: CPTII,S$GLB,, | Performed by: PSYCHIATRY & NEUROLOGY

## 2024-12-09 PROCEDURE — 77067 SCR MAMMO BI INCL CAD: CPT | Mod: TC,HCNC

## 2024-12-09 PROCEDURE — 3288F FALL RISK ASSESSMENT DOCD: CPT | Mod: CPTII,S$GLB,, | Performed by: PSYCHIATRY & NEUROLOGY

## 2024-12-09 PROCEDURE — 1101F PT FALLS ASSESS-DOCD LE1/YR: CPT | Mod: CPTII,S$GLB,, | Performed by: PSYCHIATRY & NEUROLOGY

## 2024-12-09 RX ORDER — FLUTICASONE PROPIONATE 50 MCG
SPRAY, SUSPENSION (ML) NASAL
Qty: 48 G | Refills: 3 | Status: SHIPPED | OUTPATIENT
Start: 2024-12-09

## 2024-12-09 NOTE — PROGRESS NOTES
12/8/2024     5:41 PM 4/24/2024     9:50 AM 10/5/2022     9:52 AM 10/26/2021     1:31 PM 4/14/2021    10:00 AM 9/15/2020     1:24 PM 10/23/2019    10:22 AM   EPWORTH SLEEPINESS SCALE TOTAL SCORE    Total score 7  5 4 5 6 8 6       Patient-reported       Maryann Sorenson is a 83 y.o. female seen today for RLS follow up. Last seen on 4/24/2024.  Taking Gabapentin 300 mg - 3 times a day  Mirapex -0.125 mg - at bedtime - keeps it down to 1 pill.    H&H was borderline again - did not check Ferritin  Will try to increase iron reach foods - was constipated with supplement (even organic salts constipating)    Not taking any sleep aid.    Still taking Magnesium 250 mg - also for constipation.    Wearing CPAP (DS2) compliant        The patient denies improved sleep continuity and daytime sleepiness on PAP. ESS today is 8/24.  Sleeps alone - not sure of break through snoring.  No  dry mouth.  No aerophagia or air hunger. Denies occasional mask leaks and discomfort. Using a Dreamwear Medium under the nose  mask    DME: [facility]; got DS2 through [employer] in 2022  APAP 10-18    Patient ID: [id number] Maryann Sorenson Compliance Summary 9/9/2024 - 12/7/2024 (90 days) Days with Device Usage 90 days Days without Device Usage 0 days Percent Days with Device Usage 100.0% Cumulative Usage 28 days 5 hrs. 8 mins. 8 secs. Maximum Usage (1 Day) 8 hrs. 36 mins. 17 secs. Average Usage (All Days) 7 hrs. 31 mins. 25 secs. Average Usage (Days Used) 7 hrs. 31 mins. 25 secs. Minimum Usage (1 Day) 6 hrs. 2 mins. 13 secs. Percent of Days with Usage >= 4 Hours 100.0% Percent of Days with Usage < 4 Hours 0.0% Date Range Total Blower Time 28 days 5 hrs. 19 mins. 51 secs. Average AHI 5.9 Auto-CPAP Summary Auto-CPAP Mean Pressure 10.9 cmH2O Auto-CPAP Peak Average Pressure 14.9 cmH2O Device Pressure <= 90% of Time 12.7 cmH2O Average Time in Large Leak Per Day 1 secs. Device Settings as of 12/7/2024 WorkablesPAP - A-Flex Device Settings Device Mode Parameter  "Value Min Pressure 10 cmH2O Max Pressure 18 cmH2O A-Flex Setting 3 Auto Off Off Auto On On Ramp+ Time 15 minutes Ramp+ Start Pressure 9.0 cmH2O Mask Resistance X1 Tubing Type 15 Opti-Start On EZ-Start Disabled Patient Controls Access On Patient Data Access On Tube Temperature Off Humidifier        INTERVAL HISTORY:    4/24/2024:  Maryann Sorenson is a 83 y.o. female seen today for NOE and RLS  follow up. Last seen on 8/9/2023  Had pacemaker since last vsiti for vasovagal episodes; had to sleep in a ling for 6 weeks on her back.  Got used to DS2 APAP - using compliantly.  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    RLS  control is better since she moved Gabapentin from mid day to 1 hr before bed and at bedtime  (with Mirapex)- and still 1 dose in the morning for LS radiculopathy.      Reports difficulty falling asleep - "hard to shut her mind off".     Going to bed"late" when she is ready   11-11:30; can fall asleep in 5 min, or it can take hours - gets out of bed then to watch TV.   Once she falls asleep she does not sleep through - usually waking up 1-2 times through the night - to go to the bathroom - usually can return to sleep. Gets up at 6:30-7:30    Overall good sleep hygiene. Avoids electronics when not sleep and no caffeine in the afternoon. No naps. Has not tried meditations in the sleep section of Insight timer yet.  Gets of bed when unable to sleep.     DS2: likes her Dresmwear1 mask (did not like Wisp)  Patient ID: [id number] Maryann Sorenson Compliance Summary 1/24/2024 - 4/22/2024 (90 days) Days with Device Usage 90 days Days without Device Usage 0 days Percent Days with Device Usage 100.0% Cumulative Usage 28 days 13 hrs. 36 mins. 24 secs. Maximum Usage (1 Day) 9 hrs. 15 mins. 53 secs. Average Usage (All Days) 7 hrs. 37 mins. 4 secs. Average Usage (Days Used) 7 hrs. 37 mins. 4 secs. Minimum Usage (1 Day) 4 hrs. 1 mins. 43 secs. Percent of Days with " "Usage >= 4 Hours 100.0% Percent of Days with Usage < 4 Hours 0.0% Date Range Total Blower Time 28 days 14 hrs. 4 mins. 2 secs. Average AHI 8.9 Auto-CPAP Summary Auto-CPAP Mean Pressure 11.2 cmH2O Auto-CPAP Peak Average Pressure 16.7 cmH2O Device Pressure <= 90% of Time 13.9 cmH2O Average Time in Large Leak Per Day 23 secs. Device Settings as of 4/22/2024 AutoCPAP - A-Flex Device Settings Device Mode Parameter Value Min Pressure 10 cmH2O Max Pressure 18 cmH2O A-Flex Setting 3 Auto Off Off Auto On On Ramp+ Time 15 minutes Ramp+ Start Pressure 9.0 cmH2O Mask Resistance X1 Tubing Type 15 Opti-Start On EZ-Start Disabled Patient Controls Access On Patient Data Access On Tube Temperature Off Humidifier 5 Printed By: Care  Version: 1.51.0 Page 13 of 13 This report is only one of several elements to consider when evaluating therapy effectiveness and is not a substitute for diagnostic     \    Medications pertinent to sleep disorders: *Gabapentin AM, 1 hr before bed and when in bed; and Mirapex 0.125 at bedtime. Also taking Vit D, C, Beny, folic acid, Ca, CoQ10, Magnesium 250 mg.   Previously tried medications:[facility] - insurance stopped  approving     DME: [facility]  SLEEP STUDIES:       PSG 8/25/16: Significant Obstructive sleep apnea (NOE) with AHI (apnea hypopnea Index) of 8.9 and SaO2 of 87% (weight  161 lbs).RDI 15.      Using [facility]:       ASSESSMENT:    1. NOE - moderate by RDI. Poor sleep efficiency on the nioght of the study could have led to underestimation of NOE severity.  The patient symptomatically has  excessive daytime sleepiness, snoring,  witnessed breathing pauses, excessive daytime fatigue, gasping for air in sleep and interrupted sleep  with exam findings of "a crowded oral airway and elevated body mass index. The patient has medical co-morbidities of CAD and hyperlipidemia,  which can be worsened by NOE. Benefiting from CPAP use in terms of sleep continuity and daytime sleepiness. " Complaint.           2. Interrupted, nonrefreshing  sleep and residual fatigue             3. RLS -   Using Gabapentin  (1 pill in AM, 1 pill 1 hr befopre bed and one at bedtime) for neuropathic pain related to spinal stenosi  + Mirapex 0.125 mg ionce a day - currently stable. Needs to keep ciorrecting her iron.      PLAN:    Continue APAP at current settings - although AHI was elevated in February, now normalized; could be related to prior positional changes due to pacemaker placement in February; currently can sleep again in her normal position    Continue  Gabapentin  TID with Mirapex at bedtime and 1 hour prior to bed.      Continue Magnesium. 250 mg   Continue consuming iron-rich foods and MVT with iron (for RLS)   Continue Flonase      ----------------------------------------------------      Education: During our discussion today, we talked about the etiology of obstructive sleep apnea as well as the potential ramifications of untreated sleep apnea, which could include daytime sleepiness, hypertension, heart disease and/or stroke.      We discussed potential treatment options, which could include weight loss, body positioning, continuous positive airway pressure (CPAP), or referral for surgical consideration. The patient preferred CPAP option.     Discussed purpose of PAP therapy, health benefits of CPAP, as well as the potential ramifications of untreated sleep apnea, which could include daytime sleepiness, hypertension, heart disease and/or stroke. An AHI of 15 is associated with increased risk CVD.     The patient should avoid ETOH and sedatives at night, as it tends to aggravate NOE. Regular replacement of CPAP mask, tubing and filter was recommended.    Precautions: The patient was advised to abstain from driving should he feel sleepy or drowsy.    Follow up: MD in 6 months    Thank you for allowing me the opportunity to participate in the care of your patient.    More than 50% of this 46 min visit were  spent in counseling and care coordination.       ESS (B[place] Sleepiness Scale) and other sleep medicine related questionnaires were reviewed with the patient.

## 2024-12-30 ENCOUNTER — HOSPITAL ENCOUNTER (OUTPATIENT)
Dept: CARDIOLOGY | Facility: HOSPITAL | Age: 84
Discharge: HOME | End: 2024-12-30
Attending: INTERNAL MEDICINE
Payer: MEDICARE

## 2024-12-30 VITALS
RESPIRATION RATE: 16 BRPM | BODY MASS INDEX: 31.36 KG/M2 | HEIGHT: 63 IN | HEART RATE: 62 BPM | WEIGHT: 177 LBS | SYSTOLIC BLOOD PRESSURE: 107 MMHG | DIASTOLIC BLOOD PRESSURE: 53 MMHG

## 2024-12-30 DIAGNOSIS — I20.89 ANGINAL EQUIVALENT: ICD-10-CM

## 2024-12-30 DIAGNOSIS — R07.2 PRECORDIAL CHEST PAIN: ICD-10-CM

## 2024-12-30 LAB
CFR FLOW - ANTERIOR: 1.8
CFR FLOW - INFERIOR: 1.92
CFR FLOW - LATERAL: 1.89
CFR FLOW - MAX: 2.33
CFR FLOW - MIN: 1.4
CFR FLOW - SEPTAL: 2.03
CFR FLOW - WHOLE HEART: 1.91
CV STRESS BASE HR: 67 BPM
DIASTOLIC BLOOD PRESSURE: 66 MMHG
EJECTION FRACTION- HIGH: 59 %
END DIASTOLIC INDEX-HIGH: 155 ML/M2
END DIASTOLIC INDEX-LOW: 91 ML/M2
END SYSTOLIC INDEX-HIGH: 78 ML/M2
END SYSTOLIC INDEX-LOW: 40 ML/M2
NUC REST DIASTOLIC VOLUME INDEX: 68
NUC REST EJECTION FRACTION: 75
NUC REST SYSTOLIC VOLUME INDEX: 17
NUC STRESS DIASTOLIC VOLUME INDEX: 81
NUC STRESS EJECTION FRACTION: 86 %
NUC STRESS SYSTOLIC VOLUME INDEX: 12
OHS CV CPX 1 MINUTE RECOVERY HEART RATE: 74 BPM
OHS CV CPX 85 PERCENT MAX PREDICTED HEART RATE MALE: 117
OHS CV CPX MAX PREDICTED HEART RATE: 138
OHS CV CPX PATIENT IS FEMALE: 1
OHS CV CPX PATIENT IS MALE: 0
OHS CV CPX PEAK DIASTOLIC BLOOD PRESSURE: 56 MMHG
OHS CV CPX PEAK HEAR RATE: 60 BPM
OHS CV CPX PEAK RATE PRESSURE PRODUCT: 7140
OHS CV CPX PEAK SYSTOLIC BLOOD PRESSURE: 119 MMHG
OHS CV CPX PERCENT MAX PREDICTED HEART RATE ACHIEVED: 45
OHS CV CPX RATE PRESSURE PRODUCT PRESENTING: 7973
OHS CV INITIAL DOSE: 25.2 MCG/KG/MIN
OHS CV MODERATELY REDUCED FLOW CAPACITY: 7 %
OHS CV MYOCARDIAL STEAL: 0 %
OHS CV NO ISCHEMIA MILDLY REDUCED FLOW CAPACTY: 93 %
OHS CV NO ISCHEMIA MINIMALLY REDUCED FLOW CAPACITY: 0 %
OHS CV NON-TRANSMURAL MYOCARDIAL INFARCTION SINGLE CONTINUOUS REGION: 0 %
OHS CV NORMAL FLOW CAPACITY COMPARABLE TO HEALTHY YOUNG VOLUNTEERS: 0 %
OHS CV PEAK DOSE: 24.7 MCG/KG/MIN
OHS CV PET ID: 7911
OHS CV PRE-DOMINANTLY MYOCARDIAL SCAR: 0 %
OHS CV SEVERELY REDUCED FLOW CAPACITY LARGEST SINGLE CONTINUOUS REGION: 0 %
OHS CV SEVERELY REDUCED FLOW CAPACITY: 0 %
OHS CV TOTAL EXAM DLP: 439.79 MGY-CM
REST FLOW - ANTERIOR: 0.55 CC/MIN/G
REST FLOW - INFERIOR: 0.43 CC/MIN/G
REST FLOW - LATERAL: 0.47 CC/MIN/G
REST FLOW - MAX: 0.75 CC/MIN/G
REST FLOW - MIN: 0.31 CC/MIN/G
REST FLOW - SEPTAL: 0.42 CC/MIN/G
REST FLOW - WHOLE HEART: 0.47 CC/MIN/G
RETIRED EF AND QEF - SEE NOTES: 47 %
STRESS FLOW - ANTERIOR: 0.97 CC/MIN/G
STRESS FLOW - INFERIOR: 0.82 CC/MIN/G
STRESS FLOW - LATERAL: 0.87 CC/MIN/G
STRESS FLOW - MAX: 1.17 CC/MIN/G
STRESS FLOW - MIN: 0.56 CC/MIN/G
STRESS FLOW - SEPTAL: 0.86 CC/MIN/G
STRESS FLOW - WHOLE HEART: 0.88 CC/MIN/G
SYSTOLIC BLOOD PRESSURE: 119 MMHG

## 2024-12-30 PROCEDURE — A9555 RB82 RUBIDIUM: HCPCS | Mod: HCNC | Performed by: INTERNAL MEDICINE

## 2024-12-30 PROCEDURE — 93017 CV STRESS TEST TRACING ONLY: CPT | Mod: HCNC

## 2024-12-30 PROCEDURE — 78434 AQMBF PET REST & RX STRESS: CPT | Mod: 26,HCNC,, | Performed by: INTERNAL MEDICINE

## 2024-12-30 PROCEDURE — 93016 CV STRESS TEST SUPVJ ONLY: CPT | Mod: HCNC,,, | Performed by: INTERNAL MEDICINE

## 2024-12-30 PROCEDURE — 63600175 PHARM REV CODE 636 W HCPCS: Mod: HCNC | Performed by: INTERNAL MEDICINE

## 2024-12-30 PROCEDURE — 93018 CV STRESS TEST I&R ONLY: CPT | Mod: HCNC,,, | Performed by: INTERNAL MEDICINE

## 2024-12-30 PROCEDURE — 78431 MYOCRD IMG PET RST&STRS CT: CPT | Mod: 26,HCNC,, | Performed by: INTERNAL MEDICINE

## 2024-12-30 RX ORDER — AMINOPHYLLINE 25 MG/ML
75 INJECTION, SOLUTION INTRAVENOUS
Status: COMPLETED | OUTPATIENT
Start: 2024-12-30 | End: 2024-12-30

## 2024-12-30 RX ORDER — REGADENOSON 0.08 MG/ML
0.4 INJECTION, SOLUTION INTRAVENOUS
Status: COMPLETED | OUTPATIENT
Start: 2024-12-30 | End: 2024-12-30

## 2024-12-30 RX ADMIN — AMINOPHYLLINE 75 MG: 25 INJECTION, SOLUTION INTRAVENOUS at 12:12

## 2024-12-30 RX ADMIN — RUBIDIUM CHLORIDE RB-82 25.2 MILLICURIE: 150 INJECTION, SOLUTION INTRAVENOUS at 12:12

## 2024-12-30 RX ADMIN — RUBIDIUM CHLORIDE RB-82 24.7 MILLICURIE: 150 INJECTION, SOLUTION INTRAVENOUS at 12:12

## 2024-12-30 RX ADMIN — REGADENOSON 0.4 MG: 0.08 INJECTION, SOLUTION INTRAVENOUS at 12:12

## 2025-01-07 ENCOUNTER — LAB VISIT (OUTPATIENT)
Dept: LAB | Facility: HOSPITAL | Age: 85
End: 2025-01-07
Attending: INTERNAL MEDICINE
Payer: MEDICARE

## 2025-01-07 ENCOUNTER — OFFICE VISIT (OUTPATIENT)
Dept: ENDOCRINOLOGY | Facility: CLINIC | Age: 85
End: 2025-01-07
Payer: MEDICARE

## 2025-01-07 VITALS
SYSTOLIC BLOOD PRESSURE: 125 MMHG | BODY MASS INDEX: 31.83 KG/M2 | HEART RATE: 60 BPM | OXYGEN SATURATION: 98 % | WEIGHT: 179.69 LBS | DIASTOLIC BLOOD PRESSURE: 76 MMHG

## 2025-01-07 DIAGNOSIS — M81.0 AGE-RELATED OSTEOPOROSIS WITHOUT CURRENT PATHOLOGICAL FRACTURE: ICD-10-CM

## 2025-01-07 DIAGNOSIS — E04.2 MULTIPLE THYROID NODULES: ICD-10-CM

## 2025-01-07 DIAGNOSIS — E04.1 LEFT THYROID NODULE: Primary | ICD-10-CM

## 2025-01-07 DIAGNOSIS — M81.0 AGE-RELATED OSTEOPOROSIS WITHOUT CURRENT PATHOLOGICAL FRACTURE: Primary | ICD-10-CM

## 2025-01-07 LAB
25(OH)D3+25(OH)D2 SERPL-MCNC: 61 NG/ML (ref 30–96)
ALBUMIN SERPL BCP-MCNC: 4.2 G/DL (ref 3.5–5.2)
ANION GAP SERPL CALC-SCNC: 9 MMOL/L (ref 8–16)
BUN SERPL-MCNC: 26 MG/DL (ref 8–23)
CALCIUM SERPL-MCNC: 9.1 MG/DL (ref 8.7–10.5)
CHLORIDE SERPL-SCNC: 101 MMOL/L (ref 95–110)
CO2 SERPL-SCNC: 24 MMOL/L (ref 23–29)
CREAT SERPL-MCNC: 0.9 MG/DL (ref 0.5–1.4)
EST. GFR  (NO RACE VARIABLE): >60 ML/MIN/1.73 M^2
GLUCOSE SERPL-MCNC: 96 MG/DL (ref 70–110)
PHOSPHATE SERPL-MCNC: 4.4 MG/DL (ref 2.7–4.5)
POTASSIUM SERPL-SCNC: 4.3 MMOL/L (ref 3.5–5.1)
PTH-INTACT SERPL-MCNC: 89.4 PG/ML (ref 9–77)
SODIUM SERPL-SCNC: 134 MMOL/L (ref 136–145)

## 2025-01-07 PROCEDURE — 1160F RVW MEDS BY RX/DR IN RCRD: CPT | Mod: CPTII,S$GLB,, | Performed by: INTERNAL MEDICINE

## 2025-01-07 PROCEDURE — 80069 RENAL FUNCTION PANEL: CPT | Performed by: INTERNAL MEDICINE

## 2025-01-07 PROCEDURE — 99204 OFFICE O/P NEW MOD 45 MIN: CPT | Mod: S$GLB,,, | Performed by: INTERNAL MEDICINE

## 2025-01-07 PROCEDURE — 3288F FALL RISK ASSESSMENT DOCD: CPT | Mod: CPTII,S$GLB,, | Performed by: INTERNAL MEDICINE

## 2025-01-07 PROCEDURE — 83970 ASSAY OF PARATHORMONE: CPT | Performed by: INTERNAL MEDICINE

## 2025-01-07 PROCEDURE — 1126F AMNT PAIN NOTED NONE PRSNT: CPT | Mod: CPTII,S$GLB,, | Performed by: INTERNAL MEDICINE

## 2025-01-07 PROCEDURE — 3078F DIAST BP <80 MM HG: CPT | Mod: CPTII,S$GLB,, | Performed by: INTERNAL MEDICINE

## 2025-01-07 PROCEDURE — 36415 COLL VENOUS BLD VENIPUNCTURE: CPT | Performed by: INTERNAL MEDICINE

## 2025-01-07 PROCEDURE — 3074F SYST BP LT 130 MM HG: CPT | Mod: CPTII,S$GLB,, | Performed by: INTERNAL MEDICINE

## 2025-01-07 PROCEDURE — 1101F PT FALLS ASSESS-DOCD LE1/YR: CPT | Mod: CPTII,S$GLB,, | Performed by: INTERNAL MEDICINE

## 2025-01-07 PROCEDURE — 1159F MED LIST DOCD IN RCRD: CPT | Mod: CPTII,S$GLB,, | Performed by: INTERNAL MEDICINE

## 2025-01-07 PROCEDURE — 99999 PR PBB SHADOW E&M-EST. PATIENT-LVL III: CPT | Mod: PBBFAC,,, | Performed by: INTERNAL MEDICINE

## 2025-01-07 PROCEDURE — 82306 VITAMIN D 25 HYDROXY: CPT | Performed by: INTERNAL MEDICINE

## 2025-01-07 NOTE — ASSESSMENT & PLAN NOTE
H/o multiple thyroid nodules.  Last ultrasound thyroid in 2020 showed stable thyroid nodules.  Thyroid scan in 2019 showed possible hypofunctioning nodule, may correspond to 1.2 cm nodule on the left.  Denies any compressive neck symptoms.  Recent thyroid function test was normal.  Repeat ultrasound.

## 2025-01-07 NOTE — PATIENT INSTRUCTIONS
Recommend starting Reclast once a year infusion    Take calcium total 1200 mg a day from diet and supplements.  Continue your vitamin D daily  Avoid bending forwards at the waist and deep twisting  Continue weight bearing exercises like walking and do light weights  Take fall precautions  Call if you have any new fractures   Get regular dental visit and let your dentist know that you are on osteoporosis medication      Please see links below for further information:    Exercise/safe movement:  [email or web address]    Calcium content of common foods:  [email or web address]        RECLAST    DOSAGE AND ADMINISTRATION  Treatment of Osteoporosis in Postmenopausal Women, Men and reatment and Prevention of Glucocorticoid-Induced Osteoporosis  The recommended regimen is a 5 mg infusion once a year given intravenously over no less than 15 minutes.    Patients must be appropriately hydrated prior to administration of Reclast. You should drink 2 glasses of fluid at least one hour before receiving your infusion.   You may eat normally before your infusion.   You should not take Zolendronic acid if you are pregnant, breast feeding, have kidney problems, or have low blood calcium.  Administration of acetaminophen following Reclast administration may reduce the incidence of acute-phase reaction symptoms.    Please follow-up for routine oral examination prior to initiation of Reclast treatment if you have not seen a dentist more than a year.    Calcium and Vitamin D Supplementation  Take supplemental calcium and vitamin D if their dietary intake is inadequate. An average of at least 1200 mg calcium and 800-1000 international units vitamin D daily is recommended.    Reclast is contraindicated in patients with the following conditions:  - Hypocalcemia   - Creatinine clearance less than 35 mL/min and in those with evidence of  acute renal impairment due to an increased risk of renal failure   - Known hypersensitivity to zoledronic acid or any components of Reclast. Hypersensitivity reactions, including urticaria, angioedema, and anaphylactic reaction/shock have been reported     Acute Phase Reaction  The signs and symptoms of acute phase reaction occurred Reclast infusion, including fever (18%), myalgia (9%), flu-like symptoms (8%), headache (7%), and arthralgia (7%). The majority of these symptoms occurred within the first 3 days following the dose of Reclast and usually resolved within 3 days of onset but resolution could take up to 7-14 days.     Osteonecrosis of the Jaw  Osteonecrosis of the jaw (ONJ) has been reported in patients treated with bisphosphonates, including zoledronic acid. Most cases have been in cancer patients treated with intravenous bisphosphonates undergoing dental procedures. Some cases have occurred in patients with postmenopausal osteoporosis treated with either oral or intravenous bisphosphonates.    Atypical Subtrochanteric and Diaphyseal Femoral Fractures  Atypical, low-energy, or low trauma fractures of the femoral shaft have been reported in bisphosphonate-treated patients.     Musculoskeletal Pain  Severe and occasionally incapacitating bone, joint, and/or muscle pain have been infrequently reported in patients taking bisphosphonates, including Reclast. The time to onset of symptoms varied from one day to several months after starting the drug.    Patients With Asthma  There have been reports of bronchoconstriction in aspirin-sensitive patients receiving bisphosphonates.     Injection-Site Reactions  Local reactions at the infusion site, such as itching, redness and/or pain have been reported.      Atrial Fibrillation    Ocular Adverse Events  Cases of iritis/uveitis/episcleritis/conjunctivitis have been reported in patients treated with bisphosphonates, including zoledronic acid.

## 2025-01-07 NOTE — ASSESSMENT & PLAN NOTE
Osteoporosis based on her T-scores.  Has history of left wrist fracture in 2014.  Patient had discussed Fosamax with her PCP but was concerned due to her history of GERD/hiatal hernia.  She is interested in parenteral treatment.  Discussed doing Reclast once a year infusion.  Printed handout given to the patient.    Discussed treatment up to 3 years if BMD stable and no fragility fractures    Discussed her risk of fractures and benefits of osteoporosis treatment  Side effects including osteonecrosis of the jaw and atypical femoral fractures discussed with patient    Encouraged safe movements and fall precautions  Continue routine dental visits.    Advised patient to follow-up with her dentist if planning on tooth extraction prior to her Reclast infusion.  Instructed on Calcium and vitamin D   Labs ordered.

## 2025-01-07 NOTE — PROGRESS NOTES
ENDOCRINOLOGY CLINIC PATIENT NOTE    Subjective:      Patient ID: Maryann Sorenson is referred by Ira Smith     Chief Complaint:  Osteoporosis    HPI:   Maryann Sorenson is a 84 y.o. female who presents for evaluation of osteoporosis.    Patient has history of osteoporosis based on FRAX score since at least 2011 on her DXA scan.    T scores   Year Lumbar Spine Hip Femoral neck   2021 -1.5  -2.7       BMD: T-score and % change  Year Lumbar Spine ch vs prior Hip L Femoral neck   06/2024 -0.8  + 0.7  -2.0 -2.7, unchanged.        Lab Results   Component Value Date    CALCIUM 10.0 12/03/2024    ALBUMIN 4.2 12/03/2024    CREATININE 0.9 12/03/2024    ALKPHOS 89 12/03/2024    WQRBLZXG42SZ 60 08/22/2022    TSH 3.410 09/10/2024    EGFRNORACEVR >60.0 12/03/2024        Current treatment: None    Previous Treatment: None    History of previous fracture: Left wrist Fx after tripping on loose gravel in 2014 s/p fixation.   Parent with fractured hip: No  Smoking status: No  Glucocorticoid use: No  History of RA: No  Alcohol 3 or more/day: No  Nephrolithiasis: No  Diabetes: No.  Has prediabetes.  Frequent falls: No, uses cane for balance - lower back issues. Prior PT.   Loss of height: Not sure how much - maybe an inch.   Menopause: 46 yo, took HRT for 15 years.   Afib: Yes paroxysmal AFib  CKD: No  Cancer/XRT: No      Dental visits:  Every 6 months. Currently has issues with crown for 1 year, may need extraction. Sore in the gum - took antibiotics.     GERD: Yes, on PPI. Occasional pepcid.   PUD: No  Hiatal hernia: Yes      Supplements:   Ca: with vitamin D once daily. Yogurt and cheese in salad daily, occasional milk   VitD: MVI, vitamin D daily    Exercise: Not very active    Family history:   Osteoporosis: Sister is on infusion.       Thyroid nodules    No prior FNA biopsy.     07/15/2020     Impression:     1. No significant interval change in the appearance of the thyroid.  There is a 10 mm subtle nodule in or adjacent  to the posterior aspect of the midpole of the right lobe of the thyroid. On the prior examination it measured 10 mm.  There is a 3 mm hypoechoic nodule in the superior pole of the left lobe of the thyroid. There is a 4 mm hypoechoic nodule in the inferior pole of the left lobe of the thyroid. There is an 11 mm heterogeneous nodule in the midpole of the left lobe of the thyroid that has a mild amount of calcification associated with it. It has a moderate amount of vascularity associated with it. On the prior examination it measured 12 mm.  2. There are several normal appearing lymph nodes on the left side of the neck. One of the larger ones has a short axis measurement of 4 mm.      NM thyroid 10/2019    FINDINGS:  The 24 hour uptake is normal at 18.7 % (normal 10-30%).     Left lobe of the thyroid is normal in size and location with subtle scalloping along the lateral aspect of the mid lobe which could represent underlying hyperfunctioning nodule.  Otherwise, there is homogeneous uptake.     Right lobe of thyroid is normal in size and location with increased uptake in the mid aspect of the right lobe.     There is a probable pyramidal lobe.     Impression:     Subtle scalloping along the lateral aspect of the mid left lobe which could represent underlying hypofunctioning nodule and may correspond with 1.2 cm nodule on recent ultrasound.     Normal 24 radioiodine thyroid uptake with probable pyramidal lobe.         ROS: see HPI     Objective:     Physical Exam     /76 (BP Location: Right arm, Patient Position: Sitting)   Pulse 60   Wt 81.5 kg (179 lb 10.8 oz)   LMP  (LMP Unknown)   SpO2 98%   BMI 31.83 kg/m²     Wt Readings from Last 3 Encounters:   01/07/25 81.5 kg (179 lb 10.8 oz)   12/30/24 80.3 kg (177 lb)   12/09/24 80.5 kg (177 lb 8 oz)       Constitutional:  Pleasant, in no acute distress.   HENT:   Eyes:    No scleral icterus.   Cardiovascular:  Normal rate  Respiratory:   Effort normal    Neurological:  Normal speech      LABORATORY REVIEW:    Chemistry        Component Value Date/Time     12/03/2024 1228    K 4.5 12/03/2024 1228     12/03/2024 1228    CO2 24 12/03/2024 1228    BUN 23 12/03/2024 1228    CREATININE 0.9 12/03/2024 1228     12/03/2024 1228        Component Value Date/Time    CALCIUM 10.0 12/03/2024 1228    ALKPHOS 89 12/03/2024 1228    AST 32 12/03/2024 1228    ALT 18 12/03/2024 1228    BILITOT 0.5 12/03/2024 1228    ESTGFRAFRICA >60.0 04/26/2022 0723    EGFRNONAA >60.0 04/26/2022 0723          Lab Results   Component Value Date    HGBA1C 6.0 (H) 09/10/2024    HGBA1C 6.3 (H) 06/05/2024    HGBA1C 6.6 (H) 02/14/2024         Assessment/Plan:       1. Age-related osteoporosis without current pathological fracture  Assessment & Plan:    Osteoporosis based on her T-scores.  Has history of left wrist fracture in 2014.  Patient had discussed Fosamax with her PCP but was concerned due to her history of GERD/hiatal hernia.  She is interested in parenteral treatment.  Discussed doing Reclast once a year infusion.  Printed handout given to the patient.    Discussed treatment up to 3 years if BMD stable and no fragility fractures    Discussed her risk of fractures and benefits of osteoporosis treatment  Side effects including osteonecrosis of the jaw and atypical femoral fractures discussed with patient    Encouraged safe movements and fall precautions  Continue routine dental visits.    Advised patient to follow-up with her dentist if planning on tooth extraction prior to her Reclast infusion.  Instructed on Calcium and vitamin D   Labs ordered.         Orders:  -     Ambulatory referral/consult to Endocrinology    2. Multiple thyroid nodules  Assessment & Plan:    H/o multiple thyroid nodules.  Last ultrasound thyroid in 2020 showed stable thyroid nodules.  Thyroid scan in 2019 showed possible hypofunctioning nodule, may correspond to 1.2 cm nodule on the left.  Denies any  compressive neck symptoms.  Recent thyroid function test was normal.  Repeat ultrasound.             Farnaz Tiwari MD

## 2025-01-08 ENCOUNTER — PATIENT MESSAGE (OUTPATIENT)
Dept: ENDOCRINOLOGY | Facility: CLINIC | Age: 85
End: 2025-01-08
Payer: MEDICARE

## 2025-01-08 ENCOUNTER — PATIENT MESSAGE (OUTPATIENT)
Dept: SLEEP MEDICINE | Facility: CLINIC | Age: 85
End: 2025-01-08
Payer: MEDICARE

## 2025-01-08 DIAGNOSIS — G25.81 RLS (RESTLESS LEGS SYNDROME): Primary | ICD-10-CM

## 2025-01-08 RX ORDER — PRAMIPEXOLE DIHYDROCHLORIDE 0.12 MG/1
TABLET ORAL
Qty: 90 TABLET | Refills: 0 | Status: SHIPPED | OUTPATIENT
Start: 2025-01-08

## 2025-01-09 ENCOUNTER — PATIENT MESSAGE (OUTPATIENT)
Dept: ENDOCRINOLOGY | Facility: CLINIC | Age: 85
End: 2025-01-09
Payer: MEDICARE

## 2025-01-10 ENCOUNTER — LAB VISIT (OUTPATIENT)
Dept: LAB | Facility: HOSPITAL | Age: 85
End: 2025-01-10
Attending: INTERNAL MEDICINE
Payer: MEDICARE

## 2025-01-10 DIAGNOSIS — M81.0 AGE-RELATED OSTEOPOROSIS WITHOUT CURRENT PATHOLOGICAL FRACTURE: ICD-10-CM

## 2025-01-10 LAB
CALCIUM 24H UR-MRATE: NORMAL MG/HR (ref 4–12)
CALCIUM UR-MCNC: <2 MG/DL (ref 0–15)
CALCIUM URINE (MG/SPEC): NORMAL MG/SPEC
CREAT 24H UR-MRATE: 31.9 MG/HR (ref 40–75)
CREAT UR-MCNC: 30 MG/DL (ref 15–325)
CREATININE, URINE (MG/SPEC): 765 MG/SPEC
URINE COLLECTION DURATION: 24 HR
URINE COLLECTION DURATION: 24 HR
URINE VOLUME: 2550 ML
URINE VOLUME: 2550 ML

## 2025-01-10 PROCEDURE — 82340 ASSAY OF CALCIUM IN URINE: CPT | Mod: PN | Performed by: INTERNAL MEDICINE

## 2025-01-10 PROCEDURE — 82570 ASSAY OF URINE CREATININE: CPT | Mod: PN | Performed by: INTERNAL MEDICINE

## 2025-01-17 ENCOUNTER — TELEPHONE (OUTPATIENT)
Dept: INFECTIOUS DISEASES | Facility: HOSPITAL | Age: 85
End: 2025-01-17
Payer: MEDICARE

## 2025-01-17 NOTE — TELEPHONE ENCOUNTER
Attempted to call to schedule Reclast infusion. No answer, left  with call back number of [phone].

## 2025-01-24 ENCOUNTER — PATIENT MESSAGE (OUTPATIENT)
Dept: INFECTIOUS DISEASES | Facility: HOSPITAL | Age: 85
End: 2025-01-24
Payer: MEDICARE

## 2025-01-30 ENCOUNTER — HOSPITAL ENCOUNTER (OUTPATIENT)
Dept: ENDOCRINOLOGY | Facility: CLINIC | Age: 85
Discharge: HOME | End: 2025-01-30
Attending: INTERNAL MEDICINE
Payer: MEDICARE

## 2025-01-30 ENCOUNTER — TELEPHONE (OUTPATIENT)
Dept: ENDOCRINOLOGY | Facility: CLINIC | Age: 85
End: 2025-01-30
Payer: MEDICARE

## 2025-01-30 ENCOUNTER — PATIENT MESSAGE (OUTPATIENT)
Dept: ENDOCRINOLOGY | Facility: CLINIC | Age: 85
End: 2025-01-30
Payer: MEDICARE

## 2025-01-30 DIAGNOSIS — E04.1 LEFT THYROID NODULE: ICD-10-CM

## 2025-01-30 PROCEDURE — 76536 US EXAM OF HEAD AND NECK: CPT | Mod: HCNC,S$GLB,, | Performed by: INTERNAL MEDICINE

## 2025-01-30 RX ORDER — FLECAINIDE ACETATE 100 MG/1
100 TABLET ORAL EVERY 12 HOURS
Qty: 180 TABLET | Refills: 3 | Status: SHIPPED | OUTPATIENT
Start: 2025-01-30 | End: 2026-01-30

## 2025-01-30 RX ORDER — FLECAINIDE ACETATE 100 MG/1
100 TABLET ORAL EVERY 12 HOURS
Qty: 180 TABLET | Refills: 3 | Status: SHIPPED | OUTPATIENT
Start: 2025-01-30 | End: 2025-01-30 | Stop reason: SDUPTHER

## 2025-01-30 NOTE — TELEPHONE ENCOUNTER
----- Message from Suzanne sent at 1/30/2025  9:18 AM CST -----  Type:  RX Refill Request    Who Called: Pt   Refill or New Rx: Refill   RX Name and Strength:flecainide (TAMBOCOR) 100 MG Tab   Preferred Pharmacy with phone number:[facility] - [facility] [address]  [address] [facility] [address] [facility] [address]  Phone: [phone]   Local or Mail Order: Mail   Ordering Provider: Linnette   Would the patient rather a call back or a response via [facility]? Call   Best Call Back Number:[phone]   Additional Information: previous  pt

## 2025-01-30 NOTE — TELEPHONE ENCOUNTER
----- Message from Suzanne sent at 1/30/2025  9:18 AM CST -----  Type:  RX Refill Request    Who Called: Pt   Refill or New Rx: Refill   RX Name and Strength:flecainide (TAMBOCOR) 100 MG Tab   Preferred Pharmacy with phone number:[facility] - [facility] [address]  [address] [facility] [address]  Phone: [phone]   Local or Mail Order: Mail   Ordering Provider: Linnette   Would the patient rather a call back or a response via [facility]? Call   Best Call Back Number:[phone]   Additional Information: previous  pt

## 2025-02-05 ENCOUNTER — TELEPHONE (OUTPATIENT)
Dept: ENDOCRINOLOGY | Facility: CLINIC | Age: 85
End: 2025-02-05
Payer: MEDICARE

## 2025-02-06 ENCOUNTER — CLINICAL SUPPORT (OUTPATIENT)
Dept: CARDIOLOGY | Facility: HOSPITAL | Age: 85
End: 2025-02-06
Payer: MEDICARE

## 2025-02-06 DIAGNOSIS — Z95.0 PRESENCE OF CARDIAC PACEMAKER: ICD-10-CM

## 2025-02-06 DIAGNOSIS — R55 SYNCOPE AND COLLAPSE: ICD-10-CM

## 2025-02-07 ENCOUNTER — CLINICAL SUPPORT (OUTPATIENT)
Dept: CARDIOLOGY | Facility: HOSPITAL | Age: 85
End: 2025-02-07
Attending: INTERNAL MEDICINE
Payer: MEDICARE

## 2025-02-07 DIAGNOSIS — R55 SYNCOPE AND COLLAPSE: ICD-10-CM

## 2025-02-07 DIAGNOSIS — Z95.0 PRESENCE OF CARDIAC PACEMAKER: ICD-10-CM

## 2025-02-07 PROCEDURE — 93296 REM INTERROG EVL PM/IDS: CPT | Performed by: STUDENT IN AN ORGANIZED HEALTH CARE EDUCATION/TRAINING PROGRAM

## 2025-02-07 PROCEDURE — 93294 REM INTERROG EVL PM/LDLS PM: CPT | Mod: ,,, | Performed by: STUDENT IN AN ORGANIZED HEALTH CARE EDUCATION/TRAINING PROGRAM

## 2025-02-08 ENCOUNTER — HOSPITAL ENCOUNTER (EMERGENCY)
Facility: HOSPITAL | Age: 85
Discharge: HOME | End: 2025-02-08
Attending: EMERGENCY MEDICINE
Payer: MEDICARE

## 2025-02-08 VITALS
HEIGHT: 63 IN | BODY MASS INDEX: 31.01 KG/M2 | DIASTOLIC BLOOD PRESSURE: 76 MMHG | WEIGHT: 175 LBS | TEMPERATURE: 99 F | RESPIRATION RATE: 18 BRPM | SYSTOLIC BLOOD PRESSURE: 123 MMHG | HEART RATE: 63 BPM | OXYGEN SATURATION: 96 %

## 2025-02-08 DIAGNOSIS — I48.0 PAF (PAROXYSMAL ATRIAL FIBRILLATION): Primary | ICD-10-CM

## 2025-02-08 DIAGNOSIS — Z95.0 PACEMAKER: ICD-10-CM

## 2025-02-08 DIAGNOSIS — R00.2 PALPITATIONS: ICD-10-CM

## 2025-02-08 LAB
ALBUMIN SERPL BCP-MCNC: 3.9 G/DL (ref 3.5–5.2)
ALP SERPL-CCNC: 93 U/L (ref 38–126)
ALT SERPL W/O P-5'-P-CCNC: 42 U/L (ref 10–44)
ANION GAP SERPL CALC-SCNC: 12 MMOL/L (ref 8–16)
AST SERPL-CCNC: 56 U/L (ref 15–46)
BASOPHILS # BLD AUTO: 0.03 K/UL (ref 0–0.2)
BASOPHILS NFR BLD: 0.6 % (ref 0–1.9)
BILIRUB SERPL-MCNC: 0.3 MG/DL (ref 0.1–1)
CALCIUM SERPL-MCNC: 8.9 MG/DL (ref 8.7–10.5)
CHLORIDE SERPL-SCNC: 97 MMOL/L (ref 95–110)
CO2 SERPL-SCNC: 24 MMOL/L (ref 23–29)
CREAT SERPL-MCNC: 0.84 MG/DL (ref 0.5–1.4)
DIFFERENTIAL METHOD BLD: ABNORMAL
EOSINOPHIL # BLD AUTO: 0.1 K/UL (ref 0–0.5)
EOSINOPHIL NFR BLD: 2.8 % (ref 0–8)
ERYTHROCYTE [DISTWIDTH] IN BLOOD BY AUTOMATED COUNT: 14.7 % (ref 11.5–14.5)
EST. GFR  (NO RACE VARIABLE): >60 ML/MIN/1.73 M^2
GLUCOSE SERPL-MCNC: 147 MG/DL (ref 70–110)
HCT VFR BLD AUTO: 34.3 % (ref 37–48.5)
HGB BLD-MCNC: 11.6 G/DL (ref 12–16)
IMM GRANULOCYTES # BLD AUTO: 0.01 K/UL (ref 0–0.04)
IMM GRANULOCYTES NFR BLD AUTO: 0.2 % (ref 0–0.5)
LYMPHOCYTES # BLD AUTO: 1 K/UL (ref 1–4.8)
LYMPHOCYTES NFR BLD: 20.6 % (ref 18–48)
MCH RBC QN AUTO: 30.4 PG (ref 27–31)
MCHC RBC AUTO-ENTMCNC: 33.8 G/DL (ref 32–36)
MCV RBC AUTO: 90 FL (ref 82–98)
MONOCYTES # BLD AUTO: 0.4 K/UL (ref 0.3–1)
MONOCYTES NFR BLD: 7.8 % (ref 4–15)
NEUTROPHILS # BLD AUTO: 3.4 K/UL (ref 1.8–7.7)
NEUTROPHILS NFR BLD: 68 % (ref 38–73)
NRBC BLD-RTO: 0 /100 WBC
PLATELET # BLD AUTO: 226 K/UL (ref 150–450)
PMV BLD AUTO: 11.2 FL (ref 9.2–12.9)
POTASSIUM SERPL-SCNC: 3.9 MMOL/L (ref 3.5–5.1)
PROT SERPL-MCNC: 6.7 G/DL (ref 6–8.4)
RBC # BLD AUTO: 3.82 M/UL (ref 4–5.4)
SODIUM SERPL-SCNC: 133 MMOL/L (ref 136–145)
TROPONIN I SERPL-MCNC: <0.012 NG/ML (ref 0.01–0.03)
UUN UR-MCNC: 23 MG/DL (ref 7–17)
WBC # BLD AUTO: 5 K/UL (ref 3.9–12.7)

## 2025-02-08 PROCEDURE — 93005 ELECTROCARDIOGRAM TRACING: CPT | Mod: ER

## 2025-02-08 PROCEDURE — 85025 COMPLETE CBC W/AUTO DIFF WBC: CPT | Mod: ER | Performed by: EMERGENCY MEDICINE

## 2025-02-08 PROCEDURE — 93010 ELECTROCARDIOGRAM REPORT: CPT | Mod: ,,, | Performed by: INTERNAL MEDICINE

## 2025-02-08 PROCEDURE — 99285 EMERGENCY DEPT VISIT HI MDM: CPT | Mod: 25,ER

## 2025-02-08 PROCEDURE — 80053 COMPREHEN METABOLIC PANEL: CPT | Mod: ER | Performed by: EMERGENCY MEDICINE

## 2025-02-08 PROCEDURE — 84484 ASSAY OF TROPONIN QUANT: CPT | Mod: ER | Performed by: EMERGENCY MEDICINE

## 2025-02-08 PROCEDURE — 99900035 HC TECH TIME PER 15 MIN (STAT): Mod: ER

## 2025-02-09 NOTE — ED NOTES
Pt started at 430 pm with an atrial rhymth, took half a lopressor-did not relieve the rhymth-took another half of lopressor with no relief, has been in afib since that time. Pt denies cp, co a feeling a fullness in her throat however.

## 2025-02-09 NOTE — ED NOTES
Pt states that she would like some meds to help slow her HR down, Dr Nash aware and no new orders noted.

## 2025-02-09 NOTE — ED PROVIDER NOTES
ED Provider Note - 2/8/2025    History     Chief Complaint   Patient presents with    Palpitations     Pt presents to the ED c/o feeling like she has been in atrial fibrillation with SOB and feels like her heart is in her throat x4 hours. Reports hx of atrial fibrillation. Has a pacemaker. Prescribed Eliquis.        Patient presents with concern regarding recurrent palpitations.  Patient notes that she has a pacemaker in place for management of complete heart block but suffers from paroxysmal atrial fibrillation.  She is currently anticoagulated with Eliquis.  Patient notes that she took a total of 25 mg of metoprolol this evening prior to arrival following the onset of symptoms.  She does not have accompanying chest pain nor does she endorse shortness of breath.      Review of patient's allergies indicates:   Allergen Reactions    Amoxicillin-pot clavulanate Diarrhea     Given march 2016    Sulfa (sulfonamide antibiotics) Rash    Codeine      Other reaction(s): Unknown, tolerates hydrocodone june 2014    Doxycycline Nausea Only    Lyrica [pregabalin] Other (See Comments)     Blurry vision    Macrobid [nitrofurantoin monohyd/m-cryst] Other (See Comments)      2019 caused cramps in legs. After stopped medication it cleared.     Prevnar [pneumococcal 7-beti conj vacc] Other (See Comments)     Arm sore, redness at injection site and muscle aches. Given shot 7/23/19    Relafen [nabumetone] Diarrhea     Past Medical History:   Diagnosis Date    A-fib     Anticoagulant long-term use     Arthritis     Coronary artery disease     GERD (gastroesophageal reflux disease)     Hypertension     Pulmonary hypertension 2016    Sleep apnea     Syncope      Past Surgical History:   Procedure Laterality Date    A-V CARDIAC PACEMAKER INSERTION N/A 2/9/2024    Procedure: INSERTION, CARDIAC PACEMAKER, DUAL CHAMBER;  Surgeon: Dean Anglin MD;  Location: [facility] EP LAB;  Service: Cardiology;  Laterality: N/A;  CHB, Dual PPM, BIO, MAC, DM,  "3 Prep    broken wrist      "put plate in it"    DILATION AND CURETTAGE OF UTERUS      excision of lipoma Left 2009    near collar bone    EYE SURGERY Left 11/2023    biopsy of eye and removal of some vitreous fluid    tonsillectomy      TONSILLECTOMY       Family History   Problem Relation Name Age of Onset    Colon cancer Mother  95        d. 99 w/ brain mets    Hyperlipidemia Mother      Hypertension Mother      Heart disease Father      Kidney failure Father          d.89    Arthritis Sister Torrie         20+ surgeries- most 2/2 MSK problems- 80(2021)    Dementia Sister Torrie         MCI    Chronic back pain Sister Gemini         71(2021)    Heart disease Daughter Torie         d.5 yo in heart surgery; Tetrology of Fallot    No Known Problems Daughter Kyra         lives in [place]    Heart disease Son Carl         congenital; Tetralogy of Fallot, lives in , surgery at 40yo    Depression Son Roderick         lives near pt- works in , lives in [place], [place]     Social History     Tobacco Use    Smoking status: Never    Smokeless tobacco: Never   Substance Use Topics    Alcohol use: Not Currently    Drug use: No     Review of Systems   Constitutional:  Negative for chills and fever.   HENT:  Negative for congestion and rhinorrhea.    Respiratory:  Negative for cough and shortness of breath.    Cardiovascular:  Positive for palpitations. Negative for chest pain.   Gastrointestinal:  Negative for abdominal pain, diarrhea and vomiting.   Genitourinary:  Negative for difficulty urinating and dysuria.   Skin:  Negative for color change and rash.   Neurological:  Negative for dizziness and light-headedness.   Hematological:  Negative for adenopathy. Does not bruise/bleed easily.     Physical Exam     Initial Vitals [02/08/25 2106]   BP Pulse Resp Temp SpO2   (!) 189/86 (!) 119 20 97.7 °F (36.5 °C) 98 %      MAP       --         Vitals:    02/08/25 2106 02/08/25 2107 02/08/25 2108 02/08/25 2130   BP: (!) 189/86 " "(!) 189/86  106/66   Pulse: (!) 119 (!) 117 (!) 120 (!) 115   Resp: 20 12  15   Temp: 97.7 °F (36.5 °C)      TempSrc: Oral      SpO2: 98% 97%  95%   Weight: 79.4 kg (175 lb)      Height: 5' 3" (1.6 m)       02/08/25 2132 02/08/25 2200 02/08/25 2217 02/08/25 2230   BP: 106/66 (!) 82/52 (!) 81/61 97/61   Pulse: (!) 118 (!) 119 (!) 117 (!) 56   Resp: 19 (!) 23 12 (!) 23   Temp:       TempSrc:       SpO2: 95% 95% (!) 94% 95%   Weight:       Height:        02/08/25 2245 02/08/25 2255 02/08/25 2303 02/08/25 2319   BP: (!) 90/50 138/64 124/68 123/76   Pulse: (!) 56 (!) 53 60 63   Resp: 18 (!) 80 19 18   Temp:    98.5 °F (36.9 °C)   TempSrc:    Oral   SpO2:  95% 96% 96%   Weight:       Height:         Physical Exam    Nursing note and vitals reviewed.  Constitutional: She appears well-developed and well-nourished. She is not diaphoretic. No distress.   HENT:   Head: Normocephalic and atraumatic.   Nose: Nose normal. Mouth/Throat: Oropharynx is clear and moist.   Eyes: Conjunctivae are normal. No scleral icterus.   Neck: Neck supple. No JVD present.   Cardiovascular:  Normal rate, normal heart sounds and intact distal pulses. An irregularly irregular rhythm present.           Pulmonary/Chest: Breath sounds normal. No respiratory distress.   Abdominal: Abdomen is soft. There is no abdominal tenderness.   Musculoskeletal:         General: No edema. Normal range of motion.      Cervical back: Neck supple.     Neurological: She is alert and oriented to person, place, and time.   Skin: Skin is warm and dry.       ED Course   Procedures                   MDM  Differential Diagnoses   Based on available history, the working differential diagnoses considered during this evaluation include but are not limited to pseudopalpitations, arrhythmia, ectopy.      LABS     Labs Reviewed   CBC W/ AUTO DIFFERENTIAL - Abnormal       Result Value    WBC 5.00      RBC 3.82 (*)     Hemoglobin 11.6 (*)     Hematocrit 34.3 (*)     MCV 90      MCH " 30.4      MCHC 33.8      RDW 14.7 (*)     Platelets 226      MPV 11.2      Immature Granulocytes 0.2      Gran # (ANC) 3.4      Immature Grans (Abs) 0.01      Lymph # 1.0      Mono # 0.4      Eos # 0.1      Baso # 0.03      nRBC 0      Gran % 68.0      Lymph % 20.6      Mono % 7.8      Eosinophil % 2.8      Basophil % 0.6      Differential Method Automated     COMPREHENSIVE METABOLIC PANEL - Abnormal    Sodium 133 (*)     Potassium 3.9      Chloride 97      CO2 24      Glucose 147 (*)     BUN 23 (*)     Creatinine 0.84      Calcium 8.9      Total Protein 6.7      Albumin 3.9      Total Bilirubin 0.3      Alkaline Phosphatase 93      AST 56 (*)     ALT 42      Anion Gap 12      eGFR >60.0     TROPONIN I    Troponin I <0.012             All available results from the labs ordered were independently reviewed. with findings as follows:  CBC is unremarkable aside from mild anemia with a hematocrit of 34.  Chemistry notable only for mild elevation in the BUN of 23.  Troponin level unremarkable.      Imaging     Imaging Results              X-Ray Chest AP Portable (Final result)  Result time 02/08/25 21:50:57      Final result by Luis Hopper MD (02/08/25 21:50:57)                   Impression:      As above      Electronically signed by: Luis Hopper  Date:    02/08/2025  Time:    21:50               Narrative:    EXAMINATION:  XR CHEST AP PORTABLE    CLINICAL HISTORY:  Chest Pain;    TECHNIQUE:  Single frontal view of the chest was performed.    COMPARISON:  None    FINDINGS:  Prominent cardiac silhouette.  Mild central pulmonary vascular crowding.  Left chest pacemaker.  No acute process seen.    .    Bones are intact.                                       X-Rays:   Independently Interpreted Readings:   Chest X-Ray: No infiltrates.  No acute abnormalities. Cardiomegaly present.        EKG   EKG Readings: (Independently Interpreted)   Initial Reading: No STEMI. Rhythm: Atrial Fibrillation. Heart Rate: 87. Ectopy:  PVCs.       ED Management/Discussion   Medications - No data to display              The patient's list of active medical problems, social history, medications, and allergies as documented per RN staff has been reviewed.               On final assessment, the patient appears suitable for discharge.  The patient's heart rate has returned to normal and she is presently in a paced rhythm at her default rate of 60.  There has been no additional ectopy or arrhythmia.  Patient reports that she has no active symptoms.    I see no indication of an emergent process beyond that addressed during our encounter but have duly counseled the patient/family regarding the need for prompt follow-up as well as the indications that should prompt immediate return to the emergency room.  The patient/family has been provided with language -specific verbal and printed direction regarding our final diagnosis(es) as well as instructions regarding use of OTC and/or Rx medications intended to manage the patient's aforementioned conditions including:  ED Prescriptions    None           Patient has been advised of the following recommended follow-up instructions:  Follow-up Information       Follow up With Specialties Details Why Contact Info    Ira Smith MD Internal Medicine Schedule an appointment as soon as possible for a visit  for reassessment [address] [facility] [address]  [phone]      [facility] - Emergency Dept Emergency Medicine Go to  As needed, If symptoms worsen [address] [facility]  Emergency Department  [facility] [address] [phone]          The patient/family communicates understanding of all this information and all remaining questions and concerns were addressed at this time.      Referrals:  No orders of the defined types were placed in this encounter.      CLINICAL IMPRESSION    ICD-10-CM ICD-9-CM   1. PAF (paroxysmal atrial fibrillation)  I48.0 427.31   2. Palpitations   R00.2 785.1   3. Pacemaker  Z95.0 V45.01          ED Disposition Condition    Discharge Stable                 Aguilar Nash MD  02/09/25 2100

## 2025-02-10 ENCOUNTER — PATIENT OUTREACH (OUTPATIENT)
Facility: OTHER | Age: 85
End: 2025-02-10
Payer: MEDICARE

## 2025-02-12 LAB
OHS QRS DURATION: 94 MS
OHS QTC CALCULATION: 377 MS

## 2025-02-13 ENCOUNTER — HOSPITAL ENCOUNTER (OUTPATIENT)
Dept: ENDOCRINOLOGY | Facility: CLINIC | Age: 85
Discharge: HOME | End: 2025-02-13
Attending: INTERNAL MEDICINE
Payer: MEDICARE

## 2025-02-13 DIAGNOSIS — E04.1 LEFT THYROID NODULE: Primary | ICD-10-CM

## 2025-02-13 PROCEDURE — 10005 FNA BX W/US GDN 1ST LES: CPT | Mod: S$GLB,,, | Performed by: INTERNAL MEDICINE

## 2025-02-13 PROCEDURE — 88173 CYTOPATH EVAL FNA REPORT: CPT | Performed by: PATHOLOGY

## 2025-02-14 LAB
FINAL PATHOLOGIC DIAGNOSIS: ABNORMAL
Lab: ABNORMAL
OHS CV AF BURDEN PERCENT: < 1
OHS CV DC REMOTE DEVICE TYPE: NORMAL
OHS CV ICD SHOCK: NO
OHS CV RV PACING PERCENT: 76 %

## 2025-02-17 ENCOUNTER — PATIENT MESSAGE (OUTPATIENT)
Dept: ENDOCRINOLOGY | Facility: CLINIC | Age: 85
End: 2025-02-17
Payer: MEDICARE

## 2025-02-18 ENCOUNTER — TELEPHONE (OUTPATIENT)
Dept: ENDOCRINOLOGY | Facility: CLINIC | Age: 85
End: 2025-02-18
Payer: MEDICARE

## 2025-02-18 NOTE — TELEPHONE ENCOUNTER
Called patient to discuss her biopsy results and answered her questions regarding recent diagnosis of PTC.  Referral done to Endocrine surgery.

## 2025-02-21 DIAGNOSIS — Z00.00 ENCOUNTER FOR MEDICARE ANNUAL WELLNESS EXAM: ICD-10-CM

## 2025-02-24 ENCOUNTER — INFUSION (OUTPATIENT)
Dept: INFECTIOUS DISEASES | Facility: HOSPITAL | Age: 85
End: 2025-02-24
Payer: MEDICARE

## 2025-02-24 VITALS
DIASTOLIC BLOOD PRESSURE: 55 MMHG | RESPIRATION RATE: 18 BRPM | OXYGEN SATURATION: 96 % | SYSTOLIC BLOOD PRESSURE: 110 MMHG | TEMPERATURE: 98 F | WEIGHT: 178.25 LBS | BODY MASS INDEX: 31.58 KG/M2 | HEART RATE: 68 BPM | HEIGHT: 63 IN

## 2025-02-24 DIAGNOSIS — M81.0 AGE-RELATED OSTEOPOROSIS WITHOUT CURRENT PATHOLOGICAL FRACTURE: Primary | ICD-10-CM

## 2025-02-24 PROCEDURE — 96365 THER/PROPH/DIAG IV INF INIT: CPT

## 2025-02-24 PROCEDURE — 63600175 PHARM REV CODE 636 W HCPCS: Performed by: INTERNAL MEDICINE

## 2025-02-24 RX ORDER — ZOLEDRONIC ACID 5 MG/100ML
5 INJECTION, SOLUTION INTRAVENOUS
Status: COMPLETED | OUTPATIENT
Start: 2025-02-24 | End: 2025-02-24

## 2025-02-24 RX ORDER — SODIUM CHLORIDE 0.9 % (FLUSH) 0.9 %
10 SYRINGE (ML) INJECTION
Status: DISCONTINUED | OUTPATIENT
Start: 2025-02-24 | End: 2025-02-24 | Stop reason: HOSPADM

## 2025-02-24 RX ORDER — SODIUM CHLORIDE 0.9 % (FLUSH) 0.9 %
10 SYRINGE (ML) INJECTION
OUTPATIENT
Start: 2025-02-24

## 2025-02-24 RX ORDER — ZOLEDRONIC ACID 5 MG/100ML
5 INJECTION, SOLUTION INTRAVENOUS
OUTPATIENT
Start: 2025-02-24

## 2025-02-24 RX ORDER — HEPARIN 100 UNIT/ML
500 SYRINGE INTRAVENOUS
OUTPATIENT
Start: 2025-02-24

## 2025-02-24 RX ADMIN — ZOLEDRONIC ACID 5 MG: 5 INJECTION, SOLUTION INTRAVENOUS at 02:02

## 2025-02-24 NOTE — PROGRESS NOTES
Patient arrives for Reclast infusion - confirms use of calcium and vitamin D supplements and denies dental procedures over past 3 months - administered per guidelines     Limited head-to-toe assessment due to privacy issues and visit reason though the opportunity was given for patient to express any concerns

## 2025-02-25 ENCOUNTER — TELEPHONE (OUTPATIENT)
Dept: SURGERY | Facility: CLINIC | Age: 85
End: 2025-02-25

## 2025-02-25 ENCOUNTER — PATIENT MESSAGE (OUTPATIENT)
Dept: ENDOCRINOLOGY | Facility: CLINIC | Age: 85
End: 2025-02-25
Payer: MEDICARE

## 2025-02-25 ENCOUNTER — ANESTHESIA EVENT (OUTPATIENT)
Dept: SURGERY | Facility: HOSPITAL | Age: 85
End: 2025-02-25
Payer: MEDICARE

## 2025-02-25 ENCOUNTER — TELEPHONE (OUTPATIENT)
Dept: PREADMISSION TESTING | Facility: HOSPITAL | Age: 85
End: 2025-02-25
Payer: MEDICARE

## 2025-02-25 ENCOUNTER — OFFICE VISIT (OUTPATIENT)
Dept: SURGERY | Facility: CLINIC | Age: 85
End: 2025-02-25
Payer: MEDICARE

## 2025-02-25 ENCOUNTER — PATIENT MESSAGE (OUTPATIENT)
Dept: SURGERY | Facility: CLINIC | Age: 85
End: 2025-02-25

## 2025-02-25 VITALS
SYSTOLIC BLOOD PRESSURE: 105 MMHG | HEIGHT: 63 IN | HEART RATE: 55 BPM | DIASTOLIC BLOOD PRESSURE: 67 MMHG | WEIGHT: 176.5 LBS | BODY MASS INDEX: 31.27 KG/M2 | TEMPERATURE: 98 F

## 2025-02-25 DIAGNOSIS — C73 THYROID CANCER: Primary | ICD-10-CM

## 2025-02-25 PROCEDURE — 1159F MED LIST DOCD IN RCRD: CPT | Mod: HCNC,CPTII,S$GLB, | Performed by: STUDENT IN AN ORGANIZED HEALTH CARE EDUCATION/TRAINING PROGRAM

## 2025-02-25 PROCEDURE — 99204 OFFICE O/P NEW MOD 45 MIN: CPT | Mod: HCNC,S$GLB,, | Performed by: STUDENT IN AN ORGANIZED HEALTH CARE EDUCATION/TRAINING PROGRAM

## 2025-02-25 PROCEDURE — 1101F PT FALLS ASSESS-DOCD LE1/YR: CPT | Mod: HCNC,CPTII,S$GLB, | Performed by: STUDENT IN AN ORGANIZED HEALTH CARE EDUCATION/TRAINING PROGRAM

## 2025-02-25 PROCEDURE — 99999 PR PBB SHADOW E&M-EST. PATIENT-LVL IV: CPT | Mod: PBBFAC,HCNC,, | Performed by: STUDENT IN AN ORGANIZED HEALTH CARE EDUCATION/TRAINING PROGRAM

## 2025-02-25 PROCEDURE — 1126F AMNT PAIN NOTED NONE PRSNT: CPT | Mod: HCNC,CPTII,S$GLB, | Performed by: STUDENT IN AN ORGANIZED HEALTH CARE EDUCATION/TRAINING PROGRAM

## 2025-02-25 PROCEDURE — 76536 US EXAM OF HEAD AND NECK: CPT | Mod: HCNC,S$GLB,, | Performed by: STUDENT IN AN ORGANIZED HEALTH CARE EDUCATION/TRAINING PROGRAM

## 2025-02-25 PROCEDURE — 3074F SYST BP LT 130 MM HG: CPT | Mod: HCNC,CPTII,S$GLB, | Performed by: STUDENT IN AN ORGANIZED HEALTH CARE EDUCATION/TRAINING PROGRAM

## 2025-02-25 PROCEDURE — 3288F FALL RISK ASSESSMENT DOCD: CPT | Mod: HCNC,CPTII,S$GLB, | Performed by: STUDENT IN AN ORGANIZED HEALTH CARE EDUCATION/TRAINING PROGRAM

## 2025-02-25 PROCEDURE — 3078F DIAST BP <80 MM HG: CPT | Mod: HCNC,CPTII,S$GLB, | Performed by: STUDENT IN AN ORGANIZED HEALTH CARE EDUCATION/TRAINING PROGRAM

## 2025-02-25 RX ORDER — SODIUM CHLORIDE 9 MG/ML
INJECTION, SOLUTION INTRAVENOUS CONTINUOUS
OUTPATIENT
Start: 2025-02-25

## 2025-02-25 NOTE — TELEPHONE ENCOUNTER
Mrs. Sorenson is scheduled for Thyroid Lobectomy on 3/31/25 with Dr. Wang. Medication list indicates she is currently taking Eliquis. She will need recommendations on when to stop prior to surgery. Please advise. Thanks

## 2025-02-25 NOTE — TELEPHONE ENCOUNTER
----- Message from Tyrese Villa MD sent at 2/25/2025  1:41 PM CST -----  Regarding: RE: pre- op clearance  Cleared okay to Eliquis for 3 days prior procedure.  ----- Message -----  From: Angie Fowler MA  Sent: 2/25/2025   1:23 PM CST  To: Aidan Mclean MD; Cirilo Wang MD; Tyrese #  Subject: pre- op clearance                                This patient is schedule to have Thyroid surgery by the end of march with Dr. Cirilo Wang prior to procedure.   is requesting patient be clear for surgery. Please advise. Thank you!      02/25/25           1312  Spoke to patient regarding the above message. Informed patient cardiologist Tyrese Villa MD did clear her for surgery. Pt is to hold Eliquis for 3 days prior to surgery date 03/31/25. Informed pt her last dose should be taken in 03/27/25. Pt verbalized understanding.

## 2025-03-02 ENCOUNTER — E-CONSULT (OUTPATIENT)
Dept: CARDIOLOGY | Facility: CLINIC | Age: 85
End: 2025-03-02
Payer: MEDICARE

## 2025-03-02 DIAGNOSIS — Z01.810 PREOP CARDIOVASCULAR EXAM: Primary | ICD-10-CM

## 2025-03-02 NOTE — CONSULTS
[facility] - Cardiology  Response for E-Consult     Patient Name: Maryann Sorenson  MRN: [id number]  Primary Care Provider: Ira Smith MD   Requesting Provider: Shawn Cooper DNP  E-Consult to General Cardiology  Consult performed by: Delbert Herndon MD  Consult ordered by: Shawn Cooper DNP           83 yo female, E consult for preop clearance of thyroid lobectomy  The chart reviewed.  PMH afib  GFR > 60    Plan  Elevated periop risk of CV events for non-high risk procedure.  Ok to proceed the scheduled procedure without further cardiac study.  OK to hold ELIQUIS 2 days before the procedure and resume postop once hemodynamically stable      Total time of Consultation: 10 minute    I did not speak to the requesting provider verbally about this.     *This eConsult is based on the clinical data available to me and is furnished without benefit of a physical examination. The eConsult will need to be interpreted in light of any clinical issues or changes in patient status not available to me at the time of filing this eConsults. Significant changes in patient condition or level of acuity should result in immediate formal consultation and reevaluation. Please alert me if you have further questions.    Thank you for this eConsult referral.     Delbert Herndon MD  [facility] - [facility]

## 2025-03-05 ENCOUNTER — PATIENT MESSAGE (OUTPATIENT)
Dept: CARDIOLOGY | Facility: CLINIC | Age: 85
End: 2025-03-05
Payer: MEDICARE

## 2025-03-05 RX ORDER — METOPROLOL SUCCINATE 25 MG/1
25 TABLET, EXTENDED RELEASE ORAL DAILY
Qty: 90 TABLET | Refills: 3 | Status: SHIPPED | OUTPATIENT
Start: 2025-03-05

## 2025-03-10 ENCOUNTER — PATIENT MESSAGE (OUTPATIENT)
Dept: CARDIOLOGY | Facility: CLINIC | Age: 85
End: 2025-03-10
Payer: MEDICARE

## 2025-03-10 RX ORDER — HYDROCHLOROTHIAZIDE 12.5 MG/1
12.5 CAPSULE ORAL DAILY
Qty: 90 CAPSULE | Refills: 3 | Status: SHIPPED | OUTPATIENT
Start: 2025-03-10

## 2025-03-11 NOTE — PROGRESS NOTES
"[facility] History & Physical         Name: Maryann Sorenson   : 1940   MRN: [id number]      History of Present Illness       Chief Compliant:  Thyroid cancer     HPI:  Maryann Sorenson is a 84 y.o. female who presents to endocrine surgery clinic to discuss the diagnosis of thyroid cancer.  She has had biopsy of her left thyroid nodule which demonstrated evidence of papillary thyroid carcinoma.      Past Medical History:   Diagnosis Date    A-fib     Anticoagulant long-term use     Arthritis     Coronary artery disease     GERD (gastroesophageal reflux disease)     Hypertension     Preop cardiovascular exam 3/2/2025    Pulmonary hypertension 2016    Sleep apnea     Syncope      Past Surgical History:   Procedure Laterality Date    A-V CARDIAC PACEMAKER INSERTION N/A 2024    Procedure: INSERTION, CARDIAC PACEMAKER, DUAL CHAMBER;  Surgeon: Dean Anglin MD;  Location: [facility] EP LAB;  Service: Cardiology;  Laterality: N/A;  CHB, Dual PPM, BIO, MAC, DM, 3 Prep    broken wrist      "put plate in it"    DILATION AND CURETTAGE OF UTERUS      excision of lipoma Left     near collar bone    EYE SURGERY Left 2023    biopsy of eye and removal of some vitreous fluid    tonsillectomy      TONSILLECTOMY       Family History   Problem Relation Name Age of Onset    Colon cancer Mother  95        d. 99 w/ brain mets    Hyperlipidemia Mother      Hypertension Mother      Heart disease Father      Kidney failure Father          d.89    Arthritis Sister Torrie         20+ surgeries- most 2/2 MSK problems- 80()    Dementia Sister Torrie         MCI    Chronic back pain Sister Gemini         71()    Heart disease Daughter Torie         d.5 yo in heart surgery; Tetrology of Fallot    No Known Problems Daughter Kyra         lives in [place]    Heart disease Son Carl         congenital; Tetralogy of Fallot, lives in [place], surgery at 40yo    Depression Son Roderick         lives near pt- works in [place], " lives in [place], [place]     Patient is allergic to amoxicillin-pot clavulanate, sulfa (sulfonamide antibiotics), codeine, doxycycline, lyrica [pregabalin], macrobid [nitrofurantoin monohyd/m-cryst], prevnar [pneumococcal 7-beti conj vacc], and relafen [nabumetone].  Social Drivers of Health     Tobacco Use: Low Risk  (2/25/2025)    Patient History     Smoking Tobacco Use: Never     Smokeless Tobacco Use: Never     Passive Exposure: Not on file   Alcohol Use: Patient Declined (1/29/2025)    AUDIT-C     Frequency of Alcohol Consumption: Patient declined     Average Number of Drinks: Patient declined     Frequency of Binge Drinking: Patient declined   Financial Resource Strain: Patient Declined (1/29/2025)    Overall Financial Resource Strain (CARDIA)     Difficulty of Paying Living Expenses: Patient declined   Food Insecurity: Patient Declined (1/29/2025)    Hunger Vital Sign     Worried About Running Out of Food in the Last Year: Patient declined     Ran Out of Food in the Last Year: Patient declined   Transportation Needs: No Transportation Needs (1/16/2020)    PRAPARE - Transportation     Lack of Transportation (Medical): No     Lack of Transportation (Non-Medical): No   Physical Activity: Unknown (1/29/2025)    Exercise Vital Sign     Days of Exercise per Week: Patient declined     Minutes of Exercise per Session: Not on file   Stress: Patient Declined (1/29/2025)    [employer] - Occupational Stress Questionnaire     Feeling of Stress : Patient declined   Housing Stability: Unknown (1/29/2025)    Housing Stability Vital Sign     Unable to Pay for Housing in the Last Year: Patient declined     Number of Times Moved in the Last Year: Not on file     Homeless in the Last Year: Not on file   Depression: Low Risk  (2/24/2025)    Depression     Last PHQ-4: Flowsheet Data: 0   Utilities: Patient Declined (1/29/2025)    [facility] Utilities     Threatened with loss of utilities: Patient declined   Health  Literacy: Patient Declined (1/29/2025)     Health Literacy     Frequency of need for help with medical instructions: Patient declines to respond   Social Isolation: Not on file        Home Medications:   Prior to Admission medications    Medication Sig Start Date End Date Taking? Authorizing Provider   amLODIPine (NORVASC) 5 MG tablet Take 1 tablet (5 mg total) by mouth once daily. 6/19/24  Yes Heydi Gavin MD   apixaban (ELIQUIS) 5 mg Tab Take 1 tablet (5 mg total) by mouth 2 (two) times daily. 11/11/24  Yes Heydi Gavin MD   ascorbate calcium-bioflavonoid (FRANCISCO-C WITH BIOFLAVONOIDS) 500-200 mg Tab Take 1 tablet by mouth 2 (two) times a day.   Yes Provider, Historical   calcium carbonate-vitamin D3 600 mg (1,500 mg)-800 unit Chew Take 1 tablet by mouth once daily.   Yes Provider, Historical   celecoxib (CELEBREX) 100 MG capsule Take 1 capsule (100 mg total) by mouth once daily. Take with food and 8 oz liquid 12/6/24  Yes Ira Smith MD   cholecalciferol, vitamin D3, (VITAMIN D3) 50 mcg (2,000 unit) Tab Take by mouth once daily.   Yes Provider, Historical   coenzyme Q10 200 mg capsule Take 1 capsule by mouth Daily. 1 Capsule Oral Every day   Yes Provider, Historical   empagliflozin (JARDIANCE) 25 mg tablet Take 1 tablet (25 mg total) by mouth once daily. 11/26/24  Yes Ira Smith MD   esomeprazole (NEXIUM) 40 MG capsule 1 Capsule, Delayed Release(E.C.) Oral Every day 2/20/24  Yes Ira Smith MD   ezetimibe (ZETIA) 10 mg tablet Take 1 tablet (10 mg total) by mouth once daily. 11/25/24 11/25/25 Yes Tyrese Villa Jr., MD   fexofenadine (ALLEGRA) 180 MG tablet Take 1 tablet by mouth Daily. 1 Tablet Oral Every day   Yes Provider, Historical   flecainide (TAMBOCOR) 100 MG Tab Take 1 tablet (100 mg total) by mouth every 12 (twelve) hours. 1/30/25 1/30/26 Yes Aidan Mclean MD   fluticasone propionate (FLONASE) 50 mcg/actuation nasal spray 1  spray each nostril twice a day; total 48 g - please provide 3 months supply. 12/9/24  Yes Katja Recinos MD   gabapentin (NEURONTIN) 300 MG capsule 1 pill PO TID. 90 days supply 8/13/24  Yes Katja Recinos MD   guaiFENesin (MUCINEX) 600 mg 12 hr tablet Take 1,200 mg by mouth as needed.    Yes Provider, Historical   hydrocortisone 2.5 % ointment Apply twice daily to affected area, max 2 weeks per month 6/17/24  Yes Charito Barroso MD   LACTOBACILLUS RHAMNOSUS GG (PROBIOTIC ORAL) Take 1 capsule by mouth Daily. 1 Capsule Oral Every day   Yes Provider, Historical   losartan (COZAAR) 50 MG tablet Take 1 tablet (50 mg total) by mouth once daily. 4/16/24 4/16/25 Yes Heydi Gavin MD   lutein 20 mg Cap Take 20 mg by mouth Daily.   Yes Provider, Historical   magnesium 250 mg Tab Take 250 mg by mouth once daily.    Yes Provider, Historical   pramipexole (MIRAPEX) 0.125 MG tablet TAKE 1 TABLET EVERY DAY 1/8/25  Yes Palmira Edouard PA-C   rosuvastatin (CRESTOR) 40 MG Tab Take 1 tablet (40 mg total) by mouth once daily. 9/2/24  Yes Heydi Gavin MD   silver sulfADIAZINE 1% (SILVADENE) 1 % cream Apply topically 2 (two) times daily. 2/15/22  Yes Ira Smith MD   ZINC ORAL Take 30 mg by mouth once daily.   Yes Provider, Historical   folic acid (FOLVITE) 1 MG tablet Take 1 tablet by mouth once daily.  Patient not taking: Reported on 2/25/2025 6/22/24 6/22/25  Provider, Historical   hydroCHLOROthiazide (MICROZIDE) 12.5 mg capsule Take 1 capsule (12.5 mg total) by mouth once daily. 3/10/25   Tyrese Villa Jr., MD   methotrexate 2.5 MG Tab Take 2.5 mg by mouth every 7 days. 4 tablets 6/22/24   Provider, Historical   metoprolol succinate (TOPROL-XL) 25 MG 24 hr tablet Take 1 tablet (25 mg total) by mouth once daily. 3/5/25   Tyrese Villa Jr., MD       Review of Systems: Complete review of systems elicited and pertinent information is in the HPI    Physical Exam:  "    Vitals:  Vitals:    02/25/25 1116   BP: 105/67   Pulse: (!) 55   Temp: 97.5 °F (36.4 °C)   TempSrc: Oral   Weight: 80 kg (176 lb 7.7 oz)   Height: 5' 3" (1.6 m)           General Appearance:  Alert, cooperative, no distress, appears stated age   Head:  Normocephalic, without obvious abnormality, atraumatic   Eyes:  No scleral icterus   Nose: Nares normal, septum midline   Throat: Lips, mucosa, and tongue normal   Neck: Supple, symmetrical, trachea midline, speaks with normal voice   Lungs:   respirations unlabored   Heart:  Regular rate    Abdomen:   Soft, non-tender, non-distended   Genitalia:  Deferred   Rectal:  Deferred    Extremities: Extremities normal, atraumatic, no cyanosis or edema   Pulses: 2+ and symmetric   Skin: No jaundice   Neurologic: No focal deficits        Results Reviewed:     Labs and imaging reviewed and interpreted by me, as refected in HPI and A&P.    Surgeon ultrasound performed in office     I performed a complete ultrasound of the neck and thyroid gland. Images saved in the media tab.  Thyroid ultrasound consistent with the outside report, there is a 1.5 cm suspicious appearing left thyroid nodule and a benign-appearing a proximally 1 cm right thyroid nodule with no abnormal appearing adenopathy in the jugular chain.      Assessment and plan:   This is an 84-year-old woman with 1.5 cm papillary thyroid cancer on the left and a benign-appearing right thyroid nodule.  We had a discussion about the natural history of thyroid cancer and options for management.  We discussed the role of the thyroid surgery in the possibilities of lobectomy versus total thyroidectomy.  Her 1.5 cm left-sided cancer makes her a candidate for hemithyroidectomy based on current guidelines.  We discussed that doing a hemithyroidectomy could be curative of her cancer, but if final pathology shows certain high-risk features, she may need completion thyroidectomy.  We reviewed all of the technical and convalescent " details of the various operations and their risks benefits and alternatives.  I provided her with a copy of the patient education materials from the [employer].  She wants to move forward with hemithyroidectomy.  She consented in writing to proceed.      History, physical exam, laboratory, and radiographic findings were reviewed.        Cirilo Wang MD, FACS  General and Endocrine Surgery

## 2025-03-13 ENCOUNTER — OFFICE VISIT (OUTPATIENT)
Dept: SURGERY | Facility: CLINIC | Age: 85
End: 2025-03-13
Payer: MEDICARE

## 2025-03-13 DIAGNOSIS — C73 THYROID CANCER: Primary | ICD-10-CM

## 2025-03-13 NOTE — PROGRESS NOTES
The patient location is: Home  The chief complaint leading to consultation is: [place]  Visit type: audiovisual    Face to Face time with patient: 20 min  36 minutes of total time spent on the encounter, which includes face to face time and non-face to face time preparing to see the patient (eg, review of tests), Obtaining and/or reviewing separately obtained history, Documenting clinical information in the electronic or other health record, Independently interpreting results (not separately reported) and communicating results to the patient/family/caregiver, or Care coordination (not separately reported).     Each patient to whom he or she provides medical services by telemedicine is:  (1) informed of the relationship between the physician and patient and the respective role of any other health care provider with respect to management of the patient; and (2) notified that he or she may decline to receive medical services by telemedicine and may withdraw from such care at any time.        [facility] History & Physical         Name: Maryann Sorenson   : 1940   MRN: [id number]      History of Present Illness       Chief Compliant:  Thyroid cancer     HPI:  Maryann Sorenson is a 84 y.o. female who presents to endocrine surgery clinic to discuss the diagnosis of thyroid cancer.  She has had biopsy of her left thyroid nodule which demonstrated evidence of papillary thyroid carcinoma.      Past Medical History:   Diagnosis Date    A-fib     Anticoagulant long-term use     Arthritis     Coronary artery disease     GERD (gastroesophageal reflux disease)     Hypertension     Preop cardiovascular exam 3/2/2025    Pulmonary hypertension 2016    Sleep apnea     Syncope      Past Surgical History:   Procedure Laterality Date    A-V CARDIAC PACEMAKER INSERTION N/A 2024    Procedure: INSERTION, CARDIAC PACEMAKER, DUAL CHAMBER;  Surgeon: Dean Anglin MD;  Location: [facility];  Service: Cardiology;  Laterality:  "N/A;  CHB, Dual PPM, BIO, MAC, DM, 3 Prep    broken wrist      "put plate in it"    DILATION AND CURETTAGE OF UTERUS      excision of lipoma Left 2009    near collar bone    EYE SURGERY Left 11/2023    biopsy of eye and removal of some vitreous fluid    tonsillectomy      TONSILLECTOMY       Family History   Problem Relation Name Age of Onset    Colon cancer Mother  95        d. 99 w/ brain mets    Hyperlipidemia Mother      Hypertension Mother      Heart disease Father      Kidney failure Father          d.89    Arthritis Sister Torrie         20+ surgeries- most 2/2 MSK problems- 80(2021)    Dementia Sister Torrie         MCI    Chronic back pain Sister Gemini         71(2021)    Heart disease Daughter Torie         d.5 yo in heart surgery; Tetrology of Fallot    No Known Problems Daughter Kyra         lives in [place]    Heart disease Son Carl         congenital; Tetralogy of Fallot, lives in , surgery at 38yo    Depression Son Roderick         lives near pt- works in , lives in [place], [place]     Patient is allergic to amoxicillin-pot clavulanate, sulfa (sulfonamide antibiotics), codeine, doxycycline, lyrica [pregabalin], macrobid [nitrofurantoin monohyd/m-cryst], prevnar [pneumococcal 7-beti conj vacc], and relafen [nabumetone].  Social Drivers of Health     Tobacco Use: Low Risk  (2/25/2025)    Patient History     Smoking Tobacco Use: Never     Smokeless Tobacco Use: Never     Passive Exposure: Not on file   Alcohol Use: Patient Declined (1/29/2025)    AUDIT-C     Frequency of Alcohol Consumption: Patient declined     Average Number of Drinks: Patient declined     Frequency of Binge Drinking: Patient declined   Financial Resource Strain: Patient Declined (1/29/2025)    Overall Financial Resource Strain (CARDIA)     Difficulty of Paying Living Expenses: Patient declined   Food Insecurity: Patient Declined (1/29/2025)    Hunger Vital Sign     Worried About Running Out of Food in the Last Year: Patient " declined     Ran Out of Food in the Last Year: Patient declined   Transportation Needs: No Transportation Needs (1/16/2020)    PRAPARE - Transportation     Lack of Transportation (Medical): No     Lack of Transportation (Non-Medical): No   Physical Activity: Unknown (1/29/2025)    Exercise Vital Sign     Days of Exercise per Week: Patient declined     Minutes of Exercise per Session: Not on file   Stress: Patient Declined (1/29/2025)    Citizen of [place] [employer] - Occupational Stress Questionnaire     Feeling of Stress : Patient declined   Housing Stability: Unknown (1/29/2025)    Housing Stability Vital Sign     Unable to Pay for Housing in the Last Year: Patient declined     Number of Times Moved in the Last Year: Not on file     Homeless in the Last Year: Not on file   Depression: Low Risk  (2/24/2025)    Depression     Last PHQ-4: Flowsheet Data: 0   Utilities: Patient Declined (1/29/2025)    [facility] Utilities     Threatened with loss of utilities: Patient declined   Health Literacy: Patient Declined (1/29/2025)     Health Literacy     Frequency of need for help with medical instructions: Patient declines to respond   Social Isolation: Not on file        Home Medications:   Prior to Admission medications    Medication Sig Start Date End Date Taking? Authorizing Provider   amLODIPine (NORVASC) 5 MG tablet Take 1 tablet (5 mg total) by mouth once daily. 6/19/24  Yes Heydi Gavin MD   apixaban (ELIQUIS) 5 mg Tab Take 1 tablet (5 mg total) by mouth 2 (two) times daily. 11/11/24  Yes Heydi Gavin MD   ascorbate calcium-bioflavonoid (FRANCISCO-C WITH BIOFLAVONOIDS) 500-200 mg Tab Take 1 tablet by mouth 2 (two) times a day.   Yes Provider, Historical   calcium carbonate-vitamin D3 600 mg (1,500 mg)-800 unit Chew Take 1 tablet by mouth once daily.   Yes Provider, Historical   celecoxib (CELEBREX) 100 MG capsule Take 1 capsule (100 mg total) by mouth once daily. Take with food and 8 oz  liquid 12/6/24  Yes Ira Smith MD   cholecalciferol, vitamin D3, (VITAMIN D3) 50 mcg (2,000 unit) Tab Take by mouth once daily.   Yes Provider, Historical   coenzyme Q10 200 mg capsule Take 1 capsule by mouth Daily. 1 Capsule Oral Every day   Yes Provider, Historical   empagliflozin (JARDIANCE) 25 mg tablet Take 1 tablet (25 mg total) by mouth once daily. 11/26/24  Yes Ira Smith MD   esomeprazole (NEXIUM) 40 MG capsule 1 Capsule, Delayed Release(E.C.) Oral Every day 2/20/24  Yes Ira Smith MD   ezetimibe (ZETIA) 10 mg tablet Take 1 tablet (10 mg total) by mouth once daily. 11/25/24 11/25/25 Yes Tyrese Villa Jr., MD   fexofenadine (ALLEGRA) 180 MG tablet Take 1 tablet by mouth Daily. 1 Tablet Oral Every day   Yes Provider, Historical   flecainide (TAMBOCOR) 100 MG Tab Take 1 tablet (100 mg total) by mouth every 12 (twelve) hours. 1/30/25 1/30/26 Yes Aidan Mclean MD   fluticasone propionate (FLONASE) 50 mcg/actuation nasal spray 1 spray each nostril twice a day; total 48 g - please provide 3 months supply. 12/9/24  Yes Katja Recinos MD   gabapentin (NEURONTIN) 300 MG capsule 1 pill PO TID. 90 days supply 8/13/24  Yes Katja Recinos MD   guaiFENesin (MUCINEX) 600 mg 12 hr tablet Take 1,200 mg by mouth as needed.    Yes Provider, Historical   hydrocortisone 2.5 % ointment Apply twice daily to affected area, max 2 weeks per month 6/17/24  Yes Charito Barroso MD   LACTOBACILLUS RHAMNOSUS GG (PROBIOTIC ORAL) Take 1 capsule by mouth Daily. 1 Capsule Oral Every day   Yes Provider, Historical   losartan (COZAAR) 50 MG tablet Take 1 tablet (50 mg total) by mouth once daily. 4/16/24 4/16/25 Yes Heydi Gavin MD   lutein 20 mg Cap Take 20 mg by mouth Daily.   Yes Provider, Historical   magnesium 250 mg Tab Take 250 mg by mouth once daily.    Yes Provider, Historical   pramipexole (MIRAPEX) 0.125 MG tablet TAKE 1 TABLET EVERY DAY 1/8/25  Yes  Palmira Edouard, GALI   rosuvastatin (CRESTOR) 40 MG Tab Take 1 tablet (40 mg total) by mouth once daily. 9/2/24  Yes Heydi Gavin MD   silver sulfADIAZINE 1% (SILVADENE) 1 % cream Apply topically 2 (two) times daily. 2/15/22  Yes Ira Smith MD   ZINC ORAL Take 30 mg by mouth once daily.   Yes Provider, Historical   folic acid (FOLVITE) 1 MG tablet Take 1 tablet by mouth once daily.  Patient not taking: Reported on 2/25/2025 6/22/24 6/22/25  Provider, Historical   hydroCHLOROthiazide (MICROZIDE) 12.5 mg capsule Take 1 capsule (12.5 mg total) by mouth once daily. 3/10/25   Tyrese Villa Jr., MD   methotrexate 2.5 MG Tab Take 2.5 mg by mouth every 7 days. 4 tablets 6/22/24   Provider, Historical   metoprolol succinate (TOPROL-XL) 25 MG 24 hr tablet Take 1 tablet (25 mg total) by mouth once daily. 3/5/25   Tyrese Villa Jr., MD       Review of Systems: Complete review of systems elicited and pertinent information is in the HPI    Physical Exam:     Vitals:  There were no vitals filed for this visit.          General Appearance:  Alert, cooperative, no distress, appears stated age   Head:  Normocephalic, without obvious abnormality, atraumatic   Eyes:  No scleral icterus   Nose: Nares normal, septum midline   Throat: Lips, mucosa, and tongue normal   Neck: Supple, symmetrical, trachea midline, speaks with normal voice   Lungs:   respirations unlabored   Heart:  Regular rate    Abdomen:   Soft, non-tender, non-distended   Genitalia:  Deferred   Rectal:  Deferred    Extremities: Extremities normal, atraumatic, no cyanosis or edema   Pulses: 2+ and symmetric   Skin: No jaundice   Neurologic: No focal deficits        Results Reviewed:     Labs and imaging reviewed and interpreted by me, as refected in HPI and A&P.    Surgeon ultrasound performed in office     I performed a complete ultrasound of the neck and thyroid gland. Images saved in the media tab.  Thyroid ultrasound  consistent with the outside report, there is a 1.5 cm suspicious appearing left thyroid nodule and a benign-appearing a proximally 1 cm right thyroid nodule with no abnormal appearing adenopathy in the jugular chain.      Assessment and plan:   This is an 84-year-old woman with 1.5 cm papillary thyroid cancer on the left and a benign-appearing right thyroid nodule.  We had a discussion about the natural history of thyroid cancer and options for management.  We discussed the role of the thyroid surgery in the possibilities of lobectomy versus total thyroidectomy.  Her 1.5 cm left-sided cancer makes her a candidate for hemithyroidectomy based on current guidelines.  We discussed that doing a hemithyroidectomy could be curative of her cancer, but if final pathology shows certain high-risk features, she may need completion thyroidectomy.  We reviewed all of the technical and convalescent details of the various operations and their risks benefits and alternatives.  I provided her with a copy of the patient education materials from the [employer] of [employer].  She wants to move forward with a total thyroidectomy. She considered all of the options and, after reading about surgery, prefers a definitive approach that will lessen the risk of needing additional procedures.     We will start on side with cancer (left) and proceed to right side if nerve and parathyroids look good. If we cannot complete the thyroidectomy, we will plan for FNA in OR.       History, physical exam, laboratory, and radiographic findings were reviewed.        Cirilo Wang MD, FACS  General and Endocrine Surgery

## 2025-03-13 NOTE — H&P (VIEW-ONLY)
The patient location is: Home  The chief complaint leading to consultation is: [place]  Visit type: audiovisual    Face to Face time with patient: 20 min  36 minutes of total time spent on the encounter, which includes face to face time and non-face to face time preparing to see the patient (eg, review of tests), Obtaining and/or reviewing separately obtained history, Documenting clinical information in the electronic or other health record, Independently interpreting results (not separately reported) and communicating results to the patient/family/caregiver, or Care coordination (not separately reported).     Each patient to whom he or she provides medical services by telemedicine is:  (1) informed of the relationship between the physician and patient and the respective role of any other health care provider with respect to management of the patient; and (2) notified that he or she may decline to receive medical services by telemedicine and may withdraw from such care at any time.        [facility] History & Physical         Name: Maryann Sorenson   : 1940   MRN: [id number]      History of Present Illness       Chief Compliant:  Thyroid cancer     HPI:  Maryann Sorenson is a 84 y.o. female who presents to endocrine surgery clinic to discuss the diagnosis of thyroid cancer.  She has had biopsy of her left thyroid nodule which demonstrated evidence of papillary thyroid carcinoma.      Past Medical History:   Diagnosis Date    A-fib     Anticoagulant long-term use     Arthritis     Coronary artery disease     GERD (gastroesophageal reflux disease)     Hypertension     Preop cardiovascular exam 3/2/2025    Pulmonary hypertension 2016    Sleep apnea     Syncope      Past Surgical History:   Procedure Laterality Date    A-V CARDIAC PACEMAKER INSERTION N/A 2024    Procedure: INSERTION, CARDIAC PACEMAKER, DUAL CHAMBER;  Surgeon: Dean Anglin MD;  Location: [facility];  Service: Cardiology;  Laterality:  "N/A;  CHB, Dual PPM, BIO, MAC, DM, 3 Prep    broken wrist      "put plate in it"    DILATION AND CURETTAGE OF UTERUS      excision of lipoma Left 2009    near collar bone    EYE SURGERY Left 11/2023    biopsy of eye and removal of some vitreous fluid    tonsillectomy      TONSILLECTOMY       Family History   Problem Relation Name Age of Onset    Colon cancer Mother  95        d. 99 w/ brain mets    Hyperlipidemia Mother      Hypertension Mother      Heart disease Father      Kidney failure Father          d.89    Arthritis Sister Torrie         20+ surgeries- most 2/2 MSK problems- 80(2021)    Dementia Sister Torrie         MCI    Chronic back pain Sister Gemini         71(2021)    Heart disease Daughter Torie         d.7 yo in heart surgery; Tetrology of Fallot    No Known Problems Daughter Kyra         lives in [place]    Heart disease Son Carl         congenital; Tetralogy of Fallot, lives in , surgery at 40yo    Depression Son Roderick         lives near pt- works in , lives in [place], [place]     Patient is allergic to amoxicillin-pot clavulanate, sulfa (sulfonamide antibiotics), codeine, doxycycline, lyrica [pregabalin], macrobid [nitrofurantoin monohyd/m-cryst], prevnar [pneumococcal 7-beti conj vacc], and relafen [nabumetone].  Social Drivers of Health     Tobacco Use: Low Risk  (2/25/2025)    Patient History     Smoking Tobacco Use: Never     Smokeless Tobacco Use: Never     Passive Exposure: Not on file   Alcohol Use: Patient Declined (1/29/2025)    AUDIT-C     Frequency of Alcohol Consumption: Patient declined     Average Number of Drinks: Patient declined     Frequency of Binge Drinking: Patient declined   Financial Resource Strain: Patient Declined (1/29/2025)    Overall Financial Resource Strain (CARDIA)     Difficulty of Paying Living Expenses: Patient declined   Food Insecurity: Patient Declined (1/29/2025)    Hunger Vital Sign     Worried About Running Out of Food in the Last Year: Patient " declined     Ran Out of Food in the Last Year: Patient declined   Transportation Needs: No Transportation Needs (1/16/2020)    PRAPARE - Transportation     Lack of Transportation (Medical): No     Lack of Transportation (Non-Medical): No   Physical Activity: Unknown (1/29/2025)    Exercise Vital Sign     Days of Exercise per Week: Patient declined     Minutes of Exercise per Session: Not on file   Stress: Patient Declined (1/29/2025)    [place] [employer] - Occupational Stress Questionnaire     Feeling of Stress : Patient declined   Housing Stability: Unknown (1/29/2025)    Housing Stability Vital Sign     Unable to Pay for Housing in the Last Year: Patient declined     Number of Times Moved in the Last Year: Not on file     Homeless in the Last Year: Not on file   Depression: Low Risk  (2/24/2025)    Depression     Last PHQ-4: Flowsheet Data: 0   Utilities: Patient Declined (1/29/2025)    [facility] Utilities     Threatened with loss of utilities: Patient declined   Health Literacy: Patient Declined (1/29/2025)     Health Literacy     Frequency of need for help with medical instructions: Patient declines to respond   Social Isolation: Not on file        Home Medications:   Prior to Admission medications    Medication Sig Start Date End Date Taking? Authorizing Provider   amLODIPine (NORVASC) 5 MG tablet Take 1 tablet (5 mg total) by mouth once daily. 6/19/24  Yes Heydi Gavin MD   apixaban (ELIQUIS) 5 mg Tab Take 1 tablet (5 mg total) by mouth 2 (two) times daily. 11/11/24  Yes Heydi Gavin MD   ascorbate calcium-bioflavonoid (FRANCISCO-C WITH BIOFLAVONOIDS) 500-200 mg Tab Take 1 tablet by mouth 2 (two) times a day.   Yes Provider, Historical   calcium carbonate-vitamin D3 600 mg (1,500 mg)-800 unit Chew Take 1 tablet by mouth once daily.   Yes Provider, Historical   celecoxib (CELEBREX) 100 MG capsule Take 1 capsule (100 mg total) by mouth once daily. Take with food and 8 oz  liquid 12/6/24  Yes Iar Smith MD   cholecalciferol, vitamin D3, (VITAMIN D3) 50 mcg (2,000 unit) Tab Take by mouth once daily.   Yes Provider, Historical   coenzyme Q10 200 mg capsule Take 1 capsule by mouth Daily. 1 Capsule Oral Every day   Yes Provider, Historical   empagliflozin (JARDIANCE) 25 mg tablet Take 1 tablet (25 mg total) by mouth once daily. 11/26/24  Yes Ira Smith MD   esomeprazole (NEXIUM) 40 MG capsule 1 Capsule, Delayed Release(E.C.) Oral Every day 2/20/24  Yes Ira Smith MD   ezetimibe (ZETIA) 10 mg tablet Take 1 tablet (10 mg total) by mouth once daily. 11/25/24 11/25/25 Yes Tyrese Villa Jr., MD   fexofenadine (ALLEGRA) 180 MG tablet Take 1 tablet by mouth Daily. 1 Tablet Oral Every day   Yes Provider, Historical   flecainide (TAMBOCOR) 100 MG Tab Take 1 tablet (100 mg total) by mouth every 12 (twelve) hours. 1/30/25 1/30/26 Yes Aidan Mclean MD   fluticasone propionate (FLONASE) 50 mcg/actuation nasal spray 1 spray each nostril twice a day; total 48 g - please provide 3 months supply. 12/9/24  Yes Katja Recinos MD   gabapentin (NEURONTIN) 300 MG capsule 1 pill PO TID. 90 days supply 8/13/24  Yes Katja Recinos MD   guaiFENesin (MUCINEX) 600 mg 12 hr tablet Take 1,200 mg by mouth as needed.    Yes Provider, Historical   hydrocortisone 2.5 % ointment Apply twice daily to affected area, max 2 weeks per month 6/17/24  Yes Charito Barroso MD   LACTOBACILLUS RHAMNOSUS GG (PROBIOTIC ORAL) Take 1 capsule by mouth Daily. 1 Capsule Oral Every day   Yes Provider, Historical   losartan (COZAAR) 50 MG tablet Take 1 tablet (50 mg total) by mouth once daily. 4/16/24 4/16/25 Yes Heydi Gavin MD   lutein 20 mg Cap Take 20 mg by mouth Daily.   Yes Provider, Historical   magnesium 250 mg Tab Take 250 mg by mouth once daily.    Yes Provider, Historical   pramipexole (MIRAPEX) 0.125 MG tablet TAKE 1 TABLET EVERY DAY 1/8/25  Yes  Palmira Edouard, GALI   rosuvastatin (CRESTOR) 40 MG Tab Take 1 tablet (40 mg total) by mouth once daily. 9/2/24  Yes Heydi Gavin MD   silver sulfADIAZINE 1% (SILVADENE) 1 % cream Apply topically 2 (two) times daily. 2/15/22  Yes Ira Smith MD   ZINC ORAL Take 30 mg by mouth once daily.   Yes Provider, Historical   folic acid (FOLVITE) 1 MG tablet Take 1 tablet by mouth once daily.  Patient not taking: Reported on 2/25/2025 6/22/24 6/22/25  Provider, Historical   hydroCHLOROthiazide (MICROZIDE) 12.5 mg capsule Take 1 capsule (12.5 mg total) by mouth once daily. 3/10/25   Tyrese Villa Jr., MD   methotrexate 2.5 MG Tab Take 2.5 mg by mouth every 7 days. 4 tablets 6/22/24   Provider, Historical   metoprolol succinate (TOPROL-XL) 25 MG 24 hr tablet Take 1 tablet (25 mg total) by mouth once daily. 3/5/25   Tyrese Villa Jr., MD       Review of Systems: Complete review of systems elicited and pertinent information is in the HPI    Physical Exam:     Vitals:  There were no vitals filed for this visit.          General Appearance:  Alert, cooperative, no distress, appears stated age   Head:  Normocephalic, without obvious abnormality, atraumatic   Eyes:  No scleral icterus   Nose: Nares normal, septum midline   Throat: Lips, mucosa, and tongue normal   Neck: Supple, symmetrical, trachea midline, speaks with normal voice   Lungs:   respirations unlabored   Heart:  Regular rate    Abdomen:   Soft, non-tender, non-distended   Genitalia:  Deferred   Rectal:  Deferred    Extremities: Extremities normal, atraumatic, no cyanosis or edema   Pulses: 2+ and symmetric   Skin: No jaundice   Neurologic: No focal deficits        Results Reviewed:     Labs and imaging reviewed and interpreted by me, as refected in HPI and A&P.    Surgeon ultrasound performed in office     I performed a complete ultrasound of the neck and thyroid gland. Images saved in the media tab.  Thyroid ultrasound  consistent with the outside report, there is a 1.5 cm suspicious appearing left thyroid nodule and a benign-appearing a proximally 1 cm right thyroid nodule with no abnormal appearing adenopathy in the jugular chain.      Assessment and plan:   This is an 84-year-old woman with 1.5 cm papillary thyroid cancer on the left and a benign-appearing right thyroid nodule.  We had a discussion about the natural history of thyroid cancer and options for management.  We discussed the role of the thyroid surgery in the possibilities of lobectomy versus total thyroidectomy.  Her 1.5 cm left-sided cancer makes her a candidate for hemithyroidectomy based on current guidelines.  We discussed that doing a hemithyroidectomy could be curative of her cancer, but if final pathology shows certain high-risk features, she may need completion thyroidectomy.  We reviewed all of the technical and convalescent details of the various operations and their risks benefits and alternatives.  I provided her with a copy of the patient education materials from the [employer].  She wants to move forward with a total thyroidectomy. She considered all of the options and, after reading about surgery, prefers a definitive approach that will lessen the risk of needing additional procedures.     We will start on side with cancer (left) and proceed to right side if nerve and parathyroids look good. If we cannot complete the thyroidectomy, we will plan for FNA in OR.       History, physical exam, laboratory, and radiographic findings were reviewed.        Cirilo Wang MD, FACS  General and Endocrine Surgery

## 2025-03-17 ENCOUNTER — PATIENT MESSAGE (OUTPATIENT)
Dept: INTERNAL MEDICINE | Facility: CLINIC | Age: 85
End: 2025-03-17
Payer: MEDICARE

## 2025-03-18 RX ORDER — ESOMEPRAZOLE MAGNESIUM 40 MG/1
CAPSULE, DELAYED RELEASE ORAL
Qty: 90 CAPSULE | Refills: 3 | Status: SHIPPED | OUTPATIENT
Start: 2025-03-18

## 2025-03-20 ENCOUNTER — PATIENT MESSAGE (OUTPATIENT)
Dept: PREADMISSION TESTING | Facility: HOSPITAL | Age: 85
End: 2025-03-20

## 2025-03-20 ENCOUNTER — HOSPITAL ENCOUNTER (OUTPATIENT)
Dept: PREADMISSION TESTING | Facility: HOSPITAL | Age: 85
Discharge: HOME | End: 2025-03-20
Attending: NURSE PRACTITIONER
Payer: MEDICARE

## 2025-03-20 NOTE — ANESTHESIA PREPROCEDURE EVALUATION
"                                                                                                             03/20/2025  Maryann Sorenson is a 84 y.o., female scheduled for THYROIDECTOMY (Left: Neck) 3/31/25.    Per cardiology Dr. Herndon, "Elevated periop risk of CV events for non-high risk procedure. Ok to proceed the scheduled procedure without further cardiac study".    Past Medical History:   Diagnosis Date    A-fib     Anticoagulant long-term use     Arthritis     Coronary artery disease     GERD (gastroesophageal reflux disease)     Hypertension     Preop cardiovascular exam 3/2/2025    Pulmonary hypertension 2016    Sleep apnea     Syncope      Past Surgical History:   Procedure Laterality Date    A-V CARDIAC PACEMAKER INSERTION N/A 2/9/2024    Procedure: INSERTION, CARDIAC PACEMAKER, DUAL CHAMBER;  Surgeon: Dean Anglin MD;  Location: [facility] EP LAB;  Service: Cardiology;  Laterality: N/A;  CHB, Dual PPM, BIO, MAC, DM, 3 Prep    broken wrist      "put plate in it"    DILATION AND CURETTAGE OF UTERUS      excision of lipoma Left 2009    near collar bone    EYE SURGERY Left 11/2023    biopsy of eye and removal of some vitreous fluid    tonsillectomy      TONSILLECTOMY       Review of patient's allergies indicates:   Allergen Reactions    Amoxicillin-pot clavulanate Diarrhea     Given march 2016    Sulfa (sulfonamide antibiotics) Rash    Codeine      Other reaction(s): Unknown, tolerates hydrocodone june 2014    Doxycycline Nausea Only    Lyrica [pregabalin] Other (See Comments)     Blurry vision    Macrobid [nitrofurantoin monohyd/m-cryst] Other (See Comments)      2019 caused cramps in legs. After stopped medication it cleared.     Prevnar [pneumococcal 7-beti conj vacc] Other (See Comments)     Arm sore, redness at injection site and muscle aches. Given shot 7/23/19    Relafen [nabumetone] Diarrhea     Current Outpatient Medications   Medication Instructions    amLODIPine (NORVASC) 5 mg, Oral, Daily    apixaban " "(ELIQUIS) 5 mg, Oral, 2 times daily    ascorbate calcium-bioflavonoid (FRANCISCO-C WITH BIOFLAVONOIDS) 500-200 mg Tab 1 tablet, 2 times daily    calcium carbonate-vitamin D3 600 mg (1,500 mg)-800 unit Chew 1 tablet, Daily    celecoxib (CELEBREX) 100 mg, Oral, Daily, Take with food and 8 oz liquid    cholecalciferol, vitamin D3, (VITAMIN D3) 50 mcg (2,000 unit) Tab Daily    coenzyme Q10 200 mg capsule 1 capsule, Daily    empagliflozin (JARDIANCE) 25 mg, Oral, Daily    esomeprazole (NEXIUM) 40 MG capsule 1 Capsule, Delayed Release(E.C.) Oral Every day    ezetimibe (ZETIA) 10 mg, Oral, Daily    fexofenadine (ALLEGRA) 180 MG tablet 1 tablet, Daily    flecainide (TAMBOCOR) 100 mg, Oral, Every 12 hours    fluticasone propionate (FLONASE) 50 mcg/actuation nasal spray 1 spray each nostril twice a day; total 48 g - please provide 3 months supply.    folic acid (FOLVITE) 1 MG tablet 1 tablet, Daily    gabapentin (NEURONTIN) 300 MG capsule 1 pill PO TID. 90 days supply    guaiFENesin (MUCINEX) 1,200 mg, As needed (PRN)    hydroCHLOROthiazide (MICROZIDE) 12.5 mg, Oral, Daily    hydrocortisone 2.5 % ointment Apply twice daily to affected area, max 2 weeks per month    LACTOBACILLUS RHAMNOSUS GG (PROBIOTIC ORAL) 1 capsule, Daily    losartan (COZAAR) 50 mg, Oral, Daily    lutein 20 mg, Daily    magnesium 250 mg, Daily    methotrexate 2.5 mg, Oral, Every 7 days, 4 tablets    metoprolol succinate (TOPROL-XL) 25 mg, Oral, Daily    pramipexole (MIRAPEX) 0.125 MG tablet TAKE 1 TABLET EVERY DAY    rosuvastatin (CRESTOR) 40 mg, Oral, Daily    silver sulfADIAZINE 1% (SILVADENE) 1 % cream Topical (Top), 2 times daily    ZINC ORAL 30 mg, Daily       Pre-op Assessment    I have reviewed the Patient Summary Reports.     I have reviewed the Nursing Notes. I have reviewed the NPO Status.   I have reviewed the Medications.     Review of Systems  Anesthesia Hx:              Personal Hx of Anesthesia complications (9/6/2023 colonoscopy "Repeated ecg " "pauses of asystole. Spontaneously resolved. Aborted procedure. Cardiology to follow")                    Social:  Non-Smoker, No Alcohol Use       Hematology/Oncology:       -- Anemia:                                  Cardiovascular:  Exercise tolerance: good  Pacemaker Hypertension   CAD    Dysrhythmias (complete heart block) atrial fibrillation  Denies Angina.         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  Denies CVA. Neuromuscular Disease,   Denies Seizures.                             Movement Disorder Dx, Restless Leg Syndrome   Endocrine:  Diabetes (prediabetic), type 2         Obesity / BMI > 30      Physical Exam  General: Cooperative, Oriented and Alert    Airway:  Mallampati: II   Mouth Opening: Normal  TM Distance: Normal  Tongue: Normal  Neck ROM: Normal ROM    Dental:  Intact      Lab Results   Component Value Date    WBC 5.00 02/08/2025    HGB 11.6 (L) 02/08/2025    HCT 34.3 (L) 02/08/2025     02/08/2025    CHOL 150 09/10/2024    TRIG 88 09/10/2024    HDL 59 09/10/2024    ALT 42 02/08/2025    AST 56 (H) 02/08/2025     (L) 02/08/2025    K 3.9 02/08/2025    CL 97 02/08/2025    CREATININE 0.84 02/08/2025    BUN 23 (H) 02/08/2025    CO2 24 02/08/2025    TSH 3.410 09/10/2024    INR 1.0 02/02/2024    HGBA1C 6.0 (H) 09/10/2024     Results for orders placed or performed during the hospital encounter of 02/08/25   EKG 12-lead    Collection Time: 02/08/25  9:02 PM   Result Value Ref Range    QRS Duration 94 ms    OHS QTC Calculation 377 ms    Narrative    Test Reason : R00.2,    Vent. Rate :  87 BPM     Atrial Rate : 108 BPM     " P-R Int :    ms          QRS Dur :  94 ms      QT Int : 314 ms       P-R-T Axes :     50 -67 degrees    QTcB Int : 377 ms    Undetermined rhythm  intermittent paced rhythm   Cannot rule out Anterior infarct ST and T wave abnormality, consider  inferolateral ischemia  Abnormal ECG  When compared with ECG of 30-Dec-2024 12:32,  Significant changes have occurred  Confirmed by Ole Franklin ([id number]) on 2/12/2025 7:11:19 AM    Referred By: AAAREFERRAL SELF           Confirmed By: Ole Franklin     Cardiac Implanted Device  Type: Pacemaker  : Biotronik  Primary indication for placement: syncope and collapse, complete heart block  Pacing-dependent:   Current settings: Mode: DDDR  Lower Rate: 50 bpm  Upper Tracking Rate: 130 bpm  Upper Sensor Rate: 120 bpm  Paced AV Delay: 180 ms  Sensed AV Delay: 160 ms  RV Output: 3 Volts @ 0.0 ms  Most recent interrogation: 2/11/25  Presenting EGM Ap - .  Battery status is OK.  Measured values in normal range; small decrease in R waves to 3.0  (was near 5.0mV last year)  0% AT/AF [place]utz.  Anticoagulation noted in chart.  Since last transmission  Patient had 1 episode of high ventricular rate lasting 8 seconds on  02/02/2025     EXAMINATION:  XR CHEST AP PORTABLE    CLINICAL HISTORY:  Chest Pain;    TECHNIQUE:  Single frontal view of the chest was performed.    COMPARISON:  None    FINDINGS:  Prominent cardiac silhouette. Mild central pulmonary vascular crowding. Left chest pacemaker. No acute process seen.    .    Bones are intact.    Impression:    As above  Electronically signed by: Luis Hopper  Date: 02/08/2025     Cardiac PET Scan Stress with CT 12/30/24    The myocardial perfusion images show no evidence of ischemia or scar.    The whole heart absolute myocardial perfusion values were reduced at rest, moderately reduced during stress and CFR is mildly reduced.    CT attenuation images demonstrate moderate diffuse coronary calcifications in the LAD territory and  moderate diffuse aortic calcifications in the descending aorta.    The stress flow is reduced diffusely throughout the heart consistent with the presence of CAD, but above ischemic thresholds.    The gated perfusion images showed an ejection fraction of 75% at rest and 86% during stress. A normal ejection fraction is greater than 47%.    There is normal wall motion at rest and normal wall motion during stress.    The study's ECG is negative for ischemia.      Anesthesia Plan  Type of Anesthesia, risks & benefits discussed:    Anesthesia Type: Gen ETT  Intra-op Monitoring Plan: Standard ASA Monitors  Post Op Pain Control Plan: multimodal analgesia and IV/PO Opioids PRN  Induction:  IV  Informed Consent: Informed consent signed with the Patient and all parties understand the risks and agree with anesthesia plan.  All questions answered.   ASA Score: 3  Day of Surgery Review of History & Physical: H&P Update referred to the surgeon/provider.    Ready For Surgery From Anesthesia Perspective.     .

## 2025-03-20 NOTE — PRE-PROCEDURE INSTRUCTIONS
Sister.    Allergies, medical, surgical, family and psychosocial histories reviewed with patient. Periop plan of care reviewed. Preop instructions given, including medications to take and to hold. Hibiclens soap and instructions on use given. Time allotted for questions to be addressed.      Arrival time 1100.      Please take Amlodipine, Losartan, Metoprolol, and Nexium the morning of surgery. Stop Eliquis 2 days prior to surgery.  Stop Jardiance 3 days prior to surgery.     Surgery instructions reviewed and surgery booklet sent or emailed to the patient via the portal.

## 2025-03-20 NOTE — DISCHARGE INSTRUCTIONS
Your surgery is scheduled for 3/31/25.    Please report to Hospital Front Lobby on the 1st Floor at 1100 a.m.    THIS TIME IS SUBJECT TO CHANGE.  YOU WILL RECEIVE A PHONE CALL THE DAY BEFORE SURGERY BY 3:30 PM TO CONFIRM YOUR TIME OF ARRIVAL.  IF YOU HAVE NOT RECEIVED A PHONE CALL BY 3:30 PM THE DAY BEFORE YOUR SURGERY PLEASE CALL [phone]     INSTRUCTIONS IMPORTANT!!!  ¨Stop full meals 8 hours prior to arrival, but you can consume clear liquids up to 2 hours prior to arrival time. Clear liquids include Gatorade, water, soda, black coffee or tea (no milk or creamer), and clear juices only.       Please take Amlodipine, Losartan, Metoprolol, and Nexium the morning of surgery. Stop Eliquis 2 days prior to surgery.  Stop Jardiance 3 days prior to surgery.         ____  Do not wear makeup, including mascara.  ____  No powder, lotions or creams to surgical area.  ____  Please remove all jewelry, including piercings and leave at home.  ____  No money or valuables needed. Please leave at home.  ____  Please bring any documents given by your doctor.  ____  If going home the same day, arrange for a ride home. You will not be able to             drive if Anesthesia was used.  ____  Children under 18 years require a parent / guardian present the entire time             they are in surgery / recovery.  ____  Wear loose fitting clothing. Allow for dressings, bandages.  ____  Stop Aspirin and blood thinners as directed by prescribing provider.  ____  Do not take any herbal, vitamins, and or supplements 2 weeks prior to surgery.  ____  Stop NSAIDS (Naproxen, Aleve, Voltaren, Celebrex, Melxoicam), Goody's, and BC powder 7 days prior to surgery.  ____  Wash the surgical area with Hibiclens or Dial Antibacterial bar soap the night before surgery, and again the             morning of surgery.  Be sure to rinse hibiclens or Dial Antibacterial bar soap off completely (if instructed by   nurse).  ____  If you take diabetic  medication including Metformin, Glimepiride, Glipizide, Glyburide, Byetta, Januvia, Actos, do not take am of surgery unless            instructed by Doctor.  ____  Call MD for temperature above 101 degrees or any other signs of infection such as Urinary (bladder) infection, Upper respiratory infection, skin boils,             etc.  ____ Do Not wear your contact lenses the day of your procedure.  You may wear your glasses.      ____ Do not shave surgical site for 3 days prior to surgery.  ____ Practice Good hand washing before, during, and after procedure.  ____ Hold the following medication for 3 days prior to surgery:    Invokana (Canagliflozin)     Synjardy XR (jardiance + metformin)  Farxiga (Dapagliflozin)     Segluroment (steglarto + metformin)  Jardiance (Empagliflozin)     Qtern (farxiga + saxagliptin)   Steglatro (Ertugliflozin)     Glyxambi (jardiance + linagliptin)  Benzavvy (Bexagliflozin)     Steglujan (steglarto + sitagliptin)  Inpefa (Sotagliflozin)      Xigduo (farxiga + metformin)   Invokamet/Invokamet XR (invokana + Metformin)       I have read or had read and explained to me, and understand the above information.  Additional comments or instructions:  For additional questions call [phone]      ANESTHESIA SIDE EFFECTS  -For the first 24 hours after surgery:  Do not drive, use heavy equipment, make important decisions, or drink alcohol  -It is normal to feel sleepy for several hours.  Rest until you are more awake.  -Have someone stay with you, if needed.  They can watch for problems and help keep you safe.  -Some possible post anesthesia side effects include: nausea and vomiting, sore throat and hoarseness, sleepiness, and dizziness.        Pre-Op Bathing Instructions    Before surgery, you can play an important role in your own health.    Because skin is not sterile, we need to be sure that your skin is as free of germs as possible. By following the instructions below, you can reduce the number of  germs on your skin before surgery.    IMPORTANT: You will need to shower with a special soap called Hibiclens*, available at any pharmacy.  If you are allergic to Chlorhexidine (the antiseptic in Hibiclens), use an antibacterial soap such as Dial Soap for your preoperative shower.  You will shower with Hibiclens both the night before your surgery and the morning of your surgery.  Do not use Hibiclens on the head, face or genitals to avoid injury to those areas.    STEP #1: THE NIGHT BEFORE YOUR SURGERY     Do not shave the area of your body where your surgery will be performed.  Shower and wash your hair and body as usual with your normal soap and shampoo.  Rinse your hair and body thoroughly after you shower to remove all soap residue.  With your hand, apply one packet of Hibiclens soap to the surgical site.   Wash the site gently for five (5) minutes. Do not scrub your skin too hard.   Do not wash with your regular soap after Hibiclens is used.  Rinse your body thoroughly.  Pat yourself dry with a clean, soft towel.  Do not use lotion, cream, or powder.  Wear clean clothes.    STEP #2: THE MORNING OF YOUR SURGERY     Repeat Step #1.    * Not to be used by people allergic to Chlorhexidine.

## 2025-03-31 ENCOUNTER — ANESTHESIA (OUTPATIENT)
Dept: SURGERY | Facility: HOSPITAL | Age: 85
End: 2025-03-31
Payer: MEDICARE

## 2025-03-31 ENCOUNTER — HOSPITAL ENCOUNTER (OUTPATIENT)
Facility: HOSPITAL | Age: 85
Discharge: HOME | End: 2025-03-31
Attending: STUDENT IN AN ORGANIZED HEALTH CARE EDUCATION/TRAINING PROGRAM | Admitting: STUDENT IN AN ORGANIZED HEALTH CARE EDUCATION/TRAINING PROGRAM
Payer: MEDICARE

## 2025-03-31 VITALS
HEART RATE: 60 BPM | OXYGEN SATURATION: 97 % | RESPIRATION RATE: 17 BRPM | HEIGHT: 63 IN | SYSTOLIC BLOOD PRESSURE: 139 MMHG | WEIGHT: 176 LBS | DIASTOLIC BLOOD PRESSURE: 68 MMHG | TEMPERATURE: 97 F | BODY MASS INDEX: 31.18 KG/M2

## 2025-03-31 DIAGNOSIS — C73 THYROID CANCER: ICD-10-CM

## 2025-03-31 LAB — POCT GLUCOSE: 121 MG/DL (ref 70–110)

## 2025-03-31 PROCEDURE — 71000015 HC POSTOP RECOV 1ST HR: Mod: HCNC | Performed by: STUDENT IN AN ORGANIZED HEALTH CARE EDUCATION/TRAINING PROGRAM

## 2025-03-31 PROCEDURE — 37000009 HC ANESTHESIA EA ADD 15 MINS: Mod: HCNC | Performed by: STUDENT IN AN ORGANIZED HEALTH CARE EDUCATION/TRAINING PROGRAM

## 2025-03-31 PROCEDURE — 36000706: Mod: HCNC | Performed by: STUDENT IN AN ORGANIZED HEALTH CARE EDUCATION/TRAINING PROGRAM

## 2025-03-31 PROCEDURE — 60220 PARTIAL REMOVAL OF THYROID: CPT | Mod: HCNC,,, | Performed by: STUDENT IN AN ORGANIZED HEALTH CARE EDUCATION/TRAINING PROGRAM

## 2025-03-31 PROCEDURE — 25000003 PHARM REV CODE 250: Mod: HCNC | Performed by: STUDENT IN AN ORGANIZED HEALTH CARE EDUCATION/TRAINING PROGRAM

## 2025-03-31 PROCEDURE — 71000016 HC POSTOP RECOV ADDL HR: Mod: HCNC | Performed by: STUDENT IN AN ORGANIZED HEALTH CARE EDUCATION/TRAINING PROGRAM

## 2025-03-31 PROCEDURE — 88307 TISSUE EXAM BY PATHOLOGIST: CPT | Mod: 26,HCNC,, | Performed by: STUDENT IN AN ORGANIZED HEALTH CARE EDUCATION/TRAINING PROGRAM

## 2025-03-31 PROCEDURE — 63600175 PHARM REV CODE 636 W HCPCS: Mod: HCNC | Performed by: STUDENT IN AN ORGANIZED HEALTH CARE EDUCATION/TRAINING PROGRAM

## 2025-03-31 PROCEDURE — 27201423 OPTIME MED/SURG SUP & DEVICES STERILE SUPPLY: Mod: HCNC | Performed by: STUDENT IN AN ORGANIZED HEALTH CARE EDUCATION/TRAINING PROGRAM

## 2025-03-31 PROCEDURE — 71000033 HC RECOVERY, INTIAL HOUR: Mod: HCNC | Performed by: STUDENT IN AN ORGANIZED HEALTH CARE EDUCATION/TRAINING PROGRAM

## 2025-03-31 PROCEDURE — 36000707: Mod: HCNC | Performed by: STUDENT IN AN ORGANIZED HEALTH CARE EDUCATION/TRAINING PROGRAM

## 2025-03-31 PROCEDURE — 37000008 HC ANESTHESIA 1ST 15 MINUTES: Mod: HCNC | Performed by: STUDENT IN AN ORGANIZED HEALTH CARE EDUCATION/TRAINING PROGRAM

## 2025-03-31 PROCEDURE — 76998 US GUIDE INTRAOP: CPT | Mod: 26,,, | Performed by: STUDENT IN AN ORGANIZED HEALTH CARE EDUCATION/TRAINING PROGRAM

## 2025-03-31 PROCEDURE — 25000003 PHARM REV CODE 250: Mod: HCNC

## 2025-03-31 PROCEDURE — 63600175 PHARM REV CODE 636 W HCPCS: Mod: HCNC

## 2025-03-31 PROCEDURE — 88307 TISSUE EXAM BY PATHOLOGIST: CPT | Mod: TC,HCNC | Performed by: STUDENT IN AN ORGANIZED HEALTH CARE EDUCATION/TRAINING PROGRAM

## 2025-03-31 RX ORDER — SODIUM CHLORIDE 9 MG/ML
INJECTION, SOLUTION INTRAVENOUS CONTINUOUS
Status: DISCONTINUED | OUTPATIENT
Start: 2025-03-31 | End: 2025-03-31 | Stop reason: HOSPADM

## 2025-03-31 RX ORDER — LIDOCAINE HYDROCHLORIDE 20 MG/ML
INJECTION INTRAVENOUS
Status: DISCONTINUED | OUTPATIENT
Start: 2025-03-31 | End: 2025-03-31

## 2025-03-31 RX ORDER — FENTANYL CITRATE 50 UG/ML
INJECTION, SOLUTION INTRAMUSCULAR; INTRAVENOUS
Status: DISCONTINUED | OUTPATIENT
Start: 2025-03-31 | End: 2025-03-31

## 2025-03-31 RX ORDER — LIDOCAINE HYDROCHLORIDE AND EPINEPHRINE 10; 10 UG/ML; MG/ML
INJECTION, SOLUTION INFILTRATION; PERINEURAL
Status: DISCONTINUED | OUTPATIENT
Start: 2025-03-31 | End: 2025-03-31 | Stop reason: HOSPADM

## 2025-03-31 RX ORDER — CEFAZOLIN SODIUM 1 G/3ML
INJECTION, POWDER, FOR SOLUTION INTRAMUSCULAR; INTRAVENOUS
Status: DISCONTINUED | OUTPATIENT
Start: 2025-03-31 | End: 2025-03-31

## 2025-03-31 RX ORDER — PHENYLEPHRINE HYDROCHLORIDE 10 MG/ML
INJECTION INTRAVENOUS
Status: DISCONTINUED | OUTPATIENT
Start: 2025-03-31 | End: 2025-03-31

## 2025-03-31 RX ORDER — ONDANSETRON 8 MG/1
8 TABLET, ORALLY DISINTEGRATING ORAL EVERY 8 HOURS PRN
Status: DISCONTINUED | OUTPATIENT
Start: 2025-03-31 | End: 2025-03-31 | Stop reason: HOSPADM

## 2025-03-31 RX ORDER — ACETAMINOPHEN 325 MG/1
650 TABLET ORAL EVERY 4 HOURS PRN
Status: DISCONTINUED | OUTPATIENT
Start: 2025-03-31 | End: 2025-03-31 | Stop reason: HOSPADM

## 2025-03-31 RX ORDER — CEFAZOLIN 2 G/1
2 INJECTION, POWDER, FOR SOLUTION INTRAMUSCULAR; INTRAVENOUS
Status: DISCONTINUED | OUTPATIENT
Start: 2025-03-31 | End: 2025-03-31 | Stop reason: HOSPADM

## 2025-03-31 RX ORDER — PROPOFOL 10 MG/ML
VIAL (ML) INTRAVENOUS
Status: DISCONTINUED | OUTPATIENT
Start: 2025-03-31 | End: 2025-03-31

## 2025-03-31 RX ORDER — ONDANSETRON HYDROCHLORIDE 2 MG/ML
INJECTION, SOLUTION INTRAVENOUS
Status: DISCONTINUED | OUTPATIENT
Start: 2025-03-31 | End: 2025-03-31

## 2025-03-31 RX ORDER — OXYCODONE HYDROCHLORIDE 5 MG/1
5 TABLET ORAL EVERY 4 HOURS PRN
Status: DISCONTINUED | OUTPATIENT
Start: 2025-03-31 | End: 2025-03-31 | Stop reason: HOSPADM

## 2025-03-31 RX ORDER — ACETAMINOPHEN 500 MG
1000 TABLET ORAL ONCE
Status: COMPLETED | OUTPATIENT
Start: 2025-03-31 | End: 2025-03-31

## 2025-03-31 RX ORDER — SUCCINYLCHOLINE CHLORIDE 20 MG/ML
INJECTION INTRAMUSCULAR; INTRAVENOUS
Status: DISCONTINUED | OUTPATIENT
Start: 2025-03-31 | End: 2025-03-31

## 2025-03-31 RX ORDER — VASOPRESSIN 20 [USP'U]/ML
INJECTION, SOLUTION INTRAMUSCULAR; SUBCUTANEOUS
Status: DISCONTINUED | OUTPATIENT
Start: 2025-03-31 | End: 2025-03-31

## 2025-03-31 RX ORDER — PROCHLORPERAZINE EDISYLATE 5 MG/ML
5 INJECTION INTRAMUSCULAR; INTRAVENOUS EVERY 6 HOURS PRN
Status: DISCONTINUED | OUTPATIENT
Start: 2025-03-31 | End: 2025-03-31 | Stop reason: HOSPADM

## 2025-03-31 RX ORDER — DEXAMETHASONE SODIUM PHOSPHATE 4 MG/ML
INJECTION, SOLUTION INTRA-ARTICULAR; INTRALESIONAL; INTRAMUSCULAR; INTRAVENOUS; SOFT TISSUE
Status: DISCONTINUED | OUTPATIENT
Start: 2025-03-31 | End: 2025-03-31

## 2025-03-31 RX ADMIN — PROPOFOL 50 MG: 10 INJECTION, EMULSION INTRAVENOUS at 03:03

## 2025-03-31 RX ADMIN — PROPOFOL 30 MG: 10 INJECTION, EMULSION INTRAVENOUS at 03:03

## 2025-03-31 RX ADMIN — OXYCODONE 5 MG: 5 TABLET ORAL at 04:03

## 2025-03-31 RX ADMIN — ONDANSETRON 4 MG: 2 INJECTION, SOLUTION INTRAMUSCULAR; INTRAVENOUS at 03:03

## 2025-03-31 RX ADMIN — SODIUM CHLORIDE: 0.9 INJECTION, SOLUTION INTRAVENOUS at 03:03

## 2025-03-31 RX ADMIN — VASOPRESSIN 1 UNITS: 20 INJECTION INTRAVENOUS at 03:03

## 2025-03-31 RX ADMIN — ONDANSETRON 4 MG: 2 INJECTION, SOLUTION INTRAMUSCULAR; INTRAVENOUS at 02:03

## 2025-03-31 RX ADMIN — PHENYLEPHRINE HYDROCHLORIDE 200 MCG: 10 INJECTION INTRAVENOUS at 04:03

## 2025-03-31 RX ADMIN — PHENYLEPHRINE HYDROCHLORIDE 200 MCG: 10 INJECTION INTRAVENOUS at 03:03

## 2025-03-31 RX ADMIN — DEXAMETHASONE SODIUM PHOSPHATE 4 MG: 4 INJECTION, SOLUTION INTRA-ARTICULAR; INTRALESIONAL; INTRAMUSCULAR; INTRAVENOUS; SOFT TISSUE at 02:03

## 2025-03-31 RX ADMIN — ACETAMINOPHEN 1000 MG: 500 TABLET ORAL at 11:03

## 2025-03-31 RX ADMIN — PHENYLEPHRINE HYDROCHLORIDE 100 MCG: 10 INJECTION INTRAVENOUS at 02:03

## 2025-03-31 RX ADMIN — SODIUM CHLORIDE: 0.9 INJECTION, SOLUTION INTRAVENOUS at 02:03

## 2025-03-31 RX ADMIN — VASOPRESSIN 0.5 UNITS: 20 INJECTION INTRAVENOUS at 03:03

## 2025-03-31 RX ADMIN — CEFAZOLIN 2 G: 330 INJECTION, POWDER, FOR SOLUTION INTRAMUSCULAR; INTRAVENOUS at 02:03

## 2025-03-31 RX ADMIN — SUCCINYLCHOLINE CHLORIDE 120 MG: 20 INJECTION, SOLUTION INTRAMUSCULAR; INTRAVENOUS at 02:03

## 2025-03-31 RX ADMIN — PHENYLEPHRINE HYDROCHLORIDE 100 MCG: 10 INJECTION INTRAVENOUS at 03:03

## 2025-03-31 RX ADMIN — PHENYLEPHRINE HYDROCHLORIDE 200 MCG: 10 INJECTION INTRAVENOUS at 02:03

## 2025-03-31 RX ADMIN — FENTANYL CITRATE 100 MCG: 50 INJECTION INTRAMUSCULAR; INTRAVENOUS at 02:03

## 2025-03-31 RX ADMIN — PHENYLEPHRINE HYDROCHLORIDE 0.5 MCG/KG/MIN: 10 INJECTION INTRAVENOUS at 03:03

## 2025-03-31 RX ADMIN — LIDOCAINE HYDROCHLORIDE 100 MG: 20 INJECTION, SOLUTION INTRAVENOUS at 02:03

## 2025-03-31 RX ADMIN — VASOPRESSIN 1 UNITS: 20 INJECTION INTRAVENOUS at 02:03

## 2025-03-31 RX ADMIN — PROPOFOL 120 MG: 10 INJECTION, EMULSION INTRAVENOUS at 02:03

## 2025-03-31 RX ADMIN — ACETAMINOPHEN 650 MG: 325 TABLET ORAL at 04:03

## 2025-03-31 NOTE — OP NOTE
Op Note    Name: Maryann Sorenson  MRN: [id number]  Date: 3/31/2025    Preoperative diagnosis:  Papillary thyroid cancer, left    Postoperative diagnosis: Same    Anesthesia:  General    Assistants: R2    Indications:  This is an 84-year-old woman with a 1.5 cm left papillary thyroid cancer diagnosed on FNA biopsy.  She has a small benign-appearing nodule in the right side.  After being apprised of the risks benefits and alternatives and after multidisciplinary evaluation, she consented in writing to proceed.    Findings:  On neck ultrasound, there were the known thyroid nodules with no abnormal appearing adenopathy in the jugular chain.  Surgical findings included identification and protection of the recurrent laryngeal nerve which had good signal on the nerve monitor at the conclusion of the case.  No vagus nerve signal could be obtained and therefore, given the complete clinical context, the decision was made to stop at hemithyroidectomy.  The parathyroid glands were identified and protected.  Notably, patient had extremely poor neck extension of the neck was essentially flexed for the case.    Estimated Blood Loss:  3 cc    Specimens:  Specimens (From admission, onward)       Start     Ordered    03/31/25 1539  Specimen to Pathology  RELEASE UPON ORDERING        References:    Click here for ordering Quick Tip   Question:  Release to patient  Answer:  Immediate    03/31/25 9333                      Procedures:     Left hemithyroidectomy with intraoperative nerve monitoring  Neck ultrasound    Procedure Description:     I met the patient in the preoperative area and identified her by 2 identifiers.  All of her preoperative preparation was completed and she was taken to the operating room and placed in the supine position on the operating table.  General anesthesia was smoothly induced and endotracheal tube placed.  I performed a complete ultrasound of the neck and thyroid gland, with images saved in findings above.   The patient's skin was then prepped with a Betadine solution she was draped in a sterile manner.  The team then observed a preprocedural pause, completing the entire pre-surgical checklist.  Everyone agreed to proceed with the case.    I began with an incision oriented from my ultrasound.  I came through the skin subcutaneous tissue and platysma.  I raised subplatysmal flaps both superiorly and inferiorly.  I identified the median raphe and incised it along its length.  I then reflected the strap muscles in the left side laterally and began to medialized the thyroid.  I could not find a vagus nerve signal.  I then took the superior and inferior poles carefully and further medialized the thyroid.  I identified inferior and superior parathyroid glands and protected those.  I identified the recurrent nerve and it was intact and followed a normal anatomic course.  It had good signal on the nerve monitor.  I then continued to medialized the thyroid and take it off the trachea, being careful to protect the insertion of the nerve.  I then made a separate specimen of the isthmus which was somewhat elongated and extending into the chest.  The parathyroid glands appeared perfused and the left-sided recurrent laryngeal nerve had good signal on the nerve monitor.  I could still not find a vagus nerve signal.  Though I had no clinical suspicion for a nerve injury, given the patient's very poor neck extension and the size and clinical circumstances concerning her papillary thyroid cancer, I determined the safest thing would be to stop it left hemithyroidectomy.  I carefully obtained hemostasis using a Valsalva maneuver to elicit any otherwise occult bleeding.  I placed Surgicel in the bed of the wound.  We then reapproximated the strap muscles with 3-0 Vicryl and the platysma with 3-0 Vicryl.  The skin was reapproximated with subcuticular 5 0 Vicryl and covered with Dermabond and Steri-Strips.  The patient was awakened anesthesia  taken postanesthesia care unit in good condition.    Complications:  None           Cirilo Wang MD  Phone Number: [phone]    Date: 3/31/2025  Time: 3:52 PM

## 2025-03-31 NOTE — ANESTHESIA PROCEDURE NOTES
Intubation    Date/Time: 3/31/2025 2:21 PM    Performed by: Jasvir Grider CRNA  Authorized by: Rex Pollard MD    Intubation:     Induction:  Intravenous    Intubated:  Postinduction    Mask Ventilation:  Easy with oral airway    Attempts:  1    Attempted By:  CRNA    Method of Intubation:  Video laryngoscopy    Blade:  Garrett 3    Laryngeal View Grade: Grade I - full view of cords      Difficult Airway Encountered?: No      Complications:  None    Airway Device:  EMG ETT (NIMS)    Airway Device Size:  7.0    Style/Cuff Inflation:  Cuffed (inflated to minimal occlusive pressure)    Tube secured:  21    Secured at:  The teeth    Placement Verified By:  Capnometry    Complicating Factors:  None    Findings Post-Intubation:  BS equal bilateral and atraumatic/condition of teeth unchanged

## 2025-03-31 NOTE — ANESTHESIA POSTPROCEDURE EVALUATION
Anesthesia Post Evaluation    Patient: Maryann Sorenson    Procedure(s) Performed: Procedure(s) (LRB):  THYROIDECTOMY (Left)    Final Anesthesia Type: general      Patient location during evaluation: PACU  Patient participation: Yes- Able to Participate  Level of consciousness: awake and alert  Post-procedure vital signs: reviewed and stable  Pain management: adequate  Airway patency: patent    PONV status at discharge: No PONV  Anesthetic complications: no      Cardiovascular status: stable  Respiratory status: room air  Hydration status: euvolemic  Follow-up not needed.              Vitals Value Taken Time   /68 03/31/25 17:32   Temp 36.1 °C (97 °F) 03/31/25 16:20   Pulse 62 03/31/25 17:51   Resp 14 03/31/25 17:51   SpO2 98 % 03/31/25 17:49   Vitals shown include unfiled device data.      No case tracking events are documented in the log.      Pain/Helena Score: Pain Rating Prior to Med Admin: 6 (3/31/2025  4:42 PM)  Helena Score: 10 (3/31/2025  5:20 PM)

## 2025-03-31 NOTE — INTERVAL H&P NOTE
The patient has been examined and the H&P has been reviewed. I concur with the findings and no changes have occurred since H&P was written. Surgery/Procedure risks, benefits and alternative options discussed and understood by patient/family.

## 2025-03-31 NOTE — DISCHARGE SUMMARY
[facility] - Surgery (Hospital)  Discharge Note  Short Stay    Procedure(s) (LRB):  THYROIDECTOMY (Left)      OUTCOME: Patient tolerated treatment/procedure well without complication and is now ready for discharge.    DISPOSITION: Home or Self Care    FINAL DIAGNOSIS:  <principal problem not specified>    FOLLOWUP: In clinic    DISCHARGE INSTRUCTIONS:  No discharge procedures on file.     TIME SPENT ON DISCHARGE: 10 minutes

## 2025-03-31 NOTE — TRANSFER OF CARE
"Anesthesia Transfer of Care Note    Patient: Maryann Sorenson    Procedure(s) Performed: Procedure(s) (LRB):  THYROIDECTOMY (Left)    Patient location: PACU    Anesthesia Type: general    Transport from OR: Transported from OR on room air with adequate spontaneous ventilation    Post pain: adequate analgesia    Post assessment: no apparent anesthetic complications and tolerated procedure well    Post vital signs: stable    Level of consciousness: responds to stimulation and awake    Nausea/Vomiting: no nausea/vomiting    Complications: none    Transfer of care protocol was followed      Last vitals: Visit Vitals  BP (!) 175/60 (BP Location: Right arm, Patient Position: Lying)   Pulse (!) 56   Temp 36.8 °C (98.2 °F) (Skin)   Resp 16   Ht 5' 3" (1.6 m)   Wt 79.8 kg (176 lb)   LMP  (LMP Unknown)   SpO2 97%   Breastfeeding No   BMI 31.18 kg/m²     "

## 2025-03-31 NOTE — DISCHARGE INSTRUCTIONS
Sleep with head of bed elevated for the first few nights   Do not soak the incision but it is ok to shower     If there is neck swelling, trouble breathing, fever >101, increasing redness or drainage from the incision, call Dr. Wang or come to the emergency department    Take tylenol around the clock for pain - not to exceed 4,000 mg every 24 hours - take oxycodone only for severe pain

## 2025-04-02 ENCOUNTER — TELEPHONE (OUTPATIENT)
Facility: CLINIC | Age: 85
End: 2025-04-02
Payer: MEDICARE

## 2025-04-02 ENCOUNTER — PATIENT MESSAGE (OUTPATIENT)
Dept: SLEEP MEDICINE | Facility: CLINIC | Age: 85
End: 2025-04-02
Payer: MEDICARE

## 2025-04-02 LAB
ESTROGEN SERPL-MCNC: NORMAL PG/ML
INSULIN SERPL-ACNC: NORMAL U[IU]/ML
LAB AP CLINICAL INFORMATION: NORMAL
LAB AP GROSS DESCRIPTION: NORMAL
LAB AP PERFORMING LOCATION(S): NORMAL
LAB AP REPORT FOOTNOTES: NORMAL
LAB AP SYNOPTIC CHECKLIST: NORMAL
T3RU NFR SERPL: NORMAL %

## 2025-04-02 NOTE — TELEPHONE ENCOUNTER
----- Message from Alexi sent at 4/2/2025 12:57 PM CDT -----  Contact: pt  Type: Requesting to speak with nurseChelleo Called: PTRegarding: believes she has an upper respiratory infection after surgery. Please advise.Would the patient rather a call back or a response via [facility]? Call William Call Back Number: [phone] Additional Information:      04/02/25             1315  Spoke to patient regarding the above message. Pt stated she has an upper respiratory infection after surgery. Informed patient I would delivered this message to dr. Wang.pt verbalized understanding.

## 2025-04-03 ENCOUNTER — PATIENT MESSAGE (OUTPATIENT)
Dept: SURGERY | Facility: CLINIC | Age: 85
End: 2025-04-03
Payer: MEDICARE

## 2025-04-03 DIAGNOSIS — G25.81 RLS (RESTLESS LEGS SYNDROME): ICD-10-CM

## 2025-04-03 RX ORDER — PRAMIPEXOLE DIHYDROCHLORIDE 0.12 MG/1
TABLET ORAL
Qty: 90 TABLET | Refills: 3 | Status: SHIPPED | OUTPATIENT
Start: 2025-04-03

## 2025-04-03 NOTE — PROGRESS NOTES
aminah Sorenson to [facility] Staff (supporting You)        4/2/25  2:54 PM  Hello,   My prescription for Pramipexole 0.125 mg has no refills remaining. Please ask Dr. Recinos to send a new prescription to [facility] mail order for 90 days with 3 refills.  Thank you,  Maryann Sorenson

## 2025-04-08 ENCOUNTER — OFFICE VISIT (OUTPATIENT)
Dept: SURGERY | Facility: CLINIC | Age: 85
End: 2025-04-08
Payer: MEDICARE

## 2025-04-08 VITALS
HEART RATE: 62 BPM | HEIGHT: 63 IN | OXYGEN SATURATION: 97 % | WEIGHT: 176.69 LBS | SYSTOLIC BLOOD PRESSURE: 113 MMHG | TEMPERATURE: 95 F | BODY MASS INDEX: 31.31 KG/M2 | DIASTOLIC BLOOD PRESSURE: 77 MMHG

## 2025-04-08 DIAGNOSIS — C73 THYROID CANCER: Primary | ICD-10-CM

## 2025-04-08 PROCEDURE — 99999 PR PBB SHADOW E&M-EST. PATIENT-LVL III: CPT | Mod: PBBFAC,HCNC,, | Performed by: STUDENT IN AN ORGANIZED HEALTH CARE EDUCATION/TRAINING PROGRAM

## 2025-04-08 PROCEDURE — 3074F SYST BP LT 130 MM HG: CPT | Mod: HCNC,CPTII,S$GLB, | Performed by: STUDENT IN AN ORGANIZED HEALTH CARE EDUCATION/TRAINING PROGRAM

## 2025-04-08 PROCEDURE — 3288F FALL RISK ASSESSMENT DOCD: CPT | Mod: HCNC,CPTII,S$GLB, | Performed by: STUDENT IN AN ORGANIZED HEALTH CARE EDUCATION/TRAINING PROGRAM

## 2025-04-08 PROCEDURE — 1126F AMNT PAIN NOTED NONE PRSNT: CPT | Mod: HCNC,CPTII,S$GLB, | Performed by: STUDENT IN AN ORGANIZED HEALTH CARE EDUCATION/TRAINING PROGRAM

## 2025-04-08 PROCEDURE — 1101F PT FALLS ASSESS-DOCD LE1/YR: CPT | Mod: HCNC,CPTII,S$GLB, | Performed by: STUDENT IN AN ORGANIZED HEALTH CARE EDUCATION/TRAINING PROGRAM

## 2025-04-08 PROCEDURE — 3078F DIAST BP <80 MM HG: CPT | Mod: HCNC,CPTII,S$GLB, | Performed by: STUDENT IN AN ORGANIZED HEALTH CARE EDUCATION/TRAINING PROGRAM

## 2025-04-08 PROCEDURE — 99024 POSTOP FOLLOW-UP VISIT: CPT | Mod: HCNC,S$GLB,, | Performed by: STUDENT IN AN ORGANIZED HEALTH CARE EDUCATION/TRAINING PROGRAM

## 2025-04-08 PROCEDURE — 1159F MED LIST DOCD IN RCRD: CPT | Mod: HCNC,CPTII,S$GLB, | Performed by: STUDENT IN AN ORGANIZED HEALTH CARE EDUCATION/TRAINING PROGRAM

## 2025-04-11 ENCOUNTER — PATIENT MESSAGE (OUTPATIENT)
Dept: CARDIOLOGY | Facility: CLINIC | Age: 85
End: 2025-04-11
Payer: MEDICARE

## 2025-04-11 DIAGNOSIS — I10 ESSENTIAL HYPERTENSION: ICD-10-CM

## 2025-04-11 RX ORDER — LOSARTAN POTASSIUM 50 MG/1
50 TABLET ORAL DAILY
Qty: 90 TABLET | Refills: 3 | Status: SHIPPED | OUTPATIENT
Start: 2025-04-11 | End: 2026-04-11

## 2025-04-16 ENCOUNTER — TELEPHONE (OUTPATIENT)
Dept: CARDIOLOGY | Facility: CLINIC | Age: 85
End: 2025-04-16
Payer: MEDICARE

## 2025-04-16 NOTE — TELEPHONE ENCOUNTER
----- Message from Meghan sent at 4/16/2025  2:28 PM CDT -----  Regarding: surg clr  Pt is scheduled for cataract surgery on 5/7/25 and will needs a clr.  She also has some forms for you to fill out and can be reached at [phone].Thank you

## 2025-04-24 NOTE — PROGRESS NOTES
[facility] - Post operative visit note          Mrs. Sorenson returns to clinic for routine postoperative visit after undergoing thyroidectomy on 3/31/2025.    She has no acute complaints; she is tolerating a diet without issue, pain is minimal and controlled with OTC medications, ambulating and performing ADLs without issue, and incisions are clean/dry/intact with no gross signs of infection. Pathology results discussed with the patient.     We will plan to followup in clinic  in 3 months, labs at six week perez Wang MD, FACS  General and Endocrine Surgery         Final Diagnosis   Part 1  Thyroid, left, hemithyroidectomy:   Papillary thyroid carcinoma, classic subtype  Pathologic stage classification: pT1bNx  See synoptic report     Part 2   Thyroid, isthmus, thyroid isthmusectomy:  Unremarkable thyroid parenchyma, negative for atypia or malignancy

## 2025-04-28 ENCOUNTER — OFFICE VISIT (OUTPATIENT)
Dept: CARDIOLOGY | Facility: CLINIC | Age: 85
End: 2025-04-28
Payer: MEDICARE

## 2025-04-28 DIAGNOSIS — I44.2 CHB (COMPLETE HEART BLOCK): ICD-10-CM

## 2025-04-28 DIAGNOSIS — I77.1 SUBCLAVIAN ARTERY STENOSIS, LEFT: ICD-10-CM

## 2025-04-28 DIAGNOSIS — I70.0 AORTIC ATHEROSCLEROSIS: ICD-10-CM

## 2025-04-28 DIAGNOSIS — I27.20 PULMONARY HYPERTENSION: ICD-10-CM

## 2025-04-28 DIAGNOSIS — I27.9 CHRONIC PULMONARY HEART DISEASE: ICD-10-CM

## 2025-04-28 DIAGNOSIS — I25.10 ATHEROSCLEROSIS OF NATIVE CORONARY ARTERY OF NATIVE HEART WITHOUT ANGINA PECTORIS: ICD-10-CM

## 2025-04-28 DIAGNOSIS — I65.23 CAROTID STENOSIS, BILATERAL: ICD-10-CM

## 2025-04-28 DIAGNOSIS — Z01.810 PREOP CARDIOVASCULAR EXAM: ICD-10-CM

## 2025-04-28 DIAGNOSIS — I48.0 PAROXYSMAL ATRIAL FIBRILLATION: ICD-10-CM

## 2025-04-28 DIAGNOSIS — R93.1 AGATSTON CORONARY ARTERY CALCIUM SCORE GREATER THAN 400: Primary | ICD-10-CM

## 2025-04-28 DIAGNOSIS — E78.00 HYPERCHOLESTEROLEMIA: ICD-10-CM

## 2025-04-28 DIAGNOSIS — I10 ESSENTIAL HYPERTENSION: ICD-10-CM

## 2025-04-28 PROCEDURE — 93000 ELECTROCARDIOGRAM COMPLETE: CPT | Mod: S$GLB,,, | Performed by: INTERNAL MEDICINE

## 2025-04-28 PROCEDURE — 99214 OFFICE O/P EST MOD 30 MIN: CPT | Mod: 25,S$GLB,, | Performed by: INTERNAL MEDICINE

## 2025-04-28 PROCEDURE — 1101F PT FALLS ASSESS-DOCD LE1/YR: CPT | Mod: CPTII,S$GLB,, | Performed by: INTERNAL MEDICINE

## 2025-04-28 PROCEDURE — 99999 PR PBB SHADOW E&M-EST. PATIENT-LVL II: CPT | Mod: PBBFAC,,, | Performed by: INTERNAL MEDICINE

## 2025-04-28 PROCEDURE — 1159F MED LIST DOCD IN RCRD: CPT | Mod: CPTII,S$GLB,, | Performed by: INTERNAL MEDICINE

## 2025-04-28 PROCEDURE — 3288F FALL RISK ASSESSMENT DOCD: CPT | Mod: CPTII,S$GLB,, | Performed by: INTERNAL MEDICINE

## 2025-04-28 NOTE — PROGRESS NOTES
Subjective:   04/28/2025     Patient ID:  Maryann Sorenson is a 84 y.o. female who presents for evaulation of Pre-op Exam       History of Present Illness    CHIEF COMPLAINT:  Patient presents for follow-up to discuss multiple ongoing health issues, including thyroid surgery, cardiac concerns, and dyspnea.    HPI:  Patient underwent a partial thyroidectomy 4 weeks ago due to thyroid cancer. She reports the surgery was successful. She has a history of AFib and has a pacemaker. She has frequent palpitations, described as fluttering sensations, but never receives calls indicating sustained AFib episodes. On 02/08/2025, she visited the ED for AFib, which was brief and self-corrected without medication. The palpitations have remained consistent in frequency and intensity. Last summer, a pacemaker technician adjusted her device, which temporarily improved her symptoms.    She reports chronic dyspnea, exacerbated by her inability to exercise regularly due to back problems and balance issues.    She has a history of pulmonary HTN, followed by a specialist for 3 years. The specialist eventually determined that continued follow-up was unnecessary, as she was unconvinced of the diagnosis despite the numbers suggesting its presence.    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 She previously took methotrexate for 8 months but discontinued when thyroid cancer was discovered. She received steroid injections in her eye, which accelerated cataract formation. Her vision has significantly worsened, and she is scheduled for cataract surgery with Dr. Fuentes.    She denies chest pain, chest tightness, or swallowing problems. She denies a history of smoking or significant family history of heart problems.    CARDIAC HISTORY:  EKG ([id number]:[id number]): pacemaker rhythm CTA 01/2024: significant narrowing of L subclavian artery  CT 2022: 20-40% L carotid stenosis, <20% R carotid stenosis  Echo 06/2023: pulmonary  pressure 39  Carotid stenosis: 40% (per patient recollection) Pet stress test 12/2024: no evidence of significant stenosis, calcification in heart arteries AFib, irregular  ER visit 02/08/2025: brief AFib, self-corrected    MEDICATIONS:  Amlodipine 5 mg daily for HTN  HCTZ 12.5 mg daily for HTN  Metoprolol for HTN  Losartan for HTN  Flecainide (Tambocor) 100 mg 2x daily for AFib  Rosuvastatin 40 mg daily for high cholesterol  Ezetimibe 10 mg daily for high cholesterol (added in November)  Jardiance for prediabetes and heart Patient discontinued methotrexate after 8 months of use when thyroid cancer was discovered.    MEDICAL HISTORY:  Patient has a history of thyroid cancer, pulmonary hypertension with an uncertain diagnosis, and birdshot chorioretinopathy. She also has cataracts, which were accelerated by steroid injections in the eye.    SURGICAL HISTORY:  Patient underwent a partial thyroidectomy 4 weeks prior to the visit for thyroid cancer.    FAMILY HISTORY:  Father: stenosis when older    SOCIAL HISTORY:  Smoking: Never smoker      ROS:  General: -fever, -chills, -fatigue, -weight gain, -weight loss  Eyes: -vision changes, -redness, -discharge, +loss of vision  ENT: -ear pain, -nasal congestion, -sore throat  Cardiovascular: -chest pain, +palpitations, -lower extremity edema, +feeling of flutter in chest  Respiratory: -cough, +shortness of breath  Gastrointestinal: -abdominal pain, -nausea, -vomiting, -diarrhea, -constipation, -blood in stool  Genitourinary: -dysuria, -hematuria, -frequency  Musculoskeletal: -joint pain, -muscle pain, +back pain  Skin: -rash, -lesion  Neurological: -headache, -dizziness, -numbness, -tingling, +balance issues  Psychiatric: -anxiety, -depression, -sleep difficulty          Problem List[1]     Review of patient's allergies indicates:   Allergen Reactions    Amoxicillin-pot clavulanate Diarrhea     Given march 2016    Sulfa (sulfonamide antibiotics) Rash    Codeine      Other  reaction(s): Unknown, tolerates hydrocodone june 2014    Doxycycline Nausea Only    Lyrica [pregabalin] Other (See Comments)     Blurry vision    Macrobid [nitrofurantoin monohyd/m-cryst] Other (See Comments)      2019 caused cramps in legs. After stopped medication it cleared.     Prevnar [pneumococcal 7-beti conj vacc] Other (See Comments)     Arm sore, redness at injection site and muscle aches. Given shot 7/23/19    Relafen [nabumetone] Diarrhea       Current Medications[2]     Objective:   Physical Exam    General: No acute distress. Well-developed. Well-nourished.  Eyes: EOMI. Sclerae anicteric.  HENT: Normocephalic. Atraumatic. Nares patent. Moist oral mucosa.  Cardiovascular: Regular rate. Regular rhythm. No murmurs. No rubs. No gallops. Normal S1, S2.  Respiratory: Normal respiratory effort. Clear to auscultation bilaterally. No rales. No rhonchi. No wheezing.  Musculoskeletal: No  obvious deformity.  Extremities: No lower extremity edema.  Neurological: Alert & oriented x3. No slurred speech. Normal gait.  Psychiatric: Normal mood. Normal affect. Good insight. Good judgment.  Skin: Warm. Dry. No rash.  Neck: No audible carotid bruit.          Vitals:    04/28/25 1511   BP: (P) 133/80   Pulse: (P) 60   Blood pressure right arm 136/81, blood pressure left arm 136/78.  No significant difference in blood pressures.  Wt Readings from Last 3 Encounters:   04/28/25 (P) 81.2 kg (179 lb 0.2 oz)   04/08/25 80.2 kg (176 lb 11.2 oz)   03/31/25 79.8 kg (176 lb)     Temp Readings from Last 3 Encounters:   04/08/25 (!) 94.8 °F (34.9 °C) (Oral)   03/31/25 97 °F (36.1 °C) (Skin)   02/25/25 97.5 °F (36.4 °C) (Oral)     BP Readings from Last 3 Encounters:   04/28/25 (P) 133/80   04/08/25 113/77   03/31/25 139/68     Pulse Readings from Last 3 Encounters:   04/28/25 (P) 60   04/08/25 62   03/31/25 60               Lab Results   Component Value Date    CHOL 150 09/10/2024    CHOL 150 03/06/2024    CHOL 191 01/25/2024     Lab  Results   Component Value Date    HDL 59 09/10/2024    HDL 61 03/06/2024    HDL 74 01/25/2024     Lab Results   Component Value Date    LDLCALC 73.4 09/10/2024    LDLCALC 74.0 03/06/2024    LDLCALC 85.8 01/25/2024     Lab Results   Component Value Date    ALT 42 02/08/2025    AST 56 (H) 02/08/2025    AST 32 12/03/2024    AST 34 09/10/2024     Lab Results   Component Value Date    CREATININE 0.84 02/08/2025    BUN 23 (H) 02/08/2025     (L) 02/08/2025    K 3.9 02/08/2025    CO2 24 02/08/2025    CO2 24 01/07/2025    CO2 24 12/03/2024     Lab Results   Component Value Date    HGB 11.6 (L) 02/08/2025    HCT 34.3 (L) 02/08/2025    HCT 37.8 12/03/2024    HCT 37.3 09/10/2024       Assessment and Plan:   Assessment & Plan    I27.9 Chronic pulmonary heart disease  I27.20 Pulmonary hypertension  I44.2 CHB (complete heart block)  I48.0 Paroxysmal atrial fibrillation  R93.1 Agatston coronary artery calcium score greater than 400  I70.0 Aortic atherosclerosis  I25.10 Atherosclerosis of native coronary artery of native heart without angina pectoris  Z01.810 Preop cardiovascular exam  E78.00 Hypercholesterolemia  I65.23 Carotid stenosis, bilateral  I10 Essential hypertension  I77.1 Subclavian artery stenosis, left    IMPRESSION:  - Reviewed history of thyroid surgery, pacemaker, and a-fib.  - Recent ER visit for brief AFib episode self-corrected.  - Will check blood pressure in both arms to assess for potential discrepancy due to left subclavian stenosis.    PAROXYSMAL ATRIAL FIBRILLATION:  - Ordered echocardiogram.  - patient will continue to follow-up with EP    PREOP CARDIOVASCULAR EXAM:  - Fax note to Dr. Fuentes regarding cataract surgery, patient cleared for surgery    HYPERCHOLESTEROLEMIA:  - Ordered fasting lipid panel.    CAROTID STENOSIS, BILATERAL:  - Ordered carotid duplex.    LIFESTYLE CHANGES:  - None Ordered CBC and metabolic profile.  - Follow up in 6 months.     History of left subclavian stenosis:   - no  significant difference in blood pressures right arm in the left arm    Primary hypertension:   - blood pressure is well controlled         Follow up in about 6 months (around 10/28/2025).        Future Appointments   Date Time Provider Department Center   5/8/2025  9:00 AM LAB, [place] [facility]   5/15/2025 11:30 AM Cirilo Wang MD [facility] GEN [facility]   5/23/2025  9:40 AM Aidan Mclean MD [facility] ARRHYT [facility]   6/10/2025 11:30 AM Ira Smith MD [facility] IM [place]   6/24/2025 11:30 AM Charito Barroso MD [facility] DERM [address]   7/15/2025 10:30 AM Cirilo Wang MD [facility] GENSUR [facility]   7/22/2025 10:30 AM Jus Sy, FNP-BC [facility] SLEEP [facility]       This note was generated with the assistance of ambient listening technology. Verbal consent was obtained by the patient and accompanying visitor(s) for the recording of patient appointment to facilitate this note. I attest to having reviewed and edited the generated note for accuracy, though some syntax or spelling errors may persist. Please contact the author of this note for any clarification.                      [1]   Patient Active Problem List  Diagnosis    Meniere's disease    Hypercholesterolemia    Agatston coronary artery calcium score greater than 400 - 577    Gastroesophageal reflux disease    Asthmatic bronchitis with acute exacerbation    Bacterial sinusitis    Shortness of breath    Myalgia    Back pain of thoracolumbar region    Essential hypertension    Pulmonary hypertension    NOE (obstructive sleep apnea)    Atherosclerosis of native coronary artery of native heart without angina pectoris    Carotid stenosis, bilateral    Restless legs    Vasovagal syncope    Neck muscle spasm    Fatigue    Lumbar radiculitis    Cervical disc disease    Chronic pulmonary heart disease    Dizziness    Multiple thyroid nodules    Heart palpitations    COVID    Paroxysmal atrial fibrillation    Pre-diabetes    Aortic  atherosclerosis    Vitreous floater, bilateral    CHB (complete heart block)    PAF (paroxysmal atrial fibrillation)    Pulmonary nodule    Uveitis of both eyes    Birdshot choroidopathy of both eyes    Age-related osteoporosis without current pathological fracture    Preop cardiovascular exam    Subclavian artery stenosis, left   [2]   Current Outpatient Medications:     amLODIPine (NORVASC) 5 MG tablet, Take 1 tablet (5 mg total) by mouth once daily., Disp: 90 tablet, Rfl: 3    apixaban (ELIQUIS) 5 mg Tab, Take 1 tablet (5 mg total) by mouth 2 (two) times daily., Disp: 180 tablet, Rfl: 3    ascorbate calcium-bioflavonoid (FRANCISCO-C WITH BIOFLAVONOIDS) 500-200 mg Tab, Take 1 tablet by mouth 2 (two) times a day., Disp: , Rfl:     calcium carbonate-vitamin D3 600 mg (1,500 mg)-800 unit Chew, Take 1 tablet by mouth once daily., Disp: , Rfl:     celecoxib (CELEBREX) 100 MG capsule, Take 1 capsule (100 mg total) by mouth once daily. Take with food and 8 oz liquid, Disp: 90 capsule, Rfl: 1    cholecalciferol, vitamin D3, (VITAMIN D3) 50 mcg (2,000 unit) Tab, Take by mouth once daily., Disp: , Rfl:     coenzyme Q10 200 mg capsule, Take 1 capsule by mouth Daily. 1 Capsule Oral Every day, Disp: , Rfl:     empagliflozin (JARDIANCE) 25 mg tablet, Take 1 tablet (25 mg total) by mouth once daily., Disp: 90 tablet, Rfl: 1    esomeprazole (NEXIUM) 40 MG capsule, 1 Capsule, Delayed Release(E.C.) Oral Every day, Disp: 90 capsule, Rfl: 3    ezetimibe (ZETIA) 10 mg tablet, Take 1 tablet (10 mg total) by mouth once daily., Disp: 90 tablet, Rfl: 3    fexofenadine (ALLEGRA) 180 MG tablet, Take 1 tablet by mouth Daily. 1 Tablet Oral Every day, Disp: , Rfl:     flecainide (TAMBOCOR) 100 MG Tab, Take 1 tablet (100 mg total) by mouth every 12 (twelve) hours., Disp: 180 tablet, Rfl: 3    fluticasone propionate (FLONASE) 50 mcg/actuation nasal spray, 1 spray each nostril twice a day; total 48 g - please provide 3 months supply., Disp: 48 g,  Rfl: 3    folic acid (FOLVITE) 1 MG tablet, Take 1 tablet by mouth once daily., Disp: , Rfl:     gabapentin (NEURONTIN) 300 MG capsule, 1 pill PO TID. 90 days supply, Disp: 270 capsule, Rfl: 3    guaiFENesin (MUCINEX) 600 mg 12 hr tablet, Take 1,200 mg by mouth as needed. , Disp: , Rfl:     hydroCHLOROthiazide (MICROZIDE) 12.5 mg capsule, Take 1 capsule (12.5 mg total) by mouth once daily., Disp: 90 capsule, Rfl: 3    hydrocortisone 2.5 % ointment, Apply twice daily to affected area, max 2 weeks per month, Disp: 30 g, Rfl: 1    LACTOBACILLUS RHAMNOSUS GG (PROBIOTIC ORAL), Take 1 capsule by mouth Daily. 1 Capsule Oral Every day, Disp: , Rfl:     losartan (COZAAR) 50 MG tablet, Take 1 tablet (50 mg total) by mouth once daily., Disp: 90 tablet, Rfl: 3    lutein 20 mg Cap, Take 20 mg by mouth Daily., Disp: , Rfl:     magnesium 250 mg Tab, Take 250 mg by mouth once daily. , Disp: , Rfl:     metoprolol succinate (TOPROL-XL) 25 MG 24 hr tablet, Take 1 tablet (25 mg total) by mouth once daily., Disp: 90 tablet, Rfl: 3    pramipexole (MIRAPEX) 0.125 MG tablet, TAKE 1 TABLET EVERY DAY, Disp: 90 tablet, Rfl: 3    rosuvastatin (CRESTOR) 40 MG Tab, Take 1 tablet (40 mg total) by mouth once daily., Disp: 90 tablet, Rfl: 3    silver sulfADIAZINE 1% (SILVADENE) 1 % cream, Apply topically 2 (two) times daily., Disp: 20 g, Rfl: 0    ZINC ORAL, Take 30 mg by mouth once daily., Disp: , Rfl:     methotrexate 2.5 MG Tab, Take 2.5 mg by mouth every 7 days. 4 tablets, Disp: , Rfl:   No current facility-administered medications for this visit.    Facility-Administered Medications Ordered in Other Visits:     vancomycin (VANCOCIN) 1,000 mg in dextrose 5 % (D5W) 250 mL IVPB (Vial-Mate), 1,000 mg, Intravenous, On Call Procedure, Lili Weir NP, Last Rate: 166.7 mL/hr at 02/09/24 1329, 1,000 mg at 02/09/24 1329

## 2025-04-29 LAB
OHS QRS DURATION: 206 MS
OHS QTC CALCULATION: 501 MS

## 2025-05-08 ENCOUNTER — LAB VISIT (OUTPATIENT)
Dept: LAB | Facility: HOSPITAL | Age: 85
End: 2025-05-08
Attending: STUDENT IN AN ORGANIZED HEALTH CARE EDUCATION/TRAINING PROGRAM
Payer: MEDICARE

## 2025-05-08 DIAGNOSIS — C73 THYROID CANCER: ICD-10-CM

## 2025-05-08 DIAGNOSIS — I25.10 ATHEROSCLEROSIS OF NATIVE CORONARY ARTERY OF NATIVE HEART WITHOUT ANGINA PECTORIS: ICD-10-CM

## 2025-05-08 DIAGNOSIS — E78.00 HYPERCHOLESTEROLEMIA: ICD-10-CM

## 2025-05-08 LAB
ABSOLUTE EOSINOPHIL (OHS): 0.16 K/UL
ABSOLUTE MONOCYTE (OHS): 0.39 K/UL (ref 0.3–1)
ABSOLUTE NEUTROPHIL COUNT (OHS): 1.82 K/UL (ref 1.8–7.7)
ALBUMIN SERPL BCP-MCNC: 4.7 G/DL (ref 3.5–5.2)
ALP SERPL-CCNC: 59 UNIT/L (ref 38–126)
ALT SERPL W/O P-5'-P-CCNC: 23 UNIT/L (ref 10–44)
ANION GAP (OHS): 14 MMOL/L (ref 8–16)
AST SERPL-CCNC: 35 UNIT/L (ref 15–46)
BASOPHILS # BLD AUTO: 0.04 K/UL
BASOPHILS NFR BLD AUTO: 1.2 %
BILIRUB SERPL-MCNC: 0.4 MG/DL (ref 0.1–1)
BUN SERPL-MCNC: 27 MG/DL (ref 7–17)
CALCIUM SERPL-MCNC: 9.2 MG/DL (ref 8.7–10.5)
CHLORIDE SERPL-SCNC: 100 MMOL/L (ref 95–110)
CHOLEST SERPL-MCNC: 138 MG/DL (ref 120–199)
CHOLEST/HDLC SERPL: 2.3 {RATIO} (ref 2–5)
CO2 SERPL-SCNC: 25 MMOL/L (ref 23–29)
CREAT SERPL-MCNC: 0.9 MG/DL (ref 0.5–1.4)
ERYTHROCYTE [DISTWIDTH] IN BLOOD BY AUTOMATED COUNT: 14.6 % (ref 11.5–14.5)
GFR SERPLBLD CREATININE-BSD FMLA CKD-EPI: >60 ML/MIN/1.73/M2
GLUCOSE SERPL-MCNC: 92 MG/DL (ref 70–110)
HCT VFR BLD AUTO: 37.8 % (ref 37–48.5)
HDLC SERPL-MCNC: 61 MG/DL (ref 40–75)
HDLC SERPL: 44.2 % (ref 20–50)
HGB BLD-MCNC: 12 GM/DL (ref 12–16)
IMM GRANULOCYTES # BLD AUTO: 0.01 K/UL (ref 0–0.04)
IMM GRANULOCYTES NFR BLD AUTO: 0.3 % (ref 0–0.5)
LDLC SERPL CALC-MCNC: 62.2 MG/DL (ref 63–159)
LYMPHOCYTES # BLD AUTO: 0.9 K/UL (ref 1–4.8)
MCH RBC QN AUTO: 29.2 PG (ref 27–31)
MCHC RBC AUTO-ENTMCNC: 31.7 G/DL (ref 32–36)
MCV RBC AUTO: 92 FL (ref 82–98)
NONHDLC SERPL-MCNC: 77 MG/DL
NUCLEATED RBC (/100WBC) (OHS): 0 /100 WBC
PLATELET # BLD AUTO: 228 K/UL (ref 150–450)
PMV BLD AUTO: 11.6 FL (ref 9.2–12.9)
POTASSIUM SERPL-SCNC: 4.2 MMOL/L (ref 3.5–5.1)
PROT SERPL-MCNC: 7.3 GM/DL (ref 6–8.4)
RBC # BLD AUTO: 4.11 M/UL (ref 4–5.4)
RELATIVE EOSINOPHIL (OHS): 4.8 %
RELATIVE LYMPHOCYTE (OHS): 27.1 % (ref 18–48)
RELATIVE MONOCYTE (OHS): 11.7 % (ref 4–15)
RELATIVE NEUTROPHIL (OHS): 54.9 % (ref 38–73)
SODIUM SERPL-SCNC: 139 MMOL/L (ref 136–145)
T4 FREE SERPL-MCNC: 0.77 NG/DL (ref 0.71–1.51)
T4 FREE SERPL-MCNC: 0.79 NG/DL (ref 0.71–1.51)
TRIGL SERPL-MCNC: 74 MG/DL (ref 30–150)
TSH SERPL-ACNC: 4.27 UIU/ML (ref 0.4–4)
WBC # BLD AUTO: 3.32 K/UL (ref 3.9–12.7)

## 2025-05-08 PROCEDURE — 82040 ASSAY OF SERUM ALBUMIN: CPT | Mod: HCNC,PN

## 2025-05-08 PROCEDURE — 84439 ASSAY OF FREE THYROXINE: CPT | Mod: HCNC,PN

## 2025-05-08 PROCEDURE — 36415 COLL VENOUS BLD VENIPUNCTURE: CPT | Mod: HCNC,PN

## 2025-05-08 PROCEDURE — 80061 LIPID PANEL: CPT | Mod: HCNC,PN

## 2025-05-08 PROCEDURE — 82172 ASSAY OF APOLIPOPROTEIN: CPT | Mod: HCNC,PN

## 2025-05-08 PROCEDURE — 83695 ASSAY OF LIPOPROTEIN(A): CPT | Mod: HCNC,PN

## 2025-05-08 PROCEDURE — 85025 COMPLETE CBC W/AUTO DIFF WBC: CPT | Mod: HCNC,PN

## 2025-05-10 ENCOUNTER — CLINICAL SUPPORT (OUTPATIENT)
Dept: CARDIOLOGY | Facility: HOSPITAL | Age: 85
End: 2025-05-10
Payer: MEDICARE

## 2025-05-10 ENCOUNTER — CLINICAL SUPPORT (OUTPATIENT)
Dept: CARDIOLOGY | Facility: HOSPITAL | Age: 85
End: 2025-05-10
Attending: INTERNAL MEDICINE
Payer: MEDICARE

## 2025-05-10 DIAGNOSIS — Z95.0 PRESENCE OF CARDIAC PACEMAKER: ICD-10-CM

## 2025-05-10 DIAGNOSIS — R55 SYNCOPE AND COLLAPSE: ICD-10-CM

## 2025-05-10 LAB — APO B SERPL-MCNC: 56 MG/DL

## 2025-05-10 PROCEDURE — 93296 REM INTERROG EVL PM/IDS: CPT | Performed by: INTERNAL MEDICINE

## 2025-05-10 PROCEDURE — 93294 REM INTERROG EVL PM/LDLS PM: CPT | Mod: ,,, | Performed by: INTERNAL MEDICINE

## 2025-05-12 ENCOUNTER — RESULTS FOLLOW-UP (OUTPATIENT)
Dept: CARDIOLOGY | Facility: CLINIC | Age: 85
End: 2025-05-12

## 2025-05-12 DIAGNOSIS — E78.41 ELEVATED LIPOPROTEIN(A): Primary | ICD-10-CM

## 2025-05-12 LAB — W LP(A): 237 NMOL/L

## 2025-05-14 DIAGNOSIS — Z95.0 CARDIAC PACEMAKER IN SITU: ICD-10-CM

## 2025-05-14 DIAGNOSIS — I44.2 CHB (COMPLETE HEART BLOCK): Primary | ICD-10-CM

## 2025-05-15 ENCOUNTER — PATIENT MESSAGE (OUTPATIENT)
Dept: SURGERY | Facility: CLINIC | Age: 85
End: 2025-05-15
Payer: MEDICARE

## 2025-05-15 ENCOUNTER — OFFICE VISIT (OUTPATIENT)
Dept: SURGERY | Facility: CLINIC | Age: 85
End: 2025-05-15
Payer: MEDICARE

## 2025-05-15 DIAGNOSIS — C73 THYROID CANCER: Primary | ICD-10-CM

## 2025-05-16 ENCOUNTER — TELEPHONE (OUTPATIENT)
Dept: SURGERY | Facility: CLINIC | Age: 85
End: 2025-05-16
Payer: MEDICARE

## 2025-05-16 NOTE — TELEPHONE ENCOUNTER
05/16/25              0836 am  Spoke to patient regarding virtual appointment for yesterday 05/16/25. Patient stated she didn't received call. Patient also stated she would like to know her lab results. She is concerned with her labs being abnormal. Patient stated she would like to hear back from Dr. Wang and explain lab results she said he can message her. Also patient stated she was charge for virtual appointment when didn't happened she would like charge to be dropped. Informed patient I would delivered this message to Dr. Wang. Patient verbalized understanding.

## 2025-05-19 ENCOUNTER — TELEPHONE (OUTPATIENT)
Dept: ELECTROPHYSIOLOGY | Facility: CLINIC | Age: 85
End: 2025-05-19
Payer: MEDICARE

## 2025-05-19 ENCOUNTER — TELEPHONE (OUTPATIENT)
Dept: SURGERY | Facility: CLINIC | Age: 85
End: 2025-05-19
Payer: MEDICARE

## 2025-05-19 NOTE — TELEPHONE ENCOUNTER
05/19/25                0130 pm  Call patient no answer. Left message she is schedule for audio appointment for tomorrow we had to move it for the afternoon. Dr. Wang will be in a meeting. Please call us back if you have any questions [phone].

## 2025-05-20 ENCOUNTER — OFFICE VISIT (OUTPATIENT)
Dept: SURGERY | Facility: CLINIC | Age: 85
End: 2025-05-20
Payer: MEDICARE

## 2025-05-20 DIAGNOSIS — C73 THYROID CANCER: Primary | ICD-10-CM

## 2025-05-20 PROCEDURE — 99024 POSTOP FOLLOW-UP VISIT: CPT | Mod: 93,,, | Performed by: STUDENT IN AN ORGANIZED HEALTH CARE EDUCATION/TRAINING PROGRAM

## 2025-05-20 NOTE — PROGRESS NOTES
Audio Only Telehealth Visit     The patient location is: [place]  The chief complaint leading to consultation is: thyroid cancer  Visit type: Virtual visit with audio only (telephone)  Total time spent in medical discussion with patient: 10 minutes  Total time spent on date of the encounter:15 minutes       [facility] - Post operative visit note          Mrs. Sorenson returns to clinic for routine postoperative visit after undergoing thyroidectomy on 3/31/2025.    She has no acute complaints; she is tolerating a diet without issue, pain is minimal and controlled with OTC medications, ambulating and performing ADLs without issue, and incisions are clean/dry/intact with no gross signs of infection. Pathology results discussed with the patient.     TSH slightly out of normal range, but likely this is acceptable given age and normal fT4    We will plan to followup in clinic  in 3 months or sooner if any symptoms or concerns         Cirilo Wang MD, [facility]  General and Endocrine Surgery         Final Diagnosis   Part 1  Thyroid, left, hemithyroidectomy:   Papillary thyroid carcinoma, classic subtype  Pathologic stage classification: pT1bNx  See synoptic report     Part 2   Thyroid, isthmus, thyroid isthmusectomy:  Unremarkable thyroid parenchyma, negative for atypia or malignancy                           This service was not originating from a related E/M service provided within the previous 7 days nor will  to an E/M service or procedure within the next 24 hours or my soonest available appointment.  Prevailing standard of care was able to be met in this audio-only visit.

## 2025-05-25 PROBLEM — N18.2 CKD (CHRONIC KIDNEY DISEASE) STAGE 2, GFR 60-89 ML/MIN: Status: ACTIVE | Noted: 2025-05-25
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
## 2025-05-28 ENCOUNTER — LAB VISIT (OUTPATIENT)
Dept: LAB | Facility: HOSPITAL | Age: 85
End: 2025-05-28
Attending: INTERNAL MEDICINE
Payer: MEDICARE

## 2025-05-28 ENCOUNTER — OFFICE VISIT (OUTPATIENT)
Dept: INTERNAL MEDICINE | Facility: CLINIC | Age: 85
End: 2025-05-28
Payer: MEDICARE

## 2025-05-28 VITALS
WEIGHT: 171.5 LBS | OXYGEN SATURATION: 98 % | TEMPERATURE: 97 F | RESPIRATION RATE: 18 BRPM | HEIGHT: 63 IN | BODY MASS INDEX: 30.39 KG/M2 | SYSTOLIC BLOOD PRESSURE: 128 MMHG | HEART RATE: 68 BPM | DIASTOLIC BLOOD PRESSURE: 82 MMHG

## 2025-05-28 DIAGNOSIS — I10 ESSENTIAL HYPERTENSION: Primary | ICD-10-CM

## 2025-05-28 DIAGNOSIS — I65.23 CAROTID STENOSIS, BILATERAL: ICD-10-CM

## 2025-05-28 DIAGNOSIS — Z85.850 HX OF THYROID CANCER: ICD-10-CM

## 2025-05-28 DIAGNOSIS — R73.03 PRE-DIABETES: ICD-10-CM

## 2025-05-28 DIAGNOSIS — I70.0 AORTIC ATHEROSCLEROSIS: ICD-10-CM

## 2025-05-28 DIAGNOSIS — N18.2 CKD (CHRONIC KIDNEY DISEASE) STAGE 2, GFR 60-89 ML/MIN: ICD-10-CM

## 2025-05-28 DIAGNOSIS — R93.1 AGATSTON CORONARY ARTERY CALCIUM SCORE GREATER THAN 400: ICD-10-CM

## 2025-05-28 DIAGNOSIS — E78.5 HYPERLIPIDEMIA, UNSPECIFIED HYPERLIPIDEMIA TYPE: ICD-10-CM

## 2025-05-28 DIAGNOSIS — I27.9 CHRONIC PULMONARY HEART DISEASE: ICD-10-CM

## 2025-05-28 DIAGNOSIS — I48.0 PAROXYSMAL ATRIAL FIBRILLATION: ICD-10-CM

## 2025-05-28 DIAGNOSIS — R91.1 PULMONARY NODULE: ICD-10-CM

## 2025-05-28 LAB
EAG (OHS): 128 MG/DL (ref 68–131)
HBA1C MFR BLD: 6.1 % (ref 4–5.6)

## 2025-05-28 PROCEDURE — 1126F AMNT PAIN NOTED NONE PRSNT: CPT | Mod: CPTII,S$GLB,, | Performed by: INTERNAL MEDICINE

## 2025-05-28 PROCEDURE — 99999 PR PBB SHADOW E&M-EST. PATIENT-LVL IV: CPT | Mod: PBBFAC,,, | Performed by: INTERNAL MEDICINE

## 2025-05-28 PROCEDURE — 99215 OFFICE O/P EST HI 40 MIN: CPT | Mod: S$GLB,,, | Performed by: INTERNAL MEDICINE

## 2025-05-28 PROCEDURE — 3074F SYST BP LT 130 MM HG: CPT | Mod: CPTII,S$GLB,, | Performed by: INTERNAL MEDICINE

## 2025-05-28 PROCEDURE — 3079F DIAST BP 80-89 MM HG: CPT | Mod: CPTII,S$GLB,, | Performed by: INTERNAL MEDICINE

## 2025-05-28 PROCEDURE — 36415 COLL VENOUS BLD VENIPUNCTURE: CPT | Mod: PO

## 2025-05-28 PROCEDURE — 1159F MED LIST DOCD IN RCRD: CPT | Mod: CPTII,S$GLB,, | Performed by: INTERNAL MEDICINE

## 2025-05-28 PROCEDURE — 83036 HEMOGLOBIN GLYCOSYLATED A1C: CPT

## 2025-05-28 RX ORDER — AMLODIPINE BESYLATE 5 MG/1
5 TABLET ORAL DAILY
Qty: 90 TABLET | Refills: 3 | Status: SHIPPED | OUTPATIENT
Start: 2025-05-28

## 2025-05-28 RX ORDER — CELECOXIB 100 MG/1
100 CAPSULE ORAL DAILY
Qty: 90 CAPSULE | Refills: 3 | Status: SHIPPED | OUTPATIENT
Start: 2025-05-28

## 2025-05-29 ENCOUNTER — RESULTS FOLLOW-UP (OUTPATIENT)
Dept: INTERNAL MEDICINE | Facility: CLINIC | Age: 85
End: 2025-05-29
Payer: MEDICARE

## 2025-05-29 LAB
OHS CV AF BURDEN PERCENT: < 1
OHS CV DC REMOTE DEVICE TYPE: NORMAL
OHS CV RV PACING PERCENT: 78 %

## 2025-06-09 ENCOUNTER — HOSPITAL ENCOUNTER (OUTPATIENT)
Dept: CARDIOLOGY | Facility: HOSPITAL | Age: 85
Discharge: HOME | End: 2025-06-09
Attending: INTERNAL MEDICINE
Payer: MEDICARE

## 2025-06-09 VITALS
DIASTOLIC BLOOD PRESSURE: 69 MMHG | HEIGHT: 63 IN | HEART RATE: 60 BPM | SYSTOLIC BLOOD PRESSURE: 102 MMHG | BODY MASS INDEX: 30.3 KG/M2 | WEIGHT: 171 LBS

## 2025-06-09 DIAGNOSIS — I65.23 CAROTID STENOSIS, BILATERAL: ICD-10-CM

## 2025-06-09 DIAGNOSIS — I27.20 PULMONARY HYPERTENSION: ICD-10-CM

## 2025-06-09 LAB
AORTIC SIZE INDEX (SOV): 1.6 CM/M2
AORTIC SIZE INDEX: 1.8 CM/M2
ASCENDING AORTA: 3.2 CM
AV INDEX (PROSTH): 0.75
AV MEAN GRADIENT: 5 MMHG
AV PEAK GRADIENT: 10 MMHG
AV VALVE AREA BY VELOCITY RATIO: 2.1 CM²
AV VALVE AREA: 2.1 CM²
AV VELOCITY RATIO: 0.75
BSA FOR ECHO PROCEDURE: 1.86 M2
CV ECHO LV RWT: 0.34 CM
DOP CALC AO PEAK VEL: 1.6 M/S
DOP CALC AO VTI: 30.1 CM
DOP CALC LVOT AREA: 2.8 CM2
DOP CALC LVOT DIAMETER: 1.9 CM
DOP CALC LVOT PEAK VEL: 1.2 M/S
DOP CALC LVOT STROKE VOLUME: 64.3 CM3
DOP CALC MV VTI: 34.04 CM
DOP CALCLVOT PEAK VEL VTI: 22.7 CM
E WAVE DECELERATION TIME: 178 MSEC
E/A RATIO: 1.14
E/E' RATIO: 15 M/S
ECHO LV POSTERIOR WALL: 0.8 CM (ref 0.6–1.1)
FRACTIONAL SHORTENING: 34 % (ref 28–44)
HR MV ECHO: 60 BPM
INTERVENTRICULAR SEPTUM: 0.6 CM (ref 0.6–1.1)
IVRT: 123 MSEC
LA MAJOR: 6.7 CM
LA MINOR: 6.5 CM
LA WIDTH: 4.5 CM
LEFT ATRIUM SIZE: 3.3 CM
LEFT ATRIUM VOLUME INDEX MOD: 38 ML/M2
LEFT ATRIUM VOLUME INDEX: 46 ML/M2
LEFT ATRIUM VOLUME MOD: 69 ML
LEFT ATRIUM VOLUME: 83 CM3
LEFT INTERNAL DIMENSION IN SYSTOLE: 3.1 CM (ref 2.1–4)
LEFT VENTRICLE DIASTOLIC VOLUME INDEX: 55.8 ML/M2
LEFT VENTRICLE DIASTOLIC VOLUME: 101 ML
LEFT VENTRICLE MASS INDEX: 56.9 G/M2
LEFT VENTRICLE SYSTOLIC VOLUME INDEX: 21.5 ML/M2
LEFT VENTRICLE SYSTOLIC VOLUME: 39 ML
LEFT VENTRICULAR INTERNAL DIMENSION IN DIASTOLE: 4.7 CM (ref 3.5–6)
LEFT VENTRICULAR MASS: 103.1 G
LV LATERAL E/E' RATIO: 14.7 M/S
LV SEPTAL E/E' RATIO: 14.7 M/S
MV A" WAVE DURATION": 9.13 MSEC
MV MEAN GRADIENT: 2 MMHG
MV PEAK A VEL: 0.77 M/S
MV PEAK E VEL: 0.88 M/S
MV PEAK GRADIENT: 4 MMHG
MV STENOSIS PRESSURE HALF TIME: 51.74 MS
MV VALVE AREA BY CONTINUITY EQUATION: 1.89 CM2
MV VALVE AREA P 1/2 METHOD: 4.25 CM2
OHS CV RV/LV RATIO: 0.74 CM
PISA TR MAX VEL: 3 M/S
PULM VEIN S/D RATIO: 1.32
PV PEAK D VEL: 0.5 M/S
PV PEAK S VEL: 0.66 M/S
RA MAJOR: 5.64 CM
RA PRESSURE ESTIMATED: 3 MMHG
RA WIDTH: 4.1 CM
RIGHT VENTRICLE DIASTOLIC BASEL DIMENSION: 3.5 CM
RV TB RVSP: 6 MMHG
SINUS: 2.96 CM
STJ: 2.7 CM
TDI LATERAL: 0.06 M/S
TDI SEPTAL: 0.06 M/S
TDI: 0.06 M/S
TRICUSPID ANNULAR PLANE SYSTOLIC EXCURSION: 2.7 CM
TV REST PULMONARY ARTERY PRESSURE: 39 MMHG
Z-SCORE OF LEFT VENTRICULAR DIMENSION IN END DIASTOLE: -0.63
Z-SCORE OF LEFT VENTRICULAR DIMENSION IN END SYSTOLE: 0.02

## 2025-06-09 PROCEDURE — 93880 EXTRACRANIAL BILAT STUDY: CPT

## 2025-06-09 PROCEDURE — 93306 TTE W/DOPPLER COMPLETE: CPT | Mod: 26,,, | Performed by: INTERNAL MEDICINE

## 2025-06-09 PROCEDURE — 93306 TTE W/DOPPLER COMPLETE: CPT

## 2025-06-09 PROCEDURE — 93880 EXTRACRANIAL BILAT STUDY: CPT | Mod: 26,,, | Performed by: INTERNAL MEDICINE

## 2025-06-10 LAB
LEFT ARM DIASTOLIC BLOOD PRESSURE: 69 MMHG
LEFT ARM SYSTOLIC BLOOD PRESSURE: 102 MMHG
LEFT CBA DIAS: 16 CM/S
LEFT CBA SYS: 51 CM/S
LEFT CCA DIST DIAS: 20 CM/S
LEFT CCA DIST SYS: 51 CM/S
LEFT CCA MID DIAS: 17 CM/S
LEFT CCA MID SYS: 56 CM/S
LEFT CCA PROX DIAS: 19 CM/S
LEFT CCA PROX SYS: 76 CM/S
LEFT ECA DIAS: 10 CM/S
LEFT ECA SYS: 58 CM/S
LEFT ICA DIST DIAS: 23 CM/S
LEFT ICA DIST SYS: 58 CM/S
LEFT ICA MID DIAS: 22 CM/S
LEFT ICA MID SYS: 60 CM/S
LEFT ICA PROX DIAS: 18 CM/S
LEFT ICA PROX SYS: 53 CM/S
LEFT VERTEBRAL DIAS: 9 CM/S
LEFT VERTEBRAL SYS: 19 CM/S
OHS CV CAROTID RIGHT ICA EDV HIGHEST: 37
OHS CV CAROTID ULTRASOUND LEFT ICA/CCA RATIO: 1.18
OHS CV CAROTID ULTRASOUND RIGHT ICA/CCA RATIO: 1.96
OHS CV PV CAROTID LEFT HIGHEST CCA: 76
OHS CV PV CAROTID LEFT HIGHEST ICA: 60
OHS CV PV CAROTID RIGHT HIGHEST CCA: 62
OHS CV PV CAROTID RIGHT HIGHEST ICA: 94
OHS CV US CAROTID LEFT HIGHEST EDV: 23
RIGHT ARM DIASTOLIC BLOOD PRESSURE: 70 MMHG
RIGHT ARM SYSTOLIC BLOOD PRESSURE: 120 MMHG
RIGHT CBA DIAS: 17 CM/S
RIGHT CBA SYS: 42 CM/S
RIGHT CCA DIST DIAS: 19 CM/S
RIGHT CCA DIST SYS: 48 CM/S
RIGHT CCA MID DIAS: 18 CM/S
RIGHT CCA MID SYS: 50 CM/S
RIGHT CCA PROX DIAS: 17 CM/S
RIGHT CCA PROX SYS: 62 CM/S
RIGHT ECA DIAS: 13 CM/S
RIGHT ECA SYS: 74 CM/S
RIGHT ICA DIST DIAS: 37 CM/S
RIGHT ICA DIST SYS: 94 CM/S
RIGHT ICA MID DIAS: 28 CM/S
RIGHT ICA MID SYS: 73 CM/S
RIGHT ICA PROX DIAS: 26 CM/S
RIGHT ICA PROX SYS: 56 CM/S
RIGHT VERTEBRAL DIAS: 21 CM/S
RIGHT VERTEBRAL SYS: 75 CM/S

## 2025-06-24 ENCOUNTER — OFFICE VISIT (OUTPATIENT)
Dept: DERMATOLOGY | Facility: CLINIC | Age: 85
End: 2025-06-24
Payer: MEDICARE

## 2025-06-24 DIAGNOSIS — D22.9 MULTIPLE BENIGN NEVI: ICD-10-CM

## 2025-06-24 DIAGNOSIS — D23.9 DERMATOFIBROMA: ICD-10-CM

## 2025-06-24 DIAGNOSIS — L82.1 SEBORRHEIC KERATOSES: ICD-10-CM

## 2025-06-24 DIAGNOSIS — Z76.89 ENCOUNTER FOR SKIN CARE: ICD-10-CM

## 2025-06-24 DIAGNOSIS — L40.9 PSORIASIS: Primary | ICD-10-CM

## 2025-06-24 DIAGNOSIS — Z12.83 SCREENING EXAM FOR SKIN CANCER: ICD-10-CM

## 2025-06-24 DIAGNOSIS — L81.4 LENTIGINES: ICD-10-CM

## 2025-06-24 DIAGNOSIS — B07.9 VIRAL WARTS, UNSPECIFIED TYPE: ICD-10-CM

## 2025-06-24 PROCEDURE — 1160F RVW MEDS BY RX/DR IN RCRD: CPT | Mod: CPTII,HCNC,S$GLB, | Performed by: STUDENT IN AN ORGANIZED HEALTH CARE EDUCATION/TRAINING PROGRAM

## 2025-06-24 PROCEDURE — 1159F MED LIST DOCD IN RCRD: CPT | Mod: CPTII,HCNC,S$GLB, | Performed by: STUDENT IN AN ORGANIZED HEALTH CARE EDUCATION/TRAINING PROGRAM

## 2025-06-24 PROCEDURE — 99214 OFFICE O/P EST MOD 30 MIN: CPT | Mod: 25,HCNC,S$GLB, | Performed by: STUDENT IN AN ORGANIZED HEALTH CARE EDUCATION/TRAINING PROGRAM

## 2025-06-24 PROCEDURE — 99999 PR PBB SHADOW E&M-EST. PATIENT-LVL III: CPT | Mod: PBBFAC,HCNC,, | Performed by: STUDENT IN AN ORGANIZED HEALTH CARE EDUCATION/TRAINING PROGRAM

## 2025-06-24 PROCEDURE — 1101F PT FALLS ASSESS-DOCD LE1/YR: CPT | Mod: CPTII,HCNC,S$GLB, | Performed by: STUDENT IN AN ORGANIZED HEALTH CARE EDUCATION/TRAINING PROGRAM

## 2025-06-24 PROCEDURE — 1126F AMNT PAIN NOTED NONE PRSNT: CPT | Mod: CPTII,HCNC,S$GLB, | Performed by: STUDENT IN AN ORGANIZED HEALTH CARE EDUCATION/TRAINING PROGRAM

## 2025-06-24 PROCEDURE — 3288F FALL RISK ASSESSMENT DOCD: CPT | Mod: CPTII,HCNC,S$GLB, | Performed by: STUDENT IN AN ORGANIZED HEALTH CARE EDUCATION/TRAINING PROGRAM

## 2025-06-24 PROCEDURE — 17110 DESTRUCTION B9 LES UP TO 14: CPT | Mod: HCNC,S$GLB,, | Performed by: STUDENT IN AN ORGANIZED HEALTH CARE EDUCATION/TRAINING PROGRAM

## 2025-06-24 RX ORDER — BETAMETHASONE DIPROPIONATE 0.5 MG/G
OINTMENT TOPICAL
Qty: 45 G | Refills: 2 | Status: SHIPPED | OUTPATIENT
Start: 2025-06-24

## 2025-06-24 NOTE — PROGRESS NOTES
Subjective:      Patient ID:  Maryann Sorenson is a 84 y.o. female who presents for No chief complaint on file.    Maryann Sorenson is a 84 y.o. female who presents for: FBSE screening exam for skin cancer.    Last office visit 6/11/2024 with me     The patient has the following lesions of concern:  Location: left elbow   Duration: a couple of weeks   Symptoms: none   Relieving factors/Previous treatments: cortisone 10- does not really help     Patient with new area of concern:   Location: left ring finger wart   Previous treatments: none     Patient with new area of concern:   Location: back of right arm   Previous treatments: none     Pertinent history:  History of blistering sunburns: No  History of tanning bed use: No  Family history of melanoma: ? mother / father spots removed, unsure if cancerous   Personal history of mole removal: Yes   Personal history of skin cancer: No         Review of Systems   Skin:  Positive for daily sunscreen use (face) and activity-related sunscreen use. Negative for recent sunburn.   Hematologic/Lymphatic: Bruises/bleeds easily (eliquis daily).       Objective:   Physical Exam   Constitutional: She appears well-developed and well-nourished. No distress.   Neurological: She is alert and oriented to person, place, and time. She is not disoriented.   Psychiatric: She has a normal mood and affect.   Skin:   Areas Examined (abnormalities noted in diagram):   Scalp / Hair Palpated and Inspected  Head / Face Inspection Performed  Neck Inspection Performed  Chest / Axilla Inspection Performed  Abdomen Inspection Performed  Genitals / Buttocks / Groin Inspection Performed  Back Inspection Performed  RUE Inspected  LUE Inspection Performed  RLE Inspected  LLE Inspection Performed  Nails and Digits Inspection Performed                     Diagram Legend     Erythematous scaling macule/papule c/w actinic keratosis       Vascular papule c/w angioma      Pigmented verrucoid papule/plaque c/w  seborrheic keratosis      Yellow umbilicated papule c/w sebaceous hyperplasia      Irregularly shaped tan macule c/w lentigo     1-2 mm smooth white papules consistent with Milia      Movable subcutaneous cyst with punctum c/w epidermal inclusion cyst      Subcutaneous movable cyst c/w pilar cyst      Firm pink to brown papule c/w dermatofibroma      Pedunculated fleshy papule(s) c/w skin tag(s)      Evenly pigmented macule c/w junctional nevus     Mildly variegated pigmented, slightly irregular-bordered macule c/w mildly atypical nevus      Flesh colored to evenly pigmented papule c/w intradermal nevus       Pink pearly papule/plaque c/w basal cell carcinoma      Erythematous hyperkeratotic cursted plaque c/w SCC      Surgical scar with no sign of skin cancer recurrence      Open and closed comedones      Inflammatory papules and pustules      Verrucoid papule consistent consistent with wart     Erythematous eczematous patches and plaques     Dystrophic onycholytic nail with subungual debris c/w onychomycosis     Umbilicated papule    Erythematous-base heme-crusted tan verrucoid plaque consistent with inflamed seborrheic keratosis     Erythematous Silvery Scaling Plaque c/w Psoriasis     See annotation      Assessment / Plan:        Seborrheic keratoses  These are benign inherited growths without a malignant potential. Reassurance given to patient. No treatment is necessary.     Psoriasis  Localized to L elbow/extensor arm  Start betamethasone ointment BID 2 weeks on/ 1 week off to rash on body    Lentigines  This is a benign hyperpigmented sun induced lesion. Recommend daily sun protection/avoidance and use of at least SPF 30, broad spectrum sunscreen (OTC drug) will reduce the number of new lesions. Treatment of these benign lesions are considered cosmetic.    Multiple benign nevi  reassurance    Viral warts, unspecified type  Cryosurgery procedure note:    Verbal consent from the patient is obtained including,  but not limited to, risk of hypopigmentation/hyperpigmentation, scar, recurrence of lesion. Liquid nitrogen cryosurgery is applied to 1 verruca, as detailed in the physical exam, to produce a freeze injury. 3 consecutive freeze thaw cycles are applied to each verruca. The patient is aware that blisters (possibly blood blisters) may form.    Dermatofibroma  This is a benign scar-like lesion secondary to minor trauma. No treatment required.     Screening exam for skin cancer  Total body skin examination performed today including at least 12 points as noted in physical examination. No lesions suspicious for malignancy noted.    Recommend daily sun protection/avoidance, use of at least SPF 30, broad spectrum sunscreen (OTC drug), skin self examinations, and routine physician surveillance to optimize early detection    Encounter skin care  Patient concerned over blackheads of face and wants extraction  I examined face with dermoscopy do not see any blackheads. See regular pores without any keratin debris.  Recommend to consider start topical retinoid, she prefers OTC, start adapalene gel qhs pea-sized amount to face and moisturize after. Results take consistent usage    RTC 1 yr, sooner prn

## 2025-06-25 DIAGNOSIS — Z78.0 MENOPAUSE: ICD-10-CM

## 2025-07-15 ENCOUNTER — TELEPHONE (OUTPATIENT)
Dept: SURGERY | Facility: CLINIC | Age: 85
End: 2025-07-15
Payer: MEDICARE

## 2025-07-15 NOTE — TELEPHONE ENCOUNTER
07/15/25       0835 am  Call patient to informed Dr. Wang is in surgery this morning if she wanted to do a virtual or reschedule. No answer. Left voicemail. Please call us back at [phone].

## 2025-07-18 ENCOUNTER — CLINICAL SUPPORT (OUTPATIENT)
Dept: CARDIOLOGY | Facility: CLINIC | Age: 85
End: 2025-07-18
Attending: INTERNAL MEDICINE
Payer: MEDICARE

## 2025-07-18 ENCOUNTER — OFFICE VISIT (OUTPATIENT)
Dept: CARDIOLOGY | Facility: CLINIC | Age: 85
End: 2025-07-18
Payer: MEDICARE

## 2025-07-18 VITALS
HEIGHT: 63 IN | WEIGHT: 176 LBS | BODY MASS INDEX: 31.18 KG/M2 | HEART RATE: 61 BPM | SYSTOLIC BLOOD PRESSURE: 118 MMHG | DIASTOLIC BLOOD PRESSURE: 76 MMHG

## 2025-07-18 DIAGNOSIS — I10 ESSENTIAL HYPERTENSION: ICD-10-CM

## 2025-07-18 DIAGNOSIS — I44.2 CHB (COMPLETE HEART BLOCK): ICD-10-CM

## 2025-07-18 DIAGNOSIS — Z95.0 CARDIAC PACEMAKER IN SITU: ICD-10-CM

## 2025-07-18 DIAGNOSIS — I48.0 PAF (PAROXYSMAL ATRIAL FIBRILLATION): Primary | ICD-10-CM

## 2025-07-18 DIAGNOSIS — I49.8 OTHER SPECIFIED CARDIAC ARRHYTHMIAS: ICD-10-CM

## 2025-07-18 DIAGNOSIS — G47.33 OSA (OBSTRUCTIVE SLEEP APNEA): ICD-10-CM

## 2025-07-18 DIAGNOSIS — R55 VASOVAGAL SYNCOPE: ICD-10-CM

## 2025-07-18 LAB
OHS QRS DURATION: 164 MS
OHS QTC CALCULATION: 473 MS

## 2025-07-18 PROCEDURE — 99999 PR PBB SHADOW E&M-EST. PATIENT-LVL I: CPT | Mod: PBBFAC,HCNC,, | Performed by: INTERNAL MEDICINE

## 2025-07-18 PROCEDURE — 99999 PR PBB SHADOW E&M-EST. PATIENT-LVL III: CPT | Mod: PBBFAC,HCNC,,

## 2025-07-18 RX ORDER — FLECAINIDE ACETATE 50 MG/1
50 TABLET ORAL EVERY 12 HOURS
Qty: 180 TABLET | Refills: 3 | Status: SHIPPED | OUTPATIENT
Start: 2025-07-18 | End: 2026-07-18

## 2025-07-18 NOTE — PROGRESS NOTES
Subjective   Patient ID:  Maryann Sorenson is a 84 y.o. female who presents for follow-up of Atrial Fibrillation and Pacemaker Check      HPI  I had the pleasure of seeing Mrs Sorenson today in our electrophysiology clinic in follow-up for her atrial fibrillation and pacemaker. As you are aware she is a pleasant 84 year-old woman, former patient of Dr Anglin, with paroxysmal AF, hypertension (on amlodipine), NOE (on CPAP) and intermittent complete AVB. AF was diagnosed in April of 2022. Had increase in symptoms. Wore a Loopty monitor in January of 2024 and noted to have vasovagal syncope with cardioinhibitory response (developed sinus bradycardia with concomitant 12.4 seconds of complete AVB). She underwent dcPPM in 2/2024. She is on metoprolol and flecainide for her AF. She presents for yearly follow-up. Notes dyspnea on exertion that is worsening.    PET stress 12/2024: no ischemia/infarct  ECHO 6/2025: normal LVEF, severe LA enlargement.    My interpretation of today's in-clinic PPM check is stable lead parameters with 63% RA and 80% RV pacing (DDD-CLS).    My interpretation of today's in-clinic ECG is AV paced rhythm        Review of Systems   Constitutional: Negative for fever and malaise/fatigue.   HENT:  Negative for congestion and sore throat.    Eyes:  Negative for blurred vision and visual disturbance.   Cardiovascular:  Positive for irregular heartbeat. Negative for chest pain, dyspnea on exertion, near-syncope, palpitations and syncope.   Respiratory:  Negative for cough and shortness of breath.    Hematologic/Lymphatic: Negative for bleeding problem. Does not bruise/bleed easily.   Skin: Negative.    Musculoskeletal: Negative.    Gastrointestinal:  Negative for bloating, abdominal pain, hematochezia and melena.   Neurological:  Negative for focal weakness and weakness.   Psychiatric/Behavioral: Negative.            Objective     Physical Exam  Vitals reviewed.   Constitutional:       General: She is not in  acute distress.     Appearance: She is well-developed. She is not diaphoretic.   HENT:      Head: Normocephalic and atraumatic.   Eyes:      General:         Right eye: No discharge.         Left eye: No discharge.      Conjunctiva/sclera: Conjunctivae normal.   Cardiovascular:      Rate and Rhythm: Normal rate and regular rhythm.      Heart sounds: No murmur heard.     No friction rub. No gallop.   Pulmonary:      Effort: Pulmonary effort is normal. No respiratory distress.      Breath sounds: Normal breath sounds. No wheezing or rales.   Abdominal:      General: Bowel sounds are normal. There is no distension.      Palpations: Abdomen is soft.      Tenderness: There is no abdominal tenderness.   Musculoskeletal:      Cervical back: Neck supple.   Skin:     General: Skin is warm and dry.   Neurological:      Mental Status: She is alert and oriented to person, place, and time.   Psychiatric:         Behavior: Behavior normal.         Thought Content: Thought content normal.         Judgment: Judgment normal.            Assessment and Plan     1. PAF (paroxysmal atrial fibrillation)    2. CHB (complete heart block)    3. Essential hypertension    4. Vasovagal syncope    5. NOE (obstructive sleep apnea)        Plan:  In summary, Mrs Sorenson is a pleasant 84 year-old woman, former patient of Dr Anglin, with paroxysmal AF, hypertension (on amlodipine), NOE (on CPAP) and intermittent complete AVB. PPM is operating normally. AF well controlled on flec/metoprolol but having high burden of RV pacing and diastolic dysfunction symptoms. Will cut flecainide down to 50mg bid and change to DDD without CLS. Continue eliquis for stroke risk reduction. RTC in 6 months    Thank you for allowing me to participate in the care of this patient. Please do not hesitate to call me with any questions or concerns.    Aidan Mclean MD, PhD  Cardiac Electrophysiology

## 2025-07-20 ENCOUNTER — PATIENT MESSAGE (OUTPATIENT)
Dept: CARDIOLOGY | Facility: CLINIC | Age: 85
End: 2025-07-20
Payer: MEDICARE

## 2025-07-21 ENCOUNTER — TELEPHONE (OUTPATIENT)
Dept: CARDIOLOGY | Facility: HOSPITAL | Age: 85
End: 2025-07-21
Payer: MEDICARE

## 2025-07-21 NOTE — TELEPHONE ENCOUNTER
Called pt in relation to message received via the pt portal. (Please see portal message from this am).  Pt with c/o weakness in her arms and legs since Saturday morning.  Pt stated she was unable to measure her BP until this am which she reported 1st reading was 92 systolic and shortly afterward the reading had improved to 118/62.  Advised pt depending on what her baseline BP is, readings in the 90's can cause this type of symptoms.  Also advised the pt that I discussed this with the Device RN (Torrie) who reviewed PPM report from 7/18/25 along with the remote transmission from 1:46 this am, the V pacing % has not changed and that these symptoms do not appear to be related to her PPM.  Pt then stated that her dentist prescribed Clindamycin on last week for a oral abscess that has not improved and that she has a f/u appt with her dentist on Wed.  Understanding was verbalized.  Pt appreciated the call.

## 2025-07-22 ENCOUNTER — OFFICE VISIT (OUTPATIENT)
Dept: SURGERY | Facility: CLINIC | Age: 85
End: 2025-07-22
Payer: MEDICARE

## 2025-07-22 VITALS
DIASTOLIC BLOOD PRESSURE: 67 MMHG | HEIGHT: 63 IN | WEIGHT: 176.38 LBS | HEART RATE: 52 BPM | SYSTOLIC BLOOD PRESSURE: 116 MMHG | TEMPERATURE: 97 F | BODY MASS INDEX: 31.25 KG/M2

## 2025-07-22 DIAGNOSIS — C73 THYROID CANCER: Primary | ICD-10-CM

## 2025-07-22 PROCEDURE — 99214 OFFICE O/P EST MOD 30 MIN: CPT | Mod: HCNC,S$GLB,, | Performed by: STUDENT IN AN ORGANIZED HEALTH CARE EDUCATION/TRAINING PROGRAM

## 2025-07-22 PROCEDURE — 1101F PT FALLS ASSESS-DOCD LE1/YR: CPT | Mod: CPTII,HCNC,S$GLB, | Performed by: STUDENT IN AN ORGANIZED HEALTH CARE EDUCATION/TRAINING PROGRAM

## 2025-07-22 PROCEDURE — 3074F SYST BP LT 130 MM HG: CPT | Mod: CPTII,HCNC,S$GLB, | Performed by: STUDENT IN AN ORGANIZED HEALTH CARE EDUCATION/TRAINING PROGRAM

## 2025-07-22 PROCEDURE — 1159F MED LIST DOCD IN RCRD: CPT | Mod: CPTII,HCNC,S$GLB, | Performed by: STUDENT IN AN ORGANIZED HEALTH CARE EDUCATION/TRAINING PROGRAM

## 2025-07-22 PROCEDURE — 3288F FALL RISK ASSESSMENT DOCD: CPT | Mod: CPTII,HCNC,S$GLB, | Performed by: STUDENT IN AN ORGANIZED HEALTH CARE EDUCATION/TRAINING PROGRAM

## 2025-07-22 PROCEDURE — 1126F AMNT PAIN NOTED NONE PRSNT: CPT | Mod: CPTII,HCNC,S$GLB, | Performed by: STUDENT IN AN ORGANIZED HEALTH CARE EDUCATION/TRAINING PROGRAM

## 2025-07-22 PROCEDURE — 99999 PR PBB SHADOW E&M-EST. PATIENT-LVL IV: CPT | Mod: PBBFAC,HCNC,, | Performed by: STUDENT IN AN ORGANIZED HEALTH CARE EDUCATION/TRAINING PROGRAM

## 2025-07-22 PROCEDURE — 3078F DIAST BP <80 MM HG: CPT | Mod: CPTII,HCNC,S$GLB, | Performed by: STUDENT IN AN ORGANIZED HEALTH CARE EDUCATION/TRAINING PROGRAM

## 2025-08-09 ENCOUNTER — CLINICAL SUPPORT (OUTPATIENT)
Dept: CARDIOLOGY | Facility: HOSPITAL | Age: 85
End: 2025-08-09
Payer: MEDICARE

## 2025-08-09 ENCOUNTER — CLINICAL SUPPORT (OUTPATIENT)
Dept: CARDIOLOGY | Facility: HOSPITAL | Age: 85
End: 2025-08-09
Attending: INTERNAL MEDICINE
Payer: MEDICARE

## 2025-08-09 DIAGNOSIS — Z95.0 PRESENCE OF CARDIAC PACEMAKER: ICD-10-CM

## 2025-08-09 DIAGNOSIS — R55 SYNCOPE AND COLLAPSE: ICD-10-CM

## 2025-08-09 PROCEDURE — 93296 REM INTERROG EVL PM/IDS: CPT | Mod: HCNC | Performed by: INTERNAL MEDICINE

## 2025-08-09 PROCEDURE — 93294 REM INTERROG EVL PM/LDLS PM: CPT | Mod: HCNC,,, | Performed by: INTERNAL MEDICINE

## 2025-08-14 ENCOUNTER — PATIENT MESSAGE (OUTPATIENT)
Facility: CLINIC | Age: 85
End: 2025-08-14
Payer: MEDICARE

## 2025-08-14 DIAGNOSIS — M54.10 RADICULOPATHY, UNSPECIFIED SPINAL REGION: ICD-10-CM

## 2025-08-14 DIAGNOSIS — G25.81 RLS (RESTLESS LEGS SYNDROME): ICD-10-CM

## 2025-08-14 LAB
OHS CV AF BURDEN PERCENT: < 1
OHS CV DC REMOTE DEVICE TYPE: NORMAL
OHS CV RV PACING PERCENT: 56 %

## 2025-08-14 RX ORDER — GABAPENTIN 300 MG/1
CAPSULE ORAL
Qty: 270 CAPSULE | Refills: 3 | Status: SHIPPED | OUTPATIENT
Start: 2025-08-14

## 2025-09-03 DIAGNOSIS — E78.2 MIXED HYPERLIPIDEMIA: ICD-10-CM

## 2025-09-03 RX ORDER — ROSUVASTATIN CALCIUM 40 MG/1
40 TABLET, COATED ORAL DAILY
Qty: 90 TABLET | Refills: 3 | Status: SHIPPED | OUTPATIENT
Start: 2025-09-03

## (undated) DEVICE — PENCIL ROCKER SWITCH 10FT CORD

## (undated) DEVICE — TOWEL OR DISP STRL BLUE 4/PK

## (undated) DEVICE — DRAPE STERI INSTRUMENT 1018

## (undated) DEVICE — PAD DEFIB CADENCE ADULT R2

## (undated) DEVICE — SUT SILK 2-0 STRANDS 30IN

## (undated) DEVICE — PENCIL SMK EVAC CONNECTOR 10FT

## (undated) DEVICE — PACK PACER PERMANENT OMC

## (undated) DEVICE — HEMOSTAT SURGICEL 4X8IN

## (undated) DEVICE — APPLIER LIGACLIP SM 9.38IN

## (undated) DEVICE — CORD BIPOLAR 12 FOOT

## (undated) DEVICE — SYR LUER LOCK STERILE 10ML

## (undated) DEVICE — Device

## (undated) DEVICE — DISSECTOR LIGASURE EXACT 21CM

## (undated) DEVICE — SUT VICRYL 3-0 27 SH

## (undated) DEVICE — STAPLER SKIN ROTATING HEAD

## (undated) DEVICE — SUT 5-0 VICRYL / P-3 (J493)

## (undated) DEVICE — GOWN ECLIPSE REINF LVL4 TWL LG

## (undated) DEVICE — ELECTRODE REM PLYHSV RETURN 9

## (undated) DEVICE — NDL HYPO REG 25G X 1 1/2

## (undated) DEVICE — SLING SWATHE UNIVERSAL FOAM

## (undated) DEVICE — ADHESIVE DERMABOND ADVANCED

## (undated) DEVICE — DRESSING AQUACEL AG ADV 3.5X6

## (undated) DEVICE — DRAPE THREE-QTR REINF 53X77IN

## (undated) DEVICE — DRAPE INSTR MAGNETIC 10X16IN

## (undated) DEVICE — GAUZE SPONGE PEANUT STRL

## (undated) DEVICE — ELECTRODE BLADE INSULATED 1 IN

## (undated) DEVICE — BLADE SURG #15 CARBON STEEL

## (undated) DEVICE — PACK EENT SURG II ECLIPSE

## (undated) DEVICE — COVER INSTR ELASTIC BAND 40X20

## (undated) DEVICE — DRAPE SURG W/TWL 17 5/8X23

## (undated) DEVICE — COVER LIGHT HANDLE 80/CA

## (undated) DEVICE — TRAY VAGINAL WET PREP

## (undated) DEVICE — GLOVE SENSICARE PI ORTHO 7.5

## (undated) DEVICE — MANIFOLD 4 PORT

## (undated) DEVICE — DRAPE TOP 53X102IN

## (undated) DEVICE — SHEATH SAFESHEATH II ULTRA 6FR

## (undated) DEVICE — CLOSURE SKIN STERI STRIP 1/2X4